# Patient Record
Sex: FEMALE | Race: WHITE | NOT HISPANIC OR LATINO | Employment: OTHER | URBAN - METROPOLITAN AREA
[De-identification: names, ages, dates, MRNs, and addresses within clinical notes are randomized per-mention and may not be internally consistent; named-entity substitution may affect disease eponyms.]

---

## 2017-01-03 ENCOUNTER — OFFICE VISIT (OUTPATIENT)
Dept: MEDICAL GROUP | Facility: PHYSICIAN GROUP | Age: 72
End: 2017-01-03
Payer: MEDICARE

## 2017-01-03 VITALS
BODY MASS INDEX: 27.64 KG/M2 | HEART RATE: 76 BPM | OXYGEN SATURATION: 91 % | WEIGHT: 156 LBS | DIASTOLIC BLOOD PRESSURE: 80 MMHG | HEIGHT: 63 IN | RESPIRATION RATE: 16 BRPM | SYSTOLIC BLOOD PRESSURE: 132 MMHG | TEMPERATURE: 97.7 F

## 2017-01-03 DIAGNOSIS — J20.9 ACUTE BRONCHITIS DUE TO INFECTION: ICD-10-CM

## 2017-01-03 PROCEDURE — 99214 OFFICE O/P EST MOD 30 MIN: CPT | Performed by: NURSE PRACTITIONER

## 2017-01-03 NOTE — MR AVS SNAPSHOT
"Rossy Lopez   1/3/2017 9:40 AM   Office Visit   MRN: [id number]    Department:  [facility]   Dept Phone:  [phone]    Description:  Female : 1945   Provider:  TASNEEM Mariscal           Reason for Visit     Cough     Shoulder Pain           Allergies as of 1/3/2017     Allergen Noted Reactions    Other Misc 2011   Hives    Patient is allergic to bandaides  RXN=ongoing    Phisohex [Hexachlorophene] 2016   Rash    Rash at contact site  RXN=48 years ago    [facility] 2010   Rash, Itching    RXN=44-48  Years ago     Tape 2016   Hives, Itching    Paper tape okay      You were diagnosed with     Acute bronchitis due to infection   [[id number]]         Vital Signs     Blood Pressure Pulse Temperature Respirations Height Weight    132/80 mmHg 76 36.5 °C (97.7 °F) 16 1.6 m (5' 3\") 70.761 kg (156 lb)    Body Mass Index Oxygen Saturation Smoking Status             27.64 kg/m2 91% Former Smoker         Basic Information     Date Of Birth Sex Race Ethnicity Preferred Language    1945 Female White Non- English      Problem List              ICD-10-CM Priority Class Noted - Resolved    Hypertension I10   2012 - Present    Diabetes mellitus type 2, controlled (HCC) E11.9   2012 - Present    Hyperlipidemia E78.5   2012 - Present    Family history of colon cancer Z80.0   2012 - Present    Spinal stenosis, lumbar M48.06   2014 - Present    Acute carpal tunnel syndrome of right wrist G56.01   2016 - Present      Health Maintenance        Date Due Completion Dates    DIABETES MONOFILAMTENT / LE EXAM 2017, 2014, 7/10/2013 (Done)    Override on 7/10/2013: Done    URINE ACR / MICROALBUMIN 2017, 7/15/2015, 2014, 2013, 2012    A1C SCREENING 2017, 2016, 7/15/2015, 2014, 2013, 2012, 2011, 8/3/2010    COLONOSCOPY 3/12/2017 3/12/2012, 7/10/2010 (Done)   " Override on 7/10/2010: Done ([facility] q 5 yrs Family history of colon cancer)    FASTING LIPID PROFILE 8/30/2017 8/30/2016, 2/22/2016, 7/15/2015, 8/20/2014, 7/11/2013, 6/20/2012, 7/29/2011    SERUM CREATININE 8/30/2017 8/30/2016, 2/22/2016, 7/15/2015, 8/20/2014, 7/11/2013, 6/20/2012, 7/29/2011    MAMMOGRAM 9/1/2017 9/1/2016, 7/22/2015, 7/21/2014, 7/16/2013, 3/26/2012, 3/14/2011, 2/25/2010, 2/25/2010, 2/23/2009, 2/23/2009, 2/11/2008, 2/11/2008, 1/25/2007, 1/25/2007    RETINAL SCREENING 11/3/2017 11/3/2016, 11/3/2016, 11/3/2016, 11/24/2014    BONE DENSITY 3/2/2021 3/2/2016, 7/11/2012    IMM DTaP/Tdap/Td Vaccine (2 - Td) 6/19/2022 6/19/2012            Current Immunizations     13-VALENT PCV PREVNAR 8/19/2015    Influenza TIV (IM) 10/28/2014    Influenza Vaccine Adult HD 11/22/2016 11:11 AM, 10/10/2015    Pneumococcal polysaccharide vaccine (PPSV-23) 6/19/2012    SHINGLES VACCINE 6/19/2012    Tdap Vaccine 6/19/2012    Varicella Vaccine Live 6/19/2012      Below and/or attached are the medications your provider expects you to take. Review all of your home medications and newly ordered medications with your provider and/or pharmacist. Follow medication instructions as directed by your provider and/or pharmacist. Please keep your medication list with you and share with your provider. Update the information when medications are discontinued, doses are changed, or new medications (including over-the-counter products) are added; and carry medication information at all times in the event of emergency situations     Allergies:  OTHER MISC - Hives     PHISOHEX - Rash     SULFA DRUGS - Rash,Itching     TAPE - Hives,Itching               Medications  Valid as of: January 03, 2017 -  1:20 PM    Generic Name Brand Name Tablet Size Instructions for use    Aspirin (Tab) aspirin 81 MG Take 81 mg by mouth every day.        Calcium Citrate-Vitamin D   Take 1 Tab by mouth every day.        Cholecalciferol   Take  by mouth.        Enalapril  Maleate (Tab) VASOTEC 10 MG Take 1 Tab by mouth every day.        Glucose Blood (Strip) glucose blood  TEST TWICE DAILY AND AS NEEDED FOR SYMPTOMS OF HIGH OR LOW SUGAR        HydroCHLOROthiazide (Tab) HYDRODIURIL 25 MG Take 1 Tab by mouth every day.        Hydrocodone-Acetaminophen (Tab) NORCO 5-325 MG Take 1 Tab by mouth every 6 hours as needed.        Ibuprofen   Take  by mouth.        MetFORMIN HCl (Tab) GLUCOPHAGE 500 MG Take 1 Tab by mouth every day.        MethylPREDNISolone (Tablet Therapy Pack) MEDROL DOSEPAK 4 MG U.D.        MethylPREDNISolone (Tablet Therapy Pack) MEDROL DOSEPAK 4 MG Take as directed        Multiple Vitamins-Minerals   Take 1 Tab by mouth every day.        Omeprazole (CAPSULE DELAYED RELEASE) PRILOSEC 20 MG Take 1 Cap by mouth every day.        Simvastatin (Tab) ZOCOR 10 MG Take 1 Tab by mouth every evening.        .                 Medicines prescribed today were sent to:     [facility] [address] [place] [address] [address] [facility]    [address] [facility] [address]    Phone: [phone] Fax: [phone]    Open 24 Hours?: No      Medication refill instructions:       If your prescription bottle indicates you have medication refills left, it is not necessary to call your provider’s office. Please contact your pharmacy and they will refill your medication.    If your prescription bottle indicates you do not have any refills left, you may request refills at any time through one of the following ways: The online [employer] system (except Urgent Care), by calling your provider’s office, or by asking your pharmacy to contact your provider’s office with a refill request. Medication refills are processed only during regular business hours and may not be available until the next business day. Your provider may request additional information or to have a follow-up visit with you prior to refilling your medication.   *Please Note: Medication refills are  assigned a new Rx number when refilled electronically. Your pharmacy may indicate that no refills were authorized even though a new prescription for the same medication is available at the pharmacy. Please request the medicine by name with the pharmacy before contacting your provider for a refill.           MyChart Access Code: Activation code not generated  Current [employer] Status: Active

## 2017-01-03 NOTE — PROGRESS NOTES
Chief Complaint   Patient presents with   • Cough   • Shoulder Pain       HISTORY OF PRESENT ILLNESS: Patient is a 71 y.o. female established patient who presents today to discuss the followin. Acute bronchitis due to infection  Patient states that she has been suffering from a productive cough for one week now.  She has tried multiple over-the-counter remedies including decongestants, cough suppressant and expectorant without any improvement.  She continues to have a productive, congested cough with fatigue and subjective fever and chills.  Her son had similar symptoms when he was visiting for the holidays.    Allergies:Other misc; Phisohex; Sulfa drugs; and Tape    Current Outpatient Prescriptions Ordered in [facility]   Medication Sig Dispense Refill   • Ibuprofen (ADVIL PO) Take  by mouth.     • MethylPREDNISolone (MEDROL DOSEPAK) 4 MG Tablet Therapy Pack Take as directed 21 Tab 0   • glucose blood (ACCU-CHEK SMARTVIEW) strip TEST TWICE DAILY AND AS NEEDED FOR SYMPTOMS OF HIGH OR LOW SUGAR 100 Strip 3   • Cholecalciferol (VITAMIN D-3 PO) Take  by mouth.     • hydrocodone-acetaminophen (NORCO) 5-325 MG Tab per tablet Take 1 Tab by mouth every 6 hours as needed. 12 Tab 0   • MethylPREDNISolone (MEDROL DOSEPAK) 4 MG Tablet Therapy Pack U.D. 1 Tab 0   • simvastatin (ZOCOR) 10 MG Tab Take 1 Tab by mouth every evening. 90 Tab 3   • omeprazole (PRILOSEC) 20 MG delayed-release capsule Take 1 Cap by mouth every day. 90 Cap 3   • metformin (GLUCOPHAGE) 500 MG Tab Take 1 Tab by mouth every day. 90 Tab 1   • hydrochlorothiazide (HYDRODIURIL) 25 MG Tab Take 1 Tab by mouth every day. 90 Tab 1   • enalapril (VASOTEC) 10 MG Tab Take 1 Tab by mouth every day. 90 Tab 1   • Multiple Vitamins-Minerals (MULTIVITAMIN ADULT PO) Take 1 Tab by mouth every day.     • aspirin 81 MG tablet Take 81 mg by mouth every day.     • Calcium-Vitamin D (CALCIUM + D PO) Take 1 Tab by mouth every day.       No current Epic-ordered  facility-administered medications on file.       Past Medical History   Diagnosis Date   • Hyperlipidemia    • Hypertension    • Family history of colon cancer 2012   • Diabetes    • Diabetes mellitus type 2, controlled ([facility]) 2012     oral medication   • High cholesterol    • Heart burn    • Cataract 2016     early stage   • Anesthesia      difficult to wake and extended sleep (couple of days)       Social History   Substance Use Topics   • Smoking status: Former Smoker -- 1.00 packs/day for 5 years     Types: Cigarettes     Quit date: 1970   • Smokeless tobacco: Never Used      Comment: quit    • Alcohol Use: Yes      Comment: occasionally       Family Status   Relation Status Death Age   • Father     • Maternal Aunt     • Mother       Family History   Problem Relation Age of Onset   • Heart Disease Father    • Cancer Father    • Diabetes Father    • Cancer Maternal Aunt        ROS: see above    Review of Systems   Constitutional: Negative for fever, chills, weight loss and malaise/fatigue.   HENT: Negative for ear pain, nosebleeds, congestion, sore throat and neck pain.    Eyes: Negative for blurred vision.   Respiratory: Negative for cough, sputum production, shortness of breath and wheezing.    Cardiovascular: Negative for chest pain, palpitations, orthopnea and leg swelling.   Gastrointestinal: Negative for heartburn, nausea, vomiting and abdominal pain.   Genitourinary: Negative for dysuria, urgency and frequency.   Musculoskeletal: Negative for myalgias, back pain and joint pain.   Skin: Negative for rash and itching.   Neurological: Negative for dizziness, tingling, tremors, sensory change, focal weakness and headaches.   Endo/Heme/Allergies: Does not bruise/bleed easily.   Psychiatric/Behavioral: Negative for depression, suicidal ideas and memory loss.  The patient is not nervous/anxious and does not have insomnia.        Exam:  Blood pressure 132/80, pulse 76,  "temperature 36.5 °C (97.7 °F), resp. rate 16, height 1.6 m (5' 3\"), weight 70.761 kg (156 lb), SpO2 91 %.  General:  Well nourished, well developed female in NAD  Head is grossly normal.  Neck: Thyroid is not enlarged.  Pulmonary: Increased effort with rhonci throughout lower lobes.    Cardiovascular: Regular rate and rhythm without murmur.   Extremities: no clubbing, cyanosis, or edema.  Psych:  Mood and affect are normal.  Answering questions appropriately with good eye contact.      Please note that this dictation was created using voice recognition software. I have made every reasonable attempt to correct obvious errors, but I expect that there are errors of grammar and possibly content that I did not discover before finalizing the note.    Assessment/Plan:    1. Acute bronchitis due to infection          1.  Treat empirically for acute bronchitis, written prescription given due to EPIC failure at time of appointment.  2.  Continue OTC remedies for symptom relief  3.  RTC if symptoms or worsen.      "

## 2017-01-19 ENCOUNTER — TELEPHONE (OUTPATIENT)
Dept: MEDICAL GROUP | Facility: PHYSICIAN GROUP | Age: 72
End: 2017-01-19

## 2017-01-19 ENCOUNTER — HOSPITAL ENCOUNTER (OUTPATIENT)
Dept: RADIOLOGY | Facility: MEDICAL CENTER | Age: 72
End: 2017-01-19
Attending: NURSE PRACTITIONER
Payer: MEDICARE

## 2017-01-19 ENCOUNTER — OFFICE VISIT (OUTPATIENT)
Dept: MEDICAL GROUP | Facility: PHYSICIAN GROUP | Age: 72
End: 2017-01-19
Payer: MEDICARE

## 2017-01-19 VITALS
DIASTOLIC BLOOD PRESSURE: 80 MMHG | RESPIRATION RATE: 16 BRPM | SYSTOLIC BLOOD PRESSURE: 142 MMHG | WEIGHT: 156 LBS | HEIGHT: 63 IN | HEART RATE: 88 BPM | TEMPERATURE: 98.1 F | BODY MASS INDEX: 27.64 KG/M2 | OXYGEN SATURATION: 99 %

## 2017-01-19 DIAGNOSIS — S32.050A COMPRESSION FRACTURE OF L5 LUMBAR VERTEBRA, CLOSED, INITIAL ENCOUNTER (HCC): ICD-10-CM

## 2017-01-19 DIAGNOSIS — M54.50 ACUTE RIGHT-SIDED LOW BACK PAIN WITHOUT SCIATICA: ICD-10-CM

## 2017-01-19 DIAGNOSIS — M48.061 SPINAL STENOSIS, LUMBAR: ICD-10-CM

## 2017-01-19 DIAGNOSIS — M25.551 ACUTE RIGHT HIP PAIN: ICD-10-CM

## 2017-01-19 DIAGNOSIS — M25.551 ACUTE RIGHT HIP PAIN: Primary | ICD-10-CM

## 2017-01-19 DIAGNOSIS — S32.000A COMPRESSION FRACTURE OF LUMBAR VERTEBRA, CLOSED, INITIAL ENCOUNTER (HCC): ICD-10-CM

## 2017-01-19 PROCEDURE — 72100 X-RAY EXAM L-S SPINE 2/3 VWS: CPT

## 2017-01-19 PROCEDURE — 73502 X-RAY EXAM HIP UNI 2-3 VIEWS: CPT | Mod: RT

## 2017-01-19 PROCEDURE — 99214 OFFICE O/P EST MOD 30 MIN: CPT | Performed by: NURSE PRACTITIONER

## 2017-01-19 RX ORDER — KETOROLAC TROMETHAMINE 30 MG/ML
30 INJECTION, SOLUTION INTRAMUSCULAR; INTRAVENOUS ONCE
Status: COMPLETED | OUTPATIENT
Start: 2017-01-19 | End: 2017-01-19

## 2017-01-19 RX ADMIN — KETOROLAC TROMETHAMINE 30 MG: 30 INJECTION, SOLUTION INTRAMUSCULAR; INTRAVENOUS at 09:43

## 2017-01-19 ASSESSMENT — PATIENT HEALTH QUESTIONNAIRE - PHQ9: CLINICAL INTERPRETATION OF PHQ2 SCORE: 0

## 2017-01-19 NOTE — PROGRESS NOTES
Chief Complaint   Patient presents with   • Hip Pain       HISTORY OF PRESENT ILLNESS: Patient is a 71 y.o. female established patient who presents today with her  to discuss the followin. Acute right hip pain  Patient states that she developed acute right groin pain at approximately 9:30 last night.  She denies any aggravating activities or movements.  She states that the pain can be intense and is radiating to the lateral hip and into the buttock.  She does have long-standing history of lumbar stenosis, was under the care of Dr. Alvares at one point.  Last lumbar spine x-rays were reviewed from .  She denies any radiation beyond the knee.  Denies any family weakness.  She states that she took 2 Advil last night and the codeine with moderate, temporary relief.    - Ketorolac Tromethamine    2. Spinal stenosis, lumbar  - Ketorolac Tromethamine    Allergies:Other misc; Phisohex; Sulfa drugs; and Tape    Current Outpatient Prescriptions Ordered in [facility]   Medication Sig Dispense Refill   • Ibuprofen (ADVIL PO) Take  by mouth.     • MethylPREDNISolone (MEDROL DOSEPAK) 4 MG Tablet Therapy Pack Take as directed 21 Tab 0   • glucose blood (ACCU-CHEK SMARTVIEW) strip TEST TWICE DAILY AND AS NEEDED FOR SYMPTOMS OF HIGH OR LOW SUGAR 100 Strip 3   • Cholecalciferol (VITAMIN D-3 PO) Take  by mouth.     • hydrocodone-acetaminophen (NORCO) 5-325 MG Tab per tablet Take 1 Tab by mouth every 6 hours as needed. 12 Tab 0   • MethylPREDNISolone (MEDROL DOSEPAK) 4 MG Tablet Therapy Pack U.D. 1 Tab 0   • simvastatin (ZOCOR) 10 MG Tab Take 1 Tab by mouth every evening. 90 Tab 3   • omeprazole (PRILOSEC) 20 MG delayed-release capsule Take 1 Cap by mouth every day. 90 Cap 3   • metformin (GLUCOPHAGE) 500 MG Tab Take 1 Tab by mouth every day. 90 Tab 1   • hydrochlorothiazide (HYDRODIURIL) 25 MG Tab Take 1 Tab by mouth every day. 90 Tab 1   • enalapril (VASOTEC) 10 MG Tab Take 1 Tab by mouth every day. 90 Tab 1   • Multiple  Vitamins-Minerals (MULTIVITAMIN ADULT PO) Take 1 Tab by mouth every day.     • aspirin 81 MG tablet Take 81 mg by mouth every day.     • Calcium-Vitamin D (CALCIUM + D PO) Take 1 Tab by mouth every day.       Current Facility-Administered Medications Ordered in Epic   Medication Dose Route Frequency Provider Last Rate Last Dose   • ketorolac (TORADOL) injection 30 mg  30 mg Intramuscular Once Chelsi Snyder A.P.N.           Past Medical History   Diagnosis Date   • Hyperlipidemia    • Hypertension    • Family history of colon cancer 2012   • Diabetes    • Diabetes mellitus type 2, controlled ([facility]) 2012     oral medication   • High cholesterol    • Heart burn    • Cataract 2016     early stage   • Anesthesia      difficult to wake and extended sleep (couple of days)       Social History   Substance Use Topics   • Smoking status: Former Smoker -- 1.00 packs/day for 5 years     Types: Cigarettes     Quit date: 1970   • Smokeless tobacco: Never Used      Comment: quit    • Alcohol Use: 0.0 oz/week     0 Standard drinks or equivalent per week      Comment: occasionally       Family Status   Relation Status Death Age   • Father     • Maternal Aunt     • Mother       Family History   Problem Relation Age of Onset   • Heart Disease Father    • Cancer Father    • Diabetes Father    • Cancer Maternal Aunt        ROS: see above    Review of Systems   Constitutional: Negative for fever, chills, weight loss and malaise/fatigue.   HENT: Negative for ear pain, nosebleeds, congestion, sore throat and neck pain.    Eyes: Negative for blurred vision.   Respiratory: Negative for cough, sputum production, shortness of breath and wheezing.    Cardiovascular: Negative for chest pain, palpitations, orthopnea and leg swelling.   Gastrointestinal: Negative for heartburn, nausea, vomiting and abdominal pain.   Genitourinary: Negative for dysuria, urgency and frequency.   Musculoskeletal:  "Negative for myalgias, back pain and joint pain.   Skin: Negative for rash and itching.   Neurological: Negative for dizziness, tingling, tremors, sensory change, focal weakness and headaches.   Endo/Heme/Allergies: Does not bruise/bleed easily.   Psychiatric/Behavioral: Negative for depression, suicidal ideas and memory loss.  The patient is not nervous/anxious and does not have insomnia.        Exam:  Blood pressure 142/80, pulse 88, temperature 36.7 °C (98.1 °F), resp. rate 16, height 1.6 m (5' 3\"), weight 70.761 kg (156 lb), SpO2 99 %.  General:  Well nourished, well developed female in moderate distress.  Head is grossly normal.  Neck: Thyroid is not enlarged.  Pulmonary: Normal effort.   Cardiovascular: Regular rate.   Extremities: no clubbing, cyanosis, or edema.  Limited ROM with forward flexion of the lumbar spine.  Very little, painful flexion of the right hip.  Psych:  Mood and affect are normal.  Answering questions appropriately with good eye contact.      Please note that this dictation was created using voice recognition software. I have made every reasonable attempt to correct obvious errors, but I expect that there are errors of grammar and possibly content that I did not discover before finalizing the note.    I have placed the below orders and discussed them with an approved delegating provider. The MA is performing the below orders under the direction of Dr. Barillas, who have provided verbal consent for supervision.    Assessment/Plan:    1. Acute right hip pain  ketorolac (TORADOL) injection 30 mg    REFERRAL TO PHYSICAL THERAPY Reason for Therapy: Eval/Treat/Report    REFERRAL TO PHYSIATRY (PMR)    DX-HIP-COMPLETE - UNILATERAL 2+ RIGHT   2. Spinal stenosis, lumbar  ketorolac (TORADOL) injection 30 mg    REFERRAL TO PHYSICAL THERAPY Reason for Therapy: Eval/Treat/Report    REFERRAL TO PHYSIATRY (PMR)    DX-LUMBAR SPINE-2 OR 3 VIEWS        1.  Xrays today  2.  Toradol today, no additional NSAIDS " for the remainder of the day.  3.  Consider PT vs PMR if pain continues or xray results warrant further evaluation.  4.  Will contact patient with the above results. F/u pending.

## 2017-01-19 NOTE — MR AVS SNAPSHOT
"Rossy Lopez   2017 9:20 AM   Office Visit   MRN: [id number]    Department:  [facility]   Dept Phone:  [phone]    Description:  Female : 1945   Provider:  TASNEEM Mariscal           Reason for Visit     Hip Pain           Allergies as of 2017     Allergen Noted Reactions    Other Misc 2011   Hives    Patient is allergic to bandaides  RXN=ongoing    Phisohex [Hexachlorophene] 2016   Rash    Rash at contact site  RXN=48 years ago    [facility] 2010   Rash, Itching    RXN=44-48  Years ago     Tape 2016   Hives, Itching    Paper tape okay      You were diagnosed with     Acute right hip pain   [[id number]]  -  Primary     Spinal stenosis, lumbar   [[id number]]         Vital Signs     Blood Pressure Pulse Temperature Respirations Height Weight    142/80 mmHg 88 36.7 °C (98.1 °F) 16 1.6 m (5' 3\") 70.761 kg (156 lb)    Body Mass Index Oxygen Saturation Smoking Status             27.64 kg/m2 99% Former Smoker         Basic Information     Date Of Birth Sex Race Ethnicity Preferred Language    1945 Female White Non- English      Problem List              ICD-10-CM Priority Class Noted - Resolved    Hypertension I10   2012 - Present    Diabetes mellitus type 2, controlled (CMS-HCC) E11.9   2012 - Present    Hyperlipidemia E78.5   2012 - Present    Family history of colon cancer Z80.0   2012 - Present    Spinal stenosis, lumbar M48.06   2014 - Present    Acute carpal tunnel syndrome of right wrist G56.01   2016 - Present      Health Maintenance        Date Due Completion Dates    DIABETES MONOFILAMENT / LE EXAM 2017, 2014, 7/10/2013 (Done)    Override on 7/10/2013: Done    URINE ACR / MICROALBUMIN 2017, 7/15/2015, 2014, 2013, 2012    A1C SCREENING 2017, 2016, 7/15/2015, 2014, 2013, 2012, 2011, 8/3/2010    COLONOSCOPY 3/12/2017 " 3/12/2012, 7/10/2010 (Done)    Override on 7/10/2010: Done (GIC q 5 yrs Family history of colon cancer)    FASTING LIPID PROFILE 8/30/2017 8/30/2016, 2/22/2016, 7/15/2015, 8/20/2014, 7/11/2013, 6/20/2012, 7/29/2011    SERUM CREATININE 8/30/2017 8/30/2016, 2/22/2016, 7/15/2015, 8/20/2014, 7/11/2013, 6/20/2012, 7/29/2011    MAMMOGRAM 9/1/2017 9/1/2016, 7/22/2015, 7/21/2014, 7/16/2013, 3/26/2012, 3/14/2011, 2/25/2010, 2/25/2010, 2/23/2009, 2/23/2009, 2/11/2008, 2/11/2008, 1/25/2007, 1/25/2007    RETINAL SCREENING 11/3/2017 11/3/2016, 11/3/2016, 11/3/2016, 11/24/2014    BONE DENSITY 3/2/2021 3/2/2016, 7/11/2012    IMM DTaP/Tdap/Td Vaccine (2 - Td) 6/19/2022 6/19/2012            Current Immunizations     13-VALENT PCV PREVNAR 8/19/2015    Influenza TIV (IM) 10/28/2014    Influenza Vaccine Adult HD 11/22/2016 11:11 AM, 10/10/2015    Pneumococcal polysaccharide vaccine (PPSV-23) 6/19/2012    SHINGLES VACCINE 6/19/2012    Tdap Vaccine 6/19/2012    Varicella Vaccine Live 6/19/2012      Below and/or attached are the medications your provider expects you to take. Review all of your home medications and newly ordered medications with your provider and/or pharmacist. Follow medication instructions as directed by your provider and/or pharmacist. Please keep your medication list with you and share with your provider. Update the information when medications are discontinued, doses are changed, or new medications (including over-the-counter products) are added; and carry medication information at all times in the event of emergency situations     Allergies:  OTHER MISC - Hives     PHISOHEX - Rash     SULFA DRUGS - Rash,Itching     TAPE - Hives,Itching               Medications  Valid as of: January 19, 2017 - 10:16 AM    Generic Name Brand Name Tablet Size Instructions for use    Aspirin (Tab) aspirin 81 MG Take 81 mg by mouth every day.        Calcium Citrate-Vitamin D   Take 1 Tab by mouth every day.        Cholecalciferol   Take   by mouth.        Enalapril Maleate (Tab) VASOTEC 10 MG Take 1 Tab by mouth every day.        Glucose Blood (Strip) glucose blood  TEST TWICE DAILY AND AS NEEDED FOR SYMPTOMS OF HIGH OR LOW SUGAR        HydroCHLOROthiazide (Tab) HYDRODIURIL 25 MG Take 1 Tab by mouth every day.        Hydrocodone-Acetaminophen (Tab) NORCO 5-325 MG Take 1 Tab by mouth every 6 hours as needed.        Ibuprofen   Take  by mouth.        MetFORMIN HCl (Tab) GLUCOPHAGE 500 MG Take 1 Tab by mouth every day.        MethylPREDNISolone (Tablet Therapy Pack) MEDROL DOSEPAK 4 MG U.D.        MethylPREDNISolone (Tablet Therapy Pack) MEDROL DOSEPAK 4 MG Take as directed        Multiple Vitamins-Minerals   Take 1 Tab by mouth every day.        Omeprazole (CAPSULE DELAYED RELEASE) PRILOSEC 20 MG Take 1 Cap by mouth every day.        Simvastatin (Tab) ZOCOR 10 MG Take 1 Tab by mouth every evening.        .                 Medicines prescribed today were sent to:     [facility] [address] [place] [address] [address] [facility] [place] [address] [facility] [address]    Phone: [phone] Fax: [phone]    Open 24 Hours?: No      Medication refill instructions:       If your prescription bottle indicates you have medication refills left, it is not necessary to call your provider’s office. Please contact your pharmacy and they will refill your medication.    If your prescription bottle indicates you do not have any refills left, you may request refills at any time through one of the following ways: The online [employer] system (except Urgent Care), by calling your provider’s office, or by asking your pharmacy to contact your provider’s office with a refill request. Medication refills are processed only during regular business hours and may not be available until the next business day. Your provider may request additional information or to have a follow-up visit with you prior to refilling your medication.   *Please Note:  Medication refills are assigned a new Rx number when refilled electronically. Your pharmacy may indicate that no refills were authorized even though a new prescription for the same medication is available at the pharmacy. Please request the medicine by name with the pharmacy before contacting your provider for a refill.        Your To Do List     Future Labs/Procedures Complete By Expires    DX-HIP-COMPLETE - UNILATERAL 2+ RIGHT  As directed 1/19/2018    DX-LUMBAR SPINE-2 OR 3 VIEWS  As directed 1/19/2018      Referral     A referral request has been sent to our patient care coordination department. Please allow 3-5 business days for us to process this request and contact you either by phone or mail. If you do not hear from us by the 5th business day, please call us at [phone].           [employer] Access Code: Activation code not generated  Current [employer] Status: Active

## 2017-01-20 ENCOUNTER — HOSPITAL ENCOUNTER (OUTPATIENT)
Dept: RADIOLOGY | Facility: MEDICAL CENTER | Age: 72
End: 2017-01-20
Attending: NURSE PRACTITIONER
Payer: MEDICARE

## 2017-01-20 DIAGNOSIS — M54.50 ACUTE RIGHT-SIDED LOW BACK PAIN WITHOUT SCIATICA: ICD-10-CM

## 2017-01-20 DIAGNOSIS — S32.050A COMPRESSION FRACTURE OF L5 LUMBAR VERTEBRA, CLOSED, INITIAL ENCOUNTER (HCC): ICD-10-CM

## 2017-01-20 PROCEDURE — 72148 MRI LUMBAR SPINE W/O DYE: CPT

## 2017-01-20 RX ORDER — TRAMADOL HYDROCHLORIDE 50 MG/1
50 TABLET ORAL EVERY 4 HOURS PRN
Qty: 60 TAB | Refills: 0 | Status: SHIPPED
Start: 2017-01-20 | End: 2017-07-12

## 2017-01-20 NOTE — TELEPHONE ENCOUNTER
----- Message from TASNEEM Mariscal sent at 1/19/2017  4:40 PM PST -----  Lumbar spine xray reveals the possibility of a compression fracture of the L5 vertebral body.  I would like to get an MRI scan to further evaluation is chronicity.  This may be the source of the pain.  Please encourage patient to call [phone] to schedule.  Hold off on PT until MRI is done.  Thank you

## 2017-01-20 NOTE — TELEPHONE ENCOUNTER
Patient notified to get the MRI done.  She stated that she was still in pain and took some codeine.  She has the MRI today.

## 2017-01-20 NOTE — TELEPHONE ENCOUNTER
1. Caller Name: Rossy Lopez                      Call Back Number: [phone] (home)     2. Message: Patient called asking if she should schedule MRI. Per notes, I advised she should call and schedule. Patient also in a lot of pain, wondering if there is anything she can take or get to help with the pain? Please advise.    3. Patient approves office to leave a detailed voicemail/MyChart message: yes

## 2017-01-20 NOTE — TELEPHONE ENCOUNTER
Yes, patient needs to schedule MRI as soon as possible.  This will determine if the fracture is new and is amenable to surgical repair.  Please ask Dr. Barillas if she is comfortable prescribing Tramadol for the patient while we wait for the imaging.  It appears she has an acute L5 compression fracture.  Thank you

## 2017-01-23 DIAGNOSIS — M54.50 ACUTE EXACERBATION OF CHRONIC LOW BACK PAIN: ICD-10-CM

## 2017-01-23 DIAGNOSIS — G89.29 ACUTE EXACERBATION OF CHRONIC LOW BACK PAIN: ICD-10-CM

## 2017-01-23 DIAGNOSIS — M48.061 SPINAL STENOSIS, LUMBAR: ICD-10-CM

## 2017-01-25 ENCOUNTER — OFFICE VISIT (OUTPATIENT)
Dept: MEDICAL GROUP | Facility: PHYSICIAN GROUP | Age: 72
End: 2017-01-25
Payer: MEDICARE

## 2017-01-25 VITALS
HEIGHT: 63 IN | BODY MASS INDEX: 27.29 KG/M2 | RESPIRATION RATE: 16 BRPM | TEMPERATURE: 98.4 F | WEIGHT: 154 LBS | DIASTOLIC BLOOD PRESSURE: 78 MMHG | HEART RATE: 75 BPM | SYSTOLIC BLOOD PRESSURE: 130 MMHG | OXYGEN SATURATION: 98 %

## 2017-01-25 DIAGNOSIS — M25.532 LEFT WRIST PAIN: ICD-10-CM

## 2017-01-25 DIAGNOSIS — M25.512 ACUTE PAIN OF LEFT SHOULDER: Primary | ICD-10-CM

## 2017-01-25 PROCEDURE — 3288F FALL RISK ASSESSMENT DOCD: CPT | Performed by: NURSE PRACTITIONER

## 2017-01-25 PROCEDURE — 3017F COLORECTAL CA SCREEN DOC REV: CPT | Performed by: NURSE PRACTITIONER

## 2017-01-25 PROCEDURE — G8482 FLU IMMUNIZE ORDER/ADMIN: HCPCS | Performed by: NURSE PRACTITIONER

## 2017-01-25 PROCEDURE — G8432 DEP SCR NOT DOC, RNG: HCPCS | Performed by: NURSE PRACTITIONER

## 2017-01-25 PROCEDURE — 1100F PTFALLS ASSESS-DOCD GE2>/YR: CPT | Performed by: NURSE PRACTITIONER

## 2017-01-25 PROCEDURE — G8420 CALC BMI NORM PARAMETERS: HCPCS | Performed by: NURSE PRACTITIONER

## 2017-01-25 PROCEDURE — 4040F PNEUMOC VAC/ADMIN/RCVD: CPT | Performed by: NURSE PRACTITIONER

## 2017-01-25 PROCEDURE — 1036F TOBACCO NON-USER: CPT | Performed by: NURSE PRACTITIONER

## 2017-01-25 PROCEDURE — 99213 OFFICE O/P EST LOW 20 MIN: CPT | Performed by: NURSE PRACTITIONER

## 2017-01-25 PROCEDURE — 3014F SCREEN MAMMO DOC REV: CPT | Performed by: NURSE PRACTITIONER

## 2017-01-25 PROCEDURE — 0518F FALL PLAN OF CARE DOCD: CPT | Mod: 8P | Performed by: NURSE PRACTITIONER

## 2017-01-25 NOTE — MR AVS SNAPSHOT
"        Rossy Cartagena Jessica   2017 10:20 AM   Office Visit   MRN: [id number]    Department:  [facility]   Dept Phone:  [phone]    Description:  Female : 1945   Provider:  TASNEEM Mariscal           Reason for Visit     Shoulder Pain with swelling down to wrist     Arm Pain     Hand Pain           Allergies as of 2017     Allergen Noted Reactions    Other Misc 2011   Hives    Patient is allergic to bandaides  RXN=ongoing    Phisohex [Hexachlorophene] 2016   Rash    Rash at contact site  RXN=48 years ago    Sulfa Drugs 2010   Rash, Itching    RXN=[id number]  Years ago     Tape 2016   Hives, Itching    Paper tape okay      You were diagnosed with     Acute pain of left shoulder   [6413534]  -  Primary     Left wrist pain   [[id number]]         Vital Signs     Blood Pressure Pulse Temperature Respirations Height Weight    130/78 mmHg 75 36.9 °C (98.4 °F) 16 1.6 m (5' 2.99\") 69.854 kg (154 lb)    Body Mass Index Oxygen Saturation Smoking Status             27.29 kg/m2 98% Former Smoker         Basic Information     Date Of Birth Sex Race Ethnicity Preferred Language    1945 Female White Non- English      Problem List              ICD-10-CM Priority Class Noted - Resolved    Hypertension I10   2012 - Present    Diabetes mellitus type 2, controlled (CMS-HCC) E11.9   2012 - Present    Hyperlipidemia E78.5   2012 - Present    Family history of colon cancer Z80.0   2012 - Present    Spinal stenosis, lumbar M48.06   2014 - Present    Acute carpal tunnel syndrome of right wrist G56.01   2016 - Present      Health Maintenance        Date Due Completion Dates    DIABETES MONOFILAMENT / LE EXAM 2017, 2014, 7/10/2013 (Done)    Override on 7/10/2013: Done    URINE ACR / MICROALBUMIN 2017, 7/15/2015, 2014, 2013, 2012    A1C SCREENING 2017, 2016, 7/15/2015, 2014, " 7/11/2013, 6/20/2012, 7/29/2011, 8/3/2010    COLONOSCOPY 3/12/2017 3/12/2012, 7/10/2010 (Done)    Override on 7/10/2010: Done ([facility] q 5 yrs Family history of colon cancer)    FASTING LIPID PROFILE 8/30/2017 8/30/2016, 2/22/2016, 7/15/2015, 8/20/2014, 7/11/2013, 6/20/2012, 7/29/2011    SERUM CREATININE 8/30/2017 8/30/2016, 2/22/2016, 7/15/2015, 8/20/2014, 7/11/2013, 6/20/2012, 7/29/2011    MAMMOGRAM 9/1/2017 9/1/2016, 7/22/2015, 7/21/2014, 7/16/2013, 3/26/2012, 3/14/2011, 2/25/2010, 2/25/2010, 2/23/2009, 2/23/2009, 2/11/2008, 2/11/2008, 1/25/2007, 1/25/2007    RETINAL SCREENING 11/3/2017 11/3/2016, 11/3/2016, 11/3/2016, 11/24/2014    BONE DENSITY 3/2/2021 3/2/2016, 7/11/2012    IMM DTaP/Tdap/Td Vaccine (2 - Td) 6/19/2022 6/19/2012            Current Immunizations     13-VALENT PCV PREVNAR 8/19/2015    Influenza TIV (IM) 10/28/2014    Influenza Vaccine Adult HD 11/22/2016 11:11 AM, 10/10/2015    Pneumococcal polysaccharide vaccine (PPSV-23) 6/19/2012    SHINGLES VACCINE 6/19/2012    Tdap Vaccine 6/19/2012    Varicella Vaccine Live 6/19/2012      Below and/or attached are the medications your provider expects you to take. Review all of your home medications and newly ordered medications with your provider and/or pharmacist. Follow medication instructions as directed by your provider and/or pharmacist. Please keep your medication list with you and share with your provider. Update the information when medications are discontinued, doses are changed, or new medications (including over-the-counter products) are added; and carry medication information at all times in the event of emergency situations     Allergies:  OTHER MISC - Hives     PHISOHEX - Rash     SULFA DRUGS - Rash,Itching     TAPE - Hives,Itching               Medications  Valid as of: January 25, 2017 -  3:20 PM    Generic Name Brand Name Tablet Size Instructions for use    Aspirin (Tab) aspirin 81 MG Take 81 mg by mouth every day.        Calcium  Citrate-Vitamin D   Take 1 Tab by mouth every day.        Cholecalciferol   Take  by mouth.        Enalapril Maleate (Tab) VASOTEC 10 MG Take 1 Tab by mouth every day.        Glucose Blood (Strip) glucose blood  TEST TWICE DAILY AND AS NEEDED FOR SYMPTOMS OF HIGH OR LOW SUGAR        HydroCHLOROthiazide (Tab) HYDRODIURIL 25 MG Take 1 Tab by mouth every day.        Hydrocodone-Acetaminophen (Tab) NORCO 5-325 MG Take 1 Tab by mouth every 6 hours as needed.        Ibuprofen   Take  by mouth.        MetFORMIN HCl (Tab) GLUCOPHAGE 500 MG Take 1 Tab by mouth every day.        MethylPREDNISolone (Tablet Therapy Pack) MEDROL DOSEPAK 4 MG U.D.        MethylPREDNISolone (Tablet Therapy Pack) MEDROL DOSEPAK 4 MG Take as directed        Multiple Vitamins-Minerals   Take 1 Tab by mouth every day.        Omeprazole (CAPSULE DELAYED RELEASE) PRILOSEC 20 MG Take 1 Cap by mouth every day.        Simvastatin (Tab) ZOCOR 10 MG Take 1 Tab by mouth every evening.        TraMADol HCl (Tab) ULTRAM 50 MG Take 1 Tab by mouth every four hours as needed.        .                 Medicines prescribed today were sent to:     [facility] [id number] [place] [address] [address] [facility]    [address] [facility] [address]    Phone: [phone] Fax: [phone]    Open 24 Hours?: No      Medication refill instructions:       If your prescription bottle indicates you have medication refills left, it is not necessary to call your provider’s office. Please contact your pharmacy and they will refill your medication.    If your prescription bottle indicates you do not have any refills left, you may request refills at any time through one of the following ways: The online [employer] system (except Urgent Care), by calling your provider’s office, or by asking your pharmacy to contact your provider’s office with a refill request. Medication refills are processed only during regular business hours and may not be  available until the next business day. Your provider may request additional information or to have a follow-up visit with you prior to refilling your medication.   *Please Note: Medication refills are assigned a new Rx number when refilled electronically. Your pharmacy may indicate that no refills were authorized even though a new prescription for the same medication is available at the pharmacy. Please request the medicine by name with the pharmacy before contacting your provider for a refill.        Referral     A referral request has been sent to our patient care coordination department. Please allow 3-5 business days for us to process this request and contact you either by phone or mail. If you do not hear from us by the 5th business day, please call us at [phone].           appbackr Access Code: Activation code not generated  Current appbackr Status: Active

## 2017-01-25 NOTE — PROGRESS NOTES
"Subjective:      Rossy Lopez is a 71 y.o. female who presents with Shoulder Pain; Arm Pain; and Hand Pain            Shoulder Pain    Arm Pain     Rossy is here today to discuss the following new problem:    1. Acute pain of left shoulder  Patient states that left shoulder pain started on Friday.  Patient was optimistic that the symptoms would improve however she continues to have both pain and now limited range of motion.  She has been taking over-the-counter anti-inflammatories without any significant improvement.  She denies any injury or accident.  It is possible that she slept wrong but denies any specific accident, injury or movements.    2. Left wrist pain  Patient was recently seen for right low back/hip pain.  She was using a walker.  This is essentially only change to her daily routine.  She reports swelling of the left wrist and denies any known injury.  She states that there is a sensation as though she feels that her hand is asleep.    Active Ambulatory Problems     Diagnosis Date Noted   • Hypertension 06/19/2012   • Diabetes mellitus type 2, controlled (CMS-HCC) 06/19/2012   • Hyperlipidemia 06/19/2012   • Family history of colon cancer 06/19/2012   • Spinal stenosis, lumbar 05/14/2014   • Acute carpal tunnel syndrome of right wrist 09/12/2016     Resolved Ambulatory Problems     Diagnosis Date Noted   • Routine health maintenance 06/19/2012   • Pain 09/05/2014     Past Medical History   Diagnosis Date   • Diabetes    • High cholesterol    • Heart burn    • Cataract 8/2016   • Anesthesia      Review of Systems   Musculoskeletal:        See HPI          Objective:     /78 mmHg  Pulse 75  Temp(Src) 36.9 °C (98.4 °F)  Resp 16  Ht 1.6 m (5' 2.99\")  Wt 69.854 kg (154 lb)  BMI 27.29 kg/m2  SpO2 98%     Physical Exam   Constitutional: She is oriented to person, place, and time. She appears well-developed and well-nourished.   Cardiovascular: Normal rate.    Pulmonary/Chest: Effort normal. "   Musculoskeletal:        Left shoulder: She exhibits decreased range of motion, tenderness (biceps tendon) and pain. She exhibits normal pulse and normal strength.        Left wrist: She exhibits decreased range of motion, tenderness and swelling.   Neurological: She is alert and oriented to person, place, and time.   Skin: Skin is warm and dry.   Nursing note and vitals reviewed.              Assessment/Plan:     1. Acute pain of left shoulder  REFERRAL TO PHYSICAL THERAPY Reason for Therapy: Eval/Treat/Report    CANCELED: DX-SHOULDER 2+ LEFT   2. Left wrist pain  REFERRAL TO PHYSICAL THERAPY Reason for Therapy: Eval/Treat/Report       1.  Reviewed x-rays from October, there does not appear to be any arthritis in the left shoulder.  2.  Symptoms are consistent with bursitis, likely flared up with her use of the walker  3.  Encouraged five-day course of over-the-counter anti-inflammatory, consider scheduling PT if pain persists despite the medication.

## 2017-03-13 ENCOUNTER — HOSPITAL ENCOUNTER (OUTPATIENT)
Dept: LAB | Facility: MEDICAL CENTER | Age: 72
End: 2017-03-13
Attending: OPHTHALMOLOGY
Payer: MEDICARE

## 2017-03-13 LAB — CREAT SERPL-MCNC: 0.63 MG/DL (ref 0.5–1.4)

## 2017-03-13 PROCEDURE — 36415 COLL VENOUS BLD VENIPUNCTURE: CPT

## 2017-03-13 PROCEDURE — 82565 ASSAY OF CREATININE: CPT

## 2017-03-16 ENCOUNTER — HOSPITAL ENCOUNTER (OUTPATIENT)
Dept: RADIOLOGY | Facility: MEDICAL CENTER | Age: 72
End: 2017-03-16
Attending: OPHTHALMOLOGY
Payer: MEDICARE

## 2017-03-16 DIAGNOSIS — H04.163: ICD-10-CM

## 2017-03-16 PROCEDURE — 700117 HCHG RX CONTRAST REV CODE 255: Performed by: OPHTHALMOLOGY

## 2017-03-16 PROCEDURE — 70482 CT ORBIT/EAR/FOSSA W/O&W/DYE: CPT

## 2017-03-16 RX ADMIN — IOHEXOL 100 ML: 350 INJECTION, SOLUTION INTRAVENOUS at 11:31

## 2017-03-23 RX ORDER — HYDROCHLOROTHIAZIDE 25 MG/1
TABLET ORAL
Qty: 90 TAB | Refills: 0 | Status: SHIPPED | OUTPATIENT
Start: 2017-03-23 | End: 2017-04-10

## 2017-03-23 NOTE — TELEPHONE ENCOUNTER
Was the patient seen in the last year in this department? Yes     Does patient have an active prescription for medications requested? No     Received Request Via: Pharmacy      Pt met protocol?: Yes pt last ov 1/2017   BP Readings from Last 1 Encounters:   01/25/17 130/78     Lab Results   Component Value Date/Time    GLYCOHEMOGLOBIN 6.6* 08/30/2016 08:11 AM

## 2017-04-10 RX ORDER — ENALAPRIL MALEATE 10 MG/1
TABLET ORAL
Qty: 90 TAB | Refills: 1 | Status: SHIPPED | OUTPATIENT
Start: 2017-04-10 | End: 2017-07-12 | Stop reason: SDUPTHER

## 2017-04-10 RX ORDER — HYDROCHLOROTHIAZIDE 25 MG/1
TABLET ORAL
Qty: 90 TAB | Refills: 0 | Status: SHIPPED | OUTPATIENT
Start: 2017-04-10 | End: 2017-06-25 | Stop reason: SDUPTHER

## 2017-04-10 NOTE — TELEPHONE ENCOUNTER
Was the patient seen in the last year in this department? Yes     Does patient have an active prescription for medications requested? No     Received Request Via: Pharmacy      Pt met protocol?: Yes    LAST OV 01/25/2017    BP Readings from Last 1 Encounters:   01/25/17 130/78

## 2017-04-10 NOTE — TELEPHONE ENCOUNTER
Refill X 6 months, sent to pharmacy.Pt. Seen in the last 6 months per protocol.   Lab Results   Component Value Date/Time    SODIUM 134* 08/30/2016 08:11 AM    POTASSIUM 3.5* 08/30/2016 08:11 AM    CHLORIDE 97 08/30/2016 08:11 AM    CO2 30 08/30/2016 08:11 AM    GLUCOSE 141* 08/30/2016 08:11 AM    BUN 11 08/30/2016 08:11 AM    CREATININE 0.63 03/13/2017 01:30 PM

## 2017-04-17 NOTE — TELEPHONE ENCOUNTER
Last seen by PCP 1/17. Will send 3 months to pharmacy. Change of pharmacy. Patient is due for a follow up appointment. Please schedule.

## 2017-04-17 NOTE — TELEPHONE ENCOUNTER
Was the patient seen in the last year in this department? Yes     Does patient have an active prescription for medications requested? No     Received Request Via: Pharmacy      Pt met protocol?: Yes    A1C 8/16

## 2017-05-16 RX ORDER — HYDROCHLOROTHIAZIDE 25 MG/1
TABLET ORAL
Refills: 0 | OUTPATIENT
Start: 2017-05-16

## 2017-06-26 RX ORDER — HYDROCHLOROTHIAZIDE 25 MG/1
TABLET ORAL
Qty: 90 TAB | Refills: 0 | Status: SHIPPED | OUTPATIENT
Start: 2017-06-26 | End: 2017-09-23 | Stop reason: SDUPTHER

## 2017-06-26 NOTE — TELEPHONE ENCOUNTER
Was the patient seen in the last year in this department? Yes     Does patient have an active prescription for medications requested? No     Received Request Via: Pharmacy      Pt met protocol?: Yes    LAST OV 01/25/2017    BP Readings from Last 1 Encounters:   01/25/17 130/78     Lab Results   Component Value Date/Time    GLYCOHEMOGLOBIN 6.6* 08/30/2016 08:11 AM        Lab Results   Component Value Date/Time    HDL 60 08/30/2016 08:11 AM

## 2017-07-12 ENCOUNTER — HOSPITAL ENCOUNTER (OUTPATIENT)
Dept: LAB | Facility: MEDICAL CENTER | Age: 72
End: 2017-07-12
Attending: NURSE PRACTITIONER
Payer: MEDICARE

## 2017-07-12 ENCOUNTER — OFFICE VISIT (OUTPATIENT)
Dept: MEDICAL GROUP | Facility: PHYSICIAN GROUP | Age: 72
End: 2017-07-12
Payer: MEDICARE

## 2017-07-12 VITALS
SYSTOLIC BLOOD PRESSURE: 122 MMHG | TEMPERATURE: 97.9 F | HEART RATE: 70 BPM | RESPIRATION RATE: 16 BRPM | HEIGHT: 62 IN | OXYGEN SATURATION: 95 % | DIASTOLIC BLOOD PRESSURE: 88 MMHG | BODY MASS INDEX: 28.34 KG/M2 | WEIGHT: 154 LBS

## 2017-07-12 DIAGNOSIS — K21.9 GERD WITHOUT ESOPHAGITIS: ICD-10-CM

## 2017-07-12 DIAGNOSIS — I10 ESSENTIAL HYPERTENSION: ICD-10-CM

## 2017-07-12 DIAGNOSIS — E11.22 CONTROLLED TYPE 2 DIABETES MELLITUS WITH STAGE 1 CHRONIC KIDNEY DISEASE, WITHOUT LONG-TERM CURRENT USE OF INSULIN (HCC): ICD-10-CM

## 2017-07-12 DIAGNOSIS — Z78.0 POSTMENOPAUSAL: ICD-10-CM

## 2017-07-12 DIAGNOSIS — Z12.39 SCREENING FOR BREAST CANCER: ICD-10-CM

## 2017-07-12 DIAGNOSIS — N18.1 CONTROLLED TYPE 2 DIABETES MELLITUS WITH STAGE 1 CHRONIC KIDNEY DISEASE, WITHOUT LONG-TERM CURRENT USE OF INSULIN (HCC): ICD-10-CM

## 2017-07-12 DIAGNOSIS — Z00.00 MEDICARE ANNUAL WELLNESS VISIT, SUBSEQUENT: Primary | ICD-10-CM

## 2017-07-12 DIAGNOSIS — E78.2 MIXED HYPERLIPIDEMIA: ICD-10-CM

## 2017-07-12 DIAGNOSIS — Z00.00 MEDICARE ANNUAL WELLNESS VISIT, SUBSEQUENT: ICD-10-CM

## 2017-07-12 DIAGNOSIS — Z80.0 FAMILY HISTORY OF COLON CANCER: ICD-10-CM

## 2017-07-12 LAB
25(OH)D3 SERPL-MCNC: 78 NG/ML (ref 30–100)
ALBUMIN SERPL BCP-MCNC: 3.9 G/DL (ref 3.2–4.9)
ALBUMIN/GLOB SERPL: 1.3 G/DL
ALP SERPL-CCNC: 96 U/L (ref 30–99)
ALT SERPL-CCNC: 17 U/L (ref 2–50)
ANION GAP SERPL CALC-SCNC: 7 MMOL/L (ref 0–11.9)
AST SERPL-CCNC: 17 U/L (ref 12–45)
BILIRUB SERPL-MCNC: 0.5 MG/DL (ref 0.1–1.5)
BUN SERPL-MCNC: 11 MG/DL (ref 8–22)
CALCIUM SERPL-MCNC: 9.4 MG/DL (ref 8.5–10.5)
CHLORIDE SERPL-SCNC: 98 MMOL/L (ref 96–112)
CHOLEST SERPL-MCNC: 143 MG/DL (ref 100–199)
CO2 SERPL-SCNC: 28 MMOL/L (ref 20–33)
CREAT SERPL-MCNC: 0.62 MG/DL (ref 0.5–1.4)
EST. AVERAGE GLUCOSE BLD GHB EST-MCNC: 154 MG/DL
GFR SERPL CREATININE-BSD FRML MDRD: >60 ML/MIN/1.73 M 2
GLOBULIN SER CALC-MCNC: 2.9 G/DL (ref 1.9–3.5)
GLUCOSE SERPL-MCNC: 117 MG/DL (ref 65–99)
HBA1C MFR BLD: 7 % (ref 0–5.6)
HDLC SERPL-MCNC: 55 MG/DL
LDLC SERPL CALC-MCNC: 70 MG/DL
POTASSIUM SERPL-SCNC: 4.2 MMOL/L (ref 3.6–5.5)
PROT SERPL-MCNC: 6.8 G/DL (ref 6–8.2)
SODIUM SERPL-SCNC: 133 MMOL/L (ref 135–145)
TRIGL SERPL-MCNC: 90 MG/DL (ref 0–149)

## 2017-07-12 PROCEDURE — 36415 COLL VENOUS BLD VENIPUNCTURE: CPT | Mod: GA

## 2017-07-12 PROCEDURE — 83036 HEMOGLOBIN GLYCOSYLATED A1C: CPT | Mod: GA

## 2017-07-12 PROCEDURE — 80053 COMPREHEN METABOLIC PANEL: CPT

## 2017-07-12 PROCEDURE — G0439 PPPS, SUBSEQ VISIT: HCPCS | Performed by: NURSE PRACTITIONER

## 2017-07-12 PROCEDURE — 82043 UR ALBUMIN QUANTITATIVE: CPT

## 2017-07-12 PROCEDURE — 80061 LIPID PANEL: CPT

## 2017-07-12 PROCEDURE — 82570 ASSAY OF URINE CREATININE: CPT

## 2017-07-12 PROCEDURE — 82306 VITAMIN D 25 HYDROXY: CPT | Mod: GA

## 2017-07-12 RX ORDER — ENALAPRIL MALEATE 10 MG/1
10 TABLET ORAL
Qty: 90 TAB | Refills: 1 | Status: SHIPPED | OUTPATIENT
Start: 2017-07-12 | End: 2018-04-09 | Stop reason: SDUPTHER

## 2017-07-12 RX ORDER — OMEPRAZOLE 20 MG/1
20 CAPSULE, DELAYED RELEASE ORAL DAILY
Qty: 90 CAP | Refills: 3 | Status: SHIPPED | OUTPATIENT
Start: 2017-07-12 | End: 2018-04-09 | Stop reason: SDUPTHER

## 2017-07-12 RX ORDER — SIMVASTATIN 10 MG
10 TABLET ORAL EVERY EVENING
Qty: 90 TAB | Refills: 3 | Status: SHIPPED | OUTPATIENT
Start: 2017-07-12 | End: 2018-04-09 | Stop reason: SDUPTHER

## 2017-07-12 NOTE — PROGRESS NOTES
Chief Complaint   Patient presents with   • Annual Exam   • Medication Refill       HISTORY OF PRESENT ILLNESS: Patient is a 72 y.o. female established patient who presents today for annual exam and medication refills.    Depression Screening    Little interest or pleasure in doing things?   No  Feeling down, depressed , or hopeless?  No  Patient Health Questionnaire Score:   0  If depressive symptoms.    Interpretation of PHQ-9 Total Score   Score Severity   1-4 Minimal Depression   5-9 Mild Depression   10-14 Moderate Depression   15-19 Moderately Severe Depression   20-27 Severe Depression    Screening for Cognitive Impairment    Three Minute Recall (banana, sunrise, fence)   3/3    Draw clock face with all 12 numbers set to the hand to show 10 minures past 11 o'clock    5/5  No cognitive concerns identified.    Fall Risk Assessment    Has the patient had two or more falls in the last year or any fall with injury in the last year?  No  If Fall Risk identified deferred to follow up visit unless specifically addressed in assessment and plan.    Safety Assessment    Throw rugs on floor.   Yes  Handrails on all stairs.   Yes  Good lighting in all hallways.  Yes     Difficulty hearing.   No  Patient counseled about all safety risks that were identified.    Functional Assessment ADLs    Are there any barriers preventing you from cooking for yourself or meeting nutritional needs?   no.    Are there any barriers preventing you from driving safely or obtaining transportation?   .  no  Are there any barriers preventing you from using a telephone or calling for help?   .  no  Are there any barriers preventing you from shopping?   .  no  Are there any barriers preventing you from taking care of your own finances?   .  no  Are there any barriers preventing you from managing your medications?   .  no  Are currently engaing any exercise or physical activity?   .   Regular exercise, walking, camping    Health Maintenance Summary                 Annual Wellness Visit Overdue 2/16/2017      Done 2/16/2016 Visit Dx: Medicare annual wellness visit, subsequent     Patient has more history with this topic...    DIABETES MONOFILAMENT / LE EXAM Overdue 2/16/2017      Done 2/16/2016 AMB DIABETIC MONOFILAMENT LOWER EXTREMITY EXAM     Patient has more history with this topic...    URINE ACR / MICROALBUMIN Overdue 2/22/2017      Done 2/22/2016 MICROALBUMIN CREAT RATIO URINE     Patient has more history with this topic...    A1C SCREENING Overdue 2/28/2017      Done 8/30/2016 HEMOGLOBIN A1C (A)     Patient has more history with this topic...    COLONOSCOPY Overdue 3/12/2017      Done 3/12/2012 REFERRAL TO GI FOR COLONOSCOPY     Patient has more history with this topic...    FASTING LIPID PROFILE Next Due 8/30/2017      Done 8/30/2016 LIPID PROFILE (A)     Patient has more history with this topic...    MAMMOGRAM Next Due 9/1/2017      Done 9/1/2016 MA-SCREEN MAMMO W/CAD-BILAT     Patient has more history with this topic...    IMM INFLUENZA Next Due 9/1/2017      Done 11/22/2016 Imm Admin: Influenza Vaccine Adult HD     Patient has more history with this topic...    RETINAL SCREENING Next Due 11/3/2017      Done 11/3/2016 REFERRAL FOR RETINAL SCREENING EXAM     Patient has more history with this topic...    SERUM CREATININE Next Due 3/13/2018      Done 3/13/2017 CREATININE     Patient has more history with this topic...    BONE DENSITY Next Due 3/2/2021      Done 3/2/2016 DS-BONE DENSITY STUDY (DEXA)     Patient has more history with this topic...    IMM DTaP/Tdap/Td Vaccine Next Due 6/19/2022      Done 6/19/2012 Imm Admin: Tdap Vaccine          Patient Care Team:  TASNEEM Mariscal as PCP - General (Family Medicine)  Emiliano Daily M.D. as Consulting Physician (Ophthalmology)  Cale Watkins M.D. as Consulting Physician (Dermatology)  Jovani Alvares M.D. as Consulting Physician (Neurosurgery)    Allergies:Other misc; Phisohex; Sulfa drugs; and  Tape    Current Outpatient Prescriptions Ordered in [facility]   Medication Sig Dispense Refill   • enalapril (VASOTEC) 10 MG Tab Take 1 Tab by mouth every day. 90 Tab 1   • glucose blood (ACCU-CHEK SMARTVIEW) strip TEST TWICE DAILY AND AS NEEDED FOR SYMPTOMS OF HIGH OR LOW SUGAR 100 Strip 3   • simvastatin (ZOCOR) 10 MG Tab Take 1 Tab by mouth every evening. 90 Tab 3   • omeprazole (PRILOSEC) 20 MG delayed-release capsule Take 1 Cap by mouth every day. 90 Cap 3   • metformin (GLUCOPHAGE) 500 MG Tab TAKE 1 TABLET BY MOUTH EVERY DAY 90 Tab 0   • hydrochlorothiazide (HYDRODIURIL) 25 MG Tab TAKE 1 TABLET BY MOUTH EVERY DAY 90 Tab 0   • Ibuprofen (ADVIL PO) Take  by mouth.     • Cholecalciferol (VITAMIN D-3 PO) Take  by mouth.     • Multiple Vitamins-Minerals (MULTIVITAMIN ADULT PO) Take 1 Tab by mouth every day.     • aspirin 81 MG tablet Take 81 mg by mouth every day.     • Calcium-Vitamin D (CALCIUM + D PO) Take 1 Tab by mouth every day.       No current [facility]-ordered facility-administered medications on file.       Past Medical History   Diagnosis Date   • Hyperlipidemia    • Hypertension    • Family history of colon cancer 2012   • Diabetes    • Diabetes mellitus type 2, controlled (CMS-[facility]) 2012     oral medication   • High cholesterol    • Heart burn    • Cataract 2016     early stage   • Anesthesia      difficult to wake and extended sleep (couple of days)       Social History   Substance Use Topics   • Smoking status: Former Smoker -- 1.00 packs/day for 5 years     Types: Cigarettes     Quit date: 1970   • Smokeless tobacco: Never Used      Comment: quit    • Alcohol Use: 0.0 oz/week     0 Standard drinks or equivalent per week      Comment: occasionally       Family Status   Relation Status Death Age   • Father     • Maternal Aunt     • Mother       Family History   Problem Relation Age of Onset   • Heart Disease Father    • Cancer Father    • Diabetes Father    • Cancer  "Maternal Aunt        ROS: see above    Review of Systems   Constitutional: Negative for fever, chills, weight loss and malaise/fatigue.   HENT: Negative for ear pain, nosebleeds, congestion, sore throat and neck pain.    Eyes: Negative for blurred vision.   Respiratory: Negative for cough, sputum production, shortness of breath and wheezing.    Cardiovascular: Negative for chest pain, palpitations, orthopnea and leg swelling.   Gastrointestinal: Negative for heartburn, nausea, vomiting and abdominal pain.   Genitourinary: Negative for dysuria, urgency and frequency.   Musculoskeletal: Negative for myalgias, back pain and joint pain.   Skin: Negative for rash and itching.   Neurological: Negative for dizziness, tingling, tremors, sensory change, focal weakness and headaches.   Endo/Heme/Allergies: Does not bruise/bleed easily.   Psychiatric/Behavioral: Negative for depression, suicidal ideas and memory loss.  The patient is not nervous/anxious and does not have insomnia.        Exam:  Blood pressure 122/88, pulse 70, temperature 36.6 °C (97.9 °F), resp. rate 16, height 1.575 m (5' 2\"), weight 69.854 kg (154 lb), SpO2 95 %.  General:  Well nourished, well developed female in NAD  Head is grossly normal.  Neck: Thyroid is not enlarged.  Pulmonary: Normal effort. No rales, ronchi, or wheezing.  Cardiovascular: Regular rate and rhythm without murmur.   Extremities: no clubbing, cyanosis, or edema.  Psych:  Mood and affect are normal.  Answering questions appropriately with good eye contact.      Please note that this dictation was created using voice recognition software. I have made every reasonable attempt to correct obvious errors, but I expect that there are errors of grammar and possibly content that I did not discover before finalizing the note.    Assessment/Plan:    1. Medicare annual wellness visit, subsequent  Comorbidities are adequately controlled with her current medication regimen.  Patient is due for labs, " she has fasted this morning in preparation for blood work.  Due for m medication refills.  - HEMOGLOBIN A1C; Future  - LIPID PROFILE; Future  - COMP METABOLIC PANEL; Future  - MICROALBUMIN CREAT RATIO URINE; Future  - VITAMIN D,25 HYDROXY; Future  - enalapril (VASOTEC) 10 MG Tab; Take 1 Tab by mouth every day.  Dispense: 90 Tab; Refill: 1  - glucose blood (ACCU-CHEK SMARTVIEW) strip; TEST TWICE DAILY AND AS NEEDED FOR SYMPTOMS OF HIGH OR LOW SUGAR  Dispense: 100 Strip; Refill: 3  - simvastatin (ZOCOR) 10 MG Tab; Take 1 Tab by mouth every evening.  Dispense: 90 Tab; Refill: 3  - omeprazole (PRILOSEC) 20 MG delayed-release capsule; Take 1 Cap by mouth every day.  Dispense: 90 Cap; Refill: 3  - MA-SCREEN MAMMO W/CAD-BILAT; Future    2. Controlled type 2 diabetes mellitus with stage 1 chronic kidney disease, without long-term current use of insulin (CMS-HCC)  Patient monitors blood sugar 1-2 times per week.  She is due for fasting labs, prepared to have them drawn today.  She continues to take Metformin daily.  Labs are monitored by PCP.    - HEMOGLOBIN A1C; Future  - LIPID PROFILE; Future  - COMP METABOLIC PANEL; Future  - MICROALBUMIN CREAT RATIO URINE; Future  - glucose blood (ACCU-CHEK SMARTVIEW) strip; TEST TWICE DAILY AND AS NEEDED FOR SYMPTOMS OF HIGH OR LOW SUGAR  Dispense: 100 Strip; Refill: 3  - simvastatin (ZOCOR) 10 MG Tab; Take 1 Tab by mouth every evening.  Dispense: 90 Tab; Refill: 3    3. Essential hypertension  Blood pressure remains adequately controlled with enalapril, hydrochlorothiazide.  Patient monitors blood pressure intermittently outside of the clinic.  Labs and blood pressure are managed and monitored by PCP.  - enalapril (VASOTEC) 10 MG Tab; Take 1 Tab by mouth every day.  Dispense: 90 Tab; Refill: 1    4. GERD without esophagitis  Symptoms are adequately controlled with Prilosec.  Labs are up-to-date and monitored by PCP.  - omeprazole (PRILOSEC) 20 MG delayed-release capsule; Take 1 Cap by  mouth every day.  Dispense: 90 Cap; Refill: 3    5. Mixed hyperlipidemia  Cholesterol remains adequately controlled with Zocor.  Updated labs today, reviewed and monitored by PCP.  - simvastatin (ZOCOR) 10 MG Tab; Take 1 Tab by mouth every evening.  Dispense: 90 Tab; Refill: 3    6. Postmenopausal  Patient is due for vitamin D testing.  She is up-to-date with mammography and bone density screens.  Screening results are monitored by PCP.  - VITAMIN D,25 HYDROXY; Future    7. Family history of colon cancer  Patient is up-to-date with colon cancer screening.  Denies any history of abnormal findings.  Monitored by PCP.    8. Screening for breast cancer  Due for mammography in September, order given.  - MA-SCREEN MAMMO W/CAD-BILAT; Future      Fasting labs today  Due for Mammogram in Sept  Obtain records from [facility]  Due for retinal eye in November.  RTC yearly, sooner if results warrant further discussion.

## 2017-07-12 NOTE — MR AVS SNAPSHOT
"        Rossy Cartagena Lopez   2017 10:00 AM   Office Visit   MRN: [id number]    Department:  [facility]   Dept Phone:  [phone]    Description:  Female : 1945   Provider:  TASNEEM Mariscal           Reason for Visit     Annual Exam     Medication Refill           Allergies as of 2017     Allergen Noted Reactions    Other Misc 2011   Hives    Patient is allergic to bandaides  RXN=ongoing    Phisohex [Hexachlorophene] 2016   Rash    Rash at contact site  RXN=48 years ago    [facility] 2010   Rash, Itching    RXN=44-48  Years ago     Tape 2016   Hives, Itching    Paper tape okay      You were diagnosed with     Controlled type 2 diabetes mellitus with stage 1 chronic kidney disease, without long-term current use of insulin (CMS-Formerly [facility])   [2115290]  -  Primary     Essential hypertension   [5745671]       GERD without esophagitis   [250137]       Mixed hyperlipidemia   [272.2.ICD-9-CM]       Postmenopausal   [825058]       Family history of colon cancer   [859331]       Screening for breast cancer   [667471]         Vital Signs     Blood Pressure Pulse Temperature Respirations Height Weight    122/88 mmHg 70 36.6 °C (97.9 °F) 16 1.575 m (5' 2\") 69.854 kg (154 lb)    Body Mass Index Oxygen Saturation Smoking Status             28.16 kg/m2 95% Former Smoker         Basic Information     Date Of Birth Sex Race Ethnicity Preferred Language    1945 Female White Non- English      Problem List              ICD-10-CM Priority Class Noted - Resolved    Hypertension I10   2012 - Present    Diabetes mellitus type 2, controlled (CMS-Formerly [facility]) E11.9   2012 - Present    Hyperlipidemia E78.5   2012 - Present    Family history of colon cancer Z80.0   2012 - Present    Spinal stenosis, lumbar M48.06   2014 - Present    Acute carpal tunnel syndrome of right wrist G56.01   2016 - Present      Health Maintenance        Date Due Completion Dates   " DIABETES MONOFILAMENT / LE EXAM 2/16/2017 2/16/2016, 8/20/2014, 7/10/2013 (Done)    Override on 7/10/2013: Done    URINE ACR / MICROALBUMIN 2/22/2017 2/22/2016, 7/15/2015, 8/20/2014, 7/11/2013, 6/20/2012    A1C SCREENING 2/28/2017 8/30/2016, 2/22/2016, 7/15/2015, 8/20/2014, 7/11/2013, 6/20/2012, 7/29/2011, 8/3/2010    COLONOSCOPY 3/12/2017 3/12/2012, 7/10/2010 (Done)    Override on 7/10/2010: Done (GIC q 5 yrs Family history of colon cancer)    FASTING LIPID PROFILE 8/30/2017 8/30/2016, 2/22/2016, 7/15/2015, 8/20/2014, 7/11/2013, 6/20/2012, 7/29/2011    MAMMOGRAM 9/1/2017 9/1/2016, 7/22/2015, 7/21/2014, 7/16/2013, 3/26/2012, 3/14/2011, 2/25/2010, 2/25/2010, 2/23/2009, 2/23/2009, 2/11/2008, 2/11/2008, 1/25/2007, 1/25/2007    IMM INFLUENZA (1) 9/1/2017 11/22/2016, 10/10/2015, 10/28/2014    RETINAL SCREENING 11/3/2017 11/3/2016, 11/3/2016, 11/3/2016, 11/24/2014    SERUM CREATININE 3/13/2018 3/13/2017, 8/30/2016, 2/22/2016, 7/15/2015, 8/20/2014, 7/11/2013, 6/20/2012, 7/29/2011    BONE DENSITY 3/2/2021 3/2/2016, 7/11/2012    IMM DTaP/Tdap/Td Vaccine (2 - Td) 6/19/2022 6/19/2012            Current Immunizations     13-VALENT PCV PREVNAR 8/19/2015    Influenza TIV (IM) 10/28/2014    Influenza Vaccine Adult HD 11/22/2016 11:11 AM, 10/10/2015    Pneumococcal polysaccharide vaccine (PPSV-23) 6/19/2012    SHINGLES VACCINE 6/19/2012    Tdap Vaccine 6/19/2012    Varicella Vaccine Live 6/19/2012      Below and/or attached are the medications your provider expects you to take. Review all of your home medications and newly ordered medications with your provider and/or pharmacist. Follow medication instructions as directed by your provider and/or pharmacist. Please keep your medication list with you and share with your provider. Update the information when medications are discontinued, doses are changed, or new medications (including over-the-counter products) are added; and carry medication information at all times in the event of  emergency situations     Allergies:  OTHER MISC - Hives     PHISOHEX - Rash     SULFA DRUGS - Rash,Itching     TAPE - Hives,Itching               Medications  Valid as of: July 12, 2017 - 10:29 AM    Generic Name Brand Name Tablet Size Instructions for use    Aspirin (Tab) aspirin 81 MG Take 81 mg by mouth every day.        Calcium Citrate-Vitamin D   Take 1 Tab by mouth every day.        Cholecalciferol   Take  by mouth.        Enalapril Maleate (Tab) VASOTEC 10 MG Take 1 Tab by mouth every day.        Glucose Blood (Strip) glucose blood  TEST TWICE DAILY AND AS NEEDED FOR SYMPTOMS OF HIGH OR LOW SUGAR        HydroCHLOROthiazide (Tab) HYDRODIURIL 25 MG TAKE 1 TABLET BY MOUTH EVERY DAY        Ibuprofen   Take  by mouth.        MetFORMIN HCl (Tab) GLUCOPHAGE 500 MG TAKE 1 TABLET BY MOUTH EVERY DAY        Multiple Vitamins-Minerals   Take 1 Tab by mouth every day.        Omeprazole (CAPSULE DELAYED RELEASE) PRILOSEC 20 MG Take 1 Cap by mouth every day.        Simvastatin (Tab) ZOCOR 10 MG Take 1 Tab by mouth every evening.        .                 Medicines prescribed today were sent to:     [id number] - [place] - [address] [facility] [address]    [address] [place] [address]    Phone: [phone] Fax: [phone]    Open 24 Hours?: No    [id number] - [place] - [address] [place] [address]    Phone: [phone] Fax: [phone]    Open 24 Hours?: No      Medication refill instructions:       If your prescription bottle indicates you have medication refills left, it is not necessary to call your provider’s office. Please contact your pharmacy and they will refill your medication.    If your prescription bottle indicates you do not have any refills left, you may request refills at any time through one of the following ways: The online [employer] system (except Urgent Care), by calling your provider’s office, or by asking your pharmacy to contact  your provider’s office with a refill request. Medication refills are processed only during regular business hours and may not be available until the next business day. Your provider may request additional information or to have a follow-up visit with you prior to refilling your medication.   *Please Note: Medication refills are assigned a new Rx number when refilled electronically. Your pharmacy may indicate that no refills were authorized even though a new prescription for the same medication is available at the pharmacy. Please request the medicine by name with the pharmacy before contacting your provider for a refill.        Your To Do List     Future Labs/Procedures Complete By Expires    COMP METABOLIC PANEL  As directed 7/12/2018    HEMOGLOBIN A1C  As directed 7/12/2018    LIPID PROFILE  As directed 7/12/2018    MA-SCREEN MAMMO W/CAD-BILAT  As directed 8/12/2018    MICROALBUMIN CREAT RATIO URINE  As directed 7/12/2018    VITAMIN D,25 HYDROXY  As directed 7/12/2018         [employer] Access Code: Activation code not generated  Current [employer] [facility] Status: Active

## 2017-07-12 NOTE — Clinical Note
[facility]  TASNEEM Mariscal  [address]  [place] [address]  Fax: [phone]   Authorization for Release/Disclosure of   Protected Health Information   Name: ROSSY LOPEZ : 1945 SSN: [id number]   Address: [address] [place] [address] [place] [address] Phone:    [phone] (home)    I authorize the entity listed below to release/disclose the PHI below to:   [facility]/TASNEEM Mariscal and TASNEEM Mariscal   Provider or Entity Name:  [facility]   Address   City, State, Zip   Phone:      Fax:     Reason for request: continuity of care   Information to be released:    [X] LAST COLONOSCOPY,  including any PATH REPORT and follow-up  [  ] LAST FIT/COLOGUARD RESULT [  ] LAST DEXA  [  ] LAST MAMMOGRAM  [  ] LAST PAP  [  ] LAST LABS [  ] RETINA EXAM REPORT  [  ] IMMUNIZATION RECORDS  [  ] Release all info      [  ] Check here and initial the line next to each item to release ALL health information INCLUDING  _____ Care and treatment for drug and / or alcohol abuse  _____ HIV testing, infection status, or AIDS  _____ Genetic Testing    DATES OF SERVICE OR TIME PERIOD TO BE DISCLOSED: _____________  I understand and acknowledge that:  * This Authorization may be revoked at any time by you in writing, except if your health information has already been used or disclosed.  * Your health information that will be used or disclosed as a result of you signing this authorization could be re-disclosed by the recipient. If this occurs, your re-disclosed health information may no longer be protected by State or Federal laws.  * You may refuse to sign this Authorization. Your refusal will not affect your ability to obtain treatment.  * This Authorization becomes effective upon signing and will  on (date) __________.      If no date is indicated, this Authorization will  one (1) year from the signature date.    Name: Rossy Lopez    Signature:   Date:     2017       PLEASE FAX  REQUESTED RECORDS BACK TO: [phone]

## 2017-07-13 LAB
CREAT UR-MCNC: 40.4 MG/DL
MICROALBUMIN UR-MCNC: <0.7 MG/DL
MICROALBUMIN/CREAT UR: NORMAL MG/G (ref 0–30)

## 2017-07-14 ENCOUNTER — TELEPHONE (OUTPATIENT)
Dept: MEDICAL GROUP | Facility: PHYSICIAN GROUP | Age: 72
End: 2017-07-14

## 2017-07-14 DIAGNOSIS — E11.9 CONTROLLED TYPE 2 DIABETES MELLITUS WITHOUT COMPLICATION, WITHOUT LONG-TERM CURRENT USE OF INSULIN (HCC): ICD-10-CM

## 2017-07-17 NOTE — TELEPHONE ENCOUNTER
Patient's test strips were denied I called pharmacy and they stated they will need a new script for a new glucometer & strips

## 2017-08-25 ENCOUNTER — OFFICE VISIT (OUTPATIENT)
Dept: MEDICAL GROUP | Facility: PHYSICIAN GROUP | Age: 72
End: 2017-08-25
Payer: MEDICARE

## 2017-08-25 VITALS
DIASTOLIC BLOOD PRESSURE: 70 MMHG | OXYGEN SATURATION: 95 % | SYSTOLIC BLOOD PRESSURE: 150 MMHG | BODY MASS INDEX: 27.19 KG/M2 | TEMPERATURE: 98.8 F | HEART RATE: 91 BPM | RESPIRATION RATE: 16 BRPM | WEIGHT: 153.44 LBS | HEIGHT: 63 IN

## 2017-08-25 DIAGNOSIS — E78.2 MIXED HYPERLIPIDEMIA: ICD-10-CM

## 2017-08-25 DIAGNOSIS — J45.909 REACTIVE AIRWAY DISEASE WITHOUT COMPLICATION: ICD-10-CM

## 2017-08-25 DIAGNOSIS — E55.9 VITAMIN D DEFICIENCY: ICD-10-CM

## 2017-08-25 DIAGNOSIS — E11.22 CONTROLLED TYPE 2 DIABETES MELLITUS WITH STAGE 1 CHRONIC KIDNEY DISEASE, WITHOUT LONG-TERM CURRENT USE OF INSULIN (HCC): ICD-10-CM

## 2017-08-25 DIAGNOSIS — R19.5 LOOSE STOOLS: ICD-10-CM

## 2017-08-25 DIAGNOSIS — I10 ESSENTIAL HYPERTENSION: ICD-10-CM

## 2017-08-25 DIAGNOSIS — N18.1 CONTROLLED TYPE 2 DIABETES MELLITUS WITH STAGE 1 CHRONIC KIDNEY DISEASE, WITHOUT LONG-TERM CURRENT USE OF INSULIN (HCC): ICD-10-CM

## 2017-08-25 DIAGNOSIS — Z80.0 FAMILY HISTORY OF COLON CANCER: ICD-10-CM

## 2017-08-25 DIAGNOSIS — K21.9 GASTROESOPHAGEAL REFLUX DISEASE WITHOUT ESOPHAGITIS: ICD-10-CM

## 2017-08-25 PROCEDURE — 99214 OFFICE O/P EST MOD 30 MIN: CPT | Performed by: INTERNAL MEDICINE

## 2017-08-25 RX ORDER — ALBUTEROL SULFATE 90 UG/1
2 AEROSOL, METERED RESPIRATORY (INHALATION) EVERY 6 HOURS PRN
Qty: 8.5 G | Refills: 1 | Status: SHIPPED | OUTPATIENT
Start: 2017-08-25 | End: 2018-04-09 | Stop reason: SDUPTHER

## 2017-08-25 NOTE — ASSESSMENT & PLAN NOTE
A1c 7 (07/2017) she is only on metformin (glucophage 500 mg daily). She is on asa 81 mg daily, ace. microalbumin negative. Retinal screening up to date. Lipid panel well managed (07/2017)   She does not check blood glucose at home. Monofilament today

## 2017-08-25 NOTE — Clinical Note
[facility]  Zeinab Jeronimo M.D.  [address]  [place] [address]  Fax: [phone]   Authorization for Release/Disclosure of   Protected Health Information   Name: XI LICONA : 1945 SSN: [id number]   Address: [address]   [place] [address] Phone:    [phone] (home)    I authorize the entity listed below to release/disclose the PHI below to:   [facility]/Zeinab Jeronimo M.D. and Zeinab Jeronimo M.D.   Provider or Entity Name:  [facility]   Address   City, [facility]                 [address],  [place] [address]   Phone:  [phone]      Fax:      [phone]        Reason for request: continuity of care   Information to be released:    [ X ] LAST COLONOSCOPY,  including any PATH REPORT and follow-up  [ X ] LAST FIT/COLOGUARD RESULT [  ] LAST DEXA  [  ] LAST MAMMOGRAM  [  ] LAST PAP  [  ] LAST LABS [  ] RETINA EXAM REPORT  [  ] IMMUNIZATION RECORDS  [  ] Release all info      [  ] Check here and initial the line next to each item to release ALL health information INCLUDING  _____ Care and treatment for drug and / or alcohol abuse  _____ HIV testing, infection status, or AIDS  _____ Genetic Testing    DATES OF SERVICE OR TIME PERIOD TO BE DISCLOSED: _____________  I understand and acknowledge that:  * This Authorization may be revoked at any time by you in writing, except if your health information has already been used or disclosed.  * Your health information that will be used or disclosed as a result of you signing this authorization could be re-disclosed by the recipient. If this occurs, your re-disclosed health information may no longer be protected by State or Federal laws.  * You may refuse to sign this Authorization. Your refusal will not affect your ability to obtain treatment.  * This Authorization becomes effective upon signing and will  on (date) __________.      If no date is indicated, this Authorization will  one (1) year from the signature date.       Name: Rossy Lopez    Signature:   Date:     8/25/2017       PLEASE FAX REQUESTED RECORDS BACK TO: [phone]

## 2017-08-25 NOTE — ASSESSMENT & PLAN NOTE
Her mother and children with asthma. She tells me that she gets sob, not able to get full breath, especially when there is smoking, slight wheezing today on examination. Former smoker, stopped long time ago, not significant smoking. She is not using any inhaler. She denies chest pain, leg swelling, orthopnea, palpitations. This is occasional, have happened in the past. She denies cough or fever

## 2017-08-25 NOTE — ASSESSMENT & PLAN NOTE
Chronic. Well managed. She is on enalapril 10 mg daily and hydrochlorothiazide 25 mg daily. Blood pressure slightly elevated today in the clinic, but very well controled in the past and at home. She denies symptoms of headache, visual changes, palpitation, cough, leg swelling, chest pain

## 2017-08-25 NOTE — MR AVS SNAPSHOT
"        Rossy Cartagena Jessica   2017 1:00 PM   Office Visit   MRN: [id number]    Department:  [facility]   Dept Phone:  [phone]    Description:  Female : 1945   Provider:  Zeinab Jeronimo M.D.           Reason for Visit     Establish Care     Diabetes monofilament test      Allergies as of 2017     Allergen Noted Reactions    Other Misc 2011   Hives    Patient is allergic to bandaides  RXN=ongoing    Phisohex [Hexachlorophene] 2016   Rash    Rash at contact site  RXN=48 years ago    [facility] 2010   Rash, Itching    RXN=44-48  Years ago     Tape 2016   Hives, Itching    Paper tape okay      You were diagnosed with     Loose stools   [563661]       Essential hypertension   [5606510]       Controlled type 2 diabetes mellitus with stage 1 chronic kidney disease, without long-term current use of insulin (CMS-[facility])   [8962462]       Mixed hyperlipidemia   [272.2.ICD-9-CM]       Family history of colon cancer   [326390]       Vitamin D deficiency   [1051329]       Reactive airway disease without complication   [0270102]         Vital Signs     Blood Pressure Pulse Temperature Respirations Height Weight    150/70 mmHg 91 37.1 °C (98.8 °F) 16 1.6 m (5' 3\") 69.6 kg (153 lb 7 oz)    Body Mass Index Oxygen Saturation Smoking Status             27.19 kg/m2 95% Former Smoker         Basic Information     Date Of Birth Sex Race Ethnicity Preferred Language    1945 Female White Non- English      Your appointments     Sep 05, 2017  9:30 AM   MA SCRN10 with [facility] MG 2   [facility] ([address])    [address]  [place] [address]   [phone]           No deodorant, powder, perfume or lotion under the arm or breast area.              Problem List              ICD-10-CM Priority Class Noted - Resolved    Hypertension I10   2012 - Present    Diabetes mellitus type 2, controlled (CMS-HCC) E11.9   2012 - Present    Hyperlipidemia E78.5   " 6/19/2012 - Present    Family history of colon cancer Z80.0   6/19/2012 - Present    Loose stools R19.5   8/25/2017 - Present    Vitamin D deficiency E55.9   8/25/2017 - Present    Reactive airway disease without complication J45.909   8/25/2017 - Present      Health Maintenance        Date Due Completion Dates    DIABETES MONOFILAMENT / LE EXAM 2/16/2017 2/16/2016, 8/20/2014, 7/10/2013 (Done)    Override on 7/10/2013: Done    COLONOSCOPY 3/12/2017 3/12/2012, 7/10/2010 (Done)    Override on 7/10/2010: Done (GIC q 5 yrs Family history of colon cancer)    IMM INFLUENZA (1) 9/1/2017 11/22/2016, 10/10/2015, 10/28/2014    MAMMOGRAM 9/1/2017 9/1/2016, 7/22/2015, 7/21/2014, 7/16/2013, 3/26/2012, 3/14/2011, 2/25/2010, 2/25/2010, 2/23/2009, 2/23/2009, 2/11/2008, 2/11/2008, 1/25/2007, 1/25/2007    RETINAL SCREENING 11/3/2017 11/3/2016, 11/3/2016, 11/3/2016, 11/24/2014    A1C SCREENING 1/12/2018 7/12/2017, 8/30/2016, 2/22/2016, 7/15/2015, 8/20/2014, 7/11/2013, 6/20/2012, 7/29/2011, 8/3/2010    FASTING LIPID PROFILE 7/12/2018 7/12/2017, 8/30/2016, 2/22/2016, 7/15/2015, 8/20/2014, 7/11/2013, 6/20/2012, 7/29/2011    URINE ACR / MICROALBUMIN 7/12/2018 7/12/2017, 2/22/2016, 7/15/2015, 8/20/2014, 7/11/2013, 6/20/2012    SERUM CREATININE 7/12/2018 7/12/2017, 3/13/2017, 8/30/2016, 2/22/2016, 7/15/2015, 8/20/2014, 7/11/2013, 6/20/2012, 7/29/2011    BONE DENSITY 3/2/2021 3/2/2016, 7/11/2012    IMM DTaP/Tdap/Td Vaccine (2 - Td) 6/19/2022 6/19/2012            Current Immunizations     13-VALENT PCV PREVNAR 8/19/2015    Influenza TIV (IM) 10/28/2014    Influenza Vaccine Adult HD 11/22/2016 11:11 AM, 10/10/2015    Pneumococcal polysaccharide vaccine (PPSV-23) 6/19/2012    SHINGLES VACCINE 6/19/2012    Tdap Vaccine 6/19/2012    Varicella Vaccine Live 6/19/2012      Below and/or attached are the medications your provider expects you to take. Review all of your home medications and newly ordered medications with your provider and/or  pharmacist. Follow medication instructions as directed by your provider and/or pharmacist. Please keep your medication list with you and share with your provider. Update the information when medications are discontinued, doses are changed, or new medications (including over-the-counter products) are added; and carry medication information at all times in the event of emergency situations     Allergies:  OTHER MISC - Hives     PHISOHEX - Rash     SULFA DRUGS - Rash,Itching     TAPE - Hives,Itching               Medications  Valid as of: August 25, 2017 -  1:38 PM    Generic Name Brand Name Tablet Size Instructions for use    Albuterol Sulfate (Aero Soln) albuterol 108 (90 Base) MCG/ACT Inhale 2 Puffs by mouth every 6 hours as needed for Shortness of Breath.        Aspirin (Tab) aspirin 81 MG Take 81 mg by mouth every day.        Cholecalciferol   Take  by mouth.        Enalapril Maleate (Tab) VASOTEC 10 MG Take 1 Tab by mouth every day.        Glucose Blood (Strip) glucose blood  TEST TWICE DAILY AND AS NEEDED FOR SYMPTOMS OF HIGH OR LOW SUGAR        HydroCHLOROthiazide (Tab) HYDRODIURIL 25 MG TAKE 1 TABLET BY MOUTH EVERY DAY        Ibuprofen   Take  by mouth.        MetFORMIN HCl (Tab) GLUCOPHAGE 500 MG Take 1 Tab by mouth 2 times a day, with meals.        Multiple Vitamins-Minerals   Take 1 Tab by mouth every day.        Omeprazole (CAPSULE DELAYED RELEASE) PRILOSEC 20 MG Take 1 Cap by mouth every day.        Simvastatin (Tab) ZOCOR 10 MG Take 1 Tab by mouth every evening.        .                 Medicines prescribed today were sent to:     [id number] - [place] - [address] AT [facility] [address]    [address] [place] [address]    Phone: [phone] Fax: [phone]    Open 24 Hours?: No    [id number] - [place] - [address]    [address] [place] [address]    Phone: [phone] Fax: [phone]    Open 24 Hours?: No      Medication refill instructions:        If your prescription bottle indicates you have medication refills left, it is not necessary to call your provider’s office. Please contact your pharmacy and they will refill your medication.    If your prescription bottle indicates you do not have any refills left, you may request refills at any time through one of the following ways: The online [employer] system (except Urgent Care), by calling your provider’s office, or by asking your pharmacy to contact your provider’s office with a refill request. Medication refills are processed only during regular business hours and may not be available until the next business day. Your provider may request additional information or to have a follow-up visit with you prior to refilling your medication.   *Please Note: Medication refills are assigned a new Rx number when refilled electronically. Your pharmacy may indicate that no refills were authorized even though a new prescription for the same medication is available at the pharmacy. Please request the medicine by name with the pharmacy before contacting your provider for a refill.        Your To Do List     Future Labs/Procedures Complete By Expires    CBC WITH DIFFERENTIAL  As directed 8/26/2018    COMP METABOLIC PANEL  As directed 8/26/2018    HEMOGLOBIN A1C  As directed 8/26/2018    LIPID PROFILE  As directed 8/26/2018    MICROALBUMIN CREAT RATIO URINE  As directed 8/26/2018    VITAMIN D,25 HYDROXY  As directed 8/26/2018         [employer] Access Code: Activation code not generated  Current [employer] Status: Active

## 2017-08-25 NOTE — ASSESSMENT & PLAN NOTE
Vitamin D 78, but she is on supplement, advise to cut down on vitamin d and will continue to monitor

## 2017-08-25 NOTE — ASSESSMENT & PLAN NOTE
She gets symptoms acid reflux, and burning sensation whenever she will have pasta, or eat tomatoes. Never had EGD. Used to take tums a lot. She is stable on omeprazole which she is taking for few years. She denies unintentional weight loss, dysphagia, melanotic stool, abdominal pain, hematochezia

## 2017-08-25 NOTE — ASSESSMENT & PLAN NOTE
She reports that last two weeks she is experiencing loose, watery stool, intermittent, sometimes associated with abdominal cramping pain. She denies hematochezia, melanotic stool, weight loss, fever. It goes on its own. Sometimes it is only once a day, sometimes 3-4 times. No recent antibiotic use. Normal, recent normal colonoscopy

## 2017-08-26 NOTE — PROGRESS NOTES
Subjective:   Rossy Lopez is a 72 y.o. female here today for hypertension, DMII     Hypertension  Chronic. Well managed. She is on enalapril 10 mg daily and hydrochlorothiazide 25 mg daily. Blood pressure slightly elevated today in the clinic, but very well controled in the past and at home. She denies symptoms of headache, visual changes, palpitation, cough, leg swelling, chest pain    Diabetes mellitus type 2, controlled  A1c 7 (07/2017) she is only on metformin (glucophage 500 mg daily). She is on asa 81 mg daily, ace. microalbumin negative. Retinal screening up to date. Lipid panel well managed (07/2017)   She does not check blood glucose at home. Monofilament today    Hyperlipidemia  Chronic. She is on simvastatin 10 mg daily, lipid panel 7/2017. Well managed     Family history of colon cancer  Father with colon cancer, she is up to date with colonoscopy. Will request the report    Loose stools  She reports that last two weeks she is experiencing loose, watery stool, intermittent, sometimes associated with abdominal cramping pain. She denies hematochezia, melanotic stool, weight loss, fever. It goes on its own. Sometimes it is only once a day, sometimes 3-4 times. No recent antibiotic use. Normal, recent normal colonoscopy     Vitamin D deficiency  Vitamin D 78, but she is on supplement, advise to cut down on vitamin d and will continue to monitor     Reactive airway disease without complication  Her mother and children with asthma. She tells me that she gets sob, not able to get full breath, especially when there is smoking, slight wheezing today on examination. Former smoker, stopped long time ago, not significant smoking. She is not using any inhaler. She denies chest pain, leg swelling, orthopnea, palpitations. This is occasional, have happened in the past. She denies cough or fever     Gastroesophageal reflux disease without esophagitis  She gets symptoms acid reflux, and burning sensation whenever  she will have pasta, or eat tomatoes. Never had EGD. Used to take tums a lot. She is stable on omeprazole which she is taking for few years. She denies unintentional weight loss, dysphagia, melanotic stool, abdominal pain, hematochezia          Current medicines (including changes today)  Current Outpatient Prescriptions   Medication Sig Dispense Refill   • metformin (GLUCOPHAGE) 500 MG Tab Take 1 Tab by mouth 2 times a day, with meals. 180 Tab 3   • albuterol 108 (90 Base) MCG/ACT Aero Soln inhalation aerosol Inhale 2 Puffs by mouth every 6 hours as needed for Shortness of Breath. 8.5 g 1   • enalapril (VASOTEC) 10 MG Tab Take 1 Tab by mouth every day. 90 Tab 1   • simvastatin (ZOCOR) 10 MG Tab Take 1 Tab by mouth every evening. 90 Tab 3   • omeprazole (PRILOSEC) 20 MG delayed-release capsule Take 1 Cap by mouth every day. 90 Cap 3   • hydrochlorothiazide (HYDRODIURIL) 25 MG Tab TAKE 1 TABLET BY MOUTH EVERY DAY 90 Tab 0   • Ibuprofen (ADVIL PO) Take  by mouth.     • Cholecalciferol (VITAMIN D-3 PO) Take  by mouth.     • Multiple Vitamins-Minerals (MULTIVITAMIN ADULT PO) Take 1 Tab by mouth every day.     • aspirin 81 MG tablet Take 81 mg by mouth every day.     • glucose blood (ACCU-CHEK SMARTVIEW) strip TEST TWICE DAILY AND AS NEEDED FOR SYMPTOMS OF HIGH OR LOW SUGAR 100 Strip 3     No current facility-administered medications for this visit.     She  has a past medical history of Hyperlipidemia; Hypertension; Family history of colon cancer (6/19/2012); Diabetes; Diabetes mellitus type 2, controlled (CMS-HCC) (6/19/2012); High cholesterol; Heart burn; Cataract (8/2016); and Anesthesia.    Current Outpatient Prescriptions   Medication Sig Dispense Refill   • metformin (GLUCOPHAGE) 500 MG Tab Take 1 Tab by mouth 2 times a day, with meals. 180 Tab 3   • albuterol 108 (90 Base) MCG/ACT Aero Soln inhalation aerosol Inhale 2 Puffs by mouth every 6 hours as needed for Shortness of Breath. 8.5 g 1   • enalapril (VASOTEC)  10 MG Tab Take 1 Tab by mouth every day. 90 Tab 1   • simvastatin (ZOCOR) 10 MG Tab Take 1 Tab by mouth every evening. 90 Tab 3   • omeprazole (PRILOSEC) 20 MG delayed-release capsule Take 1 Cap by mouth every day. 90 Cap 3   • hydrochlorothiazide (HYDRODIURIL) 25 MG Tab TAKE 1 TABLET BY MOUTH EVERY DAY 90 Tab 0   • Ibuprofen (ADVIL PO) Take  by mouth.     • Cholecalciferol (VITAMIN D-3 PO) Take  by mouth.     • Multiple Vitamins-Minerals (MULTIVITAMIN ADULT PO) Take 1 Tab by mouth every day.     • aspirin 81 MG tablet Take 81 mg by mouth every day.     • glucose blood (ACCU-CHEK SMARTVIEW) strip TEST TWICE DAILY AND AS NEEDED FOR SYMPTOMS OF HIGH OR LOW SUGAR 100 Strip 3     No current facility-administered medications for this visit.       Allergies as of 08/25/2017 - Jeremiah as Reviewed 08/25/2017   Allergen Reaction Noted   • Other misc Hives 07/29/2011   • Phisohex [hexachlorophene] Rash 08/31/2016   • Sulfa drugs Rash and Itching 07/16/2010   • Tape Hives and Itching 09/12/2016       Social History     Social History   • Marital Status:      Spouse Name: N/A   • Number of Children: N/A   • Years of Education: N/A     Occupational History   • Not on file.     Social History Main Topics   • Smoking status: Former Smoker -- 1.00 packs/day for 5 years     Types: Cigarettes     Quit date: 01/01/1970   • Smokeless tobacco: Never Used      Comment: quit 1972   • Alcohol Use: 0.0 oz/week     0 Standard drinks or equivalent per week      Comment: occasionally   • Drug Use: No   • Sexual Activity: No      Comment:       Other Topics Concern   • Not on file     Social History Narrative        Family History   Problem Relation Age of Onset   • Heart Disease Father 60     MI   • Cancer Father      colon cancer   • Diabetes Father    • Cancer Maternal Aunt      breast cancer       Past Surgical History   Procedure Laterality Date   • Abdominal hysterectomy total       1995   • Tonsillectomy     •  "Inj,epidural/lumb/sac single  9/5/2014     Performed by Cale Alejandro M.D. at [facility]   • Carpal tunnel release Right 9/12/2016     Procedure: CARPAL TUNNEL RELEASE;  Surgeon: Orlando Hunter M.D.;  Location: SURGERY [facility];  Service:        ROS   All systems reviewed are negative except for HPI       Objective:     Blood pressure 150/70, pulse 91, temperature 37.1 °C (98.8 °F), resp. rate 16, height 1.6 m (5' 3\"), weight 69.6 kg (153 lb 7 oz), SpO2 95 %. Body mass index is 27.19 kg/(m^2).   Physical Exam:  Constitutional: Alert, no distress.  Skin: Warm, dry, good turgor, no rashes in visible areas.  Eye: Equal, round and reactive, conjunctiva clear, lids normal.  ENMT: Lips without lesions, good dentition, oropharynx clear.  Neck: Trachea midline, no masses, no thyromegaly. No cervical or supraclavicular lymphadenopathy  Respiratory: Unlabored respiratory effort, lungs clear to auscultation, no wheezes, no ronchi.  Cardiovascular: Normal S1, S2, no murmur, no edema.  Abdomen: Soft, non-tender, no masses, no hepatosplenomegaly.  Psych: Alert and oriented x3, normal affect and mood.  Monofilament testing with a 10 gram force: sensation intact: intact bilaterally  Visual Inspection: Feet without maceration, ulcers, fissures.  Pedal pulses: intact bilaterally        Assessment and Plan:   The following treatment plan was discussed    1. Loose stools  Irritable bowel syndrome vs some food intolerance, I advise pt to keep a diary of her symptoms. Will continue to monitor      2. Essential hypertension  Continue same treatment, continue to monitor     3. Controlled type 2 diabetes mellitus with stage 1 chronic kidney disease, without long-term current use of insulin (CMS-HCC)  Increase metformin 500 mg BID. Continue to monitor   - Diabetic Monofilament Lower Extremity Exam  - HEMOGLOBIN A1C; Future  - MICROALBUMIN CREAT RATIO URINE; Future  - CBC WITH DIFFERENTIAL; Future  - COMP METABOLIC " PANEL; Future  - LIPID PROFILE; Future    4. Mixed hyperlipidemia  Continue same treatment, continue to monitor     5. Family history of colon cancer  Up to date with colonoscopy     6. Vitamin D deficiency  Continue supplement, continue to monitor   - VITAMIN D,25 HYDROXY; Future    7. Reactive airway disease without complication  I think she has asthma or reactive airways. Wheezing. Albuterol prn. PFTs ordered.   - albuterol 108 (90 Base) MCG/ACT Aero Soln inhalation aerosol; Inhale 2 Puffs by mouth every 6 hours as needed for Shortness of Breath.  Dispense: 8.5 g; Refill: 1  - PULMONARY FUNCTION TESTS Test requested: Complete Pulmonary Function Test    8. Gastroesophageal reflux disease without esophagitis  Advise to stop PPI and see if symptoms will reoccur. If yes, restart medications       Followup: Return in about 6 months (around 2/25/2018) for Short.

## 2017-09-05 ENCOUNTER — HOSPITAL ENCOUNTER (OUTPATIENT)
Dept: RADIOLOGY | Facility: MEDICAL CENTER | Age: 72
End: 2017-09-05
Attending: NURSE PRACTITIONER
Payer: MEDICARE

## 2017-09-05 DIAGNOSIS — Z12.39 SCREENING FOR BREAST CANCER: ICD-10-CM

## 2017-09-05 DIAGNOSIS — Z00.00 MEDICARE ANNUAL WELLNESS VISIT, SUBSEQUENT: ICD-10-CM

## 2017-09-05 PROCEDURE — G0202 SCR MAMMO BI INCL CAD: HCPCS

## 2017-09-19 ENCOUNTER — HOSPITAL ENCOUNTER (OUTPATIENT)
Dept: OTHER | Facility: MEDICAL CENTER | Age: 72
End: 2017-09-19
Attending: INTERNAL MEDICINE
Payer: MEDICARE

## 2017-09-19 PROCEDURE — 94729 DIFFUSING CAPACITY: CPT | Mod: 26 | Performed by: INTERNAL MEDICINE

## 2017-09-19 PROCEDURE — 94729 DIFFUSING CAPACITY: CPT

## 2017-09-19 PROCEDURE — 94726 PLETHYSMOGRAPHY LUNG VOLUMES: CPT

## 2017-09-19 PROCEDURE — 94060 EVALUATION OF WHEEZING: CPT

## 2017-09-19 PROCEDURE — 94060 EVALUATION OF WHEEZING: CPT | Mod: 26 | Performed by: INTERNAL MEDICINE

## 2017-09-19 PROCEDURE — 94726 PLETHYSMOGRAPHY LUNG VOLUMES: CPT | Mod: 26 | Performed by: INTERNAL MEDICINE

## 2017-09-19 ASSESSMENT — PULMONARY FUNCTION TESTS
FEV1_PERCENT_PREDICTED: 56
FEV1_PREDICTED: 2.12
FEV1/FVC: 64
FEV1_PERCENT_CHANGE: 3
FEV1_PERCENT_CHANGE: 8
FEV1: 1.29
FEV1/FVC_PERCENT_CHANGE: 267
FEV1/FVC_PERCENT_PREDICTED: 86
FVC_PREDICTED: 2.73
FEV1/FVC_PERCENT_PREDICTED: 82
FVC_PERCENT_PREDICTED: 68
FEV1_PERCENT_PREDICTED: 60
FVC: 1.93
FEV1/FVC: 66.84
FVC: 1.87
FEV1/FVC_PERCENT_PREDICTED: 78
FEV1: 1.19
FVC_PERCENT_PREDICTED: 70

## 2017-09-21 NOTE — PROCEDURES
DATE OF STUDY:  09/19/2017    FINDINGS:  The patient's mid flows are markedly reduced at 32% predicted, FEV1   is very low at 1.19 liters, 56% predicted.  Mild hyperinflation was noted.    Oxygen transfer was normal.  Flow volume loop confirms the obstructive pattern   with good effort noted.       ____________________________________     MD ROSEMARIE PAREKH / JEFERSON    DD:  09/20/2017 14:38:36  DT:  09/20/2017 15:19:22    D#:  [id number]  Job#:  [id number]

## 2017-09-25 RX ORDER — HYDROCHLOROTHIAZIDE 25 MG/1
TABLET ORAL
Qty: 90 TAB | Refills: 1 | Status: SHIPPED | OUTPATIENT
Start: 2017-09-25 | End: 2018-04-03 | Stop reason: SDUPTHER

## 2017-09-26 NOTE — TELEPHONE ENCOUNTER
Refill X 6 months, sent to pharmacy.Pt. Seen in the last 6 months per protocol.   Lab Results   Component Value Date/Time    SODIUM 133 (L) 07/12/2017 10:48 AM    POTASSIUM 4.2 07/12/2017 10:48 AM    CHLORIDE 98 07/12/2017 10:48 AM    CO2 28 07/12/2017 10:48 AM    GLUCOSE 117 (H) 07/12/2017 10:48 AM    BUN 11 07/12/2017 10:48 AM    CREATININE 0.62 07/12/2017 10:48 AM

## 2017-10-19 ENCOUNTER — OFFICE VISIT (OUTPATIENT)
Dept: MEDICAL GROUP | Facility: PHYSICIAN GROUP | Age: 72
End: 2017-10-19
Payer: MEDICARE

## 2017-10-19 VITALS
SYSTOLIC BLOOD PRESSURE: 116 MMHG | RESPIRATION RATE: 20 BRPM | TEMPERATURE: 97.7 F | HEIGHT: 63 IN | HEART RATE: 91 BPM | OXYGEN SATURATION: 90 % | DIASTOLIC BLOOD PRESSURE: 70 MMHG | BODY MASS INDEX: 26.05 KG/M2 | WEIGHT: 147 LBS

## 2017-10-19 DIAGNOSIS — J40 BRONCHITIS: Primary | ICD-10-CM

## 2017-10-19 DIAGNOSIS — J45.20 MILD INTERMITTENT REACTIVE AIRWAY DISEASE WITHOUT COMPLICATION: ICD-10-CM

## 2017-10-19 PROCEDURE — 99214 OFFICE O/P EST MOD 30 MIN: CPT | Performed by: INTERNAL MEDICINE

## 2017-10-19 RX ORDER — PREDNISONE 20 MG/1
60 TABLET ORAL DAILY
Qty: 15 TAB | Refills: 0 | Status: SHIPPED | OUTPATIENT
Start: 2017-10-19 | End: 2017-11-28

## 2017-10-19 RX ORDER — CODEINE PHOSPHATE/GUAIFENESIN 10-100MG/5
5 LIQUID (ML) ORAL 3 TIMES DAILY PRN
Qty: 120 ML | Refills: 0 | Status: SHIPPED | OUTPATIENT
Start: 2017-10-19 | End: 2018-02-28

## 2017-10-19 RX ORDER — ENALAPRIL MALEATE 10 MG/1
TABLET ORAL
Qty: 90 TAB | Refills: 3 | Status: SHIPPED | OUTPATIENT
Start: 2017-10-19 | End: 2017-10-19

## 2017-10-19 RX ORDER — AZITHROMYCIN 250 MG/1
TABLET, FILM COATED ORAL
Qty: 6 TAB | Refills: 0 | Status: SHIPPED | OUTPATIENT
Start: 2017-10-19 | End: 2017-10-24

## 2017-10-19 NOTE — PROGRESS NOTES
Subjective:   Rossy Lopez is a 72 y.o. female here today for Cough, wheezing    72-year-old with past medical history of hypertension, diabetes, reactive airway presents today complaining of productive cough with greenish sputum and wheezing. Started to have cold symptoms 4 days ago. Her  is also sick at home. She denies fever. She has loose stool, which are chronic. She has not tried anything. She denies fever, abdominal pain, distention, or shortness of breath, leg swelling, sore throat. She has some sinus tenderness. She denies myalgia. She has significant wheezing's on examination. It is getting worse       Current medicines (including changes today)  Current Outpatient Prescriptions   Medication Sig Dispense Refill   • predniSONE (DELTASONE) 20 MG Tab Take 3 Tabs by mouth every day. 15 Tab 0   • azithromycin (ZITHROMAX) 250 MG Tab 2 tabs by mouth day 1, 1 tab by mouth days 2-5 6 Tab 0   • guaifenesin-codeine (TUSSI-ORGANIDIN NR) 100-10 MG/5ML syrup Take 5 mL by mouth 3 times a day as needed for Cough. 120 mL 0   • hydrochlorothiazide (HYDRODIURIL) 25 MG Tab TAKE 1 TABLET BY MOUTH EVERY DAY 90 Tab 1   • metformin (GLUCOPHAGE) 500 MG Tab Take 1 Tab by mouth 2 times a day, with meals. 180 Tab 3   • albuterol 108 (90 Base) MCG/ACT Aero Soln inhalation aerosol Inhale 2 Puffs by mouth every 6 hours as needed for Shortness of Breath. 8.5 g 1   • enalapril (VASOTEC) 10 MG Tab Take 1 Tab by mouth every day. 90 Tab 1   • glucose blood (ACCU-CHEK SMARTVIEW) strip TEST TWICE DAILY AND AS NEEDED FOR SYMPTOMS OF HIGH OR LOW SUGAR 100 Strip 3   • simvastatin (ZOCOR) 10 MG Tab Take 1 Tab by mouth every evening. 90 Tab 3   • omeprazole (PRILOSEC) 20 MG delayed-release capsule Take 1 Cap by mouth every day. 90 Cap 3   • Ibuprofen (ADVIL PO) Take  by mouth.     • Cholecalciferol (VITAMIN D-3 PO) Take  by mouth.     • Multiple Vitamins-Minerals (MULTIVITAMIN ADULT PO) Take 1 Tab by mouth every day.     • aspirin 81 MG  tablet Take 81 mg by mouth every day.       No current facility-administered medications for this visit.      She  has a past medical history of Anesthesia; Cataract (8/2016); Diabetes; Diabetes mellitus type 2, controlled (CMS-[facility]) (6/19/2012); Family history of colon cancer (6/19/2012); Heart burn; High cholesterol; Hyperlipidemia; and Hypertension.    Current Outpatient Prescriptions   Medication Sig Dispense Refill   • predniSONE (DELTASONE) 20 MG Tab Take 3 Tabs by mouth every day. 15 Tab 0   • azithromycin (ZITHROMAX) 250 MG Tab 2 tabs by mouth day 1, 1 tab by mouth days 2-5 6 Tab 0   • guaifenesin-codeine (TUSSI-ORGANIDIN NR) 100-10 MG/5ML syrup Take 5 mL by mouth 3 times a day as needed for Cough. 120 mL 0   • hydrochlorothiazide (HYDRODIURIL) 25 MG Tab TAKE 1 TABLET BY MOUTH EVERY DAY 90 Tab 1   • metformin (GLUCOPHAGE) 500 MG Tab Take 1 Tab by mouth 2 times a day, with meals. 180 Tab 3   • albuterol 108 (90 Base) MCG/ACT Aero Soln inhalation aerosol Inhale 2 Puffs by mouth every 6 hours as needed for Shortness of Breath. 8.5 g 1   • enalapril (VASOTEC) 10 MG Tab Take 1 Tab by mouth every day. 90 Tab 1   • glucose blood (ACCU-CHEK SMARTVIEW) strip TEST TWICE DAILY AND AS NEEDED FOR SYMPTOMS OF HIGH OR LOW SUGAR 100 Strip 3   • simvastatin (ZOCOR) 10 MG Tab Take 1 Tab by mouth every evening. 90 Tab 3   • omeprazole (PRILOSEC) 20 MG delayed-release capsule Take 1 Cap by mouth every day. 90 Cap 3   • Ibuprofen (ADVIL PO) Take  by mouth.     • Cholecalciferol (VITAMIN D-3 PO) Take  by mouth.     • Multiple Vitamins-Minerals (MULTIVITAMIN ADULT PO) Take 1 Tab by mouth every day.     • aspirin 81 MG tablet Take 81 mg by mouth every day.       No current facility-administered medications for this visit.        Allergies as of 10/19/2017 - Reviewed 10/19/2017   Allergen Reaction Noted   • Other [facility] Hives 07/29/2011   • Phisohex [hexachlorophene] Rash 08/31/2016   • Sulfa drugs Rash and Itching 07/16/2010   • Tape  "Hives and Itching 09/12/2016       Social History     Social History   • Marital status:      Spouse name: N/A   • Number of children: N/A   • Years of education: N/A     Occupational History   • Not on file.     Social History Main Topics   • Smoking status: Former Smoker     Packs/day: 1.00     Years: 5.00     Types: Cigarettes     Quit date: 1/1/1970   • Smokeless tobacco: Never Used      Comment: quit 1972   • Alcohol use 0.0 oz/week      Comment: occasionally   • Drug use: No   • Sexual activity: No      Comment:       Other Topics Concern   • Not on file     Social History Narrative   • No narrative on file        Family History   Problem Relation Age of Onset   • Heart Disease Father 60     MI   • Cancer Father      colon cancer   • Diabetes Father    • Cancer Maternal Aunt      breast cancer       Past Surgical History:   Procedure Laterality Date   • CARPAL TUNNEL RELEASE Right 9/12/2016    Procedure: CARPAL TUNNEL RELEASE;  Surgeon: Orlando Hunter M.D.;  Location: [facility];  Service:    • INJ,EPIDURAL/LUMB/SAC SINGLE  9/5/2014    Performed by Cale Alejandro M.D. at [facility]   • ABDOMINAL HYSTERECTOMY TOTAL      1995   • TONSILLECTOMY         ROS  All systems reviewed are negative except for HPI         Objective:     Blood pressure 116/70, pulse 91, temperature 36.5 °C (97.7 °F), resp. rate 20, height 1.6 m (5' 3\"), weight 66.7 kg (147 lb), SpO2 90 %, not currently breastfeeding. Body mass index is 26.04 kg/m².  Physical Exam:  Constitutional: Alert, no distress, sick looking appearance .  Skin: Warm, dry, good turgor, no rashes in visible areas.  Eye: Equal, round and reactive, conjunctiva clear, lids normal.  ENMT: Lips without lesions, good dentition, oropharynx clear.  Neck: Trachea midline, no masses, no thyromegaly. No cervical or supraclavicular lymphadenopathy, tympanic membraine clear  Respiratory: Unlabored respiratory effort, + wheezes, no " maday.  Cardiovascular: Normal S1, S2, no murmur, no edema.  Abdomen: Soft, non-tender, no masses, no hepatosplenomegaly.  Psych: Alert and oriented x3, normal affect and mood.        Assessment and Plan:   The following treatment plan was discussed    1. Bronchitis  2. Mild intermittent reactive airway disease without complication  She has wheezing significant  No pneumonia at this time. I will order z-pack. Significant wheezing, I order prednisone pack. She had recently PFTs, showed some mild obstruction, but not changed with inhaler. She is on albuterol but it is not helping. She used to smoke only for 5 years. She does not have significant second exposure   I advised to go to emergency room started having fever, feeling sob   - predniSONE (DELTASONE) 20 MG Tab; Take 3 Tabs by mouth every day.  Dispense: 15 Tab; Refill: 0  - azithromycin (ZITHROMAX) 250 MG Tab; 2 tabs by mouth day 1, 1 tab by mouth days 2-5  Dispense: 6 Tab; Refill: 0  - guaifenesin-codeine (TUSSI-ORGANIDIN NR) 100-10 MG/5ML syrup; Take 5 mL by mouth 3 times a day as needed for Cough.  Dispense: 120 mL; Refill: 0    Followup: Return if symptoms worsen or fail to improve, for Short.

## 2017-10-19 NOTE — LETTER
[facility]  Zeinab Jeronimo M.D.  [address]  [place] [address]  Fax: [phone]   Authorization for Release/Disclosure of   Protected Health Information   Name: XI LOPEZ : 1945 SSN: [id number]   Address: [address] [place] [address] Phone:    [phone] (home)    I authorize the entity listed below to release/disclose the PHI below to:   [facility]/Zeinab Jeronimo M.D. and Zeinab Jeronimo M.D.   Provider or Entity Name:  [facility]   Address   City, State, Zip   Phone:      Fax:     Reason for request: continuity of care   Information to be released:    [X  ] LAST COLONOSCOPY,  including any PATH REPORT and follow-up  [  ] LAST FIT/COLOGUARD RESULT [  ] LAST DEXA  [  ] LAST MAMMOGRAM  [  ] LAST PAP  [  ] LAST LABS [  ] RETINA EXAM REPORT  [  ] IMMUNIZATION RECORDS  [  ] Release all info      [  ] Check here and initial the line next to each item to release ALL health information INCLUDING  _____ Care and treatment for drug and / or alcohol abuse  _____ HIV testing, infection status, or AIDS  _____ Genetic Testing    DATES OF SERVICE OR TIME PERIOD TO BE DISCLOSED: _____________  I understand and acknowledge that:  * This Authorization may be revoked at any time by you in writing, except if your health information has already been used or disclosed.  * Your health information that will be used or disclosed as a result of you signing this authorization could be re-disclosed by the recipient. If this occurs, your re-disclosed health information may no longer be protected by State or Federal laws.  * You may refuse to sign this Authorization. Your refusal will not affect your ability to obtain treatment.  * This Authorization becomes effective upon signing and will  on (date) __________.      If no date is indicated, this Authorization will  one (1) year from the signature date.    Name: Xi Lopez    Signature:   Date:     10/19/2017       PLEASE FAX REQUESTED RECORDS BACK  TO: [phone]

## 2017-11-02 ENCOUNTER — APPOINTMENT (RX ONLY)
Dept: URBAN - METROPOLITAN AREA CLINIC 20 | Facility: CLINIC | Age: 72
Setting detail: DERMATOLOGY
End: 2017-11-02

## 2017-11-02 DIAGNOSIS — L81.4 OTHER MELANIN HYPERPIGMENTATION: ICD-10-CM

## 2017-11-02 DIAGNOSIS — D18.0 HEMANGIOMA: ICD-10-CM

## 2017-11-02 DIAGNOSIS — L57.8 OTHER SKIN CHANGES DUE TO CHRONIC EXPOSURE TO NONIONIZING RADIATION: ICD-10-CM

## 2017-11-02 DIAGNOSIS — D22 MELANOCYTIC NEVI: ICD-10-CM

## 2017-11-02 DIAGNOSIS — L82.1 OTHER SEBORRHEIC KERATOSIS: ICD-10-CM

## 2017-11-02 DIAGNOSIS — L72.0 EPIDERMAL CYST: ICD-10-CM

## 2017-11-02 DIAGNOSIS — L57.0 ACTINIC KERATOSIS: ICD-10-CM

## 2017-11-02 PROBLEM — D48.5 NEOPLASM OF UNCERTAIN BEHAVIOR OF SKIN: Status: ACTIVE | Noted: 2017-11-02

## 2017-11-02 PROBLEM — D22.5 MELANOCYTIC NEVI OF TRUNK: Status: ACTIVE | Noted: 2017-11-02

## 2017-11-02 PROBLEM — D18.01 HEMANGIOMA OF SKIN AND SUBCUTANEOUS TISSUE: Status: ACTIVE | Noted: 2017-11-02

## 2017-11-02 PROBLEM — I10 ESSENTIAL (PRIMARY) HYPERTENSION: Status: ACTIVE | Noted: 2017-11-02

## 2017-11-02 PROBLEM — E13.9 OTHER SPECIFIED DIABETES MELLITUS WITHOUT COMPLICATIONS: Status: ACTIVE | Noted: 2017-11-02

## 2017-11-02 PROCEDURE — ? LIQUID NITROGEN

## 2017-11-02 PROCEDURE — 17000 DESTRUCT PREMALG LESION: CPT

## 2017-11-02 PROCEDURE — ? COUNSELING

## 2017-11-02 PROCEDURE — 11101: CPT

## 2017-11-02 PROCEDURE — 17003 DESTRUCT PREMALG LES 2-14: CPT

## 2017-11-02 PROCEDURE — ? BIOPSY BY SHAVE METHOD

## 2017-11-02 PROCEDURE — 11100: CPT | Mod: 59

## 2017-11-02 PROCEDURE — 99214 OFFICE O/P EST MOD 30 MIN: CPT | Mod: 25

## 2017-11-02 ASSESSMENT — LOCATION SIMPLE DESCRIPTION DERM
LOCATION SIMPLE: LEFT FOREHEAD
LOCATION SIMPLE: RIGHT POSTERIOR UPPER ARM
LOCATION SIMPLE: LEFT FOREARM
LOCATION SIMPLE: RIGHT FOREARM
LOCATION SIMPLE: LEFT CHEEK
LOCATION SIMPLE: RIGHT THIGH
LOCATION SIMPLE: RIGHT UPPER ARM
LOCATION SIMPLE: CHEST
LOCATION SIMPLE: SCALP
LOCATION SIMPLE: RIGHT UPPER BACK
LOCATION SIMPLE: RIGHT CHEEK
LOCATION SIMPLE: LEFT THIGH
LOCATION SIMPLE: LEFT UPPER ARM

## 2017-11-02 ASSESSMENT — LOCATION ZONE DERM
LOCATION ZONE: SCALP
LOCATION ZONE: FACE
LOCATION ZONE: TRUNK
LOCATION ZONE: ARM
LOCATION ZONE: LEG

## 2017-11-02 ASSESSMENT — LOCATION DETAILED DESCRIPTION DERM
LOCATION DETAILED: RIGHT PROXIMAL LATERAL POSTERIOR UPPER ARM
LOCATION DETAILED: LEFT MEDIAL SUPERIOR CHEST
LOCATION DETAILED: RIGHT CENTRAL MALAR CHEEK
LOCATION DETAILED: RIGHT ANTERIOR DISTAL THIGH
LOCATION DETAILED: LEFT LATERAL MALAR CHEEK
LOCATION DETAILED: LEFT INFERIOR CENTRAL MALAR CHEEK
LOCATION DETAILED: RIGHT SUPERIOR MEDIAL UPPER BACK
LOCATION DETAILED: LEFT PROXIMAL DORSAL FOREARM
LOCATION DETAILED: LEFT SUPERIOR PARIETAL SCALP
LOCATION DETAILED: LEFT MEDIAL FOREHEAD
LOCATION DETAILED: RIGHT ANTERIOR PROXIMAL UPPER ARM
LOCATION DETAILED: LEFT ANTERIOR PROXIMAL UPPER ARM
LOCATION DETAILED: RIGHT INFERIOR UPPER BACK
LOCATION DETAILED: RIGHT MID-UPPER BACK
LOCATION DETAILED: RIGHT SUPERIOR PARIETAL SCALP
LOCATION DETAILED: LEFT LATERAL FOREHEAD
LOCATION DETAILED: LEFT ANTERIOR DISTAL THIGH
LOCATION DETAILED: RIGHT PROXIMAL DORSAL FOREARM

## 2017-11-02 NOTE — PROCEDURE: BIOPSY BY SHAVE METHOD
Notification Instructions: Patient will be notified of biopsy results. However, patient instructed to call the office if not contacted within 2 weeks.
Electrodesiccation Text: The wound bed was treated with electrodesiccation after the biopsy was performed.
Anesthesia Type: 1% lidocaine with 1:100,000 epinephrine and 408mcg clindamycin/ml and a 1:10 solution of 8.4% sodium bicarbonate
Anesthesia Volume In Cc: 0.5
Destruction After The Procedure: No
Size Of Lesion In Cm: 0
Biopsy Method: Personna blade
Post-Care Instructions: I reviewed with the patient in detail post-care instructions. Patient is to keep the biopsy site dry overnight, and then apply bacitracin twice daily until healed. Patient may apply hydrogen peroxide soaks to remove any crusting.
Cryotherapy Text: The wound bed was treated with cryotherapy after the biopsy was performed.
Detail Level: Detailed
Lab: [id number]
Wound Care: Vaseline
Consent: Written consent was obtained and risks were reviewed including but not limited to scarring, infection, bleeding, scabbing, incomplete removal, nerve damage and allergy to anesthesia.
Lab Facility: 
Type Of Destruction Used: Curettage
Dressing: Band-Aid
Hemostasis: Drysol and Electrocautery
Billing Type: Third-Party Bill
Curettage Text: The wound bed was treated with curettage after the biopsy was performed.
Silver Nitrate Text: The wound bed was treated with silver nitrate after the biopsy was performed.
Biopsy Type: H and E
Electrodesiccation And Curettage Text: The wound bed was treated with electrodesiccation and curettage after the biopsy was performed.

## 2017-11-28 ENCOUNTER — OFFICE VISIT (OUTPATIENT)
Dept: MEDICAL GROUP | Facility: PHYSICIAN GROUP | Age: 72
End: 2017-11-28
Payer: MEDICARE

## 2017-11-28 VITALS
HEIGHT: 63 IN | WEIGHT: 148.6 LBS | HEART RATE: 102 BPM | TEMPERATURE: 97.6 F | DIASTOLIC BLOOD PRESSURE: 70 MMHG | BODY MASS INDEX: 26.33 KG/M2 | SYSTOLIC BLOOD PRESSURE: 134 MMHG | OXYGEN SATURATION: 90 % | RESPIRATION RATE: 20 BRPM

## 2017-11-28 DIAGNOSIS — J40 BRONCHITIS: ICD-10-CM

## 2017-11-28 DIAGNOSIS — J45.20 MILD INTERMITTENT REACTIVE AIRWAY DISEASE WITHOUT COMPLICATION: ICD-10-CM

## 2017-11-28 DIAGNOSIS — I10 ESSENTIAL HYPERTENSION: ICD-10-CM

## 2017-11-28 PROCEDURE — 99214 OFFICE O/P EST MOD 30 MIN: CPT | Performed by: INTERNAL MEDICINE

## 2017-11-28 RX ORDER — AZITHROMYCIN 250 MG/1
TABLET, FILM COATED ORAL
Qty: 6 TAB | Refills: 0 | Status: SHIPPED | OUTPATIENT
Start: 2017-11-28 | End: 2017-12-03

## 2017-11-30 ENCOUNTER — APPOINTMENT (RX ONLY)
Dept: URBAN - METROPOLITAN AREA CLINIC 20 | Facility: CLINIC | Age: 72
Setting detail: DERMATOLOGY
End: 2017-11-30

## 2017-11-30 PROBLEM — C44.612 BASAL CELL CARCINOMA OF SKIN OF RIGHT UPPER LIMB, INCLUDING SHOULDER: Status: ACTIVE | Noted: 2017-11-30

## 2017-11-30 PROBLEM — L57.0 ACTINIC KERATOSIS: Status: ACTIVE | Noted: 2017-11-30

## 2017-11-30 PROCEDURE — 17261 DSTRJ MAL LES T/A/L .6-1.0CM: CPT

## 2017-11-30 PROCEDURE — ? CURETTAGE AND DESTRUCTION

## 2017-11-30 NOTE — PROCEDURE: CURETTAGE AND DESTRUCTION
Number Of Curettages: 3
Anesthesia Type: 1% lidocaine with epinephrine
Post-Care Instructions: I reviewed with the patient in detail post-care instructions. Patient is to keep the area dry for 48 hours, and not to engage in any swimming until the area is healed. Should the patient develop any fevers, chills, bleeding, severe pain patient will contact the office immediately.
Bill As A Line Item Or As Units: Line Item
What Was Performed First?: Curettage
Additional Information: (Optional): The wound was cleaned, and a pressure dressing was applied.  The patient received detailed post-op instructions.
Size Of Lesion After Curettage: 0.6
Anesthesia Volume In Cc: -
Render Post-Care Instructions In Note?: no
Detail Level: Detailed
Cautery Type: electrodesiccation
Size Of Lesion In Cm: 0.4
Consent was obtained from the patient. The risks, benefits and alternatives to therapy were discussed in detail. Specifically, the risks of infection, scarring, bleeding, prolonged wound healing, nerve injury, incomplete removal, allergy to anesthesia and recurrence were addressed. Alternatives to ED&C, such as: surgical removal and XRT were also discussed.  Prior to the procedure, the treatment site was clearly identified and confirmed by the patient. All components of Universal Protocol/PAUSE Rule completed.

## 2017-11-30 NOTE — HPI: SKIN LESION (BASAL CELL CARCINOMA)
How Severe Is Your Skin Cancer?: mild
Is This A New Presentation, Or A Follow-Up?: Follow Up Basal Cell Carcinoma
When Was Basal Cell Biopsied? (Optional): 11/2/17

## 2017-12-04 NOTE — PROGRESS NOTES
Subjective:   Rossy Lopez is a 72 y.o. female here today for feeling sick     She also reports ongoing history of hypertension, diabetes hyperactive airway presented today complaining of feeling congested, productive cough with greenish sputum sometimes feeling shortness of breath. She is feeling this way for one week. She hasn't used the inhaler or any other medications. She denies  recent traveling, sick contact exposure, fever, chest pain, leg swelling, orthopnea, abdominal pain, diarrhea.  BP well control. She denies lightheadedness, blurry vision, headaches.    Current medicines (including changes today)  Current Outpatient Prescriptions   Medication Sig Dispense Refill   • azithromycin (ZITHROMAX) 250 MG Tab 2 tabs by mouth day 1, 1 tab by mouth days 2-5 6 Tab 0   • guaifenesin-codeine (TUSSI-ORGANIDIN NR) 100-10 MG/5ML syrup Take 5 mL by mouth 3 times a day as needed for Cough. 120 mL 0   • hydrochlorothiazide (HYDRODIURIL) 25 MG Tab TAKE 1 TABLET BY MOUTH EVERY DAY 90 Tab 1   • metformin (GLUCOPHAGE) 500 MG Tab Take 1 Tab by mouth 2 times a day, with meals. 180 Tab 3   • albuterol 108 (90 Base) MCG/ACT Aero Soln inhalation aerosol Inhale 2 Puffs by mouth every 6 hours as needed for Shortness of Breath. 8.5 g 1   • enalapril (VASOTEC) 10 MG Tab Take 1 Tab by mouth every day. 90 Tab 1   • glucose blood (ACCU-CHEK SMARTVIEW) strip TEST TWICE DAILY AND AS NEEDED FOR SYMPTOMS OF HIGH OR LOW SUGAR 100 Strip 3   • simvastatin (ZOCOR) 10 MG Tab Take 1 Tab by mouth every evening. 90 Tab 3   • omeprazole (PRILOSEC) 20 MG delayed-release capsule Take 1 Cap by mouth every day. 90 Cap 3   • Ibuprofen (ADVIL PO) Take  by mouth.     • Cholecalciferol (VITAMIN D-3 PO) Take  by mouth.     • Multiple Vitamins-Minerals (MULTIVITAMIN ADULT PO) Take 1 Tab by mouth every day.     • aspirin 81 MG tablet Take 81 mg by mouth every day.       No current facility-administered medications for this visit.      She  has a past  medical history of Anesthesia; Cataract (8/2016); Diabetes; Diabetes mellitus type 2, controlled (CMS-HCC) (6/19/2012); Family history of colon cancer (6/19/2012); Heart burn; High cholesterol; Hyperlipidemia; and Hypertension.    Current Outpatient Prescriptions   Medication Sig Dispense Refill   • azithromycin (ZITHROMAX) 250 MG Tab 2 tabs by mouth day 1, 1 tab by mouth days 2-5 6 Tab 0   • guaifenesin-codeine (TUSSI-ORGANIDIN NR) 100-10 MG/5ML syrup Take 5 mL by mouth 3 times a day as needed for Cough. 120 mL 0   • hydrochlorothiazide (HYDRODIURIL) 25 MG Tab TAKE 1 TABLET BY MOUTH EVERY DAY 90 Tab 1   • metformin (GLUCOPHAGE) 500 MG Tab Take 1 Tab by mouth 2 times a day, with meals. 180 Tab 3   • albuterol 108 (90 Base) MCG/ACT Aero Soln inhalation aerosol Inhale 2 Puffs by mouth every 6 hours as needed for Shortness of Breath. 8.5 g 1   • enalapril (VASOTEC) 10 MG Tab Take 1 Tab by mouth every day. 90 Tab 1   • glucose blood (ACCU-CHEK SMARTVIEW) strip TEST TWICE DAILY AND AS NEEDED FOR SYMPTOMS OF HIGH OR LOW SUGAR 100 Strip 3   • simvastatin (ZOCOR) 10 MG Tab Take 1 Tab by mouth every evening. 90 Tab 3   • omeprazole (PRILOSEC) 20 MG delayed-release capsule Take 1 Cap by mouth every day. 90 Cap 3   • Ibuprofen (ADVIL PO) Take  by mouth.     • Cholecalciferol (VITAMIN D-3 PO) Take  by mouth.     • Multiple Vitamins-Minerals (MULTIVITAMIN ADULT PO) Take 1 Tab by mouth every day.     • aspirin 81 MG tablet Take 81 mg by mouth every day.       No current facility-administered medications for this visit.        Allergies as of 11/28/2017 - Reviewed 11/28/2017   Allergen Reaction Noted   • Other misc Hives 07/29/2011   • Phisohex [hexachlorophene] Rash 08/31/2016   • Sulfa drugs Rash and Itching 07/16/2010   • Tape Hives and Itching 09/12/2016       Social History     Social History   • Marital status:      Spouse name: N/A   • Number of children: N/A   • Years of education: N/A     Occupational History   •  "Not on file.     Social History Main Topics   • Smoking status: Former Smoker     Packs/day: 1.00     Years: 5.00     Types: Cigarettes     Quit date: 1/1/1970   • Smokeless tobacco: Never Used      Comment: quit 1972   • Alcohol use 0.0 oz/week      Comment: occasionally   • Drug use: No   • Sexual activity: No      Comment:       Other Topics Concern   • Not on file     Social History Narrative   • No narrative on file        Family History   Problem Relation Age of Onset   • Heart Disease Father 60     MI   • Cancer Father      colon cancer   • Diabetes Father    • Cancer Maternal Aunt      breast cancer       Past Surgical History:   Procedure Laterality Date   • CARPAL TUNNEL RELEASE Right 9/12/2016    Procedure: CARPAL TUNNEL RELEASE;  Surgeon: Orlando Hunter M.D.;  Location: SURGERY [facility];  Service:    • INJ,EPIDURAL/LUMB/SAC SINGLE  9/5/2014    Performed by Cale Alejandro M.D. at SURGERY SURGICAL [facility] ORS   • ABDOMINAL HYSTERECTOMY TOTAL      1995   • TONSILLECTOMY         ROS   All systems reviewed are negative except for HPI       Objective:     Blood pressure 134/70, pulse (!) 102, temperature 36.4 °C (97.6 °F), resp. rate 20, height 1.6 m (5' 3\"), weight 67.4 kg (148 lb 9.6 oz), SpO2 90 %, not currently breastfeeding. Body mass index is 26.32 kg/m².   Physical Exam:  Constitutional: Alert, no distress, congested   Skin: Warm, dry, good turgor, no rashes in visible areas.  Eye: Equal, round and reactive, conjunctiva clear, lids normal.  ENMT: Lips without lesions, good dentition, oropharynx clear.  Neck: Trachea midline, no masses, no thyromegaly. No cervical or supraclavicular lymphadenopathy, tympanic membrane clear  Respiratory: Unlabored respiratory effort, lungs clear to auscultation, + wheezes, no ronchi.  Cardiovascular: Normal S1, S2, no murmur, no edema.  Abdomen: Soft, non-tender, no masses, no hepatosplenomegaly.  Psych: Alert and oriented x3, normal affect and " mood.        Assessment and Plan:   The following treatment plan was discussed    1. Essential hypertension  Continue same treatment, continue to monitor     2. Mild intermittent reactive airway disease without complication  3. Bronchitis  I think pt is developing bacterial bronchitis. I advised her to use her inhaler regularly. I will prescribe Z-Francisco. Advised patient with hydration, probiotic. Advised patient to follow up if she is not feeling better, if she starts developing fever.    - azithromycin (ZITHROMAX) 250 MG Tab; 2 tabs by mouth day 1, 1 tab by mouth days 2-5  Dispense: 6 Tab; Refill: 0    Total 22 minutes face-to-face time spent with patient, with greater than 50% of the total time discussing patient's issues and symptoms as listed above in assessment and plan, as well as managing coordination of care for future evaluation and treatment.    Followup: Return if symptoms worsen or fail to improve, for Short.

## 2018-01-31 ENCOUNTER — OFFICE VISIT (OUTPATIENT)
Dept: MEDICAL GROUP | Facility: PHYSICIAN GROUP | Age: 73
End: 2018-01-31
Payer: MEDICARE

## 2018-01-31 VITALS
TEMPERATURE: 98.6 F | WEIGHT: 146 LBS | HEART RATE: 91 BPM | HEIGHT: 63 IN | SYSTOLIC BLOOD PRESSURE: 112 MMHG | OXYGEN SATURATION: 94 % | RESPIRATION RATE: 16 BRPM | BODY MASS INDEX: 25.87 KG/M2 | DIASTOLIC BLOOD PRESSURE: 80 MMHG

## 2018-01-31 DIAGNOSIS — N18.1 CONTROLLED TYPE 2 DIABETES MELLITUS WITH STAGE 1 CHRONIC KIDNEY DISEASE, WITHOUT LONG-TERM CURRENT USE OF INSULIN (HCC): ICD-10-CM

## 2018-01-31 DIAGNOSIS — J45.20 MILD INTERMITTENT REACTIVE AIRWAY DISEASE WITHOUT COMPLICATION: ICD-10-CM

## 2018-01-31 DIAGNOSIS — R53.83 FATIGUE, UNSPECIFIED TYPE: ICD-10-CM

## 2018-01-31 DIAGNOSIS — R05.9 COUGH: ICD-10-CM

## 2018-01-31 DIAGNOSIS — R63.4 WEIGHT LOSS: ICD-10-CM

## 2018-01-31 DIAGNOSIS — E87.1 HYPONATREMIA: ICD-10-CM

## 2018-01-31 DIAGNOSIS — E11.22 CONTROLLED TYPE 2 DIABETES MELLITUS WITH STAGE 1 CHRONIC KIDNEY DISEASE, WITHOUT LONG-TERM CURRENT USE OF INSULIN (HCC): ICD-10-CM

## 2018-01-31 PROCEDURE — 99214 OFFICE O/P EST MOD 30 MIN: CPT | Performed by: INTERNAL MEDICINE

## 2018-01-31 RX ORDER — TIOTROPIUM BROMIDE 18 UG/1
18 CAPSULE ORAL; RESPIRATORY (INHALATION) DAILY
Qty: 30 CAP | Refills: 3 | Status: SHIPPED | OUTPATIENT
Start: 2018-01-31 | End: 2018-02-01

## 2018-01-31 ASSESSMENT — PATIENT HEALTH QUESTIONNAIRE - PHQ9: CLINICAL INTERPRETATION OF PHQ2 SCORE: 0

## 2018-02-01 ENCOUNTER — TELEPHONE (OUTPATIENT)
Dept: MEDICAL GROUP | Facility: PHYSICIAN GROUP | Age: 73
End: 2018-02-01

## 2018-02-01 DIAGNOSIS — J40 BRONCHITIS: ICD-10-CM

## 2018-02-01 DIAGNOSIS — R63.4 WEIGHT LOSS: ICD-10-CM

## 2018-02-01 DIAGNOSIS — E87.1 HYPONATREMIA: ICD-10-CM

## 2018-02-01 DIAGNOSIS — J45.20 MILD INTERMITTENT REACTIVE AIRWAY DISEASE WITHOUT COMPLICATION: ICD-10-CM

## 2018-02-01 DIAGNOSIS — R53.83 FATIGUE, UNSPECIFIED TYPE: ICD-10-CM

## 2018-02-01 NOTE — PROGRESS NOTES
Subjective:   Rossy Lopez is a 72 y.o. female here today for cough, tired, weight loss     71 y/o F with pmh of DMII , HTN, hyperlipidemia presented feeling sick. . I have ordered PFTs, because she was Her mother had asthma and she  Was geting sob, not able to get full breath, especially when there is smoking. Former smoker, stopped long time ago, not significant smoking. PFts 09/2017 FEV/FVC 81 %,, FEV 56%. Loop obstructive pattern. . I treated for bronchitis 10/29/2018, because she was sick. She got slight better, but not complete. She presented again 12/28/2017 feeling sick. Treated again with z-pack. She again after a month, feeling tired, persistent cough, not productive. No wheezing. Albuterol does not help. She denies chest pain, leg swelling, palpitations. She denies night sweats. She reports 10 lbs weight loss in 4 months unintentional. She has low energy. Review of lab work noted hyponatremia     Current medicines (including changes today)  Current Outpatient Prescriptions   Medication Sig Dispense Refill   • tiotropium (SPIRIVA) 18 MCG Cap Inhale 1 Cap by mouth every day. 30 Cap 3   • guaifenesin-codeine (TUSSI-ORGANIDIN NR) 100-10 MG/5ML syrup Take 5 mL by mouth 3 times a day as needed for Cough. 120 mL 0   • hydrochlorothiazide (HYDRODIURIL) 25 MG Tab TAKE 1 TABLET BY MOUTH EVERY DAY 90 Tab 1   • metformin (GLUCOPHAGE) 500 MG Tab Take 1 Tab by mouth 2 times a day, with meals. 180 Tab 3   • albuterol 108 (90 Base) MCG/ACT Aero Soln inhalation aerosol Inhale 2 Puffs by mouth every 6 hours as needed for Shortness of Breath. 8.5 g 1   • enalapril (VASOTEC) 10 MG Tab Take 1 Tab by mouth every day. 90 Tab 1   • glucose blood (ACCU-CHEK SMARTVIEW) strip TEST TWICE DAILY AND AS NEEDED FOR SYMPTOMS OF HIGH OR LOW SUGAR 100 Strip 3   • simvastatin (ZOCOR) 10 MG Tab Take 1 Tab by mouth every evening. 90 Tab 3   • omeprazole (PRILOSEC) 20 MG delayed-release capsule Take 1 Cap by mouth every day. 90 Cap 3    • Ibuprofen (ADVIL PO) Take  by mouth.     • Cholecalciferol (VITAMIN D-3 PO) Take  by mouth.     • Multiple Vitamins-Minerals (MULTIVITAMIN ADULT PO) Take 1 Tab by mouth every day.     • aspirin 81 MG tablet Take 81 mg by mouth every day.       No current facility-administered medications for this visit.      She  has a past medical history of Anesthesia; Cataract (8/2016); Diabetes; Diabetes mellitus type 2, controlled (CMS-[facility]) (6/19/2012); Family history of colon cancer (6/19/2012); Heart burn; High cholesterol; Hyperlipidemia; and Hypertension.    Current Outpatient Prescriptions   Medication Sig Dispense Refill   • tiotropium (SPIRIVA) 18 MCG Cap Inhale 1 Cap by mouth every day. 30 Cap 3   • guaifenesin-codeine (TUSSI-ORGANIDIN NR) 100-10 MG/5ML syrup Take 5 mL by mouth 3 times a day as needed for Cough. 120 mL 0   • hydrochlorothiazide (HYDRODIURIL) 25 MG Tab TAKE 1 TABLET BY MOUTH EVERY DAY 90 Tab 1   • metformin (GLUCOPHAGE) 500 MG Tab Take 1 Tab by mouth 2 times a day, with meals. 180 Tab 3   • albuterol 108 (90 Base) MCG/ACT Aero Soln inhalation aerosol Inhale 2 Puffs by mouth every 6 hours as needed for Shortness of Breath. 8.5 g 1   • enalapril (VASOTEC) 10 MG Tab Take 1 Tab by mouth every day. 90 Tab 1   • glucose blood (ACCU-CHEK SMARTVIEW) strip TEST TWICE DAILY AND AS NEEDED FOR SYMPTOMS OF HIGH OR LOW SUGAR 100 Strip 3   • simvastatin (ZOCOR) 10 MG Tab Take 1 Tab by mouth every evening. 90 Tab 3   • omeprazole (PRILOSEC) 20 MG delayed-release capsule Take 1 Cap by mouth every day. 90 Cap 3   • Ibuprofen (ADVIL PO) Take  by mouth.     • Cholecalciferol (VITAMIN D-3 PO) Take  by mouth.     • Multiple Vitamins-Minerals (MULTIVITAMIN ADULT PO) Take 1 Tab by mouth every day.     • aspirin 81 MG tablet Take 81 mg by mouth every day.       No current facility-administered medications for this visit.        Allergies as of 01/31/2018 - Reviewed 01/31/2018   Allergen Reaction Noted   • Other misc Hives  "07/29/2011   • Phisohex [hexachlorophene] Rash 08/31/2016   • Sulfa drugs Rash and Itching 07/16/2010   • Tape Hives and Itching 09/12/2016       Social History     Social History   • Marital status:      Spouse name: N/A   • Number of children: N/A   • Years of education: N/A     Occupational History   • Not on file.     Social History Main Topics   • Smoking status: Former Smoker     Packs/day: 1.00     Years: 5.00     Types: Cigarettes     Quit date: 1/1/1970   • Smokeless tobacco: Never Used      Comment: quit 1972   • Alcohol use 0.0 oz/week      Comment: occasionally   • Drug use: No   • Sexual activity: No      Comment:       Other Topics Concern   • Not on file     Social History Narrative   • No narrative on file        Family History   Problem Relation Age of Onset   • Heart Disease Father 60     MI   • Cancer Father      colon cancer   • Diabetes Father    • Cancer Maternal Aunt      breast cancer       Past Surgical History:   Procedure Laterality Date   • CARPAL TUNNEL RELEASE Right 9/12/2016    Procedure: CARPAL TUNNEL RELEASE;  Surgeon: Orlando Hunter M.D.;  Location: [facility];  Service:    • INJ,EPIDURAL/LUMB/SAC SINGLE  9/5/2014    Performed by Cale Alejandro M.D. at SURGERY SURGICAL [facility] ORS   • ABDOMINAL HYSTERECTOMY TOTAL      1995   • TONSILLECTOMY         ROS   All systems reviewed are negative except for HPI       Objective:     Blood pressure 112/80, pulse 91, temperature 37 °C (98.6 °F), resp. rate 16, height 1.6 m (5' 3\"), weight 66.2 kg (146 lb), SpO2 94 %, not currently breastfeeding. Body mass index is 25.86 kg/m².   Physical Exam:  Constitutional: Alert, no distress.  Skin: Warm, dry, good turgor, no rashes in visible areas.  Eye: Equal, round and reactive, conjunctiva clear, lids normal.  ENMT: Lips without lesions, good dentition, oropharynx clear.  Neck: Trachea midline, no masses, no thyromegaly. No cervical or supraclavicular " lymphadenopathy  Respiratory: Unlabored respiratory effort, lungs clear to auscultation, no wheezes, no ronchi.  Cardiovascular: Normal S1, S2, no murmur, no edema.  Abdomen: Soft, non-tender, no masses, no hepatosplenomegaly.  Psych: Alert and oriented x3, normal affect and mood.        Assessment and Plan:   The following treatment plan was discussed    1. Cough  2. Mild intermittent reactive airway disease without complication  3. Hyponatremia  4. Weight loss  5. Fatigue, unspecified type  I will order Chest CT to rule out malignancy, vs interstitial lung disease. pFT are mixed obstructive, mild restrictive. She has not been a significant smoker. Does not make, why she has persistent cough. Not reactive to inhaler. Weight loss and hyponatremia is concerning. I will order chest CT for further eval. Cough medication and spiriva trial in the mean time. TSH to rule out thyroid condition, TSH nl in the past   - CT-CHEST (THORAX) WITH; Future  - REFERRAL TO PULMONOLOGY  - TSH WITH REFLEX TO FT4; Future    6. Controlled type 2 diabetes mellitus with stage 1 chronic kidney disease, without long-term current use of insulin (CMS-HCC)  Continue to monitor. Lab work to follow     Followup: Return in about 4 weeks (around 2/28/2018), or if symptoms worsen or fail to improve, for Short.

## 2018-02-01 NOTE — TELEPHONE ENCOUNTER
Phone Number Called: [phone] (home)     Message: Pt notified of message below.     Left Message for patient to call back: N\A

## 2018-02-01 NOTE — TELEPHONE ENCOUNTER
Phone Number Called: [phone] (home)     Message: Pt called states spiriva inhaler is almost 300 dollars she said the pharmacy said to get anoro ellipta inhaler insurance covers it with only 30 dollars out of pocket. Also pt states ct order on the questions it states she isnt diabetic but she is so would like you to update her order. Please advise, thank you.     Left Message for patient to call back: N\A

## 2018-02-03 ENCOUNTER — HOSPITAL ENCOUNTER (OUTPATIENT)
Dept: LAB | Facility: MEDICAL CENTER | Age: 73
End: 2018-02-03
Attending: INTERNAL MEDICINE
Payer: MEDICARE

## 2018-02-03 DIAGNOSIS — N18.1 CONTROLLED TYPE 2 DIABETES MELLITUS WITH STAGE 1 CHRONIC KIDNEY DISEASE, WITHOUT LONG-TERM CURRENT USE OF INSULIN (HCC): ICD-10-CM

## 2018-02-03 DIAGNOSIS — R53.83 FATIGUE, UNSPECIFIED TYPE: ICD-10-CM

## 2018-02-03 DIAGNOSIS — E55.9 VITAMIN D DEFICIENCY: ICD-10-CM

## 2018-02-03 DIAGNOSIS — E11.22 CONTROLLED TYPE 2 DIABETES MELLITUS WITH STAGE 1 CHRONIC KIDNEY DISEASE, WITHOUT LONG-TERM CURRENT USE OF INSULIN (HCC): ICD-10-CM

## 2018-02-03 DIAGNOSIS — R63.4 WEIGHT LOSS: ICD-10-CM

## 2018-02-03 LAB
25(OH)D3 SERPL-MCNC: 42 NG/ML (ref 30–100)
ALBUMIN SERPL BCP-MCNC: 4.2 G/DL (ref 3.2–4.9)
ALBUMIN/GLOB SERPL: 1.4 G/DL
ALP SERPL-CCNC: 86 U/L (ref 30–99)
ALT SERPL-CCNC: 18 U/L (ref 2–50)
ANION GAP SERPL CALC-SCNC: 12 MMOL/L (ref 0–11.9)
AST SERPL-CCNC: 19 U/L (ref 12–45)
BASOPHILS # BLD AUTO: 0.5 % (ref 0–1.8)
BASOPHILS # BLD: 0.03 K/UL (ref 0–0.12)
BILIRUB SERPL-MCNC: 0.5 MG/DL (ref 0.1–1.5)
BUN SERPL-MCNC: 8 MG/DL (ref 8–22)
CALCIUM SERPL-MCNC: 8.8 MG/DL (ref 8.5–10.5)
CHLORIDE SERPL-SCNC: 93 MMOL/L (ref 96–112)
CHOLEST SERPL-MCNC: 132 MG/DL (ref 100–199)
CO2 SERPL-SCNC: 25 MMOL/L (ref 20–33)
CREAT SERPL-MCNC: 0.62 MG/DL (ref 0.5–1.4)
CREAT UR-MCNC: 182.4 MG/DL
EOSINOPHIL # BLD AUTO: 0.1 K/UL (ref 0–0.51)
EOSINOPHIL NFR BLD: 1.6 % (ref 0–6.9)
ERYTHROCYTE [DISTWIDTH] IN BLOOD BY AUTOMATED COUNT: 38.5 FL (ref 35.9–50)
EST. AVERAGE GLUCOSE BLD GHB EST-MCNC: 151 MG/DL
GLOBULIN SER CALC-MCNC: 2.9 G/DL (ref 1.9–3.5)
GLUCOSE SERPL-MCNC: 158 MG/DL (ref 65–99)
HBA1C MFR BLD: 6.9 % (ref 0–5.6)
HCT VFR BLD AUTO: 43.3 % (ref 37–47)
HDLC SERPL-MCNC: 55 MG/DL
HGB BLD-MCNC: 15.5 G/DL (ref 12–16)
IMM GRANULOCYTES # BLD AUTO: 0.02 K/UL (ref 0–0.11)
IMM GRANULOCYTES NFR BLD AUTO: 0.3 % (ref 0–0.9)
LDLC SERPL CALC-MCNC: 60 MG/DL
LYMPHOCYTES # BLD AUTO: 1.04 K/UL (ref 1–4.8)
LYMPHOCYTES NFR BLD: 16.7 % (ref 22–41)
MCH RBC QN AUTO: 31 PG (ref 27–33)
MCHC RBC AUTO-ENTMCNC: 35.8 G/DL (ref 33.6–35)
MCV RBC AUTO: 86.6 FL (ref 81.4–97.8)
MICROALBUMIN UR-MCNC: 2.5 MG/DL
MICROALBUMIN/CREAT UR: 14 MG/G (ref 0–30)
MONOCYTES # BLD AUTO: 0.82 K/UL (ref 0–0.85)
MONOCYTES NFR BLD AUTO: 13.2 % (ref 0–13.4)
NEUTROPHILS # BLD AUTO: 4.21 K/UL (ref 2–7.15)
NEUTROPHILS NFR BLD: 67.7 % (ref 44–72)
NRBC # BLD AUTO: 0 K/UL
NRBC BLD-RTO: 0 /100 WBC
PLATELET # BLD AUTO: 258 K/UL (ref 164–446)
PMV BLD AUTO: 9.9 FL (ref 9–12.9)
POTASSIUM SERPL-SCNC: 3.3 MMOL/L (ref 3.6–5.5)
PROT SERPL-MCNC: 7.1 G/DL (ref 6–8.2)
RBC # BLD AUTO: 5 M/UL (ref 4.2–5.4)
SODIUM SERPL-SCNC: 130 MMOL/L (ref 135–145)
TRIGL SERPL-MCNC: 85 MG/DL (ref 0–149)
TSH SERPL DL<=0.005 MIU/L-ACNC: 1.26 UIU/ML (ref 0.38–5.33)
WBC # BLD AUTO: 6.2 K/UL (ref 4.8–10.8)

## 2018-02-03 PROCEDURE — 80053 COMPREHEN METABOLIC PANEL: CPT

## 2018-02-03 PROCEDURE — 80061 LIPID PANEL: CPT

## 2018-02-03 PROCEDURE — 84443 ASSAY THYROID STIM HORMONE: CPT

## 2018-02-03 PROCEDURE — 82043 UR ALBUMIN QUANTITATIVE: CPT

## 2018-02-03 PROCEDURE — 83036 HEMOGLOBIN GLYCOSYLATED A1C: CPT | Mod: GA

## 2018-02-03 PROCEDURE — 85025 COMPLETE CBC W/AUTO DIFF WBC: CPT

## 2018-02-03 PROCEDURE — 82306 VITAMIN D 25 HYDROXY: CPT

## 2018-02-03 PROCEDURE — 82570 ASSAY OF URINE CREATININE: CPT

## 2018-02-03 PROCEDURE — 36415 COLL VENOUS BLD VENIPUNCTURE: CPT

## 2018-02-05 ENCOUNTER — TELEPHONE (OUTPATIENT)
Dept: MEDICAL GROUP | Facility: PHYSICIAN GROUP | Age: 73
End: 2018-02-05

## 2018-02-05 NOTE — TELEPHONE ENCOUNTER
----- Message from Zeinab Jeronimo M.D. sent at 2/4/2018 11:23 PM PST -----  Please let the patient know that the labs look good. Sodium still on the low side. Thyroid test is normal. Sugar slight lower. We can discuss at her next appointment. Thank you  Zeinab Jeronimo M.D.

## 2018-02-05 NOTE — PROGRESS NOTES
Please let the patient know that the labs look good. Sodium still on the low side. Thyroid test is normal. Sugar slight lower. We can discuss at her next appointment. Thank you  Zeinab Jeronimo M.D.

## 2018-02-06 ENCOUNTER — TELEPHONE (OUTPATIENT)
Dept: MEDICAL GROUP | Facility: PHYSICIAN GROUP | Age: 73
End: 2018-02-06

## 2018-02-06 ENCOUNTER — HOSPITAL ENCOUNTER (OUTPATIENT)
Dept: RADIOLOGY | Facility: MEDICAL CENTER | Age: 73
End: 2018-02-06
Attending: INTERNAL MEDICINE
Payer: MEDICARE

## 2018-02-06 DIAGNOSIS — J45.20 MILD INTERMITTENT REACTIVE AIRWAY DISEASE WITHOUT COMPLICATION: ICD-10-CM

## 2018-02-06 DIAGNOSIS — R63.4 WEIGHT LOSS: ICD-10-CM

## 2018-02-06 DIAGNOSIS — R53.83 FATIGUE, UNSPECIFIED TYPE: ICD-10-CM

## 2018-02-06 DIAGNOSIS — J40 BRONCHITIS: ICD-10-CM

## 2018-02-06 DIAGNOSIS — E87.1 HYPONATREMIA: ICD-10-CM

## 2018-02-06 PROCEDURE — 71260 CT THORAX DX C+: CPT

## 2018-02-06 RX ORDER — DOXYCYCLINE HYCLATE 100 MG
100 TABLET ORAL 2 TIMES DAILY
Qty: 20 TAB | Refills: 0 | Status: SHIPPED | OUTPATIENT
Start: 2018-02-06 | End: 2018-02-28

## 2018-02-06 NOTE — TELEPHONE ENCOUNTER
Please let pt knows that I will send her a new prescription for doxycycline 100 mg BID for 10 days. Please advise pt to call us or make and apt if she is not getting better  Thank you  Zeinab Jeronimo M.D.

## 2018-02-06 NOTE — TELEPHONE ENCOUNTER
----- Message from [facility] sent at 2/5/2018  1:29 PM PST -----  Regarding: FW: Procedure Question  Contact: [phone]      ----- Message -----  From: Rossy Lopez  Sent: 2/5/2018   1:24 PM  To: Emmanuel Young Ma  Subject: Procedure Question                               Dr. Jeronimo- I saw you Wednesday about what I thought was a sinus infection that was starting before it went to my lungs.  It went to my lungs and I am coughing  up phlegm/mucas.  What now?   I have an appt tomorrow at 3:15 for lung CT.   I need help getting rid of whatever this.  I called pulmonologist but they did not have a referral yet

## 2018-02-06 NOTE — TELEPHONE ENCOUNTER
Phone Number Called: [phone] (home)       Message: Pt called back and stated it is a 5 hour wait for urgent care, and would like to know if she would be able to get an antibiotic as her cough has gotten worse.    Left Message for patient to call back: N\A

## 2018-02-06 NOTE — TELEPHONE ENCOUNTER
Phone Number Called: [phone] (home)     Message: Pt notified of results below.     Left Message for patient to call back: N\A

## 2018-02-08 ENCOUNTER — TELEPHONE (OUTPATIENT)
Dept: MEDICAL GROUP | Facility: PHYSICIAN GROUP | Age: 73
End: 2018-02-08

## 2018-02-08 NOTE — TELEPHONE ENCOUNTER
Please let pt knows that she has possible pneumonia. Please check with pt how she is doing. Doxy is a good antibiotic for pneumonia. I will repeat CT scan after a month to see if chest is cleared. please advise her to keep apt as scheduled.   Thank you,  Zeinab Jeronimo M.D.

## 2018-02-08 NOTE — TELEPHONE ENCOUNTER
Phone Number Called: [phone] (home)     Message: pt notified of message below. No questions at this time.     Left Message for patient to call back: N\A

## 2018-02-28 ENCOUNTER — APPOINTMENT (RX ONLY)
Dept: URBAN - METROPOLITAN AREA CLINIC 20 | Facility: CLINIC | Age: 73
Setting detail: DERMATOLOGY
End: 2018-02-28

## 2018-02-28 ENCOUNTER — OFFICE VISIT (OUTPATIENT)
Dept: MEDICAL GROUP | Facility: PHYSICIAN GROUP | Age: 73
End: 2018-02-28
Payer: MEDICARE

## 2018-02-28 VITALS
SYSTOLIC BLOOD PRESSURE: 144 MMHG | TEMPERATURE: 97.3 F | DIASTOLIC BLOOD PRESSURE: 66 MMHG | WEIGHT: 144 LBS | RESPIRATION RATE: 14 BRPM | HEIGHT: 63 IN | HEART RATE: 54 BPM | BODY MASS INDEX: 25.52 KG/M2 | OXYGEN SATURATION: 93 %

## 2018-02-28 DIAGNOSIS — R93.89 ABNORMAL CT SCAN, CHEST: ICD-10-CM

## 2018-02-28 DIAGNOSIS — N18.1 CONTROLLED TYPE 2 DIABETES MELLITUS WITH STAGE 1 CHRONIC KIDNEY DISEASE, WITHOUT LONG-TERM CURRENT USE OF INSULIN (HCC): ICD-10-CM

## 2018-02-28 DIAGNOSIS — I10 ESSENTIAL HYPERTENSION: ICD-10-CM

## 2018-02-28 DIAGNOSIS — Z85.828 PERSONAL HISTORY OF OTHER MALIGNANT NEOPLASM OF SKIN: ICD-10-CM

## 2018-02-28 DIAGNOSIS — L57.0 ACTINIC KERATOSIS: ICD-10-CM

## 2018-02-28 DIAGNOSIS — J45.20 MILD INTERMITTENT REACTIVE AIRWAY DISEASE WITHOUT COMPLICATION: ICD-10-CM

## 2018-02-28 DIAGNOSIS — R19.5 LOOSE STOOLS: ICD-10-CM

## 2018-02-28 DIAGNOSIS — L82.0 INFLAMED SEBORRHEIC KERATOSIS: ICD-10-CM

## 2018-02-28 DIAGNOSIS — E87.1 HYPONATREMIA: ICD-10-CM

## 2018-02-28 DIAGNOSIS — E11.22 CONTROLLED TYPE 2 DIABETES MELLITUS WITH STAGE 1 CHRONIC KIDNEY DISEASE, WITHOUT LONG-TERM CURRENT USE OF INSULIN (HCC): ICD-10-CM

## 2018-02-28 DIAGNOSIS — L98.8 OTHER SPECIFIED DISORDERS OF THE SKIN AND SUBCUTANEOUS TISSUE: ICD-10-CM

## 2018-02-28 PROBLEM — J40 BRONCHITIS: Status: RESOLVED | Noted: 2017-10-19 | Resolved: 2018-02-28

## 2018-02-28 PROBLEM — R05.9 COUGH: Status: RESOLVED | Noted: 2018-01-31 | Resolved: 2018-02-28

## 2018-02-28 PROCEDURE — 99213 OFFICE O/P EST LOW 20 MIN: CPT | Mod: 25

## 2018-02-28 PROCEDURE — ? LIQUID NITROGEN

## 2018-02-28 PROCEDURE — ? COUNSELING

## 2018-02-28 PROCEDURE — ? OBSERVATION

## 2018-02-28 PROCEDURE — 17000 DESTRUCT PREMALG LESION: CPT | Mod: 59

## 2018-02-28 PROCEDURE — 99214 OFFICE O/P EST MOD 30 MIN: CPT | Performed by: INTERNAL MEDICINE

## 2018-02-28 PROCEDURE — ? ADDITIONAL NOTES

## 2018-02-28 ASSESSMENT — LOCATION ZONE DERM
LOCATION ZONE: FACE
LOCATION ZONE: LIP
LOCATION ZONE: ARM

## 2018-02-28 ASSESSMENT — LOCATION DETAILED DESCRIPTION DERM
LOCATION DETAILED: RIGHT ANTERIOR PROXIMAL UPPER ARM
LOCATION DETAILED: RIGHT POSTERIOR SHOULDER
LOCATION DETAILED: GLABELLA
LOCATION DETAILED: RIGHT SUPERIOR VERMILION LIP

## 2018-02-28 ASSESSMENT — LOCATION SIMPLE DESCRIPTION DERM
LOCATION SIMPLE: RIGHT LIP
LOCATION SIMPLE: GLABELLA
LOCATION SIMPLE: RIGHT UPPER ARM
LOCATION SIMPLE: RIGHT SHOULDER

## 2018-02-28 NOTE — PROCEDURE: LIQUID NITROGEN
Post-Care Instructions: I reviewed with the patient in detail post-care instructions. Patient is to wear sunprotection, and avoid picking at any of the treated lesions. Pt may apply Vaseline to crusted or scabbing areas.
Duration Of Freeze Thaw-Cycle (Seconds): 4
Consent: The patient's consent was obtained including but not limited to risks of crusting, scabbing, blistering, scarring, darker or lighter pigmentary change, recurrence, incomplete removal and infection.
Render Post-Care Instructions In Note?: no
Detail Level: Detailed
Medical Necessity Information: It is in your best interest to select a reason for this procedure from the list below. All of these items fulfill various CMS LCD requirements except the new and changing color options.
Medical Necessity Clause: This procedure was medically necessary because the lesions that were treated were:

## 2018-03-01 NOTE — PROGRESS NOTES
Subjective:   Rossy Lopez is a 73 y.o. female here today for follow up on chest CT scan, bronchitis     73-year-old female past medical history hypertension, hyperlipidemia, GERD, diabetes 2 well-controlled, presented as follow up on bronchitis. Please refer to previous note. I have seen pt couple of times last few months for reoccurrence of cough, being sick. She was treated twice with z-pack since 10/2017, never got better. I did order chest CT because of her being sick often and also from lab work noted chronic hyponatremia. CT showed opacification. I started pt on doxy. She is feeling significant better. She had PFTs 09/2017, significant reduce of FEV1 56%. I started her on anorrol and does not seems to help. Not sure if pt is using technic properly. She had smoked long time ago only for short period. Albuterol does not help either.   In regards to diarrhea resolved once she started to lower dose of metformin to 500 mg daily. No complications     Current medicines (including changes today)  Current Outpatient Prescriptions   Medication Sig Dispense Refill   • hydrochlorothiazide (HYDRODIURIL) 25 MG Tab TAKE 1 TABLET BY MOUTH EVERY DAY 90 Tab 1   • metformin (GLUCOPHAGE) 500 MG Tab Take 1 Tab by mouth 2 times a day, with meals. 180 Tab 3   • albuterol 108 (90 Base) MCG/ACT Aero Soln inhalation aerosol Inhale 2 Puffs by mouth every 6 hours as needed for Shortness of Breath. 8.5 g 1   • enalapril (VASOTEC) 10 MG Tab Take 1 Tab by mouth every day. 90 Tab 1   • glucose blood (ACCU-CHEK SMARTVIEW) strip TEST TWICE DAILY AND AS NEEDED FOR SYMPTOMS OF HIGH OR LOW SUGAR 100 Strip 3   • simvastatin (ZOCOR) 10 MG Tab Take 1 Tab by mouth every evening. 90 Tab 3   • omeprazole (PRILOSEC) 20 MG delayed-release capsule Take 1 Cap by mouth every day. 90 Cap 3   • Ibuprofen (ADVIL PO) Take  by mouth.     • Cholecalciferol (VITAMIN D-3 PO) Take  by mouth.     • Multiple Vitamins-Minerals (MULTIVITAMIN ADULT PO) Take 1 Tab by  mouth every day.     • aspirin 81 MG tablet Take 81 mg by mouth every day.       No current facility-administered medications for this visit.      She  has a past medical history of Anesthesia; Cataract (8/2016); Diabetes; Diabetes mellitus type 2, controlled (CMS-[facility]) (6/19/2012); Family history of colon cancer (6/19/2012); Heart burn; High cholesterol; Hyperlipidemia; and Hypertension.    Current Outpatient Prescriptions   Medication Sig Dispense Refill   • hydrochlorothiazide (HYDRODIURIL) 25 MG Tab TAKE 1 TABLET BY MOUTH EVERY DAY 90 Tab 1   • metformin (GLUCOPHAGE) 500 MG Tab Take 1 Tab by mouth 2 times a day, with meals. 180 Tab 3   • albuterol 108 (90 Base) MCG/ACT Aero Soln inhalation aerosol Inhale 2 Puffs by mouth every 6 hours as needed for Shortness of Breath. 8.5 g 1   • enalapril (VASOTEC) 10 MG Tab Take 1 Tab by mouth every day. 90 Tab 1   • glucose blood (ACCU-CHEK SMARTVIEW) strip TEST TWICE DAILY AND AS NEEDED FOR SYMPTOMS OF HIGH OR LOW SUGAR 100 Strip 3   • simvastatin (ZOCOR) 10 MG Tab Take 1 Tab by mouth every evening. 90 Tab 3   • omeprazole (PRILOSEC) 20 MG delayed-release capsule Take 1 Cap by mouth every day. 90 Cap 3   • Ibuprofen (ADVIL PO) Take  by mouth.     • Cholecalciferol (VITAMIN D-3 PO) Take  by mouth.     • Multiple Vitamins-Minerals (MULTIVITAMIN ADULT PO) Take 1 Tab by mouth every day.     • aspirin 81 MG tablet Take 81 mg by mouth every day.       No current facility-administered medications for this visit.        Allergies as of 02/28/2018 - Reviewed 01/31/2018   Allergen Reaction Noted   • Other misc Hives 07/29/2011   • Phisohex [hexachlorophene] Rash 08/31/2016   • Sulfa drugs Rash and Itching 07/16/2010   • Tape Hives and Itching 09/12/2016       Social History     Social History   • Marital status:      Spouse name: N/A   • Number of children: N/A   • Years of education: N/A     Occupational History   • Not on file.     Social History Main Topics   • Smoking  "status: Former Smoker     Packs/day: 1.00     Years: 5.00     Types: Cigarettes     Quit date: 1/1/1970   • Smokeless tobacco: Never Used      Comment: quit 1972   • Alcohol use 0.0 oz/week      Comment: occasionally   • Drug use: No   • Sexual activity: No      Comment:       Other Topics Concern   • Not on file     Social History Narrative   • No narrative on file        Family History   Problem Relation Age of Onset   • Heart Disease Father 60     MI   • Cancer Father      colon cancer   • Diabetes Father    • Cancer Maternal Aunt      breast cancer       Past Surgical History:   Procedure Laterality Date   • CARPAL TUNNEL RELEASE Right 9/12/2016    Procedure: CARPAL TUNNEL RELEASE;  Surgeon: Orlando Hunter M.D.;  Location: SURGERY [facility];  Service:    • INJ,EPIDURAL/LUMB/SAC SINGLE  9/5/2014    Performed by Cale Alejandro M.D. at SURGERY SURGICAL [facility] ORS   • ABDOMINAL HYSTERECTOMY TOTAL      1995   • TONSILLECTOMY         ROS   All systems reviewed are negative except for HPI       Objective:     Blood pressure 144/66, pulse (!) 54, temperature 36.3 °C (97.3 °F), resp. rate 14, height 1.6 m (5' 3\"), weight 65.3 kg (144 lb), SpO2 93 %. Body mass index is 25.51 kg/m².   Physical Exam:  Constitutional: Alert, no distress.  Skin: Warm, dry, good turgor, no rashes in visible areas.  Eye: Equal, round and reactive, conjunctiva clear, lids normal.  ENMT: Lips without lesions, good dentition, oropharynx clear.  Neck: Trachea midline, no masses, no thyromegaly. No cervical or supraclavicular lymphadenopathy  Respiratory: Unlabored respiratory effort, lungs clear to auscultation, no wheezes, no ronchi.  Cardiovascular: Normal S1, S2, no murmur, no edema.  Abdomen: Soft, non-tender, no masses, no hepatosplenomegaly.  Psych: Alert and oriented x3, normal affect and mood.        Assessment and Plan:   The following treatment plan was discussed    1. Controlled type 2 diabetes mellitus with stage 1 " chronic kidney disease, without long-term current use of insulin (CMS-HCC)  Continue metformin 500 mg daily.     2. Loose stools  Resolved.     3. Abnormal CT scan, chest  4. Mild intermittent reactive airway disease without complication  It is concerning ground glass opacification,. pfts significantly lower. She does not responds to inhalers. I will repeat chest CT again. If still persistent she might needs higher resolution CT scan. Possible bronchoscopy and biopsy. Not a smoker. Feeling better today   - CT-CHEST (THORAX) WITH; Future    5. Essential hypertension  Continue to monitor     6. Hyponatremia  Continue to monitor. Chronic, asymptomatic       Followup: Return in about 4 weeks (around 3/28/2018), or if symptoms worsen or fail to improve, for Short.

## 2018-03-14 ENCOUNTER — HOSPITAL ENCOUNTER (OUTPATIENT)
Dept: RADIOLOGY | Facility: MEDICAL CENTER | Age: 73
End: 2018-03-14
Attending: INTERNAL MEDICINE
Payer: MEDICARE

## 2018-03-14 DIAGNOSIS — R93.89 ABNORMAL CT SCAN, CHEST: ICD-10-CM

## 2018-03-14 PROCEDURE — 700117 HCHG RX CONTRAST REV CODE 255: Performed by: INTERNAL MEDICINE

## 2018-03-14 PROCEDURE — 71260 CT THORAX DX C+: CPT

## 2018-03-14 RX ADMIN — IOHEXOL 75 ML: 350 INJECTION, SOLUTION INTRAVENOUS at 15:45

## 2018-04-02 DIAGNOSIS — Z00.00 MEDICARE ANNUAL WELLNESS VISIT, SUBSEQUENT: ICD-10-CM

## 2018-04-02 DIAGNOSIS — N18.1 CONTROLLED TYPE 2 DIABETES MELLITUS WITH STAGE 1 CHRONIC KIDNEY DISEASE, WITHOUT LONG-TERM CURRENT USE OF INSULIN (HCC): ICD-10-CM

## 2018-04-02 DIAGNOSIS — E11.22 CONTROLLED TYPE 2 DIABETES MELLITUS WITH STAGE 1 CHRONIC KIDNEY DISEASE, WITHOUT LONG-TERM CURRENT USE OF INSULIN (HCC): ICD-10-CM

## 2018-04-03 RX ORDER — HYDROCHLOROTHIAZIDE 25 MG/1
TABLET ORAL
Qty: 90 TAB | Refills: 3 | Status: SHIPPED | OUTPATIENT
Start: 2018-04-03 | End: 2018-04-09 | Stop reason: SDUPTHER

## 2018-04-09 DIAGNOSIS — I10 ESSENTIAL HYPERTENSION: ICD-10-CM

## 2018-04-09 DIAGNOSIS — Z00.00 MEDICARE ANNUAL WELLNESS VISIT, SUBSEQUENT: ICD-10-CM

## 2018-04-09 DIAGNOSIS — E78.2 MIXED HYPERLIPIDEMIA: ICD-10-CM

## 2018-04-09 DIAGNOSIS — K21.9 GERD WITHOUT ESOPHAGITIS: ICD-10-CM

## 2018-04-09 DIAGNOSIS — N18.1 CONTROLLED TYPE 2 DIABETES MELLITUS WITH STAGE 1 CHRONIC KIDNEY DISEASE, WITHOUT LONG-TERM CURRENT USE OF INSULIN (HCC): ICD-10-CM

## 2018-04-09 DIAGNOSIS — E11.22 CONTROLLED TYPE 2 DIABETES MELLITUS WITH STAGE 1 CHRONIC KIDNEY DISEASE, WITHOUT LONG-TERM CURRENT USE OF INSULIN (HCC): ICD-10-CM

## 2018-04-09 DIAGNOSIS — J45.909 REACTIVE AIRWAY DISEASE WITHOUT COMPLICATION, UNSPECIFIED ASTHMA SEVERITY, UNSPECIFIED WHETHER PERSISTENT: ICD-10-CM

## 2018-04-09 NOTE — TELEPHONE ENCOUNTER
----- Message from Rossy Lopez sent at 4/9/2018  2:32 PM PDT -----  Regarding: Prescription Question  Contact: [phone]  Dr. Jeronimo:  I am having some problems at [facility] with my prescriptions.  They are using the prescriptions from [place] office instead of your office.  With the exceptions of Metformin and hydrochlorizide, all of my prescriptions are not being filled by prescriptions  from your office.  I am pretty sure that in August when I first saw you, my prescriptions were sent in by you to [facility].   I am now out of test strips and the pharmacy said that Medicare needs a CMA (certificate of medical necessity) before they can be filled.  The pharmacist said she was sending you a message also.  Will you please send [facility] all of  my prescriptions so that they will all be from you.  Thank you for your help

## 2018-04-10 ENCOUNTER — TELEPHONE (OUTPATIENT)
Dept: MEDICAL GROUP | Facility: PHYSICIAN GROUP | Age: 73
End: 2018-04-10

## 2018-04-10 RX ORDER — ALBUTEROL SULFATE 90 UG/1
2 AEROSOL, METERED RESPIRATORY (INHALATION) EVERY 6 HOURS PRN
Qty: 8.5 G | Refills: 1 | Status: SHIPPED | OUTPATIENT
Start: 2018-04-10 | End: 2018-05-09 | Stop reason: SDUPTHER

## 2018-04-10 RX ORDER — OMEPRAZOLE 20 MG/1
20 CAPSULE, DELAYED RELEASE ORAL DAILY
Qty: 90 CAP | Refills: 3 | Status: SHIPPED | OUTPATIENT
Start: 2018-04-10 | End: 2019-04-12 | Stop reason: SDUPTHER

## 2018-04-10 RX ORDER — SIMVASTATIN 10 MG
10 TABLET ORAL EVERY EVENING
Qty: 90 TAB | Refills: 3 | Status: SHIPPED | OUTPATIENT
Start: 2018-04-10 | End: 2019-04-12 | Stop reason: SDUPTHER

## 2018-04-10 RX ORDER — ENALAPRIL MALEATE 10 MG/1
10 TABLET ORAL
Qty: 90 TAB | Refills: 3 | Status: SHIPPED | OUTPATIENT
Start: 2018-04-10 | End: 2018-04-19 | Stop reason: SINTOL

## 2018-04-10 RX ORDER — HYDROCHLOROTHIAZIDE 25 MG/1
25 TABLET ORAL
Qty: 90 TAB | Refills: 3 | Status: SHIPPED | OUTPATIENT
Start: 2018-04-10 | End: 2019-04-12 | Stop reason: SDUPTHER

## 2018-04-11 ENCOUNTER — TELEPHONE (OUTPATIENT)
Dept: MEDICAL GROUP | Facility: PHYSICIAN GROUP | Age: 73
End: 2018-04-11

## 2018-04-11 NOTE — TELEPHONE ENCOUNTER
Pt insurance wont cover enalapril- pt states she would be ok with you sending benazapril or lisinopril.    Pt insurance wont cover proair is covered     anoro isnt covered by insurance please change it to     spiriva  advair  breo     Please advise, thank you

## 2018-04-11 NOTE — TELEPHONE ENCOUNTER
DOCUMENTATION OF PAR STATUS:    1. Name of Medication & Dose: Enalapril     2. Name of Prescription Coverage Company & phone #: [employer]    3. Date Prior Auth Submitted: 4/10/18    4. What information was given to obtain insurance decision? Office notes     5. Prior Auth Status? Pending    6. Patient Notified: yes

## 2018-04-12 ENCOUNTER — TELEPHONE (OUTPATIENT)
Dept: MEDICAL GROUP | Facility: PHYSICIAN GROUP | Age: 73
End: 2018-04-12

## 2018-04-12 RX ORDER — LISINOPRIL 10 MG/1
10 TABLET ORAL DAILY
Qty: 90 TAB | Refills: 3 | Status: SHIPPED | OUTPATIENT
Start: 2018-04-12 | End: 2018-04-19 | Stop reason: SINTOL

## 2018-04-12 NOTE — TELEPHONE ENCOUNTER
enalapril not covered by insurance needs benasepril or lisnopril or losartan please advise thank you

## 2018-04-12 NOTE — TELEPHONE ENCOUNTER
"----- Message from Rossy Lopez sent at 4/11/2018  7:18 PM PDT -----  Regarding: Prescription Question  Contact: [phone]  Hi, I am sorry to be such a pest!  The pharmacist said that Medicare needs a \"certificate of medical necessity\"   for me to receive my test strips.  Will you please send something to the pharmacy that will fulfill their wishes.  Thank you very much  Rossy"

## 2018-04-12 NOTE — TELEPHONE ENCOUNTER
DOCUMENTATION OF PAR STATUS:    1. Name of Medication & Dose: omeprazole     2. Name of Prescription Coverage Company & phone #: [employer]    3. Date Prior Auth Submitted: 4/12/18    4. What information was given to obtain insurance decision? Office notes    5. Prior Auth Status? Pending    6. Patient Notified: yes    DOCUMENTATION OF PAR STATUS:    1. Name of Medication & Dose: breo     2. Name of Prescription Coverage Company & phone #: [employer]    3. Date Prior Auth Submitted: 4/12/18    4. What information was given to obtain insurance decision? Office notes    5. Prior Auth Status? Pending    6. Patient Notified: yes    FINAL PRIOR AUTHORIZATION STATUS:    1.  Name of Medication & Dose: enalapril     2. Prior Auth Status: Denied.  Reason: Change medication to benazapril    3. Action Taken: Pharmacy Notified: yes Patient Notified: yes

## 2018-04-12 NOTE — TELEPHONE ENCOUNTER
Called [facility] and they are faxing the form. Messaged patient to notify her of what's going on.

## 2018-04-13 ENCOUNTER — APPOINTMENT (OUTPATIENT)
Dept: RADIOLOGY | Facility: IMAGING CENTER | Age: 73
End: 2018-04-13
Attending: NURSE PRACTITIONER
Payer: MEDICARE

## 2018-04-13 ENCOUNTER — OFFICE VISIT (OUTPATIENT)
Dept: URGENT CARE | Facility: CLINIC | Age: 73
End: 2018-04-13
Payer: MEDICARE

## 2018-04-13 VITALS
BODY MASS INDEX: 26.13 KG/M2 | SYSTOLIC BLOOD PRESSURE: 122 MMHG | HEIGHT: 63 IN | RESPIRATION RATE: 16 BRPM | DIASTOLIC BLOOD PRESSURE: 82 MMHG | WEIGHT: 147.49 LBS | HEART RATE: 94 BPM | TEMPERATURE: 97.1 F | OXYGEN SATURATION: 92 %

## 2018-04-13 DIAGNOSIS — J22 LOWER RESPIRATORY INFECTION: ICD-10-CM

## 2018-04-13 DIAGNOSIS — R05.9 COUGH: ICD-10-CM

## 2018-04-13 DIAGNOSIS — R06.2 WHEEZING: ICD-10-CM

## 2018-04-13 PROCEDURE — 71046 X-RAY EXAM CHEST 2 VIEWS: CPT | Mod: TC | Performed by: NURSE PRACTITIONER

## 2018-04-13 PROCEDURE — 94640 AIRWAY INHALATION TREATMENT: CPT | Performed by: NURSE PRACTITIONER

## 2018-04-13 PROCEDURE — 99213 OFFICE O/P EST LOW 20 MIN: CPT | Mod: 25 | Performed by: NURSE PRACTITIONER

## 2018-04-13 RX ORDER — LEVOFLOXACIN 750 MG/1
750 TABLET, FILM COATED ORAL DAILY
Qty: 5 TAB | Refills: 0 | Status: SHIPPED | OUTPATIENT
Start: 2018-04-13 | End: 2018-04-18

## 2018-04-13 RX ORDER — PREDNISONE 20 MG/1
40 TABLET ORAL DAILY
Qty: 10 TAB | Refills: 0 | Status: SHIPPED | OUTPATIENT
Start: 2018-04-13 | End: 2018-04-18

## 2018-04-13 RX ORDER — IPRATROPIUM BROMIDE AND ALBUTEROL SULFATE 2.5; .5 MG/3ML; MG/3ML
3 SOLUTION RESPIRATORY (INHALATION) ONCE
Status: COMPLETED | OUTPATIENT
Start: 2018-04-13 | End: 2018-04-13

## 2018-04-13 RX ADMIN — IPRATROPIUM BROMIDE AND ALBUTEROL SULFATE 3 ML: 2.5; .5 SOLUTION RESPIRATORY (INHALATION) at 08:55

## 2018-04-13 NOTE — PROGRESS NOTES
"Subjective:      Rossy Lopez is a 73 y.o. female who presents with Cough (x 4 months off / on, productive cough, wheezing)            HPI this is a 73-year-old female complaining of chronic cough on and off since October. She has been seen multiple times by her primary care provider and by urgent care for this problem. Over the last week she feels the cough is getting worse she has increased sputum production, chills, fatigue, wheezing, and generally not feeling well. Treatments tried have been over-the-counter cough medications. She has already taken azithromycin and doxycycline from her providers over the past few months. She has had 2 CT scans of her chest ordered by her primary care provider to \"rule out cancer\". No other aggravating or relieving factors.    ROS  Denies fever, orthopnea, chest pain, shortness of breath, leg swelling or pain..     Objective:     /82   Pulse 94   Temp 36.2 °C (97.1 °F)   Resp 16   Ht 1.6 m (5' 3\")   Wt 66.9 kg (147 lb 7.8 oz)   SpO2 92%   BMI 26.13 kg/m²      Physical Exam   Constitutional: She is oriented to person, place, and time. Vital signs are normal. She appears well-developed and well-nourished.  Non-toxic appearance. She does not have a sickly appearance. She does not appear ill. No distress.   HENT:   Head: Normocephalic.   Right Ear: Hearing, external ear and ear canal normal. Tympanic membrane is injected. Tympanic membrane is not erythematous. No middle ear effusion.   Left Ear: Hearing, external ear and ear canal normal. Tympanic membrane is injected. Tympanic membrane is not erythematous.  No middle ear effusion.   Nose: Rhinorrhea present. No mucosal edema. Right sinus exhibits no maxillary sinus tenderness and no frontal sinus tenderness. Left sinus exhibits no maxillary sinus tenderness and no frontal sinus tenderness.   Mouth/Throat: Uvula is midline. Posterior oropharyngeal erythema present. No oropharyngeal exudate, posterior oropharyngeal " edema or tonsillar abscesses.   Eyes: Pupils are equal, round, and reactive to light.   Neck: Trachea normal, normal range of motion and full passive range of motion without pain. Neck supple.   Cardiovascular: Normal rate and regular rhythm.  PMI is not displaced.    Pulmonary/Chest: Effort normal. No respiratory distress. She has no decreased breath sounds. She has wheezes. She has rhonchi. She has no rales.   Abdominal: Soft. Normal appearance. There is no tenderness.   Musculoskeletal: Normal range of motion.   Lymphadenopathy:     She has no cervical adenopathy.        Right: No supraclavicular adenopathy present.        Left: No supraclavicular adenopathy present.   Neurological: She is alert and oriented to person, place, and time. She has normal strength. Gait normal.   Skin: Skin is warm and dry.   Psychiatric: She has a normal mood and affect. Her speech is normal and behavior is normal.   Nursing note and vitals reviewed.        Nebulized DuoNeb given in urgent care today. Patient tolerated it well with no adverse effects. Her work of breathing was improved with moderate improvement in her story wheezing. She reports improved work of breathing. SPO2 maintained.        Chest x-ray impression:  4/13/2018 9:11 AM    HISTORY/REASON FOR EXAM:  Chronic cough since October. Prior history of smoking.      TECHNIQUE/EXAM DESCRIPTION AND NUMBER OF VIEWS:  Two views of the chest.    COMPARISON:  CT thorax 3/14/2018    FINDINGS:  The heart is normal in size.  No pulmonary infiltrates or consolidations are noted.  No pleural effusions are appreciated.  Sigmoid-shaped thoracic scoliosis is present convex right inferiorly.  There is focal eventration of the posterior aspect of the left hemidiaphragm.   Impression       No acute cardiopulmonary disease.   Reading Provider Reading Date   Gaurav Mann M.D. Apr 13, 2018          Assessment/Plan:     1. Cough  DX-CHEST-2 VIEWS    ipratropium-albuterol (DUONEB) nebulizer  solution 3 mL    predniSONE (DELTASONE) 20 MG Tab    levoFLOXacin (LEVAQUIN) 750 MG tablet   2. Wheezing  ipratropium-albuterol (DUONEB) nebulizer solution 3 mL    predniSONE (DELTASONE) 20 MG Tab    levoFLOXacin (LEVAQUIN) 750 MG tablet   3. Lower respiratory infection  predniSONE (DELTASONE) 20 MG Tab    levoFLOXacin (LEVAQUIN) 750 MG tablet     .  She has an appointment scheduled with pulmonology in 2 weeks she is encouraged to keep this appointment. Consideration of inhaled corticosteroids. It was discussed adding this at this visit today although patient is reluctant to start on inhaled corticosteroids due to the cost. I will give her some oral steroids today.    Educated in proper administration of medication(s) ordered today including safety, possible SE, risks, benefits, rationale and alternatives to therapy.     Return to clinic or PCP  2-3  days if current symptoms are not resolving in a satisfactory manner or sooner if new or worsening symptoms occur.   Patient (and/or family) advised differential diagnoses, signs and symptoms which would warrant further evaluation and /or emergent evaluation.     Patient was in agreement with this treatment plan and seemed to understand without barriers.   Questions were encouraged and answered to patients satisfaction.     Pt education done. Aftercare instructions given to pt/ caregiver. Questions answered. Verbalizes good understanding.

## 2018-04-19 ENCOUNTER — PATIENT MESSAGE (OUTPATIENT)
Dept: MEDICAL GROUP | Facility: PHYSICIAN GROUP | Age: 73
End: 2018-04-19

## 2018-04-19 DIAGNOSIS — I10 ESSENTIAL HYPERTENSION: ICD-10-CM

## 2018-04-19 RX ORDER — LOSARTAN POTASSIUM 25 MG/1
25 TABLET ORAL DAILY
Qty: 90 TAB | Refills: 3 | Status: SHIPPED | OUTPATIENT
Start: 2018-04-19 | End: 2019-04-12 | Stop reason: SDUPTHER

## 2018-04-23 ENCOUNTER — TELEPHONE (OUTPATIENT)
Dept: MEDICAL GROUP | Facility: PHYSICIAN GROUP | Age: 73
End: 2018-04-23

## 2018-04-24 NOTE — TELEPHONE ENCOUNTER
VOICEMAIL  1. Caller Name: [facility]                      Call Back Number: [phone]    2. Message: Pt's pharm called stating the standard dose for Fluticasone Furoate-Vilanterol (BREO ELLIPTA) 200-25 MCG/INH AEROSOL POWDER, BREATH ACTIVATED is to take 1 puff per day. Please review...    3. Patient approves office to leave a detailed voicemail/MyChart message: N\A

## 2018-05-09 ENCOUNTER — TELEPHONE (OUTPATIENT)
Dept: MEDICAL GROUP | Facility: PHYSICIAN GROUP | Age: 73
End: 2018-05-09

## 2018-05-09 DIAGNOSIS — J45.909 REACTIVE AIRWAY DISEASE WITHOUT COMPLICATION, UNSPECIFIED ASTHMA SEVERITY, UNSPECIFIED WHETHER PERSISTENT: ICD-10-CM

## 2018-05-09 RX ORDER — ALBUTEROL SULFATE 90 UG/1
1 AEROSOL, METERED RESPIRATORY (INHALATION) EVERY 6 HOURS PRN
Qty: 8.5 G | Refills: 1 | Status: SHIPPED | OUTPATIENT
Start: 2018-05-09 | End: 2019-05-14

## 2018-05-09 NOTE — TELEPHONE ENCOUNTER
Was the patient seen in the last year in this department? Yes     Does patient have an active prescription for medications requested? No     Received Request Via: Pharmacy     Per the pharmacy pt needs a new Rx for albuterol 108 (90 Base) MCG/ACT Aero Soln inhalation due to pt taking 2 puffs is not covered by her insurance.  I changed the the Rx to 1 puff...

## 2018-05-30 ENCOUNTER — APPOINTMENT (RX ONLY)
Dept: URBAN - METROPOLITAN AREA CLINIC 20 | Facility: CLINIC | Age: 73
Setting detail: DERMATOLOGY
End: 2018-05-30

## 2018-05-30 DIAGNOSIS — L82.1 OTHER SEBORRHEIC KERATOSIS: ICD-10-CM

## 2018-05-30 DIAGNOSIS — L57.8 OTHER SKIN CHANGES DUE TO CHRONIC EXPOSURE TO NONIONIZING RADIATION: ICD-10-CM

## 2018-05-30 DIAGNOSIS — D18.0 HEMANGIOMA: ICD-10-CM

## 2018-05-30 DIAGNOSIS — D22 MELANOCYTIC NEVI: ICD-10-CM

## 2018-05-30 DIAGNOSIS — L30.9 DERMATITIS, UNSPECIFIED: ICD-10-CM

## 2018-05-30 DIAGNOSIS — Z85.828 PERSONAL HISTORY OF OTHER MALIGNANT NEOPLASM OF SKIN: ICD-10-CM

## 2018-05-30 DIAGNOSIS — L57.0 ACTINIC KERATOSIS: ICD-10-CM

## 2018-05-30 DIAGNOSIS — L81.4 OTHER MELANIN HYPERPIGMENTATION: ICD-10-CM

## 2018-05-30 PROBLEM — D22.5 MELANOCYTIC NEVI OF TRUNK: Status: ACTIVE | Noted: 2018-05-30

## 2018-05-30 PROBLEM — D18.01 HEMANGIOMA OF SKIN AND SUBCUTANEOUS TISSUE: Status: ACTIVE | Noted: 2018-05-30

## 2018-05-30 PROBLEM — D48.5 NEOPLASM OF UNCERTAIN BEHAVIOR OF SKIN: Status: ACTIVE | Noted: 2018-05-30

## 2018-05-30 PROCEDURE — ? BIOPSY BY SHAVE METHOD

## 2018-05-30 PROCEDURE — ? PRESCRIPTION

## 2018-05-30 PROCEDURE — 99214 OFFICE O/P EST MOD 30 MIN: CPT | Mod: 25

## 2018-05-30 PROCEDURE — ? COUNSELING

## 2018-05-30 PROCEDURE — 17003 DESTRUCT PREMALG LES 2-14: CPT

## 2018-05-30 PROCEDURE — 17000 DESTRUCT PREMALG LESION: CPT

## 2018-05-30 PROCEDURE — ? LIQUID NITROGEN

## 2018-05-30 PROCEDURE — 11100: CPT | Mod: 59

## 2018-05-30 RX ORDER — TRIAMCINOLONE ACETONIDE 1 MG/G
CREAM TOPICAL BID
Qty: 1 | Refills: 2 | Status: ERX | COMMUNITY
Start: 2018-05-30

## 2018-05-30 RX ADMIN — TRIAMCINOLONE ACETONIDE: 1 CREAM TOPICAL at 00:00

## 2018-05-30 ASSESSMENT — LOCATION SIMPLE DESCRIPTION DERM
LOCATION SIMPLE: RIGHT HAND
LOCATION SIMPLE: RIGHT CHEEK
LOCATION SIMPLE: RIGHT PRETIBIAL REGION
LOCATION SIMPLE: LEFT UPPER BACK
LOCATION SIMPLE: RIGHT UPPER BACK
LOCATION SIMPLE: RIGHT SHOULDER
LOCATION SIMPLE: LEFT HAND
LOCATION SIMPLE: NOSE
LOCATION SIMPLE: NECK
LOCATION SIMPLE: RIGHT ANTERIOR NECK
LOCATION SIMPLE: LEFT CHEEK

## 2018-05-30 ASSESSMENT — LOCATION ZONE DERM
LOCATION ZONE: NECK
LOCATION ZONE: LEG
LOCATION ZONE: NOSE
LOCATION ZONE: FACE
LOCATION ZONE: HAND
LOCATION ZONE: ARM
LOCATION ZONE: TRUNK

## 2018-05-30 ASSESSMENT — LOCATION DETAILED DESCRIPTION DERM
LOCATION DETAILED: RIGHT INFERIOR MEDIAL UPPER BACK
LOCATION DETAILED: RIGHT ANTERIOR SHOULDER
LOCATION DETAILED: LEFT LATERAL MALAR CHEEK
LOCATION DETAILED: RIGHT SUPERIOR UPPER BACK
LOCATION DETAILED: LEFT RADIAL DORSAL HAND
LOCATION DETAILED: RIGHT INFERIOR CENTRAL MALAR CHEEK
LOCATION DETAILED: NASAL DORSUM
LOCATION DETAILED: RIGHT DISTAL PRETIBIAL REGION
LOCATION DETAILED: RIGHT RADIAL DORSAL HAND
LOCATION DETAILED: LEFT MEDIAL MALAR CHEEK
LOCATION DETAILED: LEFT SUPERIOR UPPER BACK
LOCATION DETAILED: NASAL SUPRATIP
LOCATION DETAILED: RIGHT CENTRAL LATERAL NECK
LOCATION DETAILED: RIGHT SUPERIOR LATERAL BUCCAL CHEEK
LOCATION DETAILED: RIGHT INFERIOR ANTERIOR NECK

## 2018-05-30 NOTE — PROCEDURE: LIQUID NITROGEN
Post-Care Instructions: I reviewed with the patient in detail post-care instructions. Patient is to wear sunprotection, and avoid picking at any of the treated lesions. Pt may apply Vaseline to crusted or scabbing areas.
Consent: The patient's consent was obtained including but not limited to risks of crusting, scabbing, blistering, scarring, darker or lighter pigmentary change, recurrence, incomplete removal and infection.
Render Post-Care Instructions In Note?: no
Duration Of Freeze Thaw-Cycle (Seconds): 4
Detail Level: Detailed

## 2018-05-30 NOTE — PROCEDURE: BIOPSY BY SHAVE METHOD
Type Of Destruction Used: Curettage
Detail Level: Detailed
Anesthesia Volume In Cc: 0.5
Consent: Written consent was obtained and risks were reviewed including but not limited to scarring, infection, bleeding, scabbing, incomplete removal, nerve damage and allergy to anesthesia.
Notification Instructions: Patient will be notified of biopsy results. However, patient instructed to call the office if not contacted within 2 weeks.
Bill [id number] For Specimen Handling/Conveyance To Laboratory?: no
Lab Facility: 
Silver Nitrate Text: The wound bed was treated with silver nitrate after the biopsy was performed.
Hemostasis: Drysol and Electrocautery
Biopsy Type: H and E
Additional Anesthesia Volume In Cc (Will Not Render If 0): 0
Billing Type: Third-Party Bill
Post-Care Instructions: I reviewed with the patient in detail post-care instructions. Patient is to keep the biopsy site dry overnight, and then apply bacitracin twice daily until healed. Patient may apply hydrogen peroxide soaks to remove any crusting.
Electrodesiccation Text: The wound bed was treated with electrodesiccation after the biopsy was performed.
Anesthesia Type: 1% lidocaine with 1:100,000 epinephrine and 408mcg clindamycin/ml and a 1:10 solution of 8.4% sodium bicarbonate
Lab: [id number]
Biopsy Method: Personna blade
Dressing: Band-Aid
Electrodesiccation And Curettage Text: The wound bed was treated with electrodesiccation and curettage after the biopsy was performed.
Was A Bandage Applied: Yes
Wound Care: Vaseline
REVIEW OF SYSTEMS/HISTORY OF PRESENT ILLNESS/HIV/VITAL SIGNS( Pullset)/DISPOSITION/PHYSICAL EXAM/PAST MEDICAL/SURGICAL/SOCIAL HISTORY

## 2018-07-08 DIAGNOSIS — Z00.00 MEDICARE ANNUAL WELLNESS VISIT, SUBSEQUENT: ICD-10-CM

## 2018-07-08 DIAGNOSIS — E11.22 CONTROLLED TYPE 2 DIABETES MELLITUS WITH STAGE 1 CHRONIC KIDNEY DISEASE, WITHOUT LONG-TERM CURRENT USE OF INSULIN (HCC): ICD-10-CM

## 2018-07-08 DIAGNOSIS — E78.2 MIXED HYPERLIPIDEMIA: ICD-10-CM

## 2018-07-08 DIAGNOSIS — N18.1 CONTROLLED TYPE 2 DIABETES MELLITUS WITH STAGE 1 CHRONIC KIDNEY DISEASE, WITHOUT LONG-TERM CURRENT USE OF INSULIN (HCC): ICD-10-CM

## 2018-07-10 RX ORDER — SIMVASTATIN 10 MG
TABLET ORAL
Qty: 90 TAB | Refills: 3 | Status: SHIPPED | OUTPATIENT
Start: 2018-07-10 | End: 2021-12-13

## 2018-07-23 ENCOUNTER — TELEPHONE (OUTPATIENT)
Dept: MEDICAL GROUP | Facility: PHYSICIAN GROUP | Age: 73
End: 2018-07-23

## 2018-07-23 NOTE — TELEPHONE ENCOUNTER
1. Caller Name: Rossy Lopez                      Call Back Number: [phone] (home)     2. Message: Pt called and requested to get medical records expedited from Dr. Jeronimo. I notified pt the records request was sent to medical records due to them requesting all the records. Pt notified to contact records dept through [phone] and ask for medical records. Pt had no problem and will let us know if she needs any help.     3. Patient approves office to leave a detailed voicemail/MyChart message: N\A

## 2018-09-03 DIAGNOSIS — E11.22 CONTROLLED TYPE 2 DIABETES MELLITUS WITH STAGE 1 CHRONIC KIDNEY DISEASE, WITHOUT LONG-TERM CURRENT USE OF INSULIN (HCC): ICD-10-CM

## 2018-09-03 DIAGNOSIS — Z00.00 MEDICARE ANNUAL WELLNESS VISIT, SUBSEQUENT: ICD-10-CM

## 2018-09-03 DIAGNOSIS — N18.1 CONTROLLED TYPE 2 DIABETES MELLITUS WITH STAGE 1 CHRONIC KIDNEY DISEASE, WITHOUT LONG-TERM CURRENT USE OF INSULIN (HCC): ICD-10-CM

## 2018-09-04 RX ORDER — BLOOD SUGAR DIAGNOSTIC
STRIP MISCELLANEOUS
Qty: 100 STRIP | Refills: 3 | Status: SHIPPED | OUTPATIENT
Start: 2018-09-04 | End: 2019-04-12 | Stop reason: SDUPTHER

## 2018-09-07 ENCOUNTER — HOSPITAL ENCOUNTER (OUTPATIENT)
Dept: RADIOLOGY | Facility: MEDICAL CENTER | Age: 73
End: 2018-09-07
Attending: INTERNAL MEDICINE
Payer: MEDICARE

## 2018-09-07 DIAGNOSIS — Z12.39 SCREENING BREAST EXAMINATION: ICD-10-CM

## 2018-09-07 PROCEDURE — 77067 SCR MAMMO BI INCL CAD: CPT

## 2018-11-27 ENCOUNTER — APPOINTMENT (RX ONLY)
Dept: URBAN - METROPOLITAN AREA CLINIC 20 | Facility: CLINIC | Age: 73
Setting detail: DERMATOLOGY
End: 2018-11-27

## 2018-11-27 DIAGNOSIS — D22 MELANOCYTIC NEVI: ICD-10-CM

## 2018-11-27 DIAGNOSIS — L73.9 FOLLICULAR DISORDER, UNSPECIFIED: ICD-10-CM

## 2018-11-27 DIAGNOSIS — D18.0 HEMANGIOMA: ICD-10-CM

## 2018-11-27 DIAGNOSIS — Z85.828 PERSONAL HISTORY OF OTHER MALIGNANT NEOPLASM OF SKIN: ICD-10-CM

## 2018-11-27 DIAGNOSIS — L57.8 OTHER SKIN CHANGES DUE TO CHRONIC EXPOSURE TO NONIONIZING RADIATION: ICD-10-CM

## 2018-11-27 DIAGNOSIS — L81.4 OTHER MELANIN HYPERPIGMENTATION: ICD-10-CM

## 2018-11-27 DIAGNOSIS — L738 OTHER SPECIFIED DISEASES OF HAIR AND HAIR FOLLICLES: ICD-10-CM

## 2018-11-27 DIAGNOSIS — L57.0 ACTINIC KERATOSIS: ICD-10-CM

## 2018-11-27 DIAGNOSIS — L82.1 OTHER SEBORRHEIC KERATOSIS: ICD-10-CM

## 2018-11-27 DIAGNOSIS — L663 OTHER SPECIFIED DISEASES OF HAIR AND HAIR FOLLICLES: ICD-10-CM

## 2018-11-27 PROBLEM — J45.909 UNSPECIFIED ASTHMA, UNCOMPLICATED: Status: ACTIVE | Noted: 2018-11-27

## 2018-11-27 PROBLEM — D18.01 HEMANGIOMA OF SKIN AND SUBCUTANEOUS TISSUE: Status: ACTIVE | Noted: 2018-11-27

## 2018-11-27 PROBLEM — D22.5 MELANOCYTIC NEVI OF TRUNK: Status: ACTIVE | Noted: 2018-11-27

## 2018-11-27 PROBLEM — D48.5 NEOPLASM OF UNCERTAIN BEHAVIOR OF SKIN: Status: ACTIVE | Noted: 2018-11-27

## 2018-11-27 PROBLEM — L02.222 FURUNCLE OF BACK [ANY PART, EXCEPT BUTTOCK]: Status: ACTIVE | Noted: 2018-11-27

## 2018-11-27 PROCEDURE — ? ADDITIONAL NOTES

## 2018-11-27 PROCEDURE — 99214 OFFICE O/P EST MOD 30 MIN: CPT | Mod: 25

## 2018-11-27 PROCEDURE — 17000 DESTRUCT PREMALG LESION: CPT

## 2018-11-27 PROCEDURE — ? COUNSELING

## 2018-11-27 PROCEDURE — ? LIQUID NITROGEN

## 2018-11-27 ASSESSMENT — LOCATION SIMPLE DESCRIPTION DERM
LOCATION SIMPLE: RIGHT HAND
LOCATION SIMPLE: RIGHT UPPER BACK
LOCATION SIMPLE: LEFT UPPER BACK
LOCATION SIMPLE: LEFT HAND
LOCATION SIMPLE: RIGHT FOREHEAD
LOCATION SIMPLE: RIGHT BACK
LOCATION SIMPLE: RIGHT ANTERIOR NECK
LOCATION SIMPLE: RIGHT CHEEK

## 2018-11-27 ASSESSMENT — LOCATION DETAILED DESCRIPTION DERM
LOCATION DETAILED: RIGHT SUPERIOR MEDIAL FOREHEAD
LOCATION DETAILED: RIGHT RADIAL DORSAL HAND
LOCATION DETAILED: RIGHT INFERIOR ANTERIOR NECK
LOCATION DETAILED: RIGHT INFERIOR MEDIAL UPPER BACK
LOCATION DETAILED: RIGHT SUPERIOR LATERAL UPPER BACK
LOCATION DETAILED: LEFT SUPERIOR UPPER BACK
LOCATION DETAILED: RIGHT INFERIOR CENTRAL MALAR CHEEK
LOCATION DETAILED: RIGHT SUPERIOR UPPER BACK
LOCATION DETAILED: LEFT RADIAL DORSAL HAND

## 2018-11-27 ASSESSMENT — LOCATION ZONE DERM
LOCATION ZONE: HAND
LOCATION ZONE: FACE
LOCATION ZONE: TRUNK
LOCATION ZONE: NECK

## 2018-11-27 NOTE — PROCEDURE: ADDITIONAL NOTES
Detail Level: Simple
Additional Notes: No drainage today.  Appears to be healing.  If still there in 1-2 weeks, RTC.

## 2018-11-27 NOTE — PROCEDURE: LIQUID NITROGEN
Duration Of Freeze Thaw-Cycle (Seconds): 4
Render Post-Care Instructions In Note?: no
Post-Care Instructions: I reviewed with the patient in detail post-care instructions. Patient is to wear sunprotection, and avoid picking at any of the treated lesions. Pt may apply Vaseline to crusted or scabbing areas.
Consent: The patient's consent was obtained including but not limited to risks of crusting, scabbing, blistering, scarring, darker or lighter pigmentary change, recurrence, incomplete removal and infection.
Detail Level: Detailed

## 2019-01-21 ENCOUNTER — HOSPITAL ENCOUNTER (OUTPATIENT)
Facility: MEDICAL CENTER | Age: 74
End: 2019-01-21
Attending: PHYSICIAN ASSISTANT
Payer: MEDICARE

## 2019-01-21 ENCOUNTER — OFFICE VISIT (OUTPATIENT)
Dept: URGENT CARE | Facility: CLINIC | Age: 74
End: 2019-01-21
Payer: MEDICARE

## 2019-01-21 VITALS
RESPIRATION RATE: 16 BRPM | OXYGEN SATURATION: 96 % | BODY MASS INDEX: 26.58 KG/M2 | HEIGHT: 63 IN | HEART RATE: 85 BPM | TEMPERATURE: 97.8 F | DIASTOLIC BLOOD PRESSURE: 84 MMHG | SYSTOLIC BLOOD PRESSURE: 130 MMHG | WEIGHT: 150 LBS

## 2019-01-21 DIAGNOSIS — N30.01 ACUTE CYSTITIS WITH HEMATURIA: ICD-10-CM

## 2019-01-21 LAB
APPEARANCE UR: NORMAL
BILIRUB UR STRIP-MCNC: NORMAL MG/DL
COLOR UR AUTO: NORMAL
GLUCOSE UR STRIP.AUTO-MCNC: NORMAL MG/DL
KETONES UR STRIP.AUTO-MCNC: NORMAL MG/DL
LEUKOCYTE ESTERASE UR QL STRIP.AUTO: NORMAL
NITRITE UR QL STRIP.AUTO: NORMAL
PH UR STRIP.AUTO: NORMAL [PH] (ref 5–8)
PROT UR QL STRIP: NORMAL MG/DL
RBC UR QL AUTO: NORMAL
SP GR UR STRIP.AUTO: NORMAL
UROBILINOGEN UR STRIP-MCNC: NORMAL MG/DL

## 2019-01-21 PROCEDURE — 87186 SC STD MICRODIL/AGAR DIL: CPT

## 2019-01-21 PROCEDURE — 87077 CULTURE AEROBIC IDENTIFY: CPT

## 2019-01-21 PROCEDURE — 99214 OFFICE O/P EST MOD 30 MIN: CPT | Performed by: PHYSICIAN ASSISTANT

## 2019-01-21 PROCEDURE — 87086 URINE CULTURE/COLONY COUNT: CPT

## 2019-01-21 PROCEDURE — 81002 URINALYSIS NONAUTO W/O SCOPE: CPT | Performed by: PHYSICIAN ASSISTANT

## 2019-01-21 RX ORDER — CEFDINIR 300 MG/1
300 CAPSULE ORAL 2 TIMES DAILY
Qty: 10 CAP | Refills: 0 | Status: SHIPPED | OUTPATIENT
Start: 2019-01-21 | End: 2019-01-26

## 2019-01-21 ASSESSMENT — ENCOUNTER SYMPTOMS
NAUSEA: 0
COUGH: 0
VOMITING: 0
FLANK PAIN: 0
SHORTNESS OF BREATH: 0
CONSTIPATION: 0
FEVER: 0
ABDOMINAL PAIN: 1
DIARRHEA: 0
CHILLS: 0
DIZZINESS: 0
MUSCULOSKELETAL NEGATIVE: 1

## 2019-01-21 NOTE — PROGRESS NOTES
"Subjective:      Rossy Lopez is a 73 y.o. female who presents with UTI (pain urination, blood in urine, started yesterday)            UTI   This is a new problem. The current episode started yesterday. The problem occurs constantly. The problem has been unchanged. Associated symptoms include abdominal pain. Pertinent negatives include no chest pain, chills, congestion, coughing, fever, nausea, rash or vomiting. Nothing aggravates the symptoms. She has tried nothing for the symptoms.     Patient denies history of pyelonephritis or kidney stones. No fevers, chills, body aches, nausea, or vomiting.       Review of Systems   Constitutional: Negative for chills and fever.   HENT: Negative for congestion.    Respiratory: Negative for cough and shortness of breath.    Cardiovascular: Negative for chest pain.   Gastrointestinal: Positive for abdominal pain. Negative for constipation, diarrhea, nausea and vomiting.   Genitourinary: Positive for frequency, hematuria and urgency. Negative for dysuria and flank pain.   Musculoskeletal: Negative.    Skin: Negative for rash.   Neurological: Negative for dizziness.        Objective:     /84 (BP Location: Left arm, Patient Position: Sitting, BP Cuff Size: Adult)   Pulse 85   Temp 36.6 °C (97.8 °F) (Temporal)   Resp 16   Ht 1.6 m (5' 3\")   Wt 68 kg (150 lb)   SpO2 96%   BMI 26.57 kg/m²      Physical Exam   Constitutional: She is oriented to person, place, and time. She appears well-developed and well-nourished. No distress.   HENT:   Head: Normocephalic and atraumatic.   Eyes: Pupils are equal, round, and reactive to light.   Neck: Normal range of motion.   Cardiovascular: Normal rate.    Pulmonary/Chest: Effort normal.   Abdominal: Soft. Normal appearance and bowel sounds are normal. She exhibits no distension. There is tenderness in the suprapubic area. There is no rebound, no guarding and no CVA tenderness.       No CVAT bilaterally   Musculoskeletal: Normal " range of motion.   Neurological: She is alert and oriented to person, place, and time.   Skin: Skin is warm and dry. She is not diaphoretic.   Psychiatric: She has a normal mood and affect. Her behavior is normal.   Nursing note and vitals reviewed.       PMH:  has a past medical history of Anesthesia; Cataract (8/2016); Diabetes; Diabetes mellitus type 2, controlled ([facility]) (6/19/2012); Family history of colon cancer (6/19/2012); Heart burn; High cholesterol; Hyperlipidemia; and Hypertension.  MEDS:   Current Outpatient Prescriptions:   •  Calcium Carbonate-Vit D-Min (CALCIUM 1200 PO), Take  by mouth., Disp: , Rfl:   •  GuaiFENesin (MUCINEX PO), Take  by mouth., Disp: , Rfl:   •  cefdinir (OMNICEF) 300 MG Cap, Take 1 Cap by mouth 2 times a day for 5 days., Disp: 10 Cap, Rfl: 0  •  losartan (COZAAR) 25 MG Tab, Take 1 Tab by mouth every day., Disp: 90 Tab, Rfl: 3  •  simvastatin (ZOCOR) 10 MG Tab, Take 1 Tab by mouth every evening., Disp: 90 Tab, Rfl: 3  •  hydroCHLOROthiazide (HYDRODIURIL) 25 MG Tab, Take 1 Tab by mouth every day., Disp: 90 Tab, Rfl: 3  •  omeprazole (PRILOSEC) 20 MG delayed-release capsule, Take 1 Cap by mouth every day., Disp: 90 Cap, Rfl: 3  •  metFORMIN (GLUCOPHAGE) 500 MG Tab, Take 1 Tab by mouth 2 times a day, with meals., Disp: 180 Tab, Rfl: 3  •  Multiple Vitamins-Minerals (MULTIVITAMIN ADULT PO), Take 1 Tab by mouth every day., Disp: , Rfl:   •  aspirin 81 MG tablet, Take 81 mg by mouth every day., Disp: , Rfl:   •  ACCU-CHEK SMARTVIEW strip, TEST TWICE DAILY AS NEEDED FOR SYMPTOMS OF HIGH OR LOW SUGAR, Disp: 100 Strip, Rfl: 3  •  metFORMIN (GLUCOPHAGE) 500 MG Tab, TAKE 1 TABLET BY MOUTH TWICE DAILY WITH MEALS, Disp: 180 Tab, Rfl: 3  •  simvastatin (ZOCOR) 10 MG Tab, TAKE 1 TABLET BY MOUTH EVERY EVENING, Disp: 90 Tab, Rfl: 3  •  albuterol 108 (90 Base) MCG/ACT Aero Soln inhalation aerosol, Inhale 1 Puff by mouth every 6 hours as needed for Shortness of Breath. (Patient not taking: Reported on  1/21/2019), Disp: 8.5 g, Rfl: 1  •  Fluticasone Furoate-Vilanterol (BREO ELLIPTA) 200-25 MCG/INH AEROSOL POWDER, BREATH ACTIVATED, Inhale 1 Puff by mouth 2 Times a Day. (Patient not taking: Reported on 1/21/2019), Disp: 1 Each, Rfl: 2  •  glucose blood (ACCU-CHEK SMARTVIEW) strip, TEST TWICE DAILY AND AS NEEDED FOR SYMPTOMS OF HIGH OR LOW SUGAR, Disp: 100 Strip, Rfl: 3  •  Ibuprofen (ADVIL PO), Take  by mouth., Disp: , Rfl:   •  Cholecalciferol (VITAMIN D-3 PO), Take  by mouth., Disp: , Rfl:   ALLERGIES:   Allergies   Allergen Reactions   • Other Misc Hives     Patient is allergic to bandaides  RXN=ongoing   • Phisohex [Hexachlorophene] Rash     Rash at contact site  RXN=48 years ago   • Sulfa Drugs Rash and Itching     RXN=44-48  Years ago    • Tape Hives and Itching     Paper tape okay     SURGHX:   Past Surgical History:   Procedure Laterality Date   • CARPAL TUNNEL RELEASE Right 9/12/2016    Procedure: CARPAL TUNNEL RELEASE;  Surgeon: Orlando Hunter M.D.;  Location: SURGERY [facility];  Service:    • INJ,EPIDURAL/LUMB/SAC SINGLE  9/5/2014    Performed by Cale Alejandro M.D. at SURGERY SURGICAL [facility] ORS   • ABDOMINAL HYSTERECTOMY TOTAL      1995   • TONSILLECTOMY       SOCHX:  reports that she quit smoking about 49 years ago. Her smoking use included Cigarettes. She has a 5.00 pack-year smoking history. She has never used smokeless tobacco. She reports that she drinks alcohol. She reports that she does not use drugs.  FH: family history includes Breast Cancer in her maternal aunt; Cancer in her father and maternal aunt; Diabetes in her father; Heart Disease (age of onset: 60) in her father.       Assessment/Plan:     1. Acute cystitis with hematuria  - POCT Urinalysis --> invalid, urine very turbid and bloody  - URINE CULTURE(NEW); Future  - cefdinir (OMNICEF) 300 MG Cap; Take 1 Cap by mouth 2 times a day for 5 days.  Dispense: 10 Cap; Refill: 0   - Complete full course of antibiotics as prescribed     -  Pt educated on preventative measures for avoiding future UTIs  - Advised to increase fluid intake  - OTC Pyridium (Azo) for symptomatic relief, advised that it will turn urine orange in color  - Pending urine culture  - ER precautions given regarding pyelonephritis including fevers greater than 101 and, vomiting and dehydration, increased back pain.

## 2019-01-24 LAB
BACTERIA UR CULT: ABNORMAL
BACTERIA UR CULT: ABNORMAL
SIGNIFICANT IND 70042: ABNORMAL
SITE SITE: ABNORMAL
SOURCE SOURCE: ABNORMAL

## 2019-01-25 ENCOUNTER — TELEPHONE (OUTPATIENT)
Dept: URGENT CARE | Facility: MEDICAL CENTER | Age: 74
End: 2019-01-25

## 2019-01-25 NOTE — TELEPHONE ENCOUNTER
Spoke to patient and informed her of positive urine culture results for E. Coli, susceptible to current course of antibiotics. She states she is feeling much better and appreciates the phone call.

## 2019-02-13 ENCOUNTER — HOSPITAL ENCOUNTER (OUTPATIENT)
Dept: LAB | Facility: MEDICAL CENTER | Age: 74
End: 2019-02-13
Attending: INTERNAL MEDICINE
Payer: MEDICARE

## 2019-02-13 LAB
ALBUMIN SERPL BCP-MCNC: 4.4 G/DL (ref 3.2–4.9)
ALBUMIN/GLOB SERPL: 1.4 G/DL
ALP SERPL-CCNC: 97 U/L (ref 30–99)
ALT SERPL-CCNC: 17 U/L (ref 2–50)
ANION GAP SERPL CALC-SCNC: 7 MMOL/L (ref 0–11.9)
AST SERPL-CCNC: 15 U/L (ref 12–45)
BASOPHILS # BLD AUTO: 0.7 % (ref 0–1.8)
BASOPHILS # BLD: 0.05 K/UL (ref 0–0.12)
BILIRUB SERPL-MCNC: 0.6 MG/DL (ref 0.1–1.5)
BUN SERPL-MCNC: 12 MG/DL (ref 8–22)
CALCIUM SERPL-MCNC: 10.2 MG/DL (ref 8.5–10.5)
CHLORIDE SERPL-SCNC: 97 MMOL/L (ref 96–112)
CHOLEST SERPL-MCNC: 178 MG/DL (ref 100–199)
CO2 SERPL-SCNC: 32 MMOL/L (ref 20–33)
CREAT SERPL-MCNC: 0.64 MG/DL (ref 0.5–1.4)
CREAT UR-MCNC: 249.1 MG/DL
EOSINOPHIL # BLD AUTO: 0.62 K/UL (ref 0–0.51)
EOSINOPHIL NFR BLD: 8.8 % (ref 0–6.9)
ERYTHROCYTE [DISTWIDTH] IN BLOOD BY AUTOMATED COUNT: 40.3 FL (ref 35.9–50)
EST. AVERAGE GLUCOSE BLD GHB EST-MCNC: 160 MG/DL
FASTING STATUS PATIENT QL REPORTED: NORMAL
GLOBULIN SER CALC-MCNC: 3.1 G/DL (ref 1.9–3.5)
GLUCOSE SERPL-MCNC: 168 MG/DL (ref 65–99)
HBA1C MFR BLD: 7.2 % (ref 0–5.6)
HCT VFR BLD AUTO: 45.2 % (ref 37–47)
HDLC SERPL-MCNC: 59 MG/DL
HGB BLD-MCNC: 15.3 G/DL (ref 12–16)
IMM GRANULOCYTES # BLD AUTO: 0.01 K/UL (ref 0–0.11)
IMM GRANULOCYTES NFR BLD AUTO: 0.1 % (ref 0–0.9)
LDLC SERPL CALC-MCNC: 92 MG/DL
LYMPHOCYTES # BLD AUTO: 2.19 K/UL (ref 1–4.8)
LYMPHOCYTES NFR BLD: 31 % (ref 22–41)
MCH RBC QN AUTO: 30.7 PG (ref 27–33)
MCHC RBC AUTO-ENTMCNC: 33.8 G/DL (ref 33.6–35)
MCV RBC AUTO: 90.8 FL (ref 81.4–97.8)
MICROALBUMIN UR-MCNC: 1 MG/DL
MICROALBUMIN/CREAT UR: 4 MG/G (ref 0–30)
MONOCYTES # BLD AUTO: 0.68 K/UL (ref 0–0.85)
MONOCYTES NFR BLD AUTO: 9.6 % (ref 0–13.4)
NEUTROPHILS # BLD AUTO: 3.52 K/UL (ref 2–7.15)
NEUTROPHILS NFR BLD: 49.8 % (ref 44–72)
NRBC # BLD AUTO: 0 K/UL
NRBC BLD-RTO: 0 /100 WBC
PLATELET # BLD AUTO: 294 K/UL (ref 164–446)
PMV BLD AUTO: 9.8 FL (ref 9–12.9)
POTASSIUM SERPL-SCNC: 3.6 MMOL/L (ref 3.6–5.5)
PROT SERPL-MCNC: 7.5 G/DL (ref 6–8.2)
RBC # BLD AUTO: 4.98 M/UL (ref 4.2–5.4)
SODIUM SERPL-SCNC: 136 MMOL/L (ref 135–145)
TRIGL SERPL-MCNC: 133 MG/DL (ref 0–149)
WBC # BLD AUTO: 7.1 K/UL (ref 4.8–10.8)

## 2019-02-13 PROCEDURE — 80061 LIPID PANEL: CPT

## 2019-02-13 PROCEDURE — 85025 COMPLETE CBC W/AUTO DIFF WBC: CPT

## 2019-02-13 PROCEDURE — 82043 UR ALBUMIN QUANTITATIVE: CPT

## 2019-02-13 PROCEDURE — 36415 COLL VENOUS BLD VENIPUNCTURE: CPT | Mod: GA

## 2019-02-13 PROCEDURE — 82570 ASSAY OF URINE CREATININE: CPT

## 2019-02-13 PROCEDURE — 80053 COMPREHEN METABOLIC PANEL: CPT

## 2019-02-13 PROCEDURE — 83036 HEMOGLOBIN GLYCOSYLATED A1C: CPT | Mod: GA

## 2019-03-09 ENCOUNTER — OFFICE VISIT (OUTPATIENT)
Dept: URGENT CARE | Facility: CLINIC | Age: 74
End: 2019-03-09
Payer: MEDICARE

## 2019-03-09 ENCOUNTER — APPOINTMENT (OUTPATIENT)
Dept: RADIOLOGY | Facility: IMAGING CENTER | Age: 74
End: 2019-03-09
Attending: PHYSICIAN ASSISTANT
Payer: MEDICARE

## 2019-03-09 VITALS
WEIGHT: 150 LBS | BODY MASS INDEX: 26.58 KG/M2 | OXYGEN SATURATION: 87 % | DIASTOLIC BLOOD PRESSURE: 88 MMHG | TEMPERATURE: 98.5 F | SYSTOLIC BLOOD PRESSURE: 130 MMHG | RESPIRATION RATE: 15 BRPM | HEIGHT: 63 IN | HEART RATE: 68 BPM

## 2019-03-09 DIAGNOSIS — J22 LOWER RESPIRATORY INFECTION: ICD-10-CM

## 2019-03-09 DIAGNOSIS — R09.02 HYPOXIA: ICD-10-CM

## 2019-03-09 PROCEDURE — 94640 AIRWAY INHALATION TREATMENT: CPT | Performed by: PHYSICIAN ASSISTANT

## 2019-03-09 PROCEDURE — 71046 X-RAY EXAM CHEST 2 VIEWS: CPT | Mod: TC | Performed by: PHYSICIAN ASSISTANT

## 2019-03-09 PROCEDURE — 94760 N-INVAS EAR/PLS OXIMETRY 1: CPT | Performed by: PHYSICIAN ASSISTANT

## 2019-03-09 PROCEDURE — 99214 OFFICE O/P EST MOD 30 MIN: CPT | Mod: 25 | Performed by: PHYSICIAN ASSISTANT

## 2019-03-09 RX ORDER — AMOXICILLIN AND CLAVULANATE POTASSIUM 875; 125 MG/1; MG/1
TABLET, FILM COATED ORAL
Refills: 0 | COMMUNITY
Start: 2019-02-21 | End: 2019-05-14

## 2019-03-09 RX ORDER — MONTELUKAST SODIUM 10 MG/1
10 TABLET ORAL EVERY EVENING
COMMUNITY
Start: 2019-03-07 | End: 2021-02-05

## 2019-03-09 RX ORDER — LEVOFLOXACIN 250 MG/1
750 TABLET, FILM COATED ORAL DAILY
Qty: 21 TAB | Refills: 0 | Status: SHIPPED | OUTPATIENT
Start: 2019-03-09 | End: 2019-03-16

## 2019-03-09 RX ORDER — METFORMIN HYDROCHLORIDE 500 MG/1
500 TABLET, EXTENDED RELEASE ORAL DAILY
Refills: 2 | COMMUNITY
Start: 2019-02-20 | End: 2021-10-05 | Stop reason: DRUGHIGH

## 2019-03-09 RX ORDER — ALBUTEROL SULFATE 90 UG/1
2 AEROSOL, METERED RESPIRATORY (INHALATION) EVERY 6 HOURS PRN
Qty: 8.5 G | Refills: 0 | Status: ON HOLD | OUTPATIENT
Start: 2019-03-09 | End: 2019-10-15

## 2019-03-09 RX ORDER — IPRATROPIUM BROMIDE AND ALBUTEROL SULFATE 2.5; .5 MG/3ML; MG/3ML
3 SOLUTION RESPIRATORY (INHALATION) ONCE
Status: COMPLETED | OUTPATIENT
Start: 2019-03-09 | End: 2019-03-09

## 2019-03-09 RX ORDER — BENZONATATE 100 MG/1
100-200 CAPSULE ORAL 3 TIMES DAILY PRN
Qty: 60 CAP | Refills: 0 | Status: SHIPPED | OUTPATIENT
Start: 2019-03-09 | End: 2019-05-14

## 2019-03-09 RX ORDER — METHYLPREDNISOLONE 4 MG/1
TABLET ORAL
Qty: 1 KIT | Refills: 0 | Status: SHIPPED | OUTPATIENT
Start: 2019-03-09 | End: 2019-05-14

## 2019-03-09 RX ADMIN — IPRATROPIUM BROMIDE AND ALBUTEROL SULFATE 3 ML: 2.5; .5 SOLUTION RESPIRATORY (INHALATION) at 18:03

## 2019-03-12 ASSESSMENT — ENCOUNTER SYMPTOMS
SPUTUM PRODUCTION: 0
MUSCULOSKELETAL NEGATIVE: 1
VOMITING: 0
HEMOPTYSIS: 0
NAUSEA: 0
ABDOMINAL PAIN: 0
DIARRHEA: 0
SORE THROAT: 0
CHILLS: 0
COUGH: 1
RHINORRHEA: 0
FEVER: 0
DIZZINESS: 0
HEADACHES: 0
SHORTNESS OF BREATH: 0
MYALGIAS: 0
WHEEZING: 1

## 2019-03-12 ASSESSMENT — COPD QUESTIONNAIRES: COPD: 0

## 2019-03-12 NOTE — PROGRESS NOTES
"Subjective:      Rossy Lopez is a 74 y.o. female who presents with Cough and Headache            Cough   This is a new problem. The current episode started in the past 7 days (3 days). The problem has been unchanged. The problem occurs every few minutes. Associated symptoms include nasal congestion, postnasal drip and wheezing. Pertinent negatives include no chest pain, chills, ear pain, fever, headaches, hemoptysis, myalgias, rhinorrhea, sore throat or shortness of breath. The symptoms are aggravated by lying down. She has tried OTC cough suppressant for the symptoms. The treatment provided mild relief. There is no history of asthma, COPD or pneumonia.     Patient presents to urgent care reporting a 3 day history of cough, congestion, wheezing, headaches, and SOB. She is currently seeing pulmonology for a chronic cough > 1 year, but hasn't been diagnosed with any specific lung disease. She is taking singulair and has an albuterol inhaler, although she is almost our of her inhaler. No recent fevers, chills, body aches, or chest pain. No lower extremity edema or history of blood clots.     Review of Systems   Constitutional: Negative for chills and fever.   HENT: Positive for postnasal drip. Negative for congestion, ear pain, rhinorrhea and sore throat.    Respiratory: Positive for cough and wheezing. Negative for hemoptysis, sputum production and shortness of breath.    Cardiovascular: Negative for chest pain.   Gastrointestinal: Negative for abdominal pain, diarrhea, nausea and vomiting.   Genitourinary: Negative.    Musculoskeletal: Negative.  Negative for myalgias.   Neurological: Negative for dizziness and headaches.        Objective:     /88   Pulse 68   Temp 36.9 °C (98.5 °F) (Temporal)   Resp 15   Ht 1.6 m (5' 3\")   Wt 68 kg (150 lb)   SpO2 (!) 87%   BMI 26.57 kg/m²      Physical Exam   Constitutional: She is oriented to person, place, and time. She appears well-developed and well-nourished. " No distress.   HENT:   Head: Normocephalic and atraumatic.   Right Ear: Hearing, tympanic membrane, external ear and ear canal normal.   Left Ear: Hearing, tympanic membrane, external ear and ear canal normal.   Mouth/Throat: Posterior oropharyngeal erythema present. No oropharyngeal exudate or posterior oropharyngeal edema.   Eyes: Pupils are equal, round, and reactive to light. Conjunctivae are normal. Right eye exhibits no discharge. Left eye exhibits no discharge.   Neck: Normal range of motion.   Cardiovascular: Normal rate, regular rhythm and normal heart sounds.    No murmur heard.  Pulmonary/Chest: Effort normal. No respiratory distress. She has wheezes. She has no rales.   Expiratory wheezes heard throughout   Musculoskeletal: Normal range of motion.   No lower extremity edema bilaterally    Lymphadenopathy:     She has no cervical adenopathy.   Neurological: She is alert and oriented to person, place, and time.   Skin: Skin is warm and dry. She is not diaphoretic.   Psychiatric: She has a normal mood and affect. Her behavior is normal.   Nursing note and vitals reviewed.       PMH:  has a past medical history of Anesthesia; Cataract (8/2016); Diabetes; Diabetes mellitus type 2, controlled ([facility]) (6/19/2012); Family history of colon cancer (6/19/2012); Heart burn; High cholesterol; Hyperlipidemia; and Hypertension.  MEDS:   Current Outpatient Prescriptions:   •  levoFLOXacin (LEVAQUIN) 250 MG Tab, Take 3 Tabs by mouth every day for 7 days., Disp: 21 Tab, Rfl: 0  •  benzonatate (TESSALON) 100 MG Cap, Take 1-2 Caps by mouth 3 times a day as needed., Disp: 60 Cap, Rfl: 0  •  MethylPREDNISolone (MEDROL DOSEPAK) 4 MG Tablet Therapy Pack, Take as directed, Disp: 1 Kit, Rfl: 0  •  albuterol 108 (90 Base) MCG/ACT Aero Soln inhalation aerosol, Inhale 2 Puffs by mouth every 6 hours as needed for Shortness of Breath., Disp: 8.5 g, Rfl: 0  •  ACCU-CHEK SMARTVIEW strip, TEST TWICE DAILY AS NEEDED FOR SYMPTOMS OF HIGH OR  LOW SUGAR, Disp: 100 Strip, Rfl: 3  •  metFORMIN (GLUCOPHAGE) 500 MG Tab, TAKE 1 TABLET BY MOUTH TWICE DAILY WITH MEALS, Disp: 180 Tab, Rfl: 3  •  simvastatin (ZOCOR) 10 MG Tab, TAKE 1 TABLET BY MOUTH EVERY EVENING, Disp: 90 Tab, Rfl: 3  •  albuterol 108 (90 Base) MCG/ACT Aero Soln inhalation aerosol, Inhale 1 Puff by mouth every 6 hours as needed for Shortness of Breath., Disp: 8.5 g, Rfl: 1  •  losartan (COZAAR) 25 MG Tab, Take 1 Tab by mouth every day., Disp: 90 Tab, Rfl: 3  •  Fluticasone Furoate-Vilanterol (BREO ELLIPTA) 200-25 MCG/INH AEROSOL POWDER, BREATH ACTIVATED, Inhale 1 Puff by mouth 2 Times a Day., Disp: 1 Each, Rfl: 2  •  simvastatin (ZOCOR) 10 MG Tab, Take 1 Tab by mouth every evening., Disp: 90 Tab, Rfl: 3  •  hydroCHLOROthiazide (HYDRODIURIL) 25 MG Tab, Take 1 Tab by mouth every day., Disp: 90 Tab, Rfl: 3  •  omeprazole (PRILOSEC) 20 MG delayed-release capsule, Take 1 Cap by mouth every day., Disp: 90 Cap, Rfl: 3  •  metFORMIN (GLUCOPHAGE) 500 MG Tab, Take 1 Tab by mouth 2 times a day, with meals., Disp: 180 Tab, Rfl: 3  •  glucose blood (ACCU-CHEK SMARTVIEW) strip, TEST TWICE DAILY AND AS NEEDED FOR SYMPTOMS OF HIGH OR LOW SUGAR, Disp: 100 Strip, Rfl: 3  •  metFORMIN ER (GLUCOPHAGE XR) 500 MG TABLET SR 24 HR, TK 1 T PO BID, Disp: , Rfl: 2  •  montelukast (SINGULAIR) 10 MG Tab, , Disp: , Rfl:   •  nystatin (MYCOSTATIN) 201452 UNIT/ML Suspension, SHAKE LQ AND TK 4 ML PO QID FOR 14 DAYS, Disp: , Rfl: 0  •  amoxicillin-clavulanate (AUGMENTIN) 875-125 MG Tab, TK 1 T PO Q 12 H FOR 14 DAYS, Disp: , Rfl: 0  •  Calcium Carbonate-Vit D-Min (CALCIUM 1200 PO), Take  by mouth., Disp: , Rfl:   •  GuaiFENesin (MUCINEX PO), Take  by mouth., Disp: , Rfl:   •  Ibuprofen (ADVIL PO), Take  by mouth., Disp: , Rfl:   •  Cholecalciferol (VITAMIN D-3 PO), Take  by mouth., Disp: , Rfl:   •  Multiple Vitamins-Minerals (MULTIVITAMIN ADULT PO), Take 1 Tab by mouth every day., Disp: , Rfl:   •  aspirin 81 MG tablet, Take 81  mg by mouth every day., Disp: , Rfl:   ALLERGIES:   Allergies   Allergen Reactions   • Other Misc Hives     Patient is allergic to bandaides  RXN=ongoing   • Phisohex [Hexachlorophene] Rash     Rash at contact site  RXN=48 years ago   • Sulfa Drugs Rash and Itching     RXN=44-48  Years ago    • Tape Hives and Itching     Paper tape okay     SURGHX:   Past Surgical History:   Procedure Laterality Date   • CARPAL TUNNEL RELEASE Right 9/12/2016    Procedure: CARPAL TUNNEL RELEASE;  Surgeon: Orlando Hunter M.D.;  Location: SURGERY [facility];  Service:    • INJ,EPIDURAL/LUMB/SAC SINGLE  9/5/2014    Performed by Cale Alejandro M.D. at SURGERY SURGICAL [facility] ORS   • ABDOMINAL HYSTERECTOMY TOTAL      1995   • TONSILLECTOMY       SOCHX:  reports that she quit smoking about 49 years ago. Her smoking use included Cigarettes. She has a 5.00 pack-year smoking history. She has never used smokeless tobacco. She reports that she drinks alcohol. She reports that she does not use drugs.  FH: family history includes Breast Cancer in her maternal aunt; Cancer in her father and maternal aunt; Diabetes in her father; Heart Disease (age of onset: 60) in her father.       Assessment/Plan:     1. Lower respiratory infection    - DX-CHEST-2 VIEWS; Future  Impression       1.  No evidence of acute cardiopulmonary process.     report per radiology.  I have reviewed the films and agree with the reading    - levoFLOXacin (LEVAQUIN) 250 MG Tab; Take 3 Tabs by mouth every day for 7 days.  Dispense: 21 Tab; Refill: 0  - benzonatate (TESSALON) 100 MG Cap; Take 1-2 Caps by mouth 3 times a day as needed.  Dispense: 60 Cap; Refill: 0  - MethylPREDNISolone (MEDROL DOSEPAK) 4 MG Tablet Therapy Pack; Take as directed  Dispense: 1 Kit; Refill: 0  - albuterol 108 (90 Base) MCG/ACT Aero Soln inhalation aerosol; Inhale 2 Puffs by mouth every 6 hours as needed for Shortness of Breath.  Dispense: 8.5 g; Refill: 0    2. Hypoxia  - UC AMA/Refusal of  Treatment  - ipratropium-albuterol (DUONEB) nebulizer solution; 3 mL by Nebulization route Once.   - Given in urgent care without significant improvement of hypoxia. Pulse ox 87-89% on RA post breathing treatment.     Recommend patient go directly to the ED for further evaluation and management of hypoxia. She declines at this time after extensive conversation of risks of doing so. AMA signed at today's visit. Will treat with antibiotics and oral steroids given patient's refusal to go to the hospital. She states she has a pulse ox machine at home and will monitor her symptoms closely, she will go to the ED if symptoms persist/worsen over the next few days.

## 2019-04-10 ENCOUNTER — HOSPITAL ENCOUNTER (OUTPATIENT)
Dept: HOSPITAL 8 - CFH | Age: 74
Discharge: HOME | End: 2019-04-10
Attending: INTERNAL MEDICINE
Payer: MEDICARE

## 2019-04-10 DIAGNOSIS — M41.85: ICD-10-CM

## 2019-04-10 DIAGNOSIS — M47.812: Primary | ICD-10-CM

## 2019-04-10 DIAGNOSIS — M51.35: ICD-10-CM

## 2019-04-10 PROCEDURE — 71250 CT THORAX DX C-: CPT

## 2019-04-10 PROCEDURE — 70490 CT SOFT TISSUE NECK W/O DYE: CPT

## 2019-04-12 DIAGNOSIS — E11.22 CONTROLLED TYPE 2 DIABETES MELLITUS WITH STAGE 1 CHRONIC KIDNEY DISEASE, WITHOUT LONG-TERM CURRENT USE OF INSULIN (HCC): ICD-10-CM

## 2019-04-12 DIAGNOSIS — Z00.00 MEDICARE ANNUAL WELLNESS VISIT, SUBSEQUENT: ICD-10-CM

## 2019-04-12 DIAGNOSIS — I10 ESSENTIAL HYPERTENSION: ICD-10-CM

## 2019-04-12 DIAGNOSIS — K21.9 GERD WITHOUT ESOPHAGITIS: ICD-10-CM

## 2019-04-12 DIAGNOSIS — N18.1 CONTROLLED TYPE 2 DIABETES MELLITUS WITH STAGE 1 CHRONIC KIDNEY DISEASE, WITHOUT LONG-TERM CURRENT USE OF INSULIN (HCC): ICD-10-CM

## 2019-04-12 DIAGNOSIS — E78.2 MIXED HYPERLIPIDEMIA: ICD-10-CM

## 2019-04-12 RX ORDER — SIMVASTATIN 10 MG
TABLET ORAL
Qty: 90 TAB | Refills: 0 | Status: SHIPPED | OUTPATIENT
Start: 2019-04-12 | End: 2019-05-14

## 2019-04-12 RX ORDER — LOSARTAN POTASSIUM 25 MG/1
TABLET ORAL
Qty: 90 TAB | Refills: 0 | Status: SHIPPED | OUTPATIENT
Start: 2019-04-12 | End: 2019-07-08 | Stop reason: SDUPTHER

## 2019-04-12 RX ORDER — BLOOD SUGAR DIAGNOSTIC
STRIP MISCELLANEOUS
Qty: 100 STRIP | Refills: 0 | Status: SHIPPED | OUTPATIENT
Start: 2019-04-12 | End: 2019-04-12 | Stop reason: SDUPTHER

## 2019-04-12 RX ORDER — OMEPRAZOLE 20 MG/1
CAPSULE, DELAYED RELEASE ORAL
Qty: 90 CAP | Refills: 0 | Status: SHIPPED | OUTPATIENT
Start: 2019-04-12 | End: 2022-01-03 | Stop reason: SDUPTHER

## 2019-04-22 ENCOUNTER — HOSPITAL ENCOUNTER (OUTPATIENT)
Dept: LAB | Facility: MEDICAL CENTER | Age: 74
End: 2019-04-22
Attending: INTERNAL MEDICINE
Payer: MEDICARE

## 2019-04-22 LAB — EOSINOPHIL # BLD AUTO: 0.91 K/UL (ref 0–0.51)

## 2019-04-22 PROCEDURE — 36415 COLL VENOUS BLD VENIPUNCTURE: CPT

## 2019-04-22 PROCEDURE — 82785 ASSAY OF IGE: CPT

## 2019-04-22 PROCEDURE — 85004 AUTOMATED DIFF WBC COUNT: CPT

## 2019-04-24 LAB — IGE SERPL-ACNC: 96 KU/L

## 2019-04-25 ENCOUNTER — TELEPHONE (OUTPATIENT)
Dept: INTERNAL MEDICINE | Facility: MEDICAL CENTER | Age: 74
End: 2019-04-25

## 2019-04-26 NOTE — TELEPHONE ENCOUNTER
VOICEMAIL  1. Caller Name: Rossy Lopez                        Call Back Number: [phone] (home)       2. Message: Requested for medical records to be faxed to us so Dr. Alvarado can review them all before she comes into her appointment with her.  She would like for Dr. Alvarado to know her whole history before she is seen.  Also if you are going to need her to do blood work she can do it now for you before she is seen.      3. Patient approves office to leave a detailed voicemail/MyChart message: N\A    Please advise.

## 2019-04-29 NOTE — TELEPHONE ENCOUNTER
I would like to see the patient first before ordering blood work but I will review her records. Thanks.

## 2019-05-07 ENCOUNTER — HOSPITAL ENCOUNTER (OUTPATIENT)
Facility: MEDICAL CENTER | Age: 74
End: 2019-05-07
Attending: INTERNAL MEDICINE
Payer: MEDICARE

## 2019-05-07 LAB
G LAMBLIA+C PARVUM AG STL QL RAPID: NORMAL
SIGNIFICANT IND 70042: NORMAL
SITE SITE: NORMAL
SOURCE SOURCE: NORMAL

## 2019-05-07 PROCEDURE — 87329 GIARDIA AG IA: CPT

## 2019-05-07 PROCEDURE — 87328 CRYPTOSPORIDIUM AG IA: CPT

## 2019-05-08 ENCOUNTER — HOSPITAL ENCOUNTER (OUTPATIENT)
Facility: MEDICAL CENTER | Age: 74
End: 2019-05-08
Attending: INTERNAL MEDICINE
Payer: MEDICARE

## 2019-05-08 PROCEDURE — 87329 GIARDIA AG IA: CPT

## 2019-05-08 PROCEDURE — 87328 CRYPTOSPORIDIUM AG IA: CPT

## 2019-05-09 ENCOUNTER — HOSPITAL ENCOUNTER (OUTPATIENT)
Facility: MEDICAL CENTER | Age: 74
End: 2019-05-09
Attending: INTERNAL MEDICINE
Payer: MEDICARE

## 2019-05-09 PROCEDURE — 87328 CRYPTOSPORIDIUM AG IA: CPT

## 2019-05-09 PROCEDURE — 87329 GIARDIA AG IA: CPT

## 2019-05-14 ENCOUNTER — OFFICE VISIT (OUTPATIENT)
Dept: INTERNAL MEDICINE | Facility: MEDICAL CENTER | Age: 74
End: 2019-05-14
Payer: MEDICARE

## 2019-05-14 VITALS
BODY MASS INDEX: 26.05 KG/M2 | DIASTOLIC BLOOD PRESSURE: 88 MMHG | SYSTOLIC BLOOD PRESSURE: 145 MMHG | WEIGHT: 147 LBS | OXYGEN SATURATION: 92 % | HEART RATE: 75 BPM | TEMPERATURE: 98.1 F | HEIGHT: 63 IN

## 2019-05-14 DIAGNOSIS — E11.22 CONTROLLED TYPE 2 DIABETES MELLITUS WITH STAGE 1 CHRONIC KIDNEY DISEASE, WITHOUT LONG-TERM CURRENT USE OF INSULIN (HCC): ICD-10-CM

## 2019-05-14 DIAGNOSIS — E07.9 THYROID LESION: ICD-10-CM

## 2019-05-14 DIAGNOSIS — I10 ESSENTIAL HYPERTENSION: ICD-10-CM

## 2019-05-14 DIAGNOSIS — E78.2 MIXED HYPERLIPIDEMIA: ICD-10-CM

## 2019-05-14 DIAGNOSIS — N18.1 CONTROLLED TYPE 2 DIABETES MELLITUS WITH STAGE 1 CHRONIC KIDNEY DISEASE, WITHOUT LONG-TERM CURRENT USE OF INSULIN (HCC): ICD-10-CM

## 2019-05-14 DIAGNOSIS — Z00.00 HEALTHCARE MAINTENANCE: ICD-10-CM

## 2019-05-14 DIAGNOSIS — K21.9 GASTROESOPHAGEAL REFLUX DISEASE WITHOUT ESOPHAGITIS: ICD-10-CM

## 2019-05-14 PROCEDURE — 99204 OFFICE O/P NEW MOD 45 MIN: CPT | Performed by: INTERNAL MEDICINE

## 2019-05-14 RX ORDER — PREDNISONE 10 MG/1
TABLET ORAL
Refills: 1 | COMMUNITY
Start: 2019-04-19 | End: 2019-07-12

## 2019-05-14 RX ORDER — KETOROLAC TROMETHAMINE 5 MG/ML
SOLUTION OPHTHALMIC
COMMUNITY
Start: 2019-05-09 | End: 2019-07-12

## 2019-05-14 RX ORDER — PREDNISOLONE ACETATE 10 MG/ML
SUSPENSION/ DROPS OPHTHALMIC
COMMUNITY
Start: 2019-05-09 | End: 2019-07-12

## 2019-05-14 RX ORDER — MOXIFLOXACIN 5 MG/ML
SOLUTION/ DROPS OPHTHALMIC
COMMUNITY
Start: 2019-05-09 | End: 2019-07-12

## 2019-05-14 ASSESSMENT — PATIENT HEALTH QUESTIONNAIRE - PHQ9: CLINICAL INTERPRETATION OF PHQ2 SCORE: 0

## 2019-05-14 NOTE — PATIENT INSTRUCTIONS
Please get the Ultrasound thyroid done.  You should also get the shingles vaccine from the pharmacy.

## 2019-05-14 NOTE — PROGRESS NOTES
Rossy Lopez is a 74 y.o. female who is here to Establish care and is new to the clinic.     CC: Patient here to establish care, history of hypertension, hyperlipidemia    HPI:    Patient is a 74-year-old female with a past medical history of type 2 diabetes, hypertension, hyperlipidemia, gastroesophageal reflux disease who is presenting to the clinic today to establish care.  She does not have any acute complaints today.  She says she recently had a history of a cough that had been ongoing for a very long time and she saw a pulmonologist for it as well as her primary care physician and had been put on many different inhalers without any improvement.  Last time she saw her pulmonologist, she was placed on Asmanex and she said her cough went away after that so she is doing great in regards to that.  She sees Dr. Abrams at [facility].  He ordered a CT scan of her chest and this was in April.  It showed mildly elevated left hemidiaphragm with adjacent minimal subsegmental atelectasis or linear fibrosis and then he also ordered a CT scan of her neck which showed moderate to severe degenerative changes of C4-C7 disc levels as well as a small 6 mm hypodense area in the left thyroid and to consider thyroid ultrasound for further work-up.  She does not have any complaints of skin changes, hair changes, weight changes, or any mood changes recently.    Patient did have labs done in February, April, and May this year.  Her eosinophil count was high and that is why it was being followed.  She was also placed on steroids during various different times throughout the past year and that is why her hemoglobin A1c level is at 7.2 but she says is always been below 7.  Patient also has a history of hypertension and she is on losartan and hydrochlorothiazide.  For her diabetes, she takes metformin 500 mg twice a day and she said it was increased at some point and she had a lot of side effects such as diarrhea and abdominal  bloating because of it.  Patient also has a history of hyperlipidemia and she is on simvastatin.  Her cholesterol levels have been well controlled with that.    She also does have a history of cataracts and she is going to be having surgery for the removal of this coming Thursday.  That is the reason why 3 different eyedrops because of the surgery that is coming up.    In terms of her smoking, she quit in 1972.  She denied any drug use, occasionally drinks alcohol.  She is up-to-date on all her health care maintenance as well as vaccinations.    No problem-specific Assessment & Plan notes found for this encounter.      She  has a past medical history of Anesthesia; Cataract (8/2016); Diabetes; Diabetes mellitus type 2, controlled ([facility]) (6/19/2012); Family history of colon cancer (6/19/2012); Heart burn; High cholesterol; Hyperlipidemia; and Hypertension.    Patient Active Problem List    Diagnosis Date Noted   • Abnormal CT scan, chest 02/28/2018   • Hyponatremia 01/31/2018   • Weight loss 01/31/2018   • Fatigue 01/31/2018   • Vitamin D deficiency 08/25/2017   • Reactive airway disease without complication 08/25/2017   • Gastroesophageal reflux disease without esophagitis 08/25/2017   • Hypertension 06/19/2012   • Controlled type 2 diabetes mellitus with stage 1 chronic kidney disease, without long-term current use of insulin ([facility]) 06/19/2012   • Hyperlipidemia 06/19/2012   • Family history of colon cancer 06/19/2012       Allergies:Other misc; Phisohex [hexachlorophene]; Sulfa drugs; and Tape    Current Outpatient Prescriptions   Medication Sig Dispense Refill   • predniSONE (DELTASONE) 10 MG Tab   1   • ketorolac (ACULAR) 0.5 % Solution      • moxifloxacin (VIGAMOX) 0.5 % Solution      • prednisoLONE acetate (PRED FORTE) 1 % Suspension      • omeprazole (PRILOSEC) 20 MG delayed-release capsule TAKE 1 CAPSULE BY MOUTH EVERY DAY 90 Cap 0   • losartan (COZAAR) 25 MG Tab TAKE 1 TABLET BY MOUTH EVERY DAY 90 Tab 0   •  "hydroCHLOROthiazide (HYDRODIURIL) 25 MG Tab TAKE 1 TABLET BY MOUTH EVERY DAY 90 Tab 0   • metFORMIN ER (GLUCOPHAGE XR) 500 MG TABLET SR 24 HR TK 1 T PO BID  2   • montelukast (SINGULAIR) 10 MG Tab      • Calcium Carbonate-Vit D-Min (CALCIUM 1200 PO) Take  by mouth.     • simvastatin (ZOCOR) 10 MG Tab TAKE 1 TABLET BY MOUTH EVERY EVENING 90 Tab 3   • glucose blood (ACCU-CHEK SMARTVIEW) strip TEST TWICE DAILY AND AS NEEDED FOR SYMPTOMS OF HIGH OR LOW SUGAR 100 Strip 3   • Multiple Vitamins-Minerals (MULTIVITAMIN ADULT PO) Take 1 Tab by mouth every day.     • aspirin 81 MG tablet Take 81 mg by mouth every day.     • albuterol 108 (90 Base) MCG/ACT Aero Soln inhalation aerosol Inhale 2 Puffs by mouth every 6 hours as needed for Shortness of Breath. 8.5 g 0   • Cholecalciferol (VITAMIN D-3 PO) Take  by mouth.       No current facility-administered medications for this visit.        Social History   Substance Use Topics   • Smoking status: Former Smoker     Packs/day: 1.00     Years: 5.00     Types: Cigarettes     Quit date: 1/1/1970   • Smokeless tobacco: Never Used      Comment: quit 1972   • Alcohol use 0.0 oz/week      Comment: occasionally       Family History   Problem Relation Age of Onset   • Heart Disease Father 60        MI   • Cancer Father         colon cancer   • Diabetes Father    • Cancer Maternal Aunt         breast cancer   • Breast Cancer Maternal Aunt    • Lung Disease Mother    • Diabetes Sister    • Heart Disease Sister      Review of Systems:   Pertinent positives as stated in HPI, all others reviewed as negative.    Physical Exam:  /88 (BP Location: Right arm, Patient Position: Sitting, BP Cuff Size: Adult)   Pulse 75   Temp 36.7 °C (98.1 °F) (Temporal)   Ht 1.6 m (5' 3\")   Wt 66.7 kg (147 lb)   SpO2 92%  Body mass index is 26.04 kg/m².    General Appearance: healthy, alert, no distress, cooperative  Skin: No rashes or lesions.  Head: Normocephalic. No masses appreciated.   Eyes: " PERRLA.  Ears: External ears normal.  Nose/Sinuses: Nares normal. Mucosa normal.   Oropharynx: Oropharynx moist and without lesion.  Neck: Neck supple. No adenopathy. Thyroid symmetric, normal size, and without nodularity.  Lungs: Lungs clear to auscultation bilaterally.  Heart: RRR without murmur, gallop, or rubs.  Abdomen: Soft, non-tender. BS normal. No masses,  No organomegaly  Musculoskeletal: Extremities: Upper and lower extremities appear normal. No deformities, edema.   Peripheral Pulses: Pulses: radial=4/4, dorsalis pedis=4/4  Neurologic: Cranial nerves intact.   Psychiatric: Mood appears normal.     Assessment/Plan:     1. Controlled type 2 diabetes mellitus with stage 1 chronic kidney disease, without long-term current use of insulin (HCC)  Patient's last hemoglobin A1c was noted to be 7.2.  It has always been below 7.  She is on metformin 500 mg twice a day.  We will continue on the current dose for now.  She did receive a short bursts of steroid therapy in the past year because of the cough.  Her last microalbumin creatinine ratio was done in February this year and it was normal.  She is up-to-date on her eye exam.  Does not have any complaints in regards to numbness or tingling or any other vision at this time.    2. Essential hypertension  Blood pressure is 145/88 today.  She says she is getting cataract surgery this Thursday so she is on 3 different eyedrops.  She says her blood pressure readings are usually lower at home.  She is taking losartan 25 mg and hydrochlorothiazide 25 mg.  We will continue on the current dose and will not make any changes at this time.    3. Mixed hyperlipidemia  Reviewed her recent lipid profile with her.  She is on simvastatin 10 mg.  We will continue on the current dose for now and no acute complaints.    4. Gastroesophageal reflux disease without esophagitis  Patient does take Prilosec and she says there is no acute complaints with that either.    5. Thyroid  lesion  She had a CT of her neck done through her pulmonologist office in April which showed a small 6 mm hypodense area in the left thyroid and to consider a thyroid ultrasound.  She does not have any acute complaints related to her thyroid but will do an ultrasound of the thyroid to make sure it is not a concerning lesion.  She did have a TSH done in February 2018 which was within normal range.  - US-SOFT TISSUES OF HEAD - NECK; Future    6. Healthcare maintenance  She had a mammogram done in September 2018 which was normal.  Due for one again this September.  She is no longer needing Pap smears.  Up-to-date on colonoscopy, up-to-date on all vaccinations, reviewed recent labs with her.  Follows up with a dentist and eye doctor regular basis.  No other complaints or needs at this time.      Followup: Return in about 6 months (around 11/14/2019), or if symptoms worsen or fail to improve.    This note was created using voice recognition software. There may be unintended errors spelling, and grammar or content.

## 2019-05-15 ENCOUNTER — PATIENT MESSAGE (OUTPATIENT)
Dept: INTERNAL MEDICINE | Facility: MEDICAL CENTER | Age: 74
End: 2019-05-15

## 2019-05-16 NOTE — TELEPHONE ENCOUNTER
From: Rossy Lopez  To: Louise Alvarado M.D.  Sent: 5/15/2019 6:53 PM PDT  Subject: Prescription Question    Dr. Alvarado - I forgot to ask about refills for my medicines. Should I just ask the pharmacy to call you when it is time for those refills? The Dr I saw in February is no longer practicing in [place] as far as I know.  thanks  Rossy Lopez

## 2019-05-21 ENCOUNTER — HOSPITAL ENCOUNTER (OUTPATIENT)
Dept: RADIOLOGY | Facility: MEDICAL CENTER | Age: 74
End: 2019-05-21
Attending: INTERNAL MEDICINE
Payer: MEDICARE

## 2019-05-21 DIAGNOSIS — E07.9 THYROID LESION: ICD-10-CM

## 2019-05-21 PROCEDURE — 76536 US EXAM OF HEAD AND NECK: CPT

## 2019-06-14 ENCOUNTER — OUTPATIENT INFUSION SERVICES (OUTPATIENT)
Dept: ONCOLOGY | Facility: MEDICAL CENTER | Age: 74
End: 2019-06-14
Attending: INTERNAL MEDICINE
Payer: MEDICARE

## 2019-06-14 VITALS
DIASTOLIC BLOOD PRESSURE: 95 MMHG | BODY MASS INDEX: 27.55 KG/M2 | HEART RATE: 98 BPM | OXYGEN SATURATION: 94 % | TEMPERATURE: 98.2 F | RESPIRATION RATE: 18 BRPM | SYSTOLIC BLOOD PRESSURE: 138 MMHG | WEIGHT: 149.69 LBS | HEIGHT: 62 IN

## 2019-06-14 PROCEDURE — 700111 HCHG RX REV CODE 636 W/ 250 OVERRIDE (IP): Mod: TB | Performed by: INTERNAL MEDICINE

## 2019-06-14 PROCEDURE — 96372 THER/PROPH/DIAG INJ SC/IM: CPT | Performed by: CLINICAL NURSE SPECIALIST

## 2019-06-14 RX ADMIN — MEPOLIZUMAB 100 MG: 100 INJECTION, POWDER, FOR SOLUTION SUBCUTANEOUS at 14:05

## 2019-06-14 NOTE — PROGRESS NOTES
Patient to infusion for first time Nucala.  Educated patient and provided new patient handout.  No concerns or complaints.  Nucala given in left back arm.  Tolerated well.  Patient aware of when to call MD/emergent s/s.  Discharged in stable condition with supportive . Will return in 4 weeks.

## 2019-06-18 ENCOUNTER — APPOINTMENT (RX ONLY)
Dept: URBAN - METROPOLITAN AREA CLINIC 20 | Facility: CLINIC | Age: 74
Setting detail: DERMATOLOGY
End: 2019-06-18

## 2019-06-18 DIAGNOSIS — D22 MELANOCYTIC NEVI: ICD-10-CM

## 2019-06-18 DIAGNOSIS — Z85.828 PERSONAL HISTORY OF OTHER MALIGNANT NEOPLASM OF SKIN: ICD-10-CM

## 2019-06-18 DIAGNOSIS — Z71.89 OTHER SPECIFIED COUNSELING: ICD-10-CM

## 2019-06-18 DIAGNOSIS — L57.0 ACTINIC KERATOSIS: ICD-10-CM

## 2019-06-18 DIAGNOSIS — L81.4 OTHER MELANIN HYPERPIGMENTATION: ICD-10-CM

## 2019-06-18 DIAGNOSIS — L72.8 OTHER FOLLICULAR CYSTS OF THE SKIN AND SUBCUTANEOUS TISSUE: ICD-10-CM

## 2019-06-18 DIAGNOSIS — D18.0 HEMANGIOMA: ICD-10-CM

## 2019-06-18 DIAGNOSIS — L82.1 OTHER SEBORRHEIC KERATOSIS: ICD-10-CM

## 2019-06-18 PROBLEM — D22.39 MELANOCYTIC NEVI OF OTHER PARTS OF FACE: Status: ACTIVE | Noted: 2019-06-18

## 2019-06-18 PROBLEM — D18.01 HEMANGIOMA OF SKIN AND SUBCUTANEOUS TISSUE: Status: ACTIVE | Noted: 2019-06-18

## 2019-06-18 PROBLEM — D22.72 MELANOCYTIC NEVI OF LEFT LOWER LIMB, INCLUDING HIP: Status: ACTIVE | Noted: 2019-06-18

## 2019-06-18 PROBLEM — D22.5 MELANOCYTIC NEVI OF TRUNK: Status: ACTIVE | Noted: 2019-06-18

## 2019-06-18 PROBLEM — D22.61 MELANOCYTIC NEVI OF RIGHT UPPER LIMB, INCLUDING SHOULDER: Status: ACTIVE | Noted: 2019-06-18

## 2019-06-18 PROBLEM — D22.71 MELANOCYTIC NEVI OF RIGHT LOWER LIMB, INCLUDING HIP: Status: ACTIVE | Noted: 2019-06-18

## 2019-06-18 PROBLEM — D22.62 MELANOCYTIC NEVI OF LEFT UPPER LIMB, INCLUDING SHOULDER: Status: ACTIVE | Noted: 2019-06-18

## 2019-06-18 PROCEDURE — 99214 OFFICE O/P EST MOD 30 MIN: CPT | Mod: 25

## 2019-06-18 PROCEDURE — ? DEFER

## 2019-06-18 PROCEDURE — ? LIQUID NITROGEN

## 2019-06-18 PROCEDURE — ? COUNSELING

## 2019-06-18 PROCEDURE — 17000 DESTRUCT PREMALG LESION: CPT

## 2019-06-18 PROCEDURE — ? SUNSCREEN RECOMMENDATIONS

## 2019-06-18 PROCEDURE — 17003 DESTRUCT PREMALG LES 2-14: CPT

## 2019-06-18 ASSESSMENT — LOCATION DETAILED DESCRIPTION DERM
LOCATION DETAILED: INFERIOR THORACIC SPINE
LOCATION DETAILED: RIGHT INFERIOR CENTRAL MALAR CHEEK
LOCATION DETAILED: LEFT VENTRAL PROXIMAL FOREARM
LOCATION DETAILED: LEFT LATERAL PROXIMAL PRETIBIAL REGION
LOCATION DETAILED: RIGHT VENTRAL PROXIMAL FOREARM
LOCATION DETAILED: POSTERIOR MID-PARIETAL SCALP
LOCATION DETAILED: LEFT PROXIMAL POSTERIOR UPPER ARM
LOCATION DETAILED: RIGHT INFERIOR FOREHEAD
LOCATION DETAILED: RIGHT SUPERIOR UPPER BACK
LOCATION DETAILED: LEFT DISTAL POSTERIOR THIGH
LOCATION DETAILED: MONS PUBIS
LOCATION DETAILED: LEFT DISTAL PRETIBIAL REGION
LOCATION DETAILED: RIGHT INFERIOR MEDIAL UPPER BACK
LOCATION DETAILED: RIGHT DISTAL POSTERIOR THIGH
LOCATION DETAILED: RIGHT INFERIOR MEDIAL MIDBACK
LOCATION DETAILED: RIGHT PROXIMAL PRETIBIAL REGION
LOCATION DETAILED: RIGHT DISTAL POSTERIOR UPPER ARM

## 2019-06-18 ASSESSMENT — LOCATION SIMPLE DESCRIPTION DERM
LOCATION SIMPLE: LEFT POSTERIOR THIGH
LOCATION SIMPLE: LEFT FOREARM
LOCATION SIMPLE: RIGHT POSTERIOR UPPER ARM
LOCATION SIMPLE: RIGHT CHEEK
LOCATION SIMPLE: RIGHT UPPER BACK
LOCATION SIMPLE: GROIN
LOCATION SIMPLE: RIGHT FOREHEAD
LOCATION SIMPLE: POSTERIOR SCALP
LOCATION SIMPLE: LEFT POSTERIOR UPPER ARM
LOCATION SIMPLE: RIGHT FOREARM
LOCATION SIMPLE: RIGHT POSTERIOR THIGH
LOCATION SIMPLE: RIGHT LOWER BACK
LOCATION SIMPLE: RIGHT PRETIBIAL REGION
LOCATION SIMPLE: LEFT PRETIBIAL REGION
LOCATION SIMPLE: UPPER BACK

## 2019-06-18 ASSESSMENT — LOCATION ZONE DERM
LOCATION ZONE: LEG
LOCATION ZONE: VULVA
LOCATION ZONE: SCALP
LOCATION ZONE: TRUNK
LOCATION ZONE: FACE
LOCATION ZONE: ARM

## 2019-06-18 NOTE — PROCEDURE: LIQUID NITROGEN
Post-Care Instructions: I reviewed with the patient in detail post-care instructions. Patient is to wear sunprotection, and avoid picking at any of the treated lesions. Pt may apply Vaseline to crusted or scabbing areas.
Render Note In Bullet Format When Appropriate: No
Number Of Freeze-Thaw Cycles: 2 freeze-thaw cycles
Duration Of Freeze Thaw-Cycle (Seconds): 10
Consent: The patient's consent was obtained including but not limited to risks of crusting, scabbing, blistering, scarring, darker or lighter pigmentary change, recurrence, incomplete removal and infection. RTC in 2 months if lesion(s) persistent.
Render Post-Care Instructions In Note?: yes
Detail Level: Detailed

## 2019-07-08 DIAGNOSIS — I10 ESSENTIAL HYPERTENSION: ICD-10-CM

## 2019-07-08 RX ORDER — LOSARTAN POTASSIUM 25 MG/1
TABLET ORAL
Qty: 90 TAB | Refills: 0 | Status: SHIPPED | OUTPATIENT
Start: 2019-07-08 | End: 2022-01-03 | Stop reason: SDUPTHER

## 2019-07-12 ENCOUNTER — OUTPATIENT INFUSION SERVICES (OUTPATIENT)
Dept: ONCOLOGY | Facility: MEDICAL CENTER | Age: 74
End: 2019-07-12
Attending: INTERNAL MEDICINE
Payer: MEDICARE

## 2019-07-12 VITALS
SYSTOLIC BLOOD PRESSURE: 142 MMHG | OXYGEN SATURATION: 93 % | WEIGHT: 148.37 LBS | TEMPERATURE: 98 F | HEART RATE: 84 BPM | RESPIRATION RATE: 18 BRPM | DIASTOLIC BLOOD PRESSURE: 73 MMHG | BODY MASS INDEX: 28.01 KG/M2 | HEIGHT: 61 IN

## 2019-07-12 PROCEDURE — 96372 THER/PROPH/DIAG INJ SC/IM: CPT

## 2019-07-12 PROCEDURE — 700111 HCHG RX REV CODE 636 W/ 250 OVERRIDE (IP): Mod: TB | Performed by: INTERNAL MEDICINE

## 2019-07-12 RX ADMIN — MEPOLIZUMAB 100 MG: 100 INJECTION, POWDER, FOR SOLUTION SUBCUTANEOUS at 11:22

## 2019-07-12 NOTE — PROGRESS NOTES
Pt arrived ambulatory to [facility] for Nucala injection. POC discussed with pt and she agrees with POC. Pt medicated per MAR. Pt tolerated injection without s/s adverse reaction. Pt reminded of next appt 8/9/19. Pt verbalized understanding. Pt discharged to self care.

## 2019-08-09 ENCOUNTER — OUTPATIENT INFUSION SERVICES (OUTPATIENT)
Dept: ONCOLOGY | Facility: MEDICAL CENTER | Age: 74
End: 2019-08-09
Attending: INTERNAL MEDICINE
Payer: MEDICARE

## 2019-08-09 VITALS
TEMPERATURE: 97.6 F | BODY MASS INDEX: 28.05 KG/M2 | RESPIRATION RATE: 18 BRPM | SYSTOLIC BLOOD PRESSURE: 134 MMHG | OXYGEN SATURATION: 94 % | DIASTOLIC BLOOD PRESSURE: 62 MMHG | WEIGHT: 148.59 LBS | HEIGHT: 61 IN | HEART RATE: 77 BPM

## 2019-08-09 PROCEDURE — 700111 HCHG RX REV CODE 636 W/ 250 OVERRIDE (IP): Mod: JG | Performed by: INTERNAL MEDICINE

## 2019-08-09 PROCEDURE — 96372 THER/PROPH/DIAG INJ SC/IM: CPT

## 2019-08-09 RX ADMIN — MEPOLIZUMAB 100 MG: 100 INJECTION, POWDER, FOR SOLUTION SUBCUTANEOUS at 11:25

## 2019-08-09 NOTE — PROGRESS NOTES
Pt arrived ambulatory to [facility] for Nucala injection. POC discussed with pt and she agrees with POC. Pt medicated per MAR. Pt tolerated injection without s/s adverse reaction. Pt discharged to self care. Pt reminded on next appt 9/6/19. Pt verbalized understanding.

## 2019-09-06 ENCOUNTER — OUTPATIENT INFUSION SERVICES (OUTPATIENT)
Dept: ONCOLOGY | Facility: MEDICAL CENTER | Age: 74
End: 2019-09-06
Attending: INTERNAL MEDICINE
Payer: MEDICARE

## 2019-09-06 VITALS
SYSTOLIC BLOOD PRESSURE: 121 MMHG | BODY MASS INDEX: 29.38 KG/M2 | DIASTOLIC BLOOD PRESSURE: 92 MMHG | WEIGHT: 145.72 LBS | RESPIRATION RATE: 18 BRPM | OXYGEN SATURATION: 90 % | HEIGHT: 59 IN | TEMPERATURE: 97.8 F | HEART RATE: 119 BPM

## 2019-09-06 PROCEDURE — 700111 HCHG RX REV CODE 636 W/ 250 OVERRIDE (IP): Mod: JG | Performed by: INTERNAL MEDICINE

## 2019-09-06 PROCEDURE — 96372 THER/PROPH/DIAG INJ SC/IM: CPT

## 2019-09-06 RX ORDER — GABAPENTIN 100 MG/1
100 CAPSULE ORAL
COMMUNITY
End: 2020-01-10

## 2019-09-06 RX ORDER — ACETAMINOPHEN AND CODEINE PHOSPHATE 300; 30 MG/1; MG/1
1-2 TABLET ORAL EVERY 4 HOURS PRN
COMMUNITY
End: 2019-10-02

## 2019-09-06 RX ORDER — CYCLOBENZAPRINE HCL 10 MG
10 TABLET ORAL 3 TIMES DAILY PRN
COMMUNITY
End: 2019-10-02

## 2019-09-06 RX ADMIN — MEPOLIZUMAB 100 MG: 100 INJECTION, POWDER, FOR SOLUTION SUBCUTANEOUS at 14:06

## 2019-09-06 NOTE — PROGRESS NOTES
Pt arrived ambulatory to [facility] for Nucala. Pt medicated per MAR. Pt tolerated injection without s/s adverse reaction. Pt discharged to self care. Pt reminded of next appt 10/4/19. Pt verbalized understanding.

## 2019-10-02 ENCOUNTER — HOSPITAL ENCOUNTER (OUTPATIENT)
Dept: RADIOLOGY | Facility: MEDICAL CENTER | Age: 74
DRG: 460 | End: 2019-10-02
Attending: NEUROLOGICAL SURGERY | Admitting: NEUROLOGICAL SURGERY
Payer: MEDICARE

## 2019-10-02 DIAGNOSIS — Z01.811 PRE-OPERATIVE RESPIRATORY EXAMINATION: ICD-10-CM

## 2019-10-02 DIAGNOSIS — Z01.812 PRE-OPERATIVE LABORATORY EXAMINATION: ICD-10-CM

## 2019-10-02 DIAGNOSIS — Z01.810 PRE-OPERATIVE CARDIOVASCULAR EXAMINATION: ICD-10-CM

## 2019-10-02 LAB
AMORPH CRY #/AREA URNS HPF: PRESENT /HPF
ANION GAP SERPL CALC-SCNC: 12 MMOL/L (ref 0–11.9)
APPEARANCE UR: ABNORMAL
APTT PPP: 23.1 SEC (ref 24.7–36)
BACTERIA #/AREA URNS HPF: NEGATIVE /HPF
BASOPHILS # BLD AUTO: 0.4 % (ref 0–1.8)
BASOPHILS # BLD: 0.05 K/UL (ref 0–0.12)
BILIRUB UR QL STRIP.AUTO: NEGATIVE
BUN SERPL-MCNC: 14 MG/DL (ref 8–22)
CALCIUM SERPL-MCNC: 9.7 MG/DL (ref 8.5–10.5)
CHLORIDE SERPL-SCNC: 93 MMOL/L (ref 96–112)
CO2 SERPL-SCNC: 30 MMOL/L (ref 20–33)
COLOR UR: YELLOW
CREAT SERPL-MCNC: 0.48 MG/DL (ref 0.5–1.4)
EKG IMPRESSION: NORMAL
EOSINOPHIL # BLD AUTO: 0.04 K/UL (ref 0–0.51)
EOSINOPHIL NFR BLD: 0.3 % (ref 0–6.9)
EPI CELLS #/AREA URNS HPF: NEGATIVE /HPF
ERYTHROCYTE [DISTWIDTH] IN BLOOD BY AUTOMATED COUNT: 42.7 FL (ref 35.9–50)
EST. AVERAGE GLUCOSE BLD GHB EST-MCNC: 157 MG/DL
GLUCOSE SERPL-MCNC: 134 MG/DL (ref 65–99)
GLUCOSE UR STRIP.AUTO-MCNC: NEGATIVE MG/DL
HBA1C MFR BLD: 7.1 % (ref 0–5.6)
HCT VFR BLD AUTO: 44.8 % (ref 37–47)
HGB BLD-MCNC: 15.5 G/DL (ref 12–16)
HYALINE CASTS #/AREA URNS LPF: ABNORMAL /LPF
IMM GRANULOCYTES # BLD AUTO: 0.11 K/UL (ref 0–0.11)
IMM GRANULOCYTES NFR BLD AUTO: 0.8 % (ref 0–0.9)
INR PPP: 0.87 (ref 0.87–1.13)
KETONES UR STRIP.AUTO-MCNC: NEGATIVE MG/DL
LEUKOCYTE ESTERASE UR QL STRIP.AUTO: NEGATIVE
LYMPHOCYTES # BLD AUTO: 2.71 K/UL (ref 1–4.8)
LYMPHOCYTES NFR BLD: 19.6 % (ref 22–41)
MCH RBC QN AUTO: 31.6 PG (ref 27–33)
MCHC RBC AUTO-ENTMCNC: 34.6 G/DL (ref 33.6–35)
MCV RBC AUTO: 91.4 FL (ref 81.4–97.8)
MICRO URNS: ABNORMAL
MONOCYTES # BLD AUTO: 1.11 K/UL (ref 0–0.85)
MONOCYTES NFR BLD AUTO: 8 % (ref 0–13.4)
NEUTROPHILS # BLD AUTO: 9.84 K/UL (ref 2–7.15)
NEUTROPHILS NFR BLD: 70.9 % (ref 44–72)
NITRITE UR QL STRIP.AUTO: NEGATIVE
NRBC # BLD AUTO: 0 K/UL
NRBC BLD-RTO: 0 /100 WBC
PH UR STRIP.AUTO: 8 [PH] (ref 5–8)
PLATELET # BLD AUTO: 328 K/UL (ref 164–446)
PMV BLD AUTO: 9.2 FL (ref 9–12.9)
POTASSIUM SERPL-SCNC: 3.3 MMOL/L (ref 3.6–5.5)
PROT UR QL STRIP: NEGATIVE MG/DL
PROTHROMBIN TIME: 12 SEC (ref 12–14.6)
RBC # BLD AUTO: 4.9 M/UL (ref 4.2–5.4)
RBC # URNS HPF: ABNORMAL /HPF
RBC UR QL AUTO: NEGATIVE
SODIUM SERPL-SCNC: 135 MMOL/L (ref 135–145)
SP GR UR STRIP.AUTO: 1.02
UROBILINOGEN UR STRIP.AUTO-MCNC: 0.2 MG/DL
WBC # BLD AUTO: 13.9 K/UL (ref 4.8–10.8)
WBC #/AREA URNS HPF: ABNORMAL /HPF

## 2019-10-02 PROCEDURE — 81001 URINALYSIS AUTO W/SCOPE: CPT

## 2019-10-02 PROCEDURE — 85610 PROTHROMBIN TIME: CPT

## 2019-10-02 PROCEDURE — 80048 BASIC METABOLIC PNL TOTAL CA: CPT

## 2019-10-02 PROCEDURE — 93005 ELECTROCARDIOGRAM TRACING: CPT | Performed by: NEUROLOGICAL SURGERY

## 2019-10-02 PROCEDURE — 83036 HEMOGLOBIN GLYCOSYLATED A1C: CPT

## 2019-10-02 PROCEDURE — 71046 X-RAY EXAM CHEST 2 VIEWS: CPT

## 2019-10-02 PROCEDURE — 85730 THROMBOPLASTIN TIME PARTIAL: CPT

## 2019-10-02 PROCEDURE — 36415 COLL VENOUS BLD VENIPUNCTURE: CPT

## 2019-10-02 PROCEDURE — 85025 COMPLETE CBC W/AUTO DIFF WBC: CPT

## 2019-10-02 RX ORDER — TRAMADOL HYDROCHLORIDE 50 MG/1
25 TABLET ORAL
Status: ON HOLD | COMMUNITY
End: 2019-10-18

## 2019-10-04 ENCOUNTER — OUTPATIENT INFUSION SERVICES (OUTPATIENT)
Dept: ONCOLOGY | Facility: MEDICAL CENTER | Age: 74
End: 2019-10-04
Attending: INTERNAL MEDICINE
Payer: MEDICARE

## 2019-10-04 VITALS
OXYGEN SATURATION: 90 % | SYSTOLIC BLOOD PRESSURE: 115 MMHG | TEMPERATURE: 98.1 F | HEART RATE: 99 BPM | HEIGHT: 62 IN | WEIGHT: 143.3 LBS | DIASTOLIC BLOOD PRESSURE: 65 MMHG | BODY MASS INDEX: 26.37 KG/M2 | RESPIRATION RATE: 18 BRPM

## 2019-10-04 PROCEDURE — 700111 HCHG RX REV CODE 636 W/ 250 OVERRIDE (IP): Mod: JG | Performed by: INTERNAL MEDICINE

## 2019-10-04 PROCEDURE — 96372 THER/PROPH/DIAG INJ SC/IM: CPT

## 2019-10-04 RX ADMIN — MEPOLIZUMAB 100 MG: 100 INJECTION, POWDER, FOR SOLUTION SUBCUTANEOUS at 11:09

## 2019-10-04 NOTE — PROGRESS NOTES
Pt arrived ambulatory to [facility] for Nucala injection. Pt states feeling well with the exception of left hip pain. Pt states she is scheduled for left total hip replacement next month. Pt ambulates independently. Pt medicated per MAR. Pt tolerated injection without s/s adverse reaction.pt discharged to self care. Pt aware of November and December appts.

## 2019-10-15 ENCOUNTER — APPOINTMENT (OUTPATIENT)
Dept: RADIOLOGY | Facility: MEDICAL CENTER | Age: 74
DRG: 460 | End: 2019-10-15
Attending: NEUROLOGICAL SURGERY
Payer: MEDICARE

## 2019-10-15 ENCOUNTER — HOSPITAL ENCOUNTER (INPATIENT)
Facility: MEDICAL CENTER | Age: 74
LOS: 4 days | DRG: 460 | End: 2019-10-19
Attending: NEUROLOGICAL SURGERY | Admitting: NEUROLOGICAL SURGERY
Payer: MEDICARE

## 2019-10-15 ENCOUNTER — ANESTHESIA (OUTPATIENT)
Dept: SURGERY | Facility: MEDICAL CENTER | Age: 74
DRG: 460 | End: 2019-10-15
Payer: MEDICARE

## 2019-10-15 ENCOUNTER — ANESTHESIA EVENT (OUTPATIENT)
Dept: SURGERY | Facility: MEDICAL CENTER | Age: 74
DRG: 460 | End: 2019-10-15
Payer: MEDICARE

## 2019-10-15 DIAGNOSIS — E11.22 CONTROLLED TYPE 2 DIABETES MELLITUS WITH STAGE 1 CHRONIC KIDNEY DISEASE, WITHOUT LONG-TERM CURRENT USE OF INSULIN (HCC): ICD-10-CM

## 2019-10-15 DIAGNOSIS — M48.062 LUMBAR STENOSIS WITH NEUROGENIC CLAUDICATION: ICD-10-CM

## 2019-10-15 DIAGNOSIS — G89.18 ACUTE POSTOPERATIVE PAIN: ICD-10-CM

## 2019-10-15 DIAGNOSIS — I10 ESSENTIAL HYPERTENSION: ICD-10-CM

## 2019-10-15 DIAGNOSIS — R09.02 HYPOXIA: ICD-10-CM

## 2019-10-15 DIAGNOSIS — N18.1 CONTROLLED TYPE 2 DIABETES MELLITUS WITH STAGE 1 CHRONIC KIDNEY DISEASE, WITHOUT LONG-TERM CURRENT USE OF INSULIN (HCC): ICD-10-CM

## 2019-10-15 PROBLEM — R11.2 PONV (POSTOPERATIVE NAUSEA AND VOMITING): Status: ACTIVE | Noted: 2019-10-15

## 2019-10-15 PROBLEM — Z98.890 PONV (POSTOPERATIVE NAUSEA AND VOMITING): Status: ACTIVE | Noted: 2019-10-15

## 2019-10-15 LAB — GLUCOSE BLD-MCNC: 145 MG/DL (ref 65–99)

## 2019-10-15 PROCEDURE — 95861 NEEDLE EMG 2 EXTREMITIES: CPT | Performed by: NEUROLOGICAL SURGERY

## 2019-10-15 PROCEDURE — A9270 NON-COVERED ITEM OR SERVICE: HCPCS | Performed by: NEUROLOGICAL SURGERY

## 2019-10-15 PROCEDURE — 160035 HCHG PACU - 1ST 60 MINS PHASE I: Performed by: NEUROLOGICAL SURGERY

## 2019-10-15 PROCEDURE — 160036 HCHG PACU - EA ADDL 30 MINS PHASE I: Performed by: NEUROLOGICAL SURGERY

## 2019-10-15 PROCEDURE — 92585 HCHG BER: CPT | Performed by: NEUROLOGICAL SURGERY

## 2019-10-15 PROCEDURE — 700102 HCHG RX REV CODE 250 W/ 637 OVERRIDE(OP): Performed by: NEUROLOGICAL SURGERY

## 2019-10-15 PROCEDURE — 502240 HCHG MISC OR SUPPLY RC 0272: Performed by: NEUROLOGICAL SURGERY

## 2019-10-15 PROCEDURE — 501838 HCHG SUTURE GENERAL: Performed by: NEUROLOGICAL SURGERY

## 2019-10-15 PROCEDURE — 95938 SOMATOSENSORY TESTING: CPT | Performed by: NEUROLOGICAL SURGERY

## 2019-10-15 PROCEDURE — 700111 HCHG RX REV CODE 636 W/ 250 OVERRIDE (IP): Performed by: ANESTHESIOLOGY

## 2019-10-15 PROCEDURE — 700111 HCHG RX REV CODE 636 W/ 250 OVERRIDE (IP): Performed by: NURSE PRACTITIONER

## 2019-10-15 PROCEDURE — 95940 IONM IN OPERATNG ROOM 15 MIN: CPT | Performed by: NEUROLOGICAL SURGERY

## 2019-10-15 PROCEDURE — 01NB0ZZ RELEASE LUMBAR NERVE, OPEN APPROACH: ICD-10-PCS | Performed by: NEUROLOGICAL SURGERY

## 2019-10-15 PROCEDURE — 500864 HCHG NEEDLE, SPINAL 18G: Performed by: NEUROLOGICAL SURGERY

## 2019-10-15 PROCEDURE — C1713 ANCHOR/SCREW BN/BN,TIS/BN: HCPCS | Performed by: NEUROLOGICAL SURGERY

## 2019-10-15 PROCEDURE — A9270 NON-COVERED ITEM OR SERVICE: HCPCS | Performed by: NURSE PRACTITIONER

## 2019-10-15 PROCEDURE — 700102 HCHG RX REV CODE 250 W/ 637 OVERRIDE(OP): Performed by: NURSE PRACTITIONER

## 2019-10-15 PROCEDURE — 3E0U0GB INTRODUCTION OF RECOMBINANT BONE MORPHOGENETIC PROTEIN INTO JOINTS, OPEN APPROACH: ICD-10-PCS | Performed by: NEUROLOGICAL SURGERY

## 2019-10-15 PROCEDURE — 160002 HCHG RECOVERY MINUTES (STAT): Performed by: NEUROLOGICAL SURGERY

## 2019-10-15 PROCEDURE — 500885 HCHG PACK, JACKSON TABLE: Performed by: NEUROLOGICAL SURGERY

## 2019-10-15 PROCEDURE — 72100 X-RAY EXAM L-S SPINE 2/3 VWS: CPT

## 2019-10-15 PROCEDURE — 0SG00AJ FUSION OF LUMBAR VERTEBRAL JOINT WITH INTERBODY FUSION DEVICE, POSTERIOR APPROACH, ANTERIOR COLUMN, OPEN APPROACH: ICD-10-PCS | Performed by: NEUROLOGICAL SURGERY

## 2019-10-15 PROCEDURE — 700101 HCHG RX REV CODE 250: Performed by: ANESTHESIOLOGY

## 2019-10-15 PROCEDURE — 160048 HCHG OR STATISTICAL LEVEL 1-5: Performed by: NEUROLOGICAL SURGERY

## 2019-10-15 PROCEDURE — 110371 HCHG SHELL REV 272: Performed by: NEUROLOGICAL SURGERY

## 2019-10-15 PROCEDURE — 700111 HCHG RX REV CODE 636 W/ 250 OVERRIDE (IP)

## 2019-10-15 PROCEDURE — 160009 HCHG ANES TIME/MIN: Performed by: NEUROLOGICAL SURGERY

## 2019-10-15 PROCEDURE — 82962 GLUCOSE BLOOD TEST: CPT

## 2019-10-15 PROCEDURE — 500367 HCHG DRAIN KIT, HEMOVAC: Performed by: NEUROLOGICAL SURGERY

## 2019-10-15 PROCEDURE — L8699 PROSTHETIC IMPLANT NOS: HCPCS | Performed by: NEUROLOGICAL SURGERY

## 2019-10-15 PROCEDURE — 00NY0ZZ RELEASE LUMBAR SPINAL CORD, OPEN APPROACH: ICD-10-PCS | Performed by: NEUROLOGICAL SURGERY

## 2019-10-15 PROCEDURE — 700101 HCHG RX REV CODE 250: Performed by: NURSE PRACTITIONER

## 2019-10-15 PROCEDURE — 700102 HCHG RX REV CODE 250 W/ 637 OVERRIDE(OP): Performed by: ANESTHESIOLOGY

## 2019-10-15 PROCEDURE — 0SG30AJ FUSION OF LUMBOSACRAL JOINT WITH INTERBODY FUSION DEVICE, POSTERIOR APPROACH, ANTERIOR COLUMN, OPEN APPROACH: ICD-10-PCS | Performed by: NEUROLOGICAL SURGERY

## 2019-10-15 PROCEDURE — 700111 HCHG RX REV CODE 636 W/ 250 OVERRIDE (IP): Performed by: NEUROLOGICAL SURGERY

## 2019-10-15 PROCEDURE — 160042 HCHG SURGERY MINUTES - EA ADDL 1 MIN LEVEL 5: Performed by: NEUROLOGICAL SURGERY

## 2019-10-15 PROCEDURE — 4A11X4G MONITORING OF PERIPHERAL NERVOUS ELECTRICAL ACTIVITY, INTRAOPERATIVE, EXTERNAL APPROACH: ICD-10-PCS | Performed by: NEUROLOGICAL SURGERY

## 2019-10-15 PROCEDURE — C1821 INTERSPINOUS IMPLANT: HCPCS | Performed by: NEUROLOGICAL SURGERY

## 2019-10-15 PROCEDURE — A4314 CATH W/DRAINAGE 2-WAY LATEX: HCPCS | Performed by: NEUROLOGICAL SURGERY

## 2019-10-15 PROCEDURE — 700112 HCHG RX REV CODE 229: Performed by: NURSE PRACTITIONER

## 2019-10-15 PROCEDURE — 770001 HCHG ROOM/CARE - MED/SURG/GYN PRIV*

## 2019-10-15 PROCEDURE — A9270 NON-COVERED ITEM OR SERVICE: HCPCS | Performed by: ANESTHESIOLOGY

## 2019-10-15 PROCEDURE — 110454 HCHG SHELL REV 250: Performed by: NEUROLOGICAL SURGERY

## 2019-10-15 PROCEDURE — 700105 HCHG RX REV CODE 258: Performed by: NEUROLOGICAL SURGERY

## 2019-10-15 PROCEDURE — 700101 HCHG RX REV CODE 250: Performed by: NEUROLOGICAL SURGERY

## 2019-10-15 PROCEDURE — 160031 HCHG SURGERY MINUTES - 1ST 30 MINS LEVEL 5: Performed by: NEUROLOGICAL SURGERY

## 2019-10-15 DEVICE — GRAPH BONE KIT INFUSE SMALL: Type: IMPLANTABLE DEVICE | Site: SPINE LUMBAR | Status: FUNCTIONAL

## 2019-10-15 DEVICE — SCREW SOLERA SET SCREW (1TCX40+3TCX21+2TCX10=123): Type: IMPLANTABLE DEVICE | Site: SPINE LUMBAR | Status: FUNCTIONAL

## 2019-10-15 DEVICE — ROD PREBENT TITANIUM 5.5 X 50MM (2TCONX2=4): Type: IMPLANTABLE DEVICE | Site: SPINE LUMBAR | Status: FUNCTIONAL

## 2019-10-15 DEVICE — SCREW MAS  SOLERA 6.5 X 45MM (1TCX16+3TCX8=40): Type: IMPLANTABLE DEVICE | Site: SPINE LUMBAR | Status: FUNCTIONAL

## 2019-10-15 DEVICE — SPACER RISE 10X26MM 8-15MM 10 DEGREES (3TCONX2=6): Type: IMPLANTABLE DEVICE | Site: SPINE LUMBAR | Status: FUNCTIONAL

## 2019-10-15 DEVICE — SCREW MAS  SOLERA 6.5 X 40MM (1TCX16+3TCX8=40): Type: IMPLANTABLE DEVICE | Site: SPINE LUMBAR | Status: FUNCTIONAL

## 2019-10-15 RX ORDER — SODIUM CHLORIDE AND POTASSIUM CHLORIDE 150; 900 MG/100ML; MG/100ML
INJECTION, SOLUTION INTRAVENOUS CONTINUOUS
Status: DISCONTINUED | OUTPATIENT
Start: 2019-10-15 | End: 2019-10-19 | Stop reason: HOSPADM

## 2019-10-15 RX ORDER — ACETAMINOPHEN 500 MG
1000 TABLET ORAL ONCE
Status: COMPLETED | OUTPATIENT
Start: 2019-10-15 | End: 2019-10-15

## 2019-10-15 RX ORDER — ONDANSETRON 2 MG/ML
4 INJECTION INTRAMUSCULAR; INTRAVENOUS EVERY 4 HOURS PRN
Status: DISCONTINUED | OUTPATIENT
Start: 2019-10-15 | End: 2019-10-19 | Stop reason: HOSPADM

## 2019-10-15 RX ORDER — HYDROMORPHONE HYDROCHLORIDE 1 MG/ML
0.4 INJECTION, SOLUTION INTRAMUSCULAR; INTRAVENOUS; SUBCUTANEOUS
Status: DISCONTINUED | OUTPATIENT
Start: 2019-10-15 | End: 2019-10-15 | Stop reason: HOSPADM

## 2019-10-15 RX ORDER — HYDROMORPHONE HYDROCHLORIDE 1 MG/ML
0.1 INJECTION, SOLUTION INTRAMUSCULAR; INTRAVENOUS; SUBCUTANEOUS
Status: DISCONTINUED | OUTPATIENT
Start: 2019-10-15 | End: 2019-10-15 | Stop reason: HOSPADM

## 2019-10-15 RX ORDER — HYDROMORPHONE HYDROCHLORIDE 1 MG/ML
0.2 INJECTION, SOLUTION INTRAMUSCULAR; INTRAVENOUS; SUBCUTANEOUS
Status: DISCONTINUED | OUTPATIENT
Start: 2019-10-15 | End: 2019-10-15 | Stop reason: HOSPADM

## 2019-10-15 RX ORDER — CEFAZOLIN SODIUM 1 G/3ML
INJECTION, POWDER, FOR SOLUTION INTRAMUSCULAR; INTRAVENOUS
Status: DISCONTINUED | OUTPATIENT
Start: 2019-10-15 | End: 2019-10-15 | Stop reason: HOSPADM

## 2019-10-15 RX ORDER — HYDROCHLOROTHIAZIDE 25 MG/1
25 TABLET ORAL
Status: DISCONTINUED | OUTPATIENT
Start: 2019-10-16 | End: 2019-10-19 | Stop reason: HOSPADM

## 2019-10-15 RX ORDER — BISACODYL 10 MG
10 SUPPOSITORY, RECTAL RECTAL
Status: DISCONTINUED | OUTPATIENT
Start: 2019-10-15 | End: 2019-10-19 | Stop reason: HOSPADM

## 2019-10-15 RX ORDER — ROCURONIUM BROMIDE 10 MG/ML
INJECTION, SOLUTION INTRAVENOUS PRN
Status: DISCONTINUED | OUTPATIENT
Start: 2019-10-15 | End: 2019-10-15 | Stop reason: SURG

## 2019-10-15 RX ORDER — HALOPERIDOL 5 MG/ML
1 INJECTION INTRAMUSCULAR
Status: DISCONTINUED | OUTPATIENT
Start: 2019-10-15 | End: 2019-10-15 | Stop reason: HOSPADM

## 2019-10-15 RX ORDER — GABAPENTIN 100 MG/1
200 CAPSULE ORAL 3 TIMES DAILY
Status: DISCONTINUED | OUTPATIENT
Start: 2019-10-15 | End: 2019-10-19 | Stop reason: HOSPADM

## 2019-10-15 RX ORDER — FLUTICASONE PROPIONATE 110 UG/1
2 AEROSOL, METERED RESPIRATORY (INHALATION)
Status: DISCONTINUED | OUTPATIENT
Start: 2019-10-15 | End: 2019-10-16

## 2019-10-15 RX ORDER — ONDANSETRON 4 MG/1
4 TABLET, ORALLY DISINTEGRATING ORAL EVERY 4 HOURS PRN
Status: DISCONTINUED | OUTPATIENT
Start: 2019-10-15 | End: 2019-10-19 | Stop reason: HOSPADM

## 2019-10-15 RX ORDER — AMOXICILLIN 250 MG
1 CAPSULE ORAL
Status: DISCONTINUED | OUTPATIENT
Start: 2019-10-15 | End: 2019-10-19 | Stop reason: HOSPADM

## 2019-10-15 RX ORDER — CEFAZOLIN SODIUM 2 G/100ML
2 INJECTION, SOLUTION INTRAVENOUS EVERY 8 HOURS
Status: COMPLETED | OUTPATIENT
Start: 2019-10-15 | End: 2019-10-16

## 2019-10-15 RX ORDER — OXYCODONE HCL 5 MG/5 ML
5 SOLUTION, ORAL ORAL
Status: COMPLETED | OUTPATIENT
Start: 2019-10-15 | End: 2019-10-15

## 2019-10-15 RX ORDER — POLYETHYLENE GLYCOL 3350 17 G/17G
1 POWDER, FOR SOLUTION ORAL 2 TIMES DAILY PRN
Status: DISCONTINUED | OUTPATIENT
Start: 2019-10-15 | End: 2019-10-19 | Stop reason: HOSPADM

## 2019-10-15 RX ORDER — SODIUM CHLORIDE, SODIUM LACTATE, POTASSIUM CHLORIDE, CALCIUM CHLORIDE 600; 310; 30; 20 MG/100ML; MG/100ML; MG/100ML; MG/100ML
INJECTION, SOLUTION INTRAVENOUS CONTINUOUS
Status: ACTIVE | OUTPATIENT
Start: 2019-10-15 | End: 2019-10-16

## 2019-10-15 RX ORDER — MIDAZOLAM HYDROCHLORIDE 1 MG/ML
INJECTION INTRAMUSCULAR; INTRAVENOUS PRN
Status: DISCONTINUED | OUTPATIENT
Start: 2019-10-15 | End: 2019-10-15 | Stop reason: SURG

## 2019-10-15 RX ORDER — DIPHENHYDRAMINE HYDROCHLORIDE 50 MG/ML
25 INJECTION INTRAMUSCULAR; INTRAVENOUS EVERY 6 HOURS PRN
Status: DISCONTINUED | OUTPATIENT
Start: 2019-10-15 | End: 2019-10-19 | Stop reason: HOSPADM

## 2019-10-15 RX ORDER — OMEPRAZOLE 20 MG/1
20 CAPSULE, DELAYED RELEASE ORAL
Status: DISCONTINUED | OUTPATIENT
Start: 2019-10-16 | End: 2019-10-19 | Stop reason: HOSPADM

## 2019-10-15 RX ORDER — DIPHENHYDRAMINE HYDROCHLORIDE 50 MG/ML
12.5 INJECTION INTRAMUSCULAR; INTRAVENOUS
Status: DISCONTINUED | OUTPATIENT
Start: 2019-10-15 | End: 2019-10-15 | Stop reason: HOSPADM

## 2019-10-15 RX ORDER — PHENYLEPHRINE HYDROCHLORIDE 10 MG/ML
INJECTION, SOLUTION INTRAMUSCULAR; INTRAVENOUS; SUBCUTANEOUS PRN
Status: DISCONTINUED | OUTPATIENT
Start: 2019-10-15 | End: 2019-10-15 | Stop reason: SURG

## 2019-10-15 RX ORDER — CEFAZOLIN SODIUM 1 G/3ML
INJECTION, POWDER, FOR SOLUTION INTRAMUSCULAR; INTRAVENOUS PRN
Status: DISCONTINUED | OUTPATIENT
Start: 2019-10-15 | End: 2019-10-15 | Stop reason: SURG

## 2019-10-15 RX ORDER — TIZANIDINE 4 MG/1
2 TABLET ORAL 3 TIMES DAILY PRN
Status: DISCONTINUED | OUTPATIENT
Start: 2019-10-15 | End: 2019-10-19 | Stop reason: HOSPADM

## 2019-10-15 RX ORDER — HYDROMORPHONE HYDROCHLORIDE 2 MG/ML
INJECTION, SOLUTION INTRAMUSCULAR; INTRAVENOUS; SUBCUTANEOUS PRN
Status: DISCONTINUED | OUTPATIENT
Start: 2019-10-15 | End: 2019-10-15 | Stop reason: SURG

## 2019-10-15 RX ORDER — KETAMINE HYDROCHLORIDE 50 MG/ML
INJECTION, SOLUTION INTRAMUSCULAR; INTRAVENOUS PRN
Status: DISCONTINUED | OUTPATIENT
Start: 2019-10-15 | End: 2019-10-15 | Stop reason: SURG

## 2019-10-15 RX ORDER — LOSARTAN POTASSIUM 50 MG/1
25 TABLET ORAL
Status: DISCONTINUED | OUTPATIENT
Start: 2019-10-16 | End: 2019-10-15

## 2019-10-15 RX ORDER — LIDOCAINE HYDROCHLORIDE 10 MG/ML
INJECTION, SOLUTION EPIDURAL; INFILTRATION; INTRACAUDAL; PERINEURAL
Status: COMPLETED
Start: 2019-10-15 | End: 2019-10-15

## 2019-10-15 RX ORDER — DOCUSATE SODIUM 100 MG/1
100 CAPSULE, LIQUID FILLED ORAL 2 TIMES DAILY
Status: DISCONTINUED | OUTPATIENT
Start: 2019-10-15 | End: 2019-10-19 | Stop reason: HOSPADM

## 2019-10-15 RX ORDER — SIMVASTATIN 20 MG
10 TABLET ORAL EVERY EVENING
Status: DISCONTINUED | OUTPATIENT
Start: 2019-10-15 | End: 2019-10-19 | Stop reason: HOSPADM

## 2019-10-15 RX ORDER — BUPIVACAINE HYDROCHLORIDE AND EPINEPHRINE 2.5; 5 MG/ML; UG/ML
INJECTION, SOLUTION EPIDURAL; INFILTRATION; INTRACAUDAL; PERINEURAL
Status: DISCONTINUED | OUTPATIENT
Start: 2019-10-15 | End: 2019-10-15 | Stop reason: HOSPADM

## 2019-10-15 RX ORDER — OXYCODONE HCL 5 MG/5 ML
10 SOLUTION, ORAL ORAL
Status: COMPLETED | OUTPATIENT
Start: 2019-10-15 | End: 2019-10-15

## 2019-10-15 RX ORDER — LOSARTAN POTASSIUM 50 MG/1
25 TABLET ORAL
Status: DISCONTINUED | OUTPATIENT
Start: 2019-10-16 | End: 2019-10-19 | Stop reason: HOSPADM

## 2019-10-15 RX ORDER — CYCLOBENZAPRINE HCL 5 MG
TABLET ORAL
Refills: 0 | Status: ON HOLD | COMMUNITY
Start: 2019-09-18 | End: 2019-10-15

## 2019-10-15 RX ORDER — DEXAMETHASONE SODIUM PHOSPHATE 4 MG/ML
INJECTION, SOLUTION INTRA-ARTICULAR; INTRALESIONAL; INTRAMUSCULAR; INTRAVENOUS; SOFT TISSUE PRN
Status: DISCONTINUED | OUTPATIENT
Start: 2019-10-15 | End: 2019-10-15 | Stop reason: SURG

## 2019-10-15 RX ORDER — CALCIUM CARBONATE 500 MG/1
500 TABLET, CHEWABLE ORAL 2 TIMES DAILY
Status: DISCONTINUED | OUTPATIENT
Start: 2019-10-15 | End: 2019-10-19 | Stop reason: HOSPADM

## 2019-10-15 RX ORDER — METFORMIN HYDROCHLORIDE 500 MG/1
500 TABLET, EXTENDED RELEASE ORAL 2 TIMES DAILY
Status: DISCONTINUED | OUTPATIENT
Start: 2019-10-16 | End: 2019-10-19 | Stop reason: HOSPADM

## 2019-10-15 RX ORDER — ENEMA 19; 7 G/133ML; G/133ML
1 ENEMA RECTAL
Status: DISCONTINUED | OUTPATIENT
Start: 2019-10-15 | End: 2019-10-19 | Stop reason: HOSPADM

## 2019-10-15 RX ORDER — DIPHENHYDRAMINE HCL 25 MG
25 TABLET ORAL EVERY 6 HOURS PRN
Status: DISCONTINUED | OUTPATIENT
Start: 2019-10-15 | End: 2019-10-19 | Stop reason: HOSPADM

## 2019-10-15 RX ORDER — MONTELUKAST SODIUM 10 MG/1
10 TABLET ORAL EVERY EVENING
Status: DISCONTINUED | OUTPATIENT
Start: 2019-10-15 | End: 2019-10-19 | Stop reason: HOSPADM

## 2019-10-15 RX ORDER — ONDANSETRON 2 MG/ML
INJECTION INTRAMUSCULAR; INTRAVENOUS PRN
Status: DISCONTINUED | OUTPATIENT
Start: 2019-10-15 | End: 2019-10-15 | Stop reason: SURG

## 2019-10-15 RX ORDER — GABAPENTIN 300 MG/1
300 CAPSULE ORAL ONCE
Status: COMPLETED | OUTPATIENT
Start: 2019-10-15 | End: 2019-10-15

## 2019-10-15 RX ORDER — HYDROCHLOROTHIAZIDE 25 MG/1
25 TABLET ORAL
Status: DISCONTINUED | OUTPATIENT
Start: 2019-10-15 | End: 2019-10-15

## 2019-10-15 RX ORDER — ONDANSETRON 2 MG/ML
4 INJECTION INTRAMUSCULAR; INTRAVENOUS
Status: COMPLETED | OUTPATIENT
Start: 2019-10-15 | End: 2019-10-15

## 2019-10-15 RX ADMIN — Medication 0.3 ML: at 13:40

## 2019-10-15 RX ADMIN — PHENYLEPHRINE HYDROCHLORIDE 200 MCG: 10 INJECTION INTRAVENOUS at 17:01

## 2019-10-15 RX ADMIN — ONDANSETRON 4 MG: 2 INJECTION INTRAMUSCULAR; INTRAVENOUS at 20:05

## 2019-10-15 RX ADMIN — SIMVASTATIN 10 MG: 20 TABLET, FILM COATED ORAL at 22:02

## 2019-10-15 RX ADMIN — PROPOFOL 100 MG: 10 INJECTION, EMULSION INTRAVENOUS at 16:34

## 2019-10-15 RX ADMIN — GABAPENTIN 300 MG: 300 CAPSULE ORAL at 14:24

## 2019-10-15 RX ADMIN — PHENYLEPHRINE HYDROCHLORIDE 200 MCG: 10 INJECTION INTRAVENOUS at 17:17

## 2019-10-15 RX ADMIN — PHENYLEPHRINE HYDROCHLORIDE 200 MCG: 10 INJECTION INTRAVENOUS at 17:11

## 2019-10-15 RX ADMIN — FENTANYL CITRATE 150 MCG: 50 INJECTION, SOLUTION INTRAMUSCULAR; INTRAVENOUS at 16:34

## 2019-10-15 RX ADMIN — ONDANSETRON 4 MG: 2 INJECTION INTRAMUSCULAR; INTRAVENOUS at 16:38

## 2019-10-15 RX ADMIN — FENTANYL CITRATE 100 MCG: 50 INJECTION, SOLUTION INTRAMUSCULAR; INTRAVENOUS at 16:47

## 2019-10-15 RX ADMIN — CEFAZOLIN 2 G: 330 INJECTION, POWDER, FOR SOLUTION INTRAMUSCULAR; INTRAVENOUS at 16:37

## 2019-10-15 RX ADMIN — DOCUSATE SODIUM 100 MG: 100 CAPSULE, LIQUID FILLED ORAL at 22:03

## 2019-10-15 RX ADMIN — KETAMINE HYDROCHLORIDE 30 MG: 50 INJECTION, SOLUTION INTRAMUSCULAR; INTRAVENOUS at 16:34

## 2019-10-15 RX ADMIN — PHENYLEPHRINE HYDROCHLORIDE 200 MCG: 10 INJECTION INTRAVENOUS at 18:01

## 2019-10-15 RX ADMIN — FENTANYL CITRATE 50 MCG: 50 INJECTION INTRAMUSCULAR; INTRAVENOUS at 19:25

## 2019-10-15 RX ADMIN — OXYCODONE HYDROCHLORIDE 5 MG: 5 SOLUTION ORAL at 19:34

## 2019-10-15 RX ADMIN — PHENYLEPHRINE HYDROCHLORIDE 100 MCG: 10 INJECTION INTRAVENOUS at 17:29

## 2019-10-15 RX ADMIN — POTASSIUM CHLORIDE AND SODIUM CHLORIDE: 900; 150 INJECTION, SOLUTION INTRAVENOUS at 22:02

## 2019-10-15 RX ADMIN — SUGAMMADEX 200 MG: 100 INJECTION, SOLUTION INTRAVENOUS at 18:22

## 2019-10-15 RX ADMIN — ANTACID TABLETS 500 MG: 500 TABLET, CHEWABLE ORAL at 22:03

## 2019-10-15 RX ADMIN — PHENYLEPHRINE HYDROCHLORIDE 200 MCG: 10 INJECTION INTRAVENOUS at 17:49

## 2019-10-15 RX ADMIN — DEXAMETHASONE SODIUM PHOSPHATE 4 MG: 4 INJECTION, SOLUTION INTRA-ARTICULAR; INTRALESIONAL; INTRAMUSCULAR; INTRAVENOUS; SOFT TISSUE at 16:38

## 2019-10-15 RX ADMIN — PHENYLEPHRINE HYDROCHLORIDE 100 MCG: 10 INJECTION INTRAVENOUS at 18:14

## 2019-10-15 RX ADMIN — PHENYLEPHRINE HYDROCHLORIDE 200 MCG: 10 INJECTION INTRAVENOUS at 16:52

## 2019-10-15 RX ADMIN — PROPOFOL 30 MG: 10 INJECTION, EMULSION INTRAVENOUS at 16:54

## 2019-10-15 RX ADMIN — ACETAMINOPHEN 1000 MG: 500 TABLET ORAL at 14:24

## 2019-10-15 RX ADMIN — ROCURONIUM BROMIDE 25 MG: 10 INJECTION, SOLUTION INTRAVENOUS at 16:34

## 2019-10-15 RX ADMIN — HYDROMORPHONE HYDROCHLORIDE 1 MG: 2 INJECTION, SOLUTION INTRAMUSCULAR; INTRAVENOUS; SUBCUTANEOUS at 16:48

## 2019-10-15 RX ADMIN — CEFAZOLIN SODIUM 2 G: 2 INJECTION, SOLUTION INTRAVENOUS at 22:02

## 2019-10-15 RX ADMIN — LIDOCAINE HYDROCHLORIDE 0.3 ML: 10 INJECTION, SOLUTION EPIDURAL; INFILTRATION; INTRACAUDAL at 13:40

## 2019-10-15 RX ADMIN — Medication: at 22:08

## 2019-10-15 RX ADMIN — MIDAZOLAM 2 MG: 1 INJECTION INTRAMUSCULAR; INTRAVENOUS at 16:29

## 2019-10-15 RX ADMIN — SODIUM CHLORIDE, POTASSIUM CHLORIDE, SODIUM LACTATE AND CALCIUM CHLORIDE: 600; 310; 30; 20 INJECTION, SOLUTION INTRAVENOUS at 13:39

## 2019-10-15 RX ADMIN — GABAPENTIN 200 MG: 100 CAPSULE ORAL at 22:03

## 2019-10-15 ASSESSMENT — PATIENT HEALTH QUESTIONNAIRE - PHQ9
SUM OF ALL RESPONSES TO PHQ9 QUESTIONS 1 AND 2: 0
1. LITTLE INTEREST OR PLEASURE IN DOING THINGS: NOT AT ALL
2. FEELING DOWN, DEPRESSED, IRRITABLE, OR HOPELESS: NOT AT ALL

## 2019-10-15 ASSESSMENT — COGNITIVE AND FUNCTIONAL STATUS - GENERAL
MOBILITY SCORE: 24
SUGGESTED CMS G CODE MODIFIER MOBILITY: CH
SUGGESTED CMS G CODE MODIFIER DAILY ACTIVITY: CH
DAILY ACTIVITIY SCORE: 24

## 2019-10-15 ASSESSMENT — LIFESTYLE VARIABLES
EVER HAD A DRINK FIRST THING IN THE MORNING TO STEADY YOUR NERVES TO GET RID OF A HANGOVER: NO
TOTAL SCORE: 0
DOES PATIENT WANT TO STOP DRINKING: NO
EVER_SMOKED: NEVER
EVER FELT BAD OR GUILTY ABOUT YOUR DRINKING: NO
ON A TYPICAL DAY WHEN YOU DRINK ALCOHOL HOW MANY DRINKS DO YOU HAVE: 0
TOTAL SCORE: 0
AVERAGE NUMBER OF DAYS PER WEEK YOU HAVE A DRINK CONTAINING ALCOHOL: 0
CONSUMPTION TOTAL: NEGATIVE
HOW MANY TIMES IN THE PAST YEAR HAVE YOU HAD 5 OR MORE DRINKS IN A DAY: 0
HAVE YOU EVER FELT YOU SHOULD CUT DOWN ON YOUR DRINKING: NO
TOTAL SCORE: 0
ALCOHOL_USE: NO
HAVE PEOPLE ANNOYED YOU BY CRITICIZING YOUR DRINKING: NO

## 2019-10-15 NOTE — ANESTHESIA PREPROCEDURE EVALUATION
Relevant Problems   ANESTHESIA   (+) PONV (postoperative nausea and vomiting)      CARDIAC   (+) Hypertension      GI   (+) Gastroesophageal reflux disease without esophagitis      ENDO   (+) Controlled type 2 diabetes mellitus with stage 1 chronic kidney disease, without long-term current use of insulin (HCC)       Physical Exam    Airway   Mallampati: II  TM distance: >3 FB  Neck ROM: full       Cardiovascular - normal exam  Rhythm: regular  Rate: normal  (-) murmur     Dental - normal exam         Pulmonary - normal exam  Breath sounds clear to auscultation     Abdominal    Neurological - normal exam                 Anesthesia Plan    ASA 3   ASA physical status 3 criteria: diabetes - poorly controlled and hypertension - poorly controlled    Plan - general       Airway plan will be ETT        Induction: intravenous    Postoperative Plan: Postoperative administration of opioids is intended.    Pertinent diagnostic labs and testing reviewed    Informed Consent:    Anesthetic plan and risks discussed with patient.    Use of blood products discussed with: patient whom consented to blood products.

## 2019-10-16 LAB
ANION GAP SERPL CALC-SCNC: 10 MMOL/L (ref 0–11.9)
BUN SERPL-MCNC: 12 MG/DL (ref 8–22)
CALCIUM SERPL-MCNC: 8.6 MG/DL (ref 8.5–10.5)
CHLORIDE SERPL-SCNC: 96 MMOL/L (ref 96–112)
CO2 SERPL-SCNC: 29 MMOL/L (ref 20–33)
CREAT SERPL-MCNC: 0.58 MG/DL (ref 0.5–1.4)
ERYTHROCYTE [DISTWIDTH] IN BLOOD BY AUTOMATED COUNT: 45.1 FL (ref 35.9–50)
GLUCOSE BLD-MCNC: 192 MG/DL (ref 65–99)
GLUCOSE SERPL-MCNC: 261 MG/DL (ref 65–99)
HCT VFR BLD AUTO: 36.1 % (ref 37–47)
HGB BLD-MCNC: 12 G/DL (ref 12–16)
MCH RBC QN AUTO: 31.1 PG (ref 27–33)
MCHC RBC AUTO-ENTMCNC: 32.8 G/DL (ref 33.6–35)
MCV RBC AUTO: 94.7 FL (ref 81.4–97.8)
PLATELET # BLD AUTO: 271 K/UL (ref 164–446)
PMV BLD AUTO: 9.2 FL (ref 9–12.9)
POTASSIUM SERPL-SCNC: 4.2 MMOL/L (ref 3.6–5.5)
RBC # BLD AUTO: 3.8 M/UL (ref 4.2–5.4)
SODIUM SERPL-SCNC: 135 MMOL/L (ref 135–145)
WBC # BLD AUTO: 13.7 K/UL (ref 4.8–10.8)

## 2019-10-16 PROCEDURE — 97161 PT EVAL LOW COMPLEX 20 MIN: CPT

## 2019-10-16 PROCEDURE — 700111 HCHG RX REV CODE 636 W/ 250 OVERRIDE (IP): Performed by: NURSE PRACTITIONER

## 2019-10-16 PROCEDURE — A9270 NON-COVERED ITEM OR SERVICE: HCPCS | Performed by: NEUROLOGICAL SURGERY

## 2019-10-16 PROCEDURE — A9270 NON-COVERED ITEM OR SERVICE: HCPCS | Performed by: NURSE PRACTITIONER

## 2019-10-16 PROCEDURE — 700112 HCHG RX REV CODE 229: Performed by: NURSE PRACTITIONER

## 2019-10-16 PROCEDURE — 85027 COMPLETE CBC AUTOMATED: CPT

## 2019-10-16 PROCEDURE — L0637 LSO SC R ANT/POS PNL PRE CST: HCPCS

## 2019-10-16 PROCEDURE — 36415 COLL VENOUS BLD VENIPUNCTURE: CPT

## 2019-10-16 PROCEDURE — 97165 OT EVAL LOW COMPLEX 30 MIN: CPT

## 2019-10-16 PROCEDURE — 700102 HCHG RX REV CODE 250 W/ 637 OVERRIDE(OP): Performed by: NEUROLOGICAL SURGERY

## 2019-10-16 PROCEDURE — 82962 GLUCOSE BLOOD TEST: CPT

## 2019-10-16 PROCEDURE — 80048 BASIC METABOLIC PNL TOTAL CA: CPT

## 2019-10-16 PROCEDURE — 770001 HCHG ROOM/CARE - MED/SURG/GYN PRIV*

## 2019-10-16 PROCEDURE — 700102 HCHG RX REV CODE 250 W/ 637 OVERRIDE(OP): Performed by: NURSE PRACTITIONER

## 2019-10-16 RX ORDER — HYDROMORPHONE HYDROCHLORIDE 2 MG/1
2 TABLET ORAL
Status: DISCONTINUED | OUTPATIENT
Start: 2019-10-16 | End: 2019-10-19 | Stop reason: HOSPADM

## 2019-10-16 RX ORDER — OXYCODONE HYDROCHLORIDE AND ACETAMINOPHEN 5; 325 MG/1; MG/1
1-2 TABLET ORAL EVERY 4 HOURS PRN
Status: DISCONTINUED | OUTPATIENT
Start: 2019-10-16 | End: 2019-10-19 | Stop reason: HOSPADM

## 2019-10-16 RX ORDER — HYDROMORPHONE HYDROCHLORIDE 1 MG/ML
0.5 INJECTION, SOLUTION INTRAMUSCULAR; INTRAVENOUS; SUBCUTANEOUS
Status: DISCONTINUED | OUTPATIENT
Start: 2019-10-16 | End: 2019-10-19 | Stop reason: HOSPADM

## 2019-10-16 RX ADMIN — HYDROMORPHONE HYDROCHLORIDE 2 MG: 2 TABLET ORAL at 21:06

## 2019-10-16 RX ADMIN — ONDANSETRON 4 MG: 4 TABLET, ORALLY DISINTEGRATING ORAL at 12:11

## 2019-10-16 RX ADMIN — LOSARTAN POTASSIUM 25 MG: 50 TABLET ORAL at 12:12

## 2019-10-16 RX ADMIN — DOCUSATE SODIUM 100 MG: 100 CAPSULE, LIQUID FILLED ORAL at 16:20

## 2019-10-16 RX ADMIN — GABAPENTIN 200 MG: 100 CAPSULE ORAL at 12:11

## 2019-10-16 RX ADMIN — HYDROMORPHONE HYDROCHLORIDE 2 MG: 2 TABLET ORAL at 16:19

## 2019-10-16 RX ADMIN — TIZANIDINE 2 MG: 4 TABLET ORAL at 16:19

## 2019-10-16 RX ADMIN — OMEPRAZOLE 20 MG: 20 CAPSULE, DELAYED RELEASE ORAL at 12:11

## 2019-10-16 RX ADMIN — ONDANSETRON 4 MG: 2 INJECTION INTRAMUSCULAR; INTRAVENOUS at 03:01

## 2019-10-16 RX ADMIN — METFORMIN HYDROCHLORIDE 500 MG: 500 TABLET, EXTENDED RELEASE ORAL at 16:26

## 2019-10-16 RX ADMIN — MONTELUKAST 10 MG: 10 TABLET, FILM COATED ORAL at 16:19

## 2019-10-16 RX ADMIN — HYDROCHLOROTHIAZIDE 25 MG: 25 TABLET ORAL at 12:12

## 2019-10-16 RX ADMIN — CEFAZOLIN SODIUM 2 G: 2 INJECTION, SOLUTION INTRAVENOUS at 05:25

## 2019-10-16 RX ADMIN — HYDROMORPHONE HYDROCHLORIDE 2 MG: 2 TABLET ORAL at 12:11

## 2019-10-16 RX ADMIN — GABAPENTIN 200 MG: 100 CAPSULE ORAL at 21:06

## 2019-10-16 RX ADMIN — ANTACID TABLETS 500 MG: 500 TABLET, CHEWABLE ORAL at 16:19

## 2019-10-16 RX ADMIN — SIMVASTATIN 10 MG: 20 TABLET, FILM COATED ORAL at 16:19

## 2019-10-16 ASSESSMENT — GAIT ASSESSMENTS
GAIT LEVEL OF ASSIST: MINIMAL ASSIST
DEVIATION: BRADYKINETIC;DECREASED HEEL STRIKE;DECREASED TOE OFF
ASSISTIVE DEVICE: FRONT WHEEL WALKER
DISTANCE (FEET): 2

## 2019-10-16 ASSESSMENT — COGNITIVE AND FUNCTIONAL STATUS - GENERAL
DRESSING REGULAR LOWER BODY CLOTHING: A LITTLE
CLIMB 3 TO 5 STEPS WITH RAILING: A LOT
TURNING FROM BACK TO SIDE WHILE IN FLAT BAD: A LOT
MOVING TO AND FROM BED TO CHAIR: UNABLE
TOILETING: A LITTLE
SUGGESTED CMS G CODE MODIFIER DAILY ACTIVITY: CJ
WALKING IN HOSPITAL ROOM: A LITTLE
MOBILITY SCORE: 12
DAILY ACTIVITIY SCORE: 20
STANDING UP FROM CHAIR USING ARMS: A LITTLE
HELP NEEDED FOR BATHING: A LITTLE
DRESSING REGULAR UPPER BODY CLOTHING: A LITTLE
SUGGESTED CMS G CODE MODIFIER MOBILITY: CL
MOVING FROM LYING ON BACK TO SITTING ON SIDE OF FLAT BED: UNABLE

## 2019-10-16 ASSESSMENT — ACTIVITIES OF DAILY LIVING (ADL): TOILETING: INDEPENDENT

## 2019-10-16 NOTE — PROGRESS NOTES
Neurosurgery Progress Note    Subjective:  Pt states the hospital lost her brace which she brought from our office & therefore was told she can't get oob. Her back is sore, legs w/o symptoms. She has had 3 episodes of emesis    Exam:  AAOX3, fc's, selby's strong. Dressing c&d    BP  Min: 106/47  Max: 133/67  Pulse  Av.6  Min: 51  Max: 99  Resp  Avg: 15.5  Min: 9  Max: 19  Temp  Av.2 °C (97.1 °F)  Min: 35.8 °C (96.5 °F)  Max: 36.8 °C (98.3 °F)  SpO2  Av.9 %  Min: 92 %  Max: 100 %    No data recorded    Recent Labs     10/16/19  0349   WBC 13.7*   RBC 3.80*   HEMOGLOBIN 12.0   HEMATOCRIT 36.1*   MCV 94.7   MCH 31.1   MCHC 32.8*   RDW 45.1   PLATELETCT 271   MPV 9.2     Recent Labs     10/16/19  0349   SODIUM 135   POTASSIUM 4.2   CHLORIDE 96   CO2 29   GLUCOSE 261*   BUN 12   CREATININE 0.58   CALCIUM 8.6               Intake/Output       10/15/19 0700 - 10/16/19 0659 10/16/19 0700 - 10/17/19 0659      7226-1301 0967-3136 Total 5645-9658 0534-4149 Total       Intake    P.O.  --  430 430  --  -- --    P.O. -- 430 430 -- -- --    I.V.  1800  1300 3100  6.4  -- 6.4    PCA End of Shift Total Volume (ml) -- -- -- 6.4 -- 6.4    Volume (mL) (lactated ringers infusion) 8580 004 5217 -- -- --    Volume (mL) (0.9 % NaCl with KCl 20 mEq infusion) -- 1000 1000 -- -- --    Total Intake 1800 1730 3530 6.4 -- 6.4       Output    Urine  50  550 600  --  -- --    Urine 50 -- 50 -- -- --    Output (mL) (Urethral Catheter Latex 16 Fr.) -- 550 550 -- -- --    Drains  --  130 130  --  -- --    Output (mL) (Closed/Suction Drain 1 Midline Back) -- 130 130 -- -- --    Blood  200  -- 200  --  -- --    Est. Blood Loss 200 -- 200 -- -- --    Total Output 250 680 930 -- -- --       Net I/O     1550 1050 2600 6.4 -- 6.4            Intake/Output Summary (Last 24 hours) at 10/16/2019 0832  Last data filed at 10/16/2019 0700  Gross per 24 hour   Intake 3536.35 ml   Output 930 ml   Net 2606.35 ml            • gabapentin  200 mg TID   •  metFORMIN ER  500 mg BID   • montelukast  10 mg Q EVENING   • omeprazole  20 mg QDAY   • simvastatin  10 mg Q EVENING   • Pharmacy Consult Request  1 Each PHARMACY TO DOSE   • MD ALERT...DO NOT ADMINISTER NSAIDS or ASPIRIN unless ORDERED By Neurosurgery  1 Each PRN   • docusate sodium  100 mg BID   • senna-docusate  1 Tab Q24HRS PRN   • polyethylene glycol/lytes  1 Packet BID PRN   • magnesium hydroxide  30 mL QDAY PRN   • bisacodyl  10 mg Q24HRS PRN   • fleet  1 Each Once PRN   • 0.9 % NaCl with KCl 20 mEq 1,000 mL   Continuous   • HYDROmorphone   Continuous   • diphenhydrAMINE  25 mg Q6HRS PRN    Or   • diphenhydrAMINE  25 mg Q6HRS PRN   • ondansetron  4 mg Q4HRS PRN   • ondansetron  4 mg Q4HRS PRN   • tizanidine  2 mg TID PRN   • benzocaine-menthol  1 Lozenge Q2HRS PRN   • calcium carbonate  500 mg BID   • hydroCHLOROthiazide  25 mg QDAY   • losartan  25 mg QDAY   • fluticasone  2 Puff Q12HRS (RT)       Assessment and Plan:  POD # 1 L4 - Sacrum  NM as above - pt to mobilize w/o brace at this time (hospital misplaced the one she brought from the office)  Prophylactic anticoagulation: no         Start date/time: no need  Ok to mobilize today w/o brace  Monitor drain - total 130, last 8 hrs 40cc  Transition onto oral meds after she eats

## 2019-10-16 NOTE — ANESTHESIA TIME REPORT
Anesthesia Start and Stop Event Times     Date Time Event    10/15/2019 1622 Ready for Procedure     1629 Anesthesia Start     1839 Anesthesia Stop        Responsible Staff  10/15/19    Name Role Begin End    Low Smith M.D. Anesth 1629 1839        Preop Diagnosis (Free Text):  Pre-op Diagnosis     LUMBAR STENOSIS, SPONDYLOLYSIS AND SPONDYLOLISTHESIS        Preop Diagnosis (Codes):    Post op Diagnosis  Lumbar spondylosis      Premium Reason  A. 3PM - 7AM    Comments:

## 2019-10-16 NOTE — OP REPORT
DATE OF SERVICE:  10/15/2019    PREOPERATIVE DIAGNOSES:  L4-L5 degenerative spondylolisthesis with severe   lumbar stenosis and neurogenic claudication.    POSTOPERATIVE DIAGNOSES:  1.  L4-L5 spondylolisthesis with severe lumbar stenosis and neurogenic   claudication.  2.  L5-S1 degenerative disk disease with severe bilateral foraminal stenosis   and degenerative disk disease.    PROCEDURES:  1.  L4 laminectomy with medial facetectomy and bilateral foraminotomies and   decompression of the L4 nerve roots.  2.  L5 laminectomy with medial facetectomy and bilateral foraminotomy and   decompression of L5 and S1 nerve roots.  3.  Left partial pediculectomy with decompression of left L5 nerve roots.  4.  Interbody fusion at L4-L5 with the use of Globus Rise cage 8-15 mm in   height and 26 mm in length augmented with locally harvested bone graft and   bone morphogenic protein, small sponge.  5.  L5-S1 interbody fusion with the use of Globus Rise cage 8-15 mm in height,   26 mm in length, augmented with locally harvested bone graft and bone   morphogenic protein, small sponge.  6.  Instrumented stabilization, L4-L5 and L5-S1 with reduction of   spondylolisthesis using [employer] Legacy instrumentation.    SURGEON:  Jovani Alvares MD.    ASSISTANT:  KIRK Perez.    ANESTHESIA:  General anesthetic.    ANESTHESIOLOGIST:  Low Smith MD.    PREPARATION:  Routine with use of somatosensory evoked potentials and EMGs.    The patient also had a Mercer catheter sterilely placed.    DESCRIPTION OF PROCEDURE:  Patient was brought to the operating room, and   following induction, patient was intubated and placed under general   anesthetic, rolled prone onto the OSI table using chest, hip, thigh pads.  All   pressure points were padded.  Sequential stockings were in place.  The back   was prepped and draped in a sterile fashion.  The proposed incision site was   injected with Marcaine 0.5% with epinephrine, approximately 30  mL, and an   incision was made at L4-L5 and carried down through the subcutaneous tissue.    Subperiosteal dissection was carried out over the lamina of L4 and L5 and we   injected another 30 mL with Marcaine 0.5% with epinephrine into the fascia.    Midline decompression was performed with use of a Leksell and Kerrisons,   decompressing around the pedicle of L4 bilaterally after doing a laminectomy   of L4.    We took the superior aspect of L5, decompressed around the pedicles of L5.    The foramina were so tight and the stenosis was so severe on the left hand   side that we had to do a partial pediculectomy, to decompress the nerve root,   and I was not really able to get a pedicular screw into this area.  We thus   drilled the junction of the lamina facet of S1 to a thin shelf and completed   our decompression centrally.    We decompressed over the S1 nerve roots bilaterally and L5 nerve roots   bilaterally.  The disk space at L4-L5 was opened with an 11-bladed scalpel   after coagulating the epidural vessels and drawing the thecal sac medially.    We prepared the endplates with the use of paddle horacio starting with a #5   and moving to a #9.  This released the spondylolisthesis.  Going to L5-S1, we   opened the disk space with use of the 11-bladed scalpel and prepared the   endplates with paddle horacio starting with a #5 and moving to a #9.  We were   angled steeply, into the disk space, to accompany the lordosis.  We were able   to curette the cartilaginous endplates down to bone and used up and   down-biting Scovilles to further get across the midline and laterally.  A   BMP-soaked sponge, small, was placed anteriorly followed by locally harvested   bone graft approximately 15 mL.  We then tamped in an 8-15 mm in height, 26x10   mm graft, and tamped this into a transverse position and opened it to   approximately 14 mm and had solid purchase.  Then on the left hand side, we   were able to instrument L4 and  S1 by going around the pedicle with the Yeh   and drilling down the axis of each pedicle with the Midas Yves followed by the   [employer] navigation system and a 5.5 tap with placement of 6.5x45 mm screw   into L4 and a 6.5x40 mm screw into S1.  Securing the shimon into S1 on the left,   tightening this, we were able to pull the spondylolisthesis of L4 back into   the shimon reducing it and securing it with a set screw.  We also did a small   distraction here.  We were able to further decorticate the cartilaginous   endplates at L4-L5, used the BMP-soaked sponge anteriorly, followed by locally   harvested bone graft 15 mL to 30 mL.  We then placed an 8-15 mm in height,   10x26 mm, cage and tamped this into a transverse position.  It was opened to   approximately 14 mm.  Going to the contralateral side, we were able to   decompress around the pedicle of L4, L5 and S1, identified superior, medial   and inferior pedicle, and drilled down the axis medially with the Midas Yves   followed by the [employer] navigation system and a 5.5 tap at L4, L5 and S1   with placement of 6.5x45 mm screws in L4 and L5 and a 6.5x40 mm screw into S1.    A 50 mm shimon was placed into the multiaxial heads and set screws were secured   at appropriate torque.  We also reduced the remainder of the listhesis of   L4-L5, with the [place] power on the right.  Set screws were broken off at   appropriate torque after intraoperative x-ray confirmed the location.  We   tracked around the pedicle of L4 bilaterally and S1 bilaterally as well as L5   on the right.  We had to do a partial pediculectomy on the left hand side to   decompress the nerve root and was thus unable to get a pedicular screw into   this side.  The nerve root was seen and completely free.    We obtained meticulous hemostasis, irrigated sparingly, and then placed 1 g of   vancomycin over the thecal sac and instrumentation, a medium size Hemovac,   and closed the fascia with #1 Vicryl suture,  subcutaneous tissue with 2-0   Vicryl, subcuticular with 3-0 Vicryl, and the skin with staples.  All sponge   and needle counts were correct.  Estimated blood loss was 200 mL or less.       ____________________________________     MD IVÁN VERMA / JEFERSON    DD:  10/15/2019 19:04:04  DT:  10/15/2019 21:51:32    D#:  [id number]  Job#:  [id number]    cc: Louise Alvarado MD

## 2019-10-16 NOTE — OR NURSING
The pt is sleepy, easy to awaken and oriented. Respirations are regular and easy. Pain is controlled, the pt is comfortable. Dressing dry and intact.Hemovac patent and draining sanguinous..

## 2019-10-16 NOTE — THERAPY
"Occupational Therapy Evaluation completed.   Functional Status: min A supine>sit EOB, max A w/donning LSO max A w/LB dressing, mod A sit>Stand, min A w/steps and pivoting to chair. Poor tolerance for activity d/t nausea and dizziness, VSS. Requested BTB. Initiated education on spinal precautions to pt and to spouse. Rn aware of session   Plan of Care: Will benefit from Occupational Therapy 4 times per week  Discharge Recommendations:  Equipment: Will Continue to Assess for Equipment Needs. Post-acute therapy Recommend home health transitional care for continued occupational therapy services.   - Pt may progress to no needs will further assess tomorrow     See \"Rehab Therapy-Acute\" Patient Summary Report for complete documentation.      74 yr old female admitted for elective spine sx pt demonstrating post op pain, nausea, dizziness and fatigue, all significantly impacting ADL's and txfs pt will benefit from acute OT anticipate as current symptoms resolve pts overall functional status will also improve.   "

## 2019-10-16 NOTE — CARE PLAN
Problem: Safety  Goal: Will remain free from injury  Outcome: PROGRESSING AS EXPECTED  Intervention: Provide assistance with mobility  Flowsheets (Taken 10/15/2019 2240)  Assistance: Assistance of One  Ambulation Tolerance: Tolerates Well  Note:   Encourage to call for any assistance needed, call light within reach.     Problem: Pain Management  Goal: Pain level will decrease to patient's comfort goal  Outcome: PROGRESSING AS EXPECTED  Intervention: Educate and implement non-pharmacologic comfort measures. Examples: relaxation, distration, play therapy, activity therapy, massage, etc.  Flowsheets (Taken 10/15/2019 2240)  Intervention: Medication (see MAR); Bolus button; Relaxation Technique; Repositioned; Rest  Note:   Pain assessment q 2 hours, medicated as needed, started on PCA dilaudid.

## 2019-10-16 NOTE — OR SURGEON
Immediate Post OP Note    PreOp Diagnosis: L45 spondylolisthesis with severe lumbar stenosis.     PostOp Diagnosis: L45 spondylolisthesis with lumbar stenosis. L5-S1 ddd with foraminal stenosis and lumbar degenerative disc disease.    Procedure(s):  POSTERIOR LUMBAR 4 - 5 DECOMPRESSION AND TRANSFORAMINAL INTERBODY FUSION . L5-S1 transforaminal interbody fusion. - Wound Class: Clean with Drain  POSTERIOR LUMBAR 4 - 5, L5-S1  DECOMPRESSION - Wound Class: Clean with Drain    Surgeon(s):  Jovani Alvares M.D.    Anesthesiologist/Type of Anesthesia:  Anesthesiologist: Low Smith M.D./General    Surgical Staff:  Assistant: ALVAREZ Reynolds  Cell Saver : Keli Yang  Circulator: Argelia Lakhani R.N.  Relief Circulator: Olamide Omer R.N.  Relief Scrub: Tianna Hector  Scrub Person: Katlin Franco    Specimens removed if any:  * No specimens in log *    Estimated Blood Loss: 200    Findings: as above, requiring extension of fusion to the L5 S1 level as well    Complications: none        10/15/2019 6:53 PM Jovani Alvares M.D.

## 2019-10-16 NOTE — THERAPY
"Physical Therapy Evaluation completed.   Bed Mobility:  Supine to Sit: Minimal Assist  Transfers: Sit to Stand: Moderate Assist  Gait: Level Of Assist: Minimal Assist with Front-Wheel Walker x2ft      Plan of Care: Pt will benefit from acute skilled PT 5x/wk.   Discharge Recommendations: Equipment: Will Continue to Assess for Equipment Needs. Post-acute therapy: Recommend home health transitional care for continued physical therapy services.    Pt is 75 y/o F s/p L4-L5 decompression and fusion, LSO donned OOB>5 min. Pt is demonstrating poor tolerance to functional mobility at this time with c/o nausea. She required min A for bed mobility with VC to sequence log roll. Pt requiring assistance to don LSO. She demonstrated poor tolerance to standing with c/o nausea /73. She was able to take 2 steps to transfer from bed to chair and requesting to return to bed to rest. Pt encouarged to get up to chair at least 2 more times today to improve tolerance to fxnl mob. Anticipate DC home as patient likely will progress with reduced nausea as pts  is able to assist physically for functional mobility. Recommend home health transitional care for continued physical therapy services.    See \"Rehab Therapy-Acute\" Patient Summary Report for complete documentation.     "

## 2019-10-16 NOTE — PROGRESS NOTES
2 RN skin assessment done; skin not WDL.     Surgical incision lower back, mepilex silver dressing, hemovac.

## 2019-10-16 NOTE — PROGRESS NOTES
Delivered Xlarge LSO brace to patients bedside. Patient lost previous brace  If you have any questions or concerns please call traction at [phone]

## 2019-10-16 NOTE — PROGRESS NOTES
Admitted from PACU, alert and oriented, with family at bedside, oriented to room, transferred from [facility] to bed, vomited 100ml, with dressing lower back and hemovac in place, purdy draining well.

## 2019-10-16 NOTE — OR NURSING
Attempt to updated family by phone.No answer. Belongings taken from locker and placed on gurney with pt. 1-bag

## 2019-10-16 NOTE — ANESTHESIA POSTPROCEDURE EVALUATION
Patient: Rossy Lopez    Procedure Summary     Date:  10/15/19 Room / Location:  [facility] 05 / SURGERY [facility] [place]    Anesthesia Start:  1629 Anesthesia Stop:  1839    Procedures:       POSTERIOR LUMBAR 4 - 5 DECOMPRESSION AND TRANSFORAMINAL INTERBODY FUSION (Spine Lumbar)      POSTERIOR LUMBAR 4 - 5 DECOMPRESSION (Spine Lumbar) Diagnosis:  (LUMBAR STENOSIS, SPONDYLOLYSIS AND SPONDYLOLISTHESIS)    Surgeon:  Jovani Alvares M.D. Responsible Provider:  Low Smith M.D.    Anesthesia Type:  general ASA Status:  3          Final Anesthesia Type: general  Last vitals  BP   Blood Pressure : 127/53, NIBP: 155/84    Temp   36.8 °C (98.3 °F)    Pulse   Pulse: 90   Resp   16    SpO2   95 %      Anesthesia Post Evaluation    Patient location during evaluation: PACU  Patient participation: complete - patient participated  Level of consciousness: awake and alert    Airway patency: patent  Anesthetic complications: no  Cardiovascular status: hemodynamically stable  Respiratory status: acceptable  Hydration status: euvolemic    PONV: none           Nurse Pain Score: 4 (NPRS)

## 2019-10-17 LAB
ANION GAP SERPL CALC-SCNC: 8 MMOL/L (ref 0–11.9)
BUN SERPL-MCNC: 7 MG/DL (ref 8–22)
CALCIUM SERPL-MCNC: 8.6 MG/DL (ref 8.5–10.5)
CHLORIDE SERPL-SCNC: 95 MMOL/L (ref 96–112)
CO2 SERPL-SCNC: 30 MMOL/L (ref 20–33)
CREAT SERPL-MCNC: 0.41 MG/DL (ref 0.5–1.4)
ERYTHROCYTE [DISTWIDTH] IN BLOOD BY AUTOMATED COUNT: 45.7 FL (ref 35.9–50)
GLUCOSE SERPL-MCNC: 166 MG/DL (ref 65–99)
HCT VFR BLD AUTO: 28.9 % (ref 37–47)
HGB BLD-MCNC: 9.9 G/DL (ref 12–16)
MCH RBC QN AUTO: 32.1 PG (ref 27–33)
MCHC RBC AUTO-ENTMCNC: 34.3 G/DL (ref 33.6–35)
MCV RBC AUTO: 93.8 FL (ref 81.4–97.8)
PLATELET # BLD AUTO: 220 K/UL (ref 164–446)
PMV BLD AUTO: 9.5 FL (ref 9–12.9)
POTASSIUM SERPL-SCNC: 3.4 MMOL/L (ref 3.6–5.5)
RBC # BLD AUTO: 3.08 M/UL (ref 4.2–5.4)
SODIUM SERPL-SCNC: 133 MMOL/L (ref 135–145)
WBC # BLD AUTO: 8.5 K/UL (ref 4.8–10.8)

## 2019-10-17 PROCEDURE — A9270 NON-COVERED ITEM OR SERVICE: HCPCS | Performed by: NURSE PRACTITIONER

## 2019-10-17 PROCEDURE — 700102 HCHG RX REV CODE 250 W/ 637 OVERRIDE(OP): Performed by: NURSE PRACTITIONER

## 2019-10-17 PROCEDURE — 700102 HCHG RX REV CODE 250 W/ 637 OVERRIDE(OP): Performed by: NEUROLOGICAL SURGERY

## 2019-10-17 PROCEDURE — 80048 BASIC METABOLIC PNL TOTAL CA: CPT

## 2019-10-17 PROCEDURE — 36415 COLL VENOUS BLD VENIPUNCTURE: CPT

## 2019-10-17 PROCEDURE — 97116 GAIT TRAINING THERAPY: CPT

## 2019-10-17 PROCEDURE — 97530 THERAPEUTIC ACTIVITIES: CPT

## 2019-10-17 PROCEDURE — A9270 NON-COVERED ITEM OR SERVICE: HCPCS | Performed by: NEUROLOGICAL SURGERY

## 2019-10-17 PROCEDURE — 97535 SELF CARE MNGMENT TRAINING: CPT

## 2019-10-17 PROCEDURE — 770001 HCHG ROOM/CARE - MED/SURG/GYN PRIV*

## 2019-10-17 PROCEDURE — 700112 HCHG RX REV CODE 229: Performed by: NURSE PRACTITIONER

## 2019-10-17 PROCEDURE — 85027 COMPLETE CBC AUTOMATED: CPT

## 2019-10-17 RX ADMIN — METFORMIN HYDROCHLORIDE 500 MG: 500 TABLET, EXTENDED RELEASE ORAL at 16:08

## 2019-10-17 RX ADMIN — OMEPRAZOLE 20 MG: 20 CAPSULE, DELAYED RELEASE ORAL at 06:05

## 2019-10-17 RX ADMIN — SIMVASTATIN 10 MG: 20 TABLET, FILM COATED ORAL at 16:09

## 2019-10-17 RX ADMIN — GABAPENTIN 200 MG: 100 CAPSULE ORAL at 16:09

## 2019-10-17 RX ADMIN — ANTACID TABLETS 500 MG: 500 TABLET, CHEWABLE ORAL at 06:05

## 2019-10-17 RX ADMIN — LOSARTAN POTASSIUM 25 MG: 50 TABLET ORAL at 06:03

## 2019-10-17 RX ADMIN — GABAPENTIN 200 MG: 100 CAPSULE ORAL at 20:39

## 2019-10-17 RX ADMIN — TIZANIDINE 2 MG: 4 TABLET ORAL at 20:38

## 2019-10-17 RX ADMIN — HYDROMORPHONE HYDROCHLORIDE 2 MG: 2 TABLET ORAL at 10:12

## 2019-10-17 RX ADMIN — HYDROMORPHONE HYDROCHLORIDE 2 MG: 2 TABLET ORAL at 06:07

## 2019-10-17 RX ADMIN — DOCUSATE SODIUM 100 MG: 100 CAPSULE, LIQUID FILLED ORAL at 06:05

## 2019-10-17 RX ADMIN — TIZANIDINE 2 MG: 4 TABLET ORAL at 10:12

## 2019-10-17 RX ADMIN — MONTELUKAST 10 MG: 10 TABLET, FILM COATED ORAL at 17:08

## 2019-10-17 RX ADMIN — HYDROMORPHONE HYDROCHLORIDE 2 MG: 2 TABLET ORAL at 20:38

## 2019-10-17 RX ADMIN — GABAPENTIN 200 MG: 100 CAPSULE ORAL at 06:04

## 2019-10-17 RX ADMIN — ANTACID TABLETS 500 MG: 500 TABLET, CHEWABLE ORAL at 16:09

## 2019-10-17 RX ADMIN — HYDROMORPHONE HYDROCHLORIDE 2 MG: 2 TABLET ORAL at 02:47

## 2019-10-17 RX ADMIN — DOCUSATE SODIUM 100 MG: 100 CAPSULE, LIQUID FILLED ORAL at 16:09

## 2019-10-17 RX ADMIN — HYDROCHLOROTHIAZIDE 25 MG: 25 TABLET ORAL at 06:04

## 2019-10-17 RX ADMIN — METFORMIN HYDROCHLORIDE 500 MG: 500 TABLET, EXTENDED RELEASE ORAL at 06:05

## 2019-10-17 ASSESSMENT — COGNITIVE AND FUNCTIONAL STATUS - GENERAL
SUGGESTED CMS G CODE MODIFIER MOBILITY: CL
MOVING FROM LYING ON BACK TO SITTING ON SIDE OF FLAT BED: UNABLE
DRESSING REGULAR LOWER BODY CLOTHING: A LITTLE
TURNING FROM BACK TO SIDE WHILE IN FLAT BAD: A LOT
HELP NEEDED FOR BATHING: A LITTLE
WALKING IN HOSPITAL ROOM: A LITTLE
MOBILITY SCORE: 12
CLIMB 3 TO 5 STEPS WITH RAILING: A LOT
DRESSING REGULAR UPPER BODY CLOTHING: A LITTLE
TOILETING: A LITTLE
SUGGESTED CMS G CODE MODIFIER DAILY ACTIVITY: CJ
MOVING TO AND FROM BED TO CHAIR: UNABLE
DAILY ACTIVITIY SCORE: 20
STANDING UP FROM CHAIR USING ARMS: A LITTLE

## 2019-10-17 ASSESSMENT — GAIT ASSESSMENTS
ASSISTIVE DEVICE: FRONT WHEEL WALKER
GAIT LEVEL OF ASSIST: MINIMAL ASSIST
DISTANCE (FEET): 20
DEVIATION: BRADYKINETIC

## 2019-10-17 NOTE — PROGRESS NOTES
Neurosurgery Progress Note    Subjective:  Pt states that her back is sore, legs w/o symptoms. Taking po, she is not a fan of her new brace. Mercer remains    Exam:  AAOX3, fc's, selby's strong. Dressing c&d, drain 60cc (180cc total) last check    BP  Min: 91/56  Max: 120/60  Pulse  Av.6  Min: 85  Max: 99  Resp  Av.3  Min: 16  Max: 18  Temp  Av.7 °C (98 °F)  Min: 36.2 °C (97.2 °F)  Max: 37.2 °C (98.9 °F)  SpO2  Av.9 %  Min: 88 %  Max: 98 %    No data recorded    Recent Labs     10/16/19  0349 10/17/19  0344   WBC 13.7* 8.5   RBC 3.80* 3.08*   HEMOGLOBIN 12.0 9.9*   HEMATOCRIT 36.1* 28.9*   MCV 94.7 93.8   MCH 31.1 32.1   MCHC 32.8* 34.3   RDW 45.1 45.7   PLATELETCT 271 220   MPV 9.2 9.5     Recent Labs     10/16/19  0349 10/17/19  0344   SODIUM 135 133*   POTASSIUM 4.2 3.4*   CHLORIDE 96 95*   CO2 29 30   GLUCOSE 261* 166*   BUN 12 7*   CREATININE 0.58 0.41*   CALCIUM 8.6 8.6               Intake/Output       10/16/19 0700 - 10/17/19 0659 10/17/19 0700 - 10/18/19 0659       Total  Total       Intake    P.O.  150  500 650  --  -- --    P.O. 150 500 650 -- -- --    I.V.  6.4  -- 6.4  --  -- --    PCA End of Shift Total Volume (ml) 6.4 -- 6.4 -- -- --    Total Intake 156.4 500 656.4 -- -- --       Output    Urine  550  600 1150  --  -- --    Output (mL) (Urethral Catheter Latex 16 Fr.)  -- -- --    Emesis  1  -- 1  --  -- --    Emesis 1 -- 1 -- -- --    Drains  --  180 180  --  -- --    Output (mL) (Closed/Suction Drain 1 Midline Back) -- 180 180 -- -- --    Total Output  -- -- --       Net I/O     -394.7 -280 -674.7 -- -- --            Intake/Output Summary (Last 24 hours) at 10/17/2019 0830  Last data filed at 10/17/2019 0600  Gross per 24 hour   Intake 500 ml   Output 1331 ml   Net -831 ml            • HYDROmorphone  2 mg Q3HRS PRN   • oxyCODONE-acetaminophen  1-2 Tab Q4HRS PRN   • HYDROmorphone  0.5 mg Q3HRS PRN   • Mometasone Furoate  2  Puff BID   • gabapentin  200 mg TID   • metFORMIN ER  500 mg BID   • montelukast  10 mg Q EVENING   • omeprazole  20 mg QDAY   • simvastatin  10 mg Q EVENING   • Pharmacy Consult Request  1 Each PHARMACY TO DOSE   • MD ALERT...DO NOT ADMINISTER NSAIDS or ASPIRIN unless ORDERED By Neurosurgery  1 Each PRN   • docusate sodium  100 mg BID   • senna-docusate  1 Tab Q24HRS PRN   • polyethylene glycol/lytes  1 Packet BID PRN   • magnesium hydroxide  30 mL QDAY PRN   • bisacodyl  10 mg Q24HRS PRN   • fleet  1 Each Once PRN   • 0.9 % NaCl with KCl 20 mEq 1,000 mL   Continuous   • diphenhydrAMINE  25 mg Q6HRS PRN    Or   • diphenhydrAMINE  25 mg Q6HRS PRN   • ondansetron  4 mg Q4HRS PRN   • ondansetron  4 mg Q4HRS PRN   • tizanidine  2 mg TID PRN   • benzocaine-menthol  1 Lozenge Q2HRS PRN   • calcium carbonate  500 mg BID   • hydroCHLOROthiazide  25 mg QDAY   • losartan  25 mg QDAY       Assessment and Plan:  POD # 2 L4 - Sacrum  NM as above - pt to continue to mobilize  Prophylactic anticoagulation: no         Start date/time: no need  Continue to monitor drain  DC purdy now  Transition onto oral meds after she eats

## 2019-10-17 NOTE — THERAPY
"Physical Therapy Treatment completed.   Bed Mobility:  Supine to Sit: (at EOB with OT upon arrival )  Transfers: Sit to Stand: Minimal Assist  Gait: Level Of Assist: Minimal Assist with Front-Wheel Walker       Plan of Care: Will benefit from Physical Therapy 5 times per week  Discharge Recommendations: Equipment: Will Continue to Assess for Equipment Needs.   See \"Rehab Therapy-Acute\" Patient Summary Report for complete documentation.     Pt was pleasant and agreeable to therapy session. She was sitting EOB with OT upon arrival, with no symptoms. She is very motivated able to cite spinal precautions and maintain throughout tx. She was able to increase total gait distance with Jackie for stability with fww. BLE knee buckle but pt able to self correct with use of fww. Pt reporting L knee bukled prior to admission. During gait distance pt reporting she started to feel \"hot and woozy\". Assisted pt back to supine position with modA. BP at 107/52. Educated pt and  regardin monitoring symptoms when changing positions, importance of nutrition as well as importance of increasing mobility with nursing staff. Deferred stair training and further gait training today due to BP and pt being symptomatic. At this time pt would not be safe to dc home due to BP, and nausea concerns. Will continue to follow while in house.   "

## 2019-10-17 NOTE — DISCHARGE PLANNING
Received Choice form at 1015  Agency/Facility Name: [facility]  Pending order  Referral sent per Choice form @

## 2019-10-17 NOTE — PROGRESS NOTES
RN MOBILITY NOTE     Surgery patient?: yes  Date of surgery: 10/15  Ambulated 50 ft on day of surgery? (N/A if today is not date of surgery): n/a  Number of times ambulated 50 feet or greater today: 2  Patient has been up to chair, edge of bed or HOB 90 degrees for all meals?: yes  Goal met? (goal is ambulating at least 50 feet 2 times on day shift, one time on night shift): yes  If patient did not meet mobility goal, why?:

## 2019-10-17 NOTE — CARE PLAN
Problem: Safety  Goal: Will remain free from falls  Outcome: PROGRESSING AS EXPECTED  Intervention: Implement fall precautions  Flowsheets (Taken 10/16/2019 2100)  Environmental Precautions: Treaded Slipper Socks on Patient;Personal Belongings, Wastebasket, Call Bell etc. in Easy Reach;Bed in Low Position  Note:   Precautions implemented, risk explained to pt      Problem: Infection  Goal: Will remain free from infection  Outcome: PROGRESSING AS EXPECTED  Intervention: Implement standard precautions and perform hand washing before and after patient contact  Note:   Standard precautions implemented and explained to pt, no s/s of infection at this time

## 2019-10-17 NOTE — DISCHARGE PLANNING
Anticipated Discharge Disposition: Home with HH    Action: Spoke with Rossy with her  at bedside. She anticipates going home with help. Pt has a PCP and Rx coverage. Pt has a walker at home. She lives at home with her spouse and has friends and family for other support.     Barriers to Discharge: Surgical Clearance    Plan: TBD      Care Transition Team Assessment    Information Source  Orientation : Oriented x 4  Information Given By: Patient  Informant's Name: Rossy  Who is responsible for making decisions for patient? : Patient    Readmission Evaluation  Is this a readmission?: No    Elopement Risk  Legal Hold: No  Ambulatory or Self Mobile in Wheelchair: No-Not an Elopement Risk  Elopement Risk: Not at Risk for Elopement    Interdisciplinary Discharge Planning  Primary Care Physician: Louise Alvarado  Lives with - Patient's Self Care Capacity: Spouse  Patient or legal guardian wants to designate a caregiver (see row info): No  Support Systems: Spouse / Significant Other  Housing / Facility: [address]  Able to Return to Previous ADL's: Future Time w/Therapy  Mobility Issues: No  Prior Services: None  Patient Expects to be Discharged to:: Home  Assistance Needed: Yes  Durable Medical Equipment: Walker    Discharge Preparedness  What is your plan after discharge?: Home with help, Home health care  What are your discharge supports?: Spouse  Prior Functional Level: Ambulatory, Drives Self, Independent with Activities of Daily Living, Independent with Medication Management  Difficulity with ADLs: None  Difficulity with IADLs: None    Functional Assesment  Prior Functional Level: Ambulatory, Drives Self, Independent with Activities of Daily Living, Independent with Medication Management    Finances  Financial Barriers to Discharge: No  Prescription Coverage: Yes    Vision / Hearing Impairment  Vision Impairment : No  Hearing Impairment : No         Advance Directive  Advance Directive?: Living Will    Domestic  Abuse  Have you ever been the victim of abuse or violence?: No  Physical Abuse or Sexual Abuse: No  Verbal Abuse or Emotional Abuse: No  Possible Abuse Reported to:: Not Applicable    Psychological Assessment  History of Substance Abuse: None  History of Psychiatric Problems: No  Non-compliant with Treatment: No    Discharge Risks or Barriers  Discharge risks or barriers?: No    Anticipated Discharge Information  Anticipated discharge disposition: [facility], Home  Discharge Address: [facility] [place]  Discharge Contact Phone Number: [phone]

## 2019-10-18 LAB — GLUCOSE BLD-MCNC: 175 MG/DL (ref 65–99)

## 2019-10-18 PROCEDURE — 3E02340 INTRODUCTION OF INFLUENZA VACCINE INTO MUSCLE, PERCUTANEOUS APPROACH: ICD-10-PCS | Performed by: NEUROLOGICAL SURGERY

## 2019-10-18 PROCEDURE — A9270 NON-COVERED ITEM OR SERVICE: HCPCS | Performed by: NURSE PRACTITIONER

## 2019-10-18 PROCEDURE — 700112 HCHG RX REV CODE 229: Performed by: NURSE PRACTITIONER

## 2019-10-18 PROCEDURE — 700111 HCHG RX REV CODE 636 W/ 250 OVERRIDE (IP): Performed by: NEUROLOGICAL SURGERY

## 2019-10-18 PROCEDURE — 97116 GAIT TRAINING THERAPY: CPT

## 2019-10-18 PROCEDURE — A9270 NON-COVERED ITEM OR SERVICE: HCPCS | Performed by: NEUROLOGICAL SURGERY

## 2019-10-18 PROCEDURE — 82962 GLUCOSE BLOOD TEST: CPT

## 2019-10-18 PROCEDURE — 90471 IMMUNIZATION ADMIN: CPT

## 2019-10-18 PROCEDURE — 700102 HCHG RX REV CODE 250 W/ 637 OVERRIDE(OP): Performed by: NURSE PRACTITIONER

## 2019-10-18 PROCEDURE — 90662 IIV NO PRSV INCREASED AG IM: CPT | Performed by: NEUROLOGICAL SURGERY

## 2019-10-18 PROCEDURE — 97530 THERAPEUTIC ACTIVITIES: CPT

## 2019-10-18 PROCEDURE — 700102 HCHG RX REV CODE 250 W/ 637 OVERRIDE(OP): Performed by: NEUROLOGICAL SURGERY

## 2019-10-18 PROCEDURE — 97535 SELF CARE MNGMENT TRAINING: CPT

## 2019-10-18 PROCEDURE — 770001 HCHG ROOM/CARE - MED/SURG/GYN PRIV*

## 2019-10-18 RX ORDER — HYDROMORPHONE HYDROCHLORIDE 2 MG/1
2 TABLET ORAL
Qty: 50 TAB | Refills: 0 | Status: SHIPPED | OUTPATIENT
Start: 2019-10-18 | End: 2019-10-25

## 2019-10-18 RX ORDER — TIZANIDINE 2 MG/1
2 TABLET ORAL 3 TIMES DAILY PRN
Qty: 60 TAB | Refills: 0 | Status: SHIPPED | OUTPATIENT
Start: 2019-10-18 | End: 2021-06-25

## 2019-10-18 RX ADMIN — METFORMIN HYDROCHLORIDE 500 MG: 500 TABLET, EXTENDED RELEASE ORAL at 16:43

## 2019-10-18 RX ADMIN — HYDROMORPHONE HYDROCHLORIDE 2 MG: 2 TABLET ORAL at 23:58

## 2019-10-18 RX ADMIN — HYDROMORPHONE HYDROCHLORIDE 2 MG: 2 TABLET ORAL at 19:30

## 2019-10-18 RX ADMIN — GABAPENTIN 200 MG: 100 CAPSULE ORAL at 04:58

## 2019-10-18 RX ADMIN — HYDROMORPHONE HYDROCHLORIDE 2 MG: 2 TABLET ORAL at 04:59

## 2019-10-18 RX ADMIN — OMEPRAZOLE 20 MG: 20 CAPSULE, DELAYED RELEASE ORAL at 04:59

## 2019-10-18 RX ADMIN — HYDROCHLOROTHIAZIDE 25 MG: 25 TABLET ORAL at 04:59

## 2019-10-18 RX ADMIN — DOCUSATE SODIUM 100 MG: 100 CAPSULE, LIQUID FILLED ORAL at 16:40

## 2019-10-18 RX ADMIN — DOCUSATE SODIUM 100 MG: 100 CAPSULE, LIQUID FILLED ORAL at 04:59

## 2019-10-18 RX ADMIN — INFLUENZA A VIRUS A/MICHIGAN/45/2015 X-275 (H1N1) ANTIGEN (FORMALDEHYDE INACTIVATED), INFLUENZA A VIRUS A/SINGAPORE/INFIMH-16-0019/2016 IVR-186 (H3N2) ANTIGEN (FORMALDEHYDE INACTIVATED), AND INFLUENZA B VIRUS B/MARYLAND/15/2016 BX-69A (A B/COLORADO/6/2017-LIKE VIRUS) ANTIGEN (FORMALDEHYDE INACTIVATED) 0.5 ML: 60; 60; 60 INJECTION, SUSPENSION INTRAMUSCULAR at 19:32

## 2019-10-18 RX ADMIN — ANTACID TABLETS 500 MG: 500 TABLET, CHEWABLE ORAL at 04:58

## 2019-10-18 RX ADMIN — GABAPENTIN 200 MG: 100 CAPSULE ORAL at 21:54

## 2019-10-18 RX ADMIN — ANTACID TABLETS 500 MG: 500 TABLET, CHEWABLE ORAL at 16:40

## 2019-10-18 RX ADMIN — SIMVASTATIN 10 MG: 20 TABLET, FILM COATED ORAL at 16:40

## 2019-10-18 RX ADMIN — GABAPENTIN 200 MG: 100 CAPSULE ORAL at 12:54

## 2019-10-18 RX ADMIN — TIZANIDINE 2 MG: 4 TABLET ORAL at 05:03

## 2019-10-18 RX ADMIN — METFORMIN HYDROCHLORIDE 500 MG: 500 TABLET, EXTENDED RELEASE ORAL at 04:58

## 2019-10-18 RX ADMIN — LOSARTAN POTASSIUM 25 MG: 50 TABLET ORAL at 04:58

## 2019-10-18 RX ADMIN — HYDROMORPHONE HYDROCHLORIDE 2 MG: 2 TABLET ORAL at 12:54

## 2019-10-18 RX ADMIN — MONTELUKAST 10 MG: 10 TABLET, FILM COATED ORAL at 16:40

## 2019-10-18 RX ADMIN — TIZANIDINE 2 MG: 4 TABLET ORAL at 19:31

## 2019-10-18 ASSESSMENT — COGNITIVE AND FUNCTIONAL STATUS - GENERAL
HELP NEEDED FOR BATHING: A LITTLE
TOILETING: A LITTLE
SUGGESTED CMS G CODE MODIFIER MOBILITY: CK
MOBILITY SCORE: 18
STANDING UP FROM CHAIR USING ARMS: A LITTLE
CLIMB 3 TO 5 STEPS WITH RAILING: A LITTLE
MOVING TO AND FROM BED TO CHAIR: A LITTLE
WALKING IN HOSPITAL ROOM: A LITTLE
SUGGESTED CMS G CODE MODIFIER DAILY ACTIVITY: CK
DRESSING REGULAR LOWER BODY CLOTHING: A LITTLE
DRESSING REGULAR UPPER BODY CLOTHING: A LITTLE
MOVING FROM LYING ON BACK TO SITTING ON SIDE OF FLAT BED: A LITTLE
PERSONAL GROOMING: A LITTLE
TURNING FROM BACK TO SIDE WHILE IN FLAT BAD: A LITTLE
DAILY ACTIVITIY SCORE: 19

## 2019-10-18 ASSESSMENT — GAIT ASSESSMENTS
GAIT LEVEL OF ASSIST: MINIMAL ASSIST
DEVIATION: BRADYKINETIC
DISTANCE (FEET): 80
ASSISTIVE DEVICE: FRONT WHEEL WALKER

## 2019-10-18 NOTE — PROGRESS NOTES
Bedside report received.  Assessment complete.  A&O x 4. Patient calls appropriately.  Patient walks with 1 assist + FWW..   Patient has 0-5/10 pain. PRN meds given  Denies N&V. Tolerating diet.  Surgical drain removed, and dressing in place.  + void, + flatus,  BM 10-15.  Patient denies SOB.  SCD's on..  Review plan with of care with patient. Call light and personal belongings with in reach. Hourly rounding in place. All needs met at this time.

## 2019-10-18 NOTE — PROGRESS NOTES
Neurosurgery Progress Note    Subjective:  Pt states that her back is sore, legs w/o symptoms. Taking po, the brace she got from our office has been found    Exam:  AAOX3, fc's, selby's strong. Dressing c&d, drain 10cc     BP  Min: 99/44  Max: 123/54  Pulse  Av  Min: 85  Max: 105  Resp  Avg: 15.4  Min: 12  Max: 17  Temp  Av °C (98.6 °F)  Min: 36.9 °C (98.4 °F)  Max: 37.1 °C (98.8 °F)  SpO2  Av %  Min: 90 %  Max: 94 %    No data recorded    Recent Labs     10/16/19  0349 10/17/19  0344   WBC 13.7* 8.5   RBC 3.80* 3.08*   HEMOGLOBIN 12.0 9.9*   HEMATOCRIT 36.1* 28.9*   MCV 94.7 93.8   MCH 31.1 32.1   MCHC 32.8* 34.3   RDW 45.1 45.7   PLATELETCT 271 220   MPV 9.2 9.5     Recent Labs     10/16/19  0349 10/17/19  0344   SODIUM 135 133*   POTASSIUM 4.2 3.4*   CHLORIDE 96 95*   CO2 29 30   GLUCOSE 261* 166*   BUN 12 7*   CREATININE 0.58 0.41*   CALCIUM 8.6 8.6               Intake/Output       10/17/19 0700 - 10/18/19 0659 10/18/19 0700 - 10/19/19 0659       Total  Total       Intake    P.O.  490  -- 490  --  -- --    P.O. 490 -- 490 -- -- --    Total Intake 490 -- 490 -- -- --       Output    Urine  500  -- 500  --  -- --    Number of Times Voided 1 x 1 x 2 x -- -- --    Output (mL) ([REMOVED] Urethral Catheter Latex 16 Fr.) 500 -- 500 -- -- --    Drains  60  50 110  --  -- --    Output (mL) (Closed/Suction Drain 1 Midline Back) 60 50 110 -- -- --    Total Output 560 50 610 -- -- --       Net I/O     -70 -50 -120 -- -- --            Intake/Output Summary (Last 24 hours) at 10/18/2019 0843  Last data filed at 10/18/2019 0400  Gross per 24 hour   Intake 490 ml   Output 610 ml   Net -120 ml            • HYDROmorphone  2 mg Q3HRS PRN   • oxyCODONE-acetaminophen  1-2 Tab Q4HRS PRN   • HYDROmorphone  0.5 mg Q3HRS PRN   • Mometasone Furoate  2 Puff BID   • gabapentin  200 mg TID   • metFORMIN ER  500 mg BID   • montelukast  10 mg Q EVENING   • omeprazole  20 mg QDAY   •  simvastatin  10 mg Q EVENING   • Pharmacy Consult Request  1 Each PHARMACY TO DOSE   • MD ALERT...DO NOT ADMINISTER NSAIDS or ASPIRIN unless ORDERED By Neurosurgery  1 Each PRN   • docusate sodium  100 mg BID   • senna-docusate  1 Tab Q24HRS PRN   • polyethylene glycol/lytes  1 Packet BID PRN   • magnesium hydroxide  30 mL QDAY PRN   • bisacodyl  10 mg Q24HRS PRN   • fleet  1 Each Once PRN   • 0.9 % NaCl with KCl 20 mEq 1,000 mL   Continuous   • diphenhydrAMINE  25 mg Q6HRS PRN    Or   • diphenhydrAMINE  25 mg Q6HRS PRN   • ondansetron  4 mg Q4HRS PRN   • ondansetron  4 mg Q4HRS PRN   • tizanidine  2 mg TID PRN   • benzocaine-menthol  1 Lozenge Q2HRS PRN   • calcium carbonate  500 mg BID   • hydroCHLOROthiazide  25 mg QDAY   • losartan  25 mg QDAY       Assessment and Plan:  POD # 3 L4 - Sacrum  NM as above - pt to continue to mobilize  Prophylactic anticoagulation: no         Start date/time: no need  DC drain - possible dc home later today vs in am

## 2019-10-18 NOTE — THERAPY
"Physical Therapy Treatment completed.   Bed Mobility:  Supine to Sit: Minimal Assist  Transfers: Sit to Stand: Supervised  Gait: Level Of Assist: Minimal Assist with Front-Wheel Walker       Plan of Care: Will benefit from Physical Therapy 5 times per week  Discharge Recommendations: Equipment: Will Continue to Assess for Equipment Needs.   See \"Rehab Therapy-Acute\" Patient Summary Report for complete documentation.     Pt was pleasant and agreeable to therapy session. She completed log rolling with SPV and Jackie for trunk to reach EOB. Pt reporting plans on sleeping in recliner for first few days. Pt urgently requesting use of restroom. No symptoms at all with mobility today. She was able to increase total gait distance with fww and Jackie strictly hands on for safety as pt did buckle in BLE yesterday. No buckling noted today at all. She completed transfers with SPV but vc for proper hand placement. Occassionally did require vc to maintain spinal precautions and  was also able to correct her with vc throughout tx session. She completed 1 platform step again hands on for safety. Provided education on correct technique to pt and her . Answered any questions they had at this time. Will continue to follow while in house.   "

## 2019-10-18 NOTE — CARE PLAN
Problem: Communication  Goal: The ability to communicate needs accurately and effectively will improve  Outcome: PROGRESSING AS EXPECTED  Note:   Plan of care discussed. Plan for dc to home tomorrow with [facility].      Problem: Safety  Goal: Will remain free from injury  Outcome: PROGRESSING AS EXPECTED  Intervention: Provide assistance with mobility  Flowsheets (Taken 10/18/2019 1421)  Assistance: Assistance of One  Ambulation Tolerance: Tolerates Well  Note:   Pt up with one assist and walker. Gait steady. Pt has TLSO brace when OOB > 5 min. Pt will be dc'd with [facility]. Bed in lowest and locked position. Pt calls appropriately.

## 2019-10-18 NOTE — CARE PLAN
Problem: Communication  Goal: The ability to communicate needs accurately and effectively will improve  Outcome: PROGRESSING AS EXPECTED     Problem: Safety  Goal: Will remain free from injury  Outcome: PROGRESSING AS EXPECTED     Problem: Bowel/Gastric:  Goal: Normal bowel function is maintained or improved  Outcome: PROGRESSING AS EXPECTED

## 2019-10-18 NOTE — PROGRESS NOTES
RN MOBILITY NOTE     Surgery patient?: yes  Date of surgery: 10/16/19  Ambulated 50 ft on day of surgery? (N/A if today is not date of surgery): n/a  Number of times ambulated 50 feet or greater today: 4  Patient has been up to chair, edge of bed or HOB 90 degrees for all meals?: yes  Goal met? (goal is ambulating at least 50 feet 2 times on day shift, one time on night shift): yes  If patient did not meet mobility goal, why?: n/a

## 2019-10-18 NOTE — THERAPY
"Occupational Therapy Treatment completed with focus on ADLs, ADL transfers and patient education.  Functional Status:  Pt seen for OT tx. Min A supine>sit with frequent cueing to maintain spinal precautions. Pt verbalized spinal precautions. Supv sit>stand w/fww. Supv ambulation to BR for toileting. Supv sit>stand. Pt demonstrated full body dressing with frequent cueing to maintain spinal precautions. Min A sit >supine to maintain spinal precautions. Pt and family education on AE and DME. Pt very pleasant, cooperative, and eager to learn ADL and ADL txf strategies. Pt will continue to benefit from OT service while in house to regain strength, endurance and independence in ADLs and ADL txfs.  Plan of Care: Will benefit from Occupational Therapy 4 times per week  Discharge Recommendations:  Equipment Will Continue to Assess for Equipment Needs. Post-acute therapy Discharge to home with home health for additional skilled therapy services.    See \"Rehab Therapy-Acute\" Patient Summary Report for complete documentation.   "

## 2019-10-19 ENCOUNTER — APPOINTMENT (OUTPATIENT)
Dept: RADIOLOGY | Facility: MEDICAL CENTER | Age: 74
DRG: 460 | End: 2019-10-19
Attending: NURSE PRACTITIONER
Payer: MEDICARE

## 2019-10-19 VITALS
WEIGHT: 143 LBS | OXYGEN SATURATION: 92 % | BODY MASS INDEX: 26.31 KG/M2 | HEIGHT: 62 IN | DIASTOLIC BLOOD PRESSURE: 57 MMHG | RESPIRATION RATE: 16 BRPM | TEMPERATURE: 98.6 F | SYSTOLIC BLOOD PRESSURE: 148 MMHG | HEART RATE: 99 BPM

## 2019-10-19 LAB
ANION GAP SERPL CALC-SCNC: 8 MMOL/L (ref 0–11.9)
BUN SERPL-MCNC: 7 MG/DL (ref 8–22)
CALCIUM SERPL-MCNC: 9 MG/DL (ref 8.5–10.5)
CHLORIDE SERPL-SCNC: 91 MMOL/L (ref 96–112)
CO2 SERPL-SCNC: 33 MMOL/L (ref 20–33)
CREAT SERPL-MCNC: 0.4 MG/DL (ref 0.5–1.4)
ERYTHROCYTE [DISTWIDTH] IN BLOOD BY AUTOMATED COUNT: 43.8 FL (ref 35.9–50)
GLUCOSE BLD-MCNC: 206 MG/DL (ref 65–99)
GLUCOSE BLD-MCNC: 226 MG/DL (ref 65–99)
GLUCOSE SERPL-MCNC: 230 MG/DL (ref 65–99)
HCT VFR BLD AUTO: 29.4 % (ref 37–47)
HGB BLD-MCNC: 10 G/DL (ref 12–16)
MCH RBC QN AUTO: 31.9 PG (ref 27–33)
MCHC RBC AUTO-ENTMCNC: 34 G/DL (ref 33.6–35)
MCV RBC AUTO: 93.9 FL (ref 81.4–97.8)
PLATELET # BLD AUTO: 237 K/UL (ref 164–446)
PMV BLD AUTO: 9 FL (ref 9–12.9)
POTASSIUM SERPL-SCNC: 3.5 MMOL/L (ref 3.6–5.5)
RBC # BLD AUTO: 3.13 M/UL (ref 4.2–5.4)
SODIUM SERPL-SCNC: 132 MMOL/L (ref 135–145)
WBC # BLD AUTO: 9 K/UL (ref 4.8–10.8)

## 2019-10-19 PROCEDURE — 82962 GLUCOSE BLOOD TEST: CPT | Mod: 91

## 2019-10-19 PROCEDURE — 700102 HCHG RX REV CODE 250 W/ 637 OVERRIDE(OP): Performed by: NEUROLOGICAL SURGERY

## 2019-10-19 PROCEDURE — A9270 NON-COVERED ITEM OR SERVICE: HCPCS | Performed by: NEUROLOGICAL SURGERY

## 2019-10-19 PROCEDURE — 700111 HCHG RX REV CODE 636 W/ 250 OVERRIDE (IP): Performed by: NURSE PRACTITIONER

## 2019-10-19 PROCEDURE — 700102 HCHG RX REV CODE 250 W/ 637 OVERRIDE(OP): Performed by: NURSE PRACTITIONER

## 2019-10-19 PROCEDURE — 85027 COMPLETE CBC AUTOMATED: CPT

## 2019-10-19 PROCEDURE — 97116 GAIT TRAINING THERAPY: CPT

## 2019-10-19 PROCEDURE — A9270 NON-COVERED ITEM OR SERVICE: HCPCS | Performed by: NURSE PRACTITIONER

## 2019-10-19 PROCEDURE — 71046 X-RAY EXAM CHEST 2 VIEWS: CPT

## 2019-10-19 PROCEDURE — 36415 COLL VENOUS BLD VENIPUNCTURE: CPT

## 2019-10-19 PROCEDURE — 700112 HCHG RX REV CODE 229: Performed by: NURSE PRACTITIONER

## 2019-10-19 PROCEDURE — 80048 BASIC METABOLIC PNL TOTAL CA: CPT

## 2019-10-19 RX ORDER — BISACODYL 10 MG
10 SUPPOSITORY, RECTAL RECTAL DAILY
Status: DISCONTINUED | OUTPATIENT
Start: 2019-10-19 | End: 2019-10-19 | Stop reason: HOSPADM

## 2019-10-19 RX ORDER — METHYLPREDNISOLONE 4 MG/1
TABLET ORAL
Qty: 1 KIT | Refills: 0 | Status: SHIPPED | OUTPATIENT
Start: 2019-10-19 | End: 2019-10-19

## 2019-10-19 RX ORDER — DEXAMETHASONE SODIUM PHOSPHATE 4 MG/ML
4 INJECTION, SOLUTION INTRA-ARTICULAR; INTRALESIONAL; INTRAMUSCULAR; INTRAVENOUS; SOFT TISSUE ONCE
Status: COMPLETED | OUTPATIENT
Start: 2019-10-19 | End: 2019-10-19

## 2019-10-19 RX ADMIN — DOCUSATE SODIUM 100 MG: 100 CAPSULE, LIQUID FILLED ORAL at 05:12

## 2019-10-19 RX ADMIN — HYDROMORPHONE HYDROCHLORIDE 2 MG: 2 TABLET ORAL at 14:28

## 2019-10-19 RX ADMIN — METFORMIN HYDROCHLORIDE 500 MG: 500 TABLET, EXTENDED RELEASE ORAL at 07:44

## 2019-10-19 RX ADMIN — LOSARTAN POTASSIUM 25 MG: 50 TABLET ORAL at 05:11

## 2019-10-19 RX ADMIN — TIZANIDINE 2 MG: 4 TABLET ORAL at 05:20

## 2019-10-19 RX ADMIN — ANTACID TABLETS 500 MG: 500 TABLET, CHEWABLE ORAL at 05:11

## 2019-10-19 RX ADMIN — HYDROCHLOROTHIAZIDE 25 MG: 25 TABLET ORAL at 05:11

## 2019-10-19 RX ADMIN — HYDROMORPHONE HYDROCHLORIDE 2 MG: 2 TABLET ORAL at 10:58

## 2019-10-19 RX ADMIN — GABAPENTIN 200 MG: 100 CAPSULE ORAL at 05:12

## 2019-10-19 RX ADMIN — OMEPRAZOLE 20 MG: 20 CAPSULE, DELAYED RELEASE ORAL at 05:11

## 2019-10-19 RX ADMIN — GABAPENTIN 200 MG: 100 CAPSULE ORAL at 13:09

## 2019-10-19 RX ADMIN — TIZANIDINE 2 MG: 4 TABLET ORAL at 16:57

## 2019-10-19 RX ADMIN — HYDROMORPHONE HYDROCHLORIDE 2 MG: 2 TABLET ORAL at 05:11

## 2019-10-19 RX ADMIN — DEXAMETHASONE SODIUM PHOSPHATE 4 MG: 4 INJECTION, SOLUTION INTRA-ARTICULAR; INTRALESIONAL; INTRAMUSCULAR; INTRAVENOUS; SOFT TISSUE at 10:58

## 2019-10-19 ASSESSMENT — COGNITIVE AND FUNCTIONAL STATUS - GENERAL
WALKING IN HOSPITAL ROOM: A LITTLE
TURNING FROM BACK TO SIDE WHILE IN FLAT BAD: A LITTLE
MOVING FROM LYING ON BACK TO SITTING ON SIDE OF FLAT BED: A LITTLE
MOVING TO AND FROM BED TO CHAIR: A LITTLE
SUGGESTED CMS G CODE MODIFIER MOBILITY: CK
CLIMB 3 TO 5 STEPS WITH RAILING: A LITTLE
MOBILITY SCORE: 18
STANDING UP FROM CHAIR USING ARMS: A LITTLE

## 2019-10-19 ASSESSMENT — GAIT ASSESSMENTS
ASSISTIVE DEVICE: FRONT WHEEL WALKER
DISTANCE (FEET): 100
GAIT LEVEL OF ASSIST: SUPERVISED

## 2019-10-19 NOTE — DISCHARGE PLANNING
Anticipated Discharge Disposition:   Home with Home Health and Home Oxygen     Action:   RN CM met with patient and patient's spouse, Uday, concerning discharge planning.  Patient has recommendations for home health from therapies and face to face for home oxygen.    RN CM reviewed choice form for HHC and Home O2.  [facility] for home oxygen and [facility]    Barriers to Discharge:   HHC and Home O2 arrangements    Plan:   RN CM faxed signed choice forms to KUMAR Lott

## 2019-10-19 NOTE — DISCHARGE PLANNING
Anticipated Discharge Disposition:   Home with Home Health and Home Oxygen    Action:   RN CM notified patient that the insurance would not cover her home oxygen use. [facility] found the most affordable home oxygen for patient.  Patient to pay $150 / month for home oxygen.  Payment to be made, then oxygen and all its equipment will be delivered.    RN CM contacted on call for order and face to face for home health.  [facility] selected.      Barriers to Discharge:   Home health orders / home health face to face    Plan:   RN CM to follow up with [facility] to confirm arrangements made and orders received

## 2019-10-19 NOTE — DISCHARGE PLANNING
Agency/Facility Name: [facility]  Spoke To: Alicia  Outcome: Patient diagnosis on order does not qualify for home 02 under medicare guidelines. Patient can self pay for home 02.      RN IKER Johnson notified.

## 2019-10-19 NOTE — PROGRESS NOTES
Desat study done. Pt will need home O2. 2L at rest and continuous. Pt has a hx of asthma. Pt has been using IS frequently up to 1500ml.

## 2019-10-19 NOTE — DISCHARGE INSTRUCTIONS
Discharge Instructions    Discharged to home by car with relative. Discharged via wheelchair, hospital escort: Yes.  Special equipment needed: TLSO    Be sure to schedule a follow-up appointment with your primary care doctor or any specialists as instructed.     Discharge Plan:   Diet Plan: Discussed  Activity Level: Discussed  Confirmed Follow up Appointment: Patient to Call and Schedule Appointment  Confirmed Symptoms Management: Discussed  Medication Reconciliation Updated: Yes  Influenza Vaccine Indication: Not indicated: Previously immunized this influenza season and > 8 years of age  Influenza Vaccine Given - only chart on this line when given: Influenza Vaccine Given (See MAR)    I understand that a diet low in cholesterol, fat, and sodium is recommended for good health. Unless I have been given specific instructions below for another diet, I accept this instruction as my diet prescription.   Other diet: Diabetic    Special Instructions:   • You may shower. Leave incision open to air and pat dry.   • Don't apply creams to your incision  • No baths or hot tubs for 6 weeks post surgery or until MD   • No bending, lifting greater than 10lbs or twisting  • Wear TLSO brace if up more than 5 minutes  • No NSAIDs (non-steroidal anti-inflammatories) for 14 days after surgery (Advil, Celebrex, Naproxen, Ibuprofen).   • Don't drive for 2 weeks or while taking narcotics  • Take stool softeners/laxatives while taking narcotics  • Schedule follow up appointment with [facility] 2 weeks after surgery  • Inspect the incision and call the office if there is redness, swelling, increased pain, drainage from the incision.   • Wear home O2 2L at all times. Check pulse oximetry several times a day.  • [facility] will contact you in 48hrs of discharge for start of care. If you haven't heard from [facility], call them on Monday.       Hypoxemia  Hypoxemia occurs when your blood does not contain enough oxygen.  The body cannot work well when it does not have enough oxygen because every part of your body needs oxygen. Oxygen travels to all parts of the body through your blood. Hypoxemia can develop suddenly or can come on slowly.  CAUSES  Some common causes of hypoxemia include:  · Long-term (chronic) lung diseases, such as chronic obstructive pulmonary disease (COPD) or interstitial lung disease.   · Disorders that affect breathing at night, such as sleep apnea.  · Fluid buildup in your lungs (pulmonary edema).   · Lung infection (pneumonia).  · Lung or throat cancer.  · Abnormal blood flow that bypasses the lungs (shunt).  · Certain diseases that affect nerves or muscles.  · A collapsed lung (pneumothorax).  · A blood clot in the lungs (pulmonary embolus).  · Certain types of heart disease.  · Slow or shallow breathing (hypoventilation).   · Certain medicines.  · High altitudes.  · Toxic chemicals and gases.  SIGNS AND SYMPTOMS  Not everyone who has hypoxemia will develop symptoms. If the hypoxemia developed quickly, you will likely have symptoms such as shortness of breath. If the hypoxemia came on slowly over months or years, you may not notice any symptoms. Symptoms can include:  · Shortness of breath (dyspnea).  · Bluish color of the skin, lips, or nail beds.  · Breathing that is fast, noisy, or shallow.  · A fast heartbeat.  · Feeling tired or sleepy.  · Being confused or feeling anxious.  DIAGNOSIS  To determine if you have hypoxemia, your health care provider may perform:  · A physical exam.  · Blood tests.  · A pulse oximetry. A sensor will be put on your finger, toe, or earlobe to measure the percent of oxygen in your blood.  TREATMENT  You will likely be treated with oxygen therapy. Depending on the cause of your hypoxemia, you may need oxygen for a short time (weeks or months), or you may need it indefinitely. Your health care provider may also recommend other therapies to treat the underlying cause of your  hypoxemia.  HOME CARE INSTRUCTIONS  · Only take over-the-counter or prescription medicines as directed by your health care provider.  · Follow oxygen safety measures if you are on oxygen therapy. These may include:  ¨ Always having a backup supply of oxygen.  ¨ Not allowing anyone to smoke around oxygen.  ¨ Handling the oxygen tanks carefully and as instructed.  · If you smoke, quit. Stay away from people who smoke.  · Follow up with your health care provider as directed.  SEEK MEDICAL CARE IF:  · You have any concerns about your oxygen therapy.  · You still have trouble breathing.  · You become short of breath when you exercise.  · You are tired when you wake up.  · You have a headache when you wake up.  SEEK IMMEDIATE MEDICAL CARE IF:   · Your breathing gets worse.  · You have new shortness of breath with normal activity.  · You have a bluish color of the skin, lips, or nail beds.  · You have confusion or cloudy thinking.  · You cough up dark mucus.  · You have chest pain.  · You have a fever.  MAKE SURE YOU:  · Understand these instructions.  · Will watch your condition.  · Will get help right away if you are not doing well or get worse.  This information is not intended to replace advice given to you by your health care provider. Make sure you discuss any questions you have with your health care provider.  Document Released: 07/02/2012 Document Revised: 12/23/2014 Document Reviewed: 07/17/2014  [employer] Patient Education © 2017 [employer]    Surgical Spinal Decompression, Care After  Introduction  Refer to this sheet in the next few weeks. These instructions provide you with information about caring for yourself after your procedure. Your health care provider may also give you more specific instructions. Your treatment has been planned according to current medical practices, but problems sometimes occur. Call your health care provider if you have any problems or questions after your  procedure.  What can I expect after the procedure?  It is common to have pain for the first few days after the procedure. Some people continue to have mild pain even after making a full recovery.  Follow these instructions at home:  Medicine  · Take medicines only as directed by your health care provider.  · Avoid taking over-the-counter pain medicines unless your health care provider tells you otherwise. These medicines interfere with the development and growth of new bone cells.  · If you were prescribed a narcotic pain medicine, take it exactly as told by your health care provider.  ¨ Do not drink alcohol while on the medicine.  ¨ Do not drive while on the medicine.  Injury care  · Care for your back brace as told by your health care provider.  · If directed, apply ice to the injured area:  ¨ Put ice in a plastic bag.  ¨ Place a towel between your skin and the bag.  ¨ Leave the ice on for 20 minutes, 2-3 times a day.  Activity  · Perform physical therapy exercises as told by your health care provider.  · Exercise regularly. Start by taking short walks. Slowly increase your activity level over time. Gentle exercise helps to ease pain.  · Sit, stand, walk, turn in bed, and reposition yourself as told by your health care provider. This will help to keep your spine in proper alignment.  · Avoid bending and twisting your body.  · Avoid doing strenuous household chores, such as vacuuming.  · Do not lift anything that is heavier than 10 lb (4.5 kg).  Other Instructions  · Keep all follow-up visits as directed by your health care provider. This is important.  · Do not use any tobacco products, including cigarettes, chewing tobacco, or electronic cigarettes. If you need help quitting, ask your health care provider. Nicotine affects the way bones heal.  Contact a health care provider if:  · Your pain gets worse.  · You have a fever.  · You have redness, swelling, or pain at the site of your incision.  · You have fluid,  blood, or pus coming from your incision.  · You have numbness, tingling, or weakness in any part of your body.  Get help right away if:  · Your incision feels swollen and tender, and the surrounding area looks like a lump. The lump may be red or bluish in color.  · You cannot move any part of your body (paralysis).  · You cannot control your bladder or bowels.  This information is not intended to replace advice given to you by your health care provider. Make sure you discuss any questions you have with your health care provider.  Document Released: 05/03/2016 Document Revised: 05/25/2017 Document Reviewed: 12/21/2015 © 2017 [employer]        · Is patient discharged on Warfarin / Coumadin?   No       Depression / Suicide Risk    As you are discharged from this [facility] facility, it is important to learn how to keep safe from harming yourself.    Recognize the warning signs:  · Abrupt changes in personality, positive or negative- including increase in energy   · Giving away possessions  · Change in eating patterns- significant weight changes-  positive or negative  · Change in sleeping patterns- unable to sleep or sleeping all the time   · Unwillingness or inability to communicate  · Depression  · Unusual sadness, discouragement and loneliness  · Talk of wanting to die  · Neglect of personal appearance   · Rebelliousness- reckless behavior  · Withdrawal from people/activities they love  · Confusion- inability to concentrate     If you or a loved one observes any of these behaviors or has concerns about self-harm, here's what you can do:  · Talk about it- your feelings and reasons for harming yourself  · Remove any means that you might use to hurt yourself (examples: pills, rope, extension cords, firearm)  · Get professional help from the community (Mental Health, Substance Abuse, psychological counseling)  · Do not be alone:Call your Safe Contact- someone whom you trust who will be there for you.  · Call your  local CRISIS HOTLINE 090-5426 or 511-444-2561  · Call your local Children's Mobile Crisis Response Team [place] (052) 559-8760 or www.[employer]erraWi  · Call the toll free National Suicide Prevention Hotlines   · National Suicide Prevention Lifeline 186-017-GNVI (3191)  · National GoodRx Line Network 800-SUICIDE (055-0600)

## 2019-10-19 NOTE — FACE TO FACE
Face to Face Supporting Documentation - Home Health    The encounter with this patient was in whole or in part the primary reason for home health admission.    Date of encounter:   Patient:                    MRN:                       YOB: 2017  Rossy Lopez  [id number]  1945     Home health to see patient for:  Skilled Nursing care for assessment, interventions & education and Physical Therapy evaluation and treatment    Skilled need for:  Surgical Aftercare s/p lumbar fusion     Skilled nursing interventions to include:  Comment: help with mobility, monitor bp/ incision/ meds     Homebound status evidenced by:  Need the aid of supportive devices such as crutches, canes, wheelchairs or walkers. Leaving home requires a considerable and taxing effort. There is a normal inability to leave the home.    Community Physician to provide follow up care: Louise Alvarado M.D.     Optional Interventions? No      I certify the face to face encounter for this home health care referral meets the CMS requirements and the encounter/clinical assessment with the patient was, in whole, or in part, for the medical condition(s) listed above, which is the primary reason for home health care. Based on my clinical findings: the service(s) are medically necessary, support the need for home health care, and the homebound criteria are met.  I certify that this patient has had a face to face encounter by myself.  OFELIA Neville. - NPI: [id number]

## 2019-10-19 NOTE — DISCHARGE PLANNING
Received Choice form at 1424  Agency/Facility Name: [facility]   Referral sent per Choice form @ 1434    Received Choice form at 1429  Agency/Facility Name: [facility]   Referral sent per Choice form @ 1434

## 2019-10-19 NOTE — THERAPY
"Physical Therapy Treatment completed.   Bed Mobility:  Supine to Sit: Minimal Assist( provided assist )  Transfers: Sit to Stand: Supervised  Gait: Level Of Assist: Supervised with Front-Wheel Walker       Plan of Care: Will benefit from Physical Therapy 5 times per week  Discharge Recommendations: Equipment: Will Continue to Assess for Equipment Needs. Post-acute therapy  Recommend home health transitional care for continued physical therapy services.     See \"Rehab Therapy-Acute\" Patient Summary Report for complete documentation.     Pt was pleasant and continues to make improvement with mobility. She is reporting she is feeling better with no symptoms throughout mobility. She completed bed mobiltiy with assist from .She progressed to SPV with fww and increase gait distance. No LOB noted and no knee buckling throughout mobility. Answered any questions pt and  had. Continue to recommend dc home with  assist at dc and .   "

## 2019-10-19 NOTE — PROGRESS NOTES
Pt being dc'd to home with [facility], TLSO brace and home O2. DC paperwork discussed with spouse and patient. All questions answered. Pt dc'd with all belongings. IV dc'd. Pt received flu shot. Pt received scripts for Zanaflex and Dilaudid. O2 tank delivered to bedside.

## 2019-10-19 NOTE — PROGRESS NOTES
Neurosurgery Progress Note    Subjective:  Pt awake, c/o right ant thigh pain to knee, constant  States pain meds/mr does not decrease that pain  Voiding  No BM  Requiring 2L O2 - denies sob or breathing issues     Exam:    A&O x3, GCS 15  PERRL  Face symm  LE str 5/5, sens equal  Bruising to back from surgery     BP  Min: 110/52  Max: 143/65  Pulse  Av  Min: 86  Max: 99  Resp  Avg: 15.6  Min: 14  Max: 16  Temp  Av.1 °C (98.8 °F)  Min: 36.7 °C (98.1 °F)  Max: 37.7 °C (99.9 °F)  SpO2  Av %  Min: 91 %  Max: 97 %    No data recorded    Recent Labs     10/17/19  034   WBC 8.5   RBC 3.08*   HEMOGLOBIN 9.9*   HEMATOCRIT 28.9*   MCV 93.8   MCH 32.1   MCHC 34.3   RDW 45.7   PLATELETCT 220   MPV 9.5     Recent Labs     10/17/19  034   SODIUM 133*   POTASSIUM 3.4*   CHLORIDE 95*   CO2 30   GLUCOSE 166*   BUN 7*   CREATININE 0.41*   CALCIUM 8.6               Intake/Output       10/18/19 0700 - 10/19/19 0659 10/19/19 0700 - 10/20/19 0659      [id number] 3813-6387 Total 0504-9810 2048-7100 Total       Intake    P.O.  --  -- --  100  -- 100    P.O. -- -- -- 100 -- 100    Total Intake -- -- -- 100 -- 100       Output    Urine  --  -- --  --  -- --    Number of Times Voided 1 x 2 x 3 x 1 x -- 1 x    Total Output -- -- -- -- -- --       Net I/O     -- -- -- 100 -- 100            Intake/Output Summary (Last 24 hours) at 10/19/2019 0835  Last data filed at 10/19/2019 0800  Gross per 24 hour   Intake 100 ml   Output --   Net 100 ml            • HYDROmorphone  2 mg Q3HRS PRN   • oxyCODONE-acetaminophen  1-2 Tab Q4HRS PRN   • HYDROmorphone  0.5 mg Q3HRS PRN   • Mometasone Furoate  2 Puff BID   • gabapentin  200 mg TID   • metFORMIN ER  500 mg BID   • montelukast  10 mg Q EVENING   • omeprazole  20 mg QDAY   • simvastatin  10 mg Q EVENING   • Pharmacy Consult Request  1 Each PHARMACY TO DOSE   • MD ALERT...DO NOT ADMINISTER NSAIDS or ASPIRIN unless ORDERED By Neurosurgery  1 Each PRN   • docusate sodium  100 mg BID   •  senna-docusate  1 Tab Q24HRS PRN   • polyethylene glycol/lytes  1 Packet BID PRN   • magnesium hydroxide  30 mL QDAY PRN   • bisacodyl  10 mg Q24HRS PRN   • fleet  1 Each Once PRN   • 0.9 % NaCl with KCl 20 mEq 1,000 mL   Continuous   • diphenhydrAMINE  25 mg Q6HRS PRN    Or   • diphenhydrAMINE  25 mg Q6HRS PRN   • ondansetron  4 mg Q4HRS PRN   • ondansetron  4 mg Q4HRS PRN   • tizanidine  2 mg TID PRN   • benzocaine-menthol  1 Lozenge Q2HRS PRN   • calcium carbonate  500 mg BID   • hydroCHLOROthiazide  25 mg QDAY   • losartan  25 mg QDAY       Assessment and Plan:  POD # 4 L4 -S1 TLIF  Prophylactic anticoagulation: no         Start date/time: no need  PT/OT  CBC/BMP now, CXR  Home health ordered - has fww at home  Will give 4mg decadron IV x 1 now, not able to be on mdp d/t glucose.  Dulcolax supp   Home O2 if cxr / labs wnl.

## 2019-10-19 NOTE — FACE TO FACE
Face to Face Note  -  Durable Medical Equipment    TASNEEM Neville - NPI: [id number]  I certify that this patient is under my care and that they had a durable medical equipment(DME)face to face encounter by myself that meets the physician DME face-to-face encounter requirements with this patient on:    Date of encounter:   Patient:                    MRN:                       YOB: 2017  Rossy Lopez  [id number]  1945     The encounter with the patient was in whole, or in part, for the following medical condition, which is the primary reason for durable medical equipment:  Post-Op Surgery    I certify that, based on my findings, the following durable medical equipment is medically necessary:  Oxygen.    HOME O2 Saturation Measurements:(Values must be present for Home Oxygen orders)  Room air sat at rest: 88  Room air sat with amb: 80  With liters of O2: 2, O2 sat at rest with O2: 93  With Liters of O2: 2, O2 sat with amb with O2 : 90  Is the patient mobile?: Yes    My Clinical findings support the need for the above equipment due to:  Hypoxia    Supporting Symptoms: 2L NC / 24 hrs/day

## 2019-10-19 NOTE — PROGRESS NOTES
Shift Summary [id number]    -Neuro: A&Ox4, pleasant  -Pain to right hip; PO dilaudid slightly effective and zanaflex  -LBM 10/15; active BS  -No complaints of nausea   -Dressing remains CDI to back   -Refused SCDs for most of night; education provided  -Activity: AX1 FWW, BA on, calls appropriately, rounding in place, bed in lowest position, call light within reach  -Possible discharge today

## 2019-10-19 NOTE — DISCHARGE SUMMARY
DATE OF ADMISSION:  10/15/2019    DATE OF DISCHARGE:  10/19/2019    PRIMARY CARE PROVIDER:  Louise Alvarado MD.    FINAL DIAGNOSES:  L4-L5 spondylolisthesis with severe lumbar stenosis.    SURGICAL PROCEDURE PERFORMED ON THIS ADMISSION:  A posterior L4-L5   decompression with placement of interbody graft at L4-L5 and L5-S1 with   instrumented stabilization posteriorly.    History of present illness and admit physical has been previously dictated.    HOSPITAL COURSE:  Following surgery, the patient was admitted to the surgical   unit.  First day postoperatively, she was seen by myself indicating that she   had quite a bit of back discomfort.  She denied lower extremity radicular   symptoms.  She had a couple of bouts of emesis after surgery, but in the   morning when I saw her, she indicated that she did not feel nauseated.  She   had full strength to her lower extremities.  She had a drain in place.  Her   vital signs were stable.  H and H was 12 and 36, white count 13.7.  Patient   was transitioned on to oral pain medication.  She was given time to mobilize   with therapy.  She was a bit upset that the hospital misplaced her brace which   she brought from our office, but we were able to get her one in the hospital.    Her drain remained.  She continued to do well.  Second day postop, she   continued to have back pain.  She expressed concern about the brace that was   gotten for her stating that it is not as comfortable to the ones she had from   our office.  The Mercer catheter was discontinued second day postoperatively,   she remained with the drain, and H and H again remained stable at 9.9 and   28.9.  This morning, she is found in bed.  She states that they miraculously   found her brace and she is very pleased with that.  She indicated still   soreness in her back pain.  Medication is helpful.  No symptoms down her legs.    Vital signs stable.  Her drain has 10 mL and this will be discontinued.  The   Mercer  catheter was discontinued yesterday and she is voiding well.    PLAN:  She will be discharged home more than likely tomorrow.  However, I   stated that if she is doing extremely well later on today and she wants to be   discharged, she may be able to.  They will just need to call me.    Prescriptions I am giving her are Dilaudid 2 mg 1 every 3 hours as needed for   pain for 7 days, #50, no refills as well as Zanaflex 2 mg tablets one 3 times   a day as needed for spasms, #60, no refills.  She is instructed to wear her   brace when she is up out of bed.  She is told no repetitive bending, twisting   or lifting.  Her weight limit is less than 15 pounds.  She is told that she   can take a shower tomorrow and allow the silver dressing to become wet.  I   have asked that she remove her silver dressing 5 days post-surgery and then   leave the incision open to air.  She is also instructed to avoid aspirin and   nonsteroidal medication.  We will see her in the office 2 weeks after surgery,   and she will call if she has further questions.       ____________________________________     ANTONIO FRIEND / JEFERSON    DD:  10/18/2019 09:07:08  DT:  10/19/2019 02:05:24    D#:  [id number]  Job#:  [id number]    cc: Louise Alvarado MD, [facility]

## 2019-10-20 ENCOUNTER — HOME HEALTH ADMISSION (OUTPATIENT)
Dept: HOME HEALTH SERVICES | Facility: HOME HEALTHCARE | Age: 74
End: 2019-10-20
Payer: MEDICARE

## 2019-10-20 NOTE — DISCHARGE PLANNING
ATTN: Case Management  RE: Referral for Home Health    As of 10/20/2019, we have accepted the Home Health referral for the patient listed above.    A [facility] clinician will be out to see the patient within 48 hours. If you have any questions or concerns regarding the patient’s transition to Home Health, please do not hesitate to contact us at [phone].      We look forward to collaborating with you,  [facility] Home Health Team

## 2019-10-22 ENCOUNTER — ANTICOAGULATION MONITORING (OUTPATIENT)
Dept: VASCULAR LAB | Facility: MEDICAL CENTER | Age: 74
End: 2019-10-22

## 2019-10-22 ENCOUNTER — HOME CARE VISIT (OUTPATIENT)
Dept: HOME HEALTH SERVICES | Facility: HOME HEALTHCARE | Age: 74
End: 2019-10-22
Payer: MEDICARE

## 2019-10-22 VITALS
BODY MASS INDEX: 26.52 KG/M2 | HEART RATE: 94 BPM | TEMPERATURE: 97.7 F | OXYGEN SATURATION: 98 % | HEIGHT: 62 IN | RESPIRATION RATE: 16 BRPM | WEIGHT: 144.13 LBS | DIASTOLIC BLOOD PRESSURE: 64 MMHG | SYSTOLIC BLOOD PRESSURE: 118 MMHG

## 2019-10-22 PROCEDURE — G0493 RN CARE EA 15 MIN HH/HOSPICE: HCPCS

## 2019-10-22 PROCEDURE — 665001 SOC-HOME HEALTH

## 2019-10-22 PROCEDURE — 665998 HH PPS REVENUE CREDIT

## 2019-10-22 PROCEDURE — 665999 HH PPS REVENUE DEBIT

## 2019-10-22 SDOH — ECONOMIC STABILITY: HOUSING INSECURITY
HOME SAFETY: FIRE ESCAPE PLAN DEVELOPED. PATIENT DOES NOT HAVE FLAMMABLE MATERIALS PRESENT IN THE HOME PRESENTING A FIRE HAZARD. NO EVIDENCE FOUND OF SMOKING MATERIALS PRESENT IN THE HOME.

## 2019-10-22 SDOH — ECONOMIC STABILITY: HOUSING INSECURITY
HOME SAFETY: OXYGEN SAFETY RISK ASSESSMENT PERFORMED. PATIENT DOES HAVE A NO SMOKING SIGN POSTED IN THE HOME. PATIENT DOES HAVE A WORKING FIRE EXTINGUISHER PRESENT IN THE HOME. SMOKE ALARMS ARE PRESENT AND FUNCTIONAL ON EACH LEVEL OF THE HOME. PATIENT DOES HAVE A

## 2019-10-22 SDOH — ECONOMIC STABILITY: HOUSING INSECURITY: EVIDENCE OF SMOKING MATERIAL: 0

## 2019-10-22 ASSESSMENT — PATIENT HEALTH QUESTIONNAIRE - PHQ9
1. LITTLE INTEREST OR PLEASURE IN DOING THINGS: 00
CLINICAL INTERPRETATION OF PHQ2 SCORE: 0
2. FEELING DOWN, DEPRESSED, IRRITABLE, OR HOPELESS: 00

## 2019-10-22 ASSESSMENT — ENCOUNTER SYMPTOMS
DEBILITATING PAIN: 1
SHORTNESS OF BREATH: T
MENTAL STATUS CHANGE: 0

## 2019-10-22 ASSESSMENT — ACTIVITIES OF DAILY LIVING (ADL): OASIS_M1830: 03

## 2019-10-22 NOTE — PROGRESS NOTES
Received referral from [facility]. Medications reviewed. No clinically significant interactions noted.

## 2019-10-23 PROCEDURE — 665999 HH PPS REVENUE DEBIT

## 2019-10-23 PROCEDURE — 665998 HH PPS REVENUE CREDIT

## 2019-10-24 PROCEDURE — 665998 HH PPS REVENUE CREDIT

## 2019-10-24 PROCEDURE — 665999 HH PPS REVENUE DEBIT

## 2019-10-25 ENCOUNTER — HOME CARE VISIT (OUTPATIENT)
Dept: HOME HEALTH SERVICES | Facility: HOME HEALTHCARE | Age: 74
End: 2019-10-25
Payer: MEDICARE

## 2019-10-25 VITALS
OXYGEN SATURATION: 95 % | HEART RATE: 92 BPM | RESPIRATION RATE: 16 BRPM | TEMPERATURE: 98.6 F | DIASTOLIC BLOOD PRESSURE: 60 MMHG | SYSTOLIC BLOOD PRESSURE: 120 MMHG

## 2019-10-25 PROCEDURE — 665999 HH PPS REVENUE DEBIT

## 2019-10-25 PROCEDURE — 665998 HH PPS REVENUE CREDIT

## 2019-10-25 PROCEDURE — G0493 RN CARE EA 15 MIN HH/HOSPICE: HCPCS

## 2019-10-25 SDOH — ECONOMIC STABILITY: HOUSING INSECURITY: HOME SAFETY: PATIENT IS REPORTING SHE IS WEANING OFF OXYGEN AND IS AT 1L/MIN PER NC

## 2019-10-25 SDOH — ECONOMIC STABILITY: HOUSING INSECURITY: EVIDENCE OF SMOKING MATERIAL: 0

## 2019-10-25 ASSESSMENT — ENCOUNTER SYMPTOMS
SEVERE DYSPNEA: 1
NAUSEA: DENIES
MENTAL STATUS CHANGE: 0
VOMITING: DENIES

## 2019-10-26 ENCOUNTER — HOME CARE VISIT (OUTPATIENT)
Dept: HOME HEALTH SERVICES | Facility: HOME HEALTHCARE | Age: 74
End: 2019-10-26
Payer: MEDICARE

## 2019-10-26 VITALS
HEART RATE: 86 BPM | DIASTOLIC BLOOD PRESSURE: 72 MMHG | RESPIRATION RATE: 18 BRPM | OXYGEN SATURATION: 95 % | SYSTOLIC BLOOD PRESSURE: 124 MMHG | TEMPERATURE: 97.7 F

## 2019-10-26 PROCEDURE — 665999 HH PPS REVENUE DEBIT

## 2019-10-26 PROCEDURE — G0151 HHCP-SERV OF PT,EA 15 MIN: HCPCS

## 2019-10-26 PROCEDURE — 665998 HH PPS REVENUE CREDIT

## 2019-10-26 SDOH — ECONOMIC STABILITY: HOUSING INSECURITY
HOME SAFETY: DISCUSSED PICKING UP THROW RUGS OR SECURING LARGER AREA RUGS TO THE FLOOR - PATIENT STATES HER HUSBAND WILL TAKE CARE OF IT

## 2019-10-27 PROCEDURE — 665999 HH PPS REVENUE DEBIT

## 2019-10-27 PROCEDURE — 665998 HH PPS REVENUE CREDIT

## 2019-10-28 ENCOUNTER — HOME CARE VISIT (OUTPATIENT)
Dept: HOME HEALTH SERVICES | Facility: HOME HEALTHCARE | Age: 74
End: 2019-10-28
Payer: MEDICARE

## 2019-10-28 VITALS
HEART RATE: 73 BPM | OXYGEN SATURATION: 95 % | SYSTOLIC BLOOD PRESSURE: 137 MMHG | TEMPERATURE: 98.1 F | DIASTOLIC BLOOD PRESSURE: 61 MMHG | RESPIRATION RATE: 17 BRPM

## 2019-10-28 PROCEDURE — 665999 HH PPS REVENUE DEBIT

## 2019-10-28 PROCEDURE — G0151 HHCP-SERV OF PT,EA 15 MIN: HCPCS

## 2019-10-28 PROCEDURE — 665998 HH PPS REVENUE CREDIT

## 2019-10-29 ENCOUNTER — HOME CARE VISIT (OUTPATIENT)
Dept: HOME HEALTH SERVICES | Facility: HOME HEALTHCARE | Age: 74
End: 2019-10-29
Payer: MEDICARE

## 2019-10-29 VITALS
HEART RATE: 75 BPM | TEMPERATURE: 97.6 F | OXYGEN SATURATION: 95 % | DIASTOLIC BLOOD PRESSURE: 64 MMHG | SYSTOLIC BLOOD PRESSURE: 108 MMHG | RESPIRATION RATE: 18 BRPM

## 2019-10-29 PROCEDURE — 665999 HH PPS REVENUE DEBIT

## 2019-10-29 PROCEDURE — 665998 HH PPS REVENUE CREDIT

## 2019-10-29 PROCEDURE — G0495 RN CARE TRAIN/EDU IN HH: HCPCS

## 2019-10-29 SDOH — ECONOMIC STABILITY: HOUSING INSECURITY: EVIDENCE OF SMOKING MATERIAL: 0

## 2019-10-29 ASSESSMENT — ENCOUNTER SYMPTOMS
VOMITING: DENIES ANY VOMITING
NAUSEA: DENIES C/O NAUSEA

## 2019-10-30 PROCEDURE — 665999 HH PPS REVENUE DEBIT

## 2019-10-30 PROCEDURE — 665998 HH PPS REVENUE CREDIT

## 2019-10-30 RX ORDER — HYDROMORPHONE HYDROCHLORIDE 2 MG/1
2 TABLET ORAL
Qty: 50 TAB | Refills: 0 | Status: CANCELLED | OUTPATIENT
Start: 2019-10-30 | End: 2019-11-06

## 2019-10-31 PROCEDURE — 665998 HH PPS REVENUE CREDIT

## 2019-10-31 PROCEDURE — 665999 HH PPS REVENUE DEBIT

## 2019-11-01 ENCOUNTER — OUTPATIENT INFUSION SERVICES (OUTPATIENT)
Dept: ONCOLOGY | Facility: MEDICAL CENTER | Age: 74
End: 2019-11-01
Attending: INTERNAL MEDICINE
Payer: MEDICARE

## 2019-11-01 VITALS
BODY MASS INDEX: 27.26 KG/M2 | SYSTOLIC BLOOD PRESSURE: 126 MMHG | HEART RATE: 109 BPM | OXYGEN SATURATION: 95 % | WEIGHT: 144.4 LBS | TEMPERATURE: 97.3 F | HEIGHT: 61 IN | DIASTOLIC BLOOD PRESSURE: 72 MMHG | RESPIRATION RATE: 18 BRPM

## 2019-11-01 PROCEDURE — 665999 HH PPS REVENUE DEBIT

## 2019-11-01 PROCEDURE — 700111 HCHG RX REV CODE 636 W/ 250 OVERRIDE (IP): Mod: JG | Performed by: INTERNAL MEDICINE

## 2019-11-01 PROCEDURE — 665998 HH PPS REVENUE CREDIT

## 2019-11-01 PROCEDURE — 96372 THER/PROPH/DIAG INJ SC/IM: CPT | Performed by: CLINICAL NURSE SPECIALIST

## 2019-11-01 RX ADMIN — MEPOLIZUMAB 100 MG: 100 INJECTION, POWDER, FOR SOLUTION SUBCUTANEOUS at 11:27

## 2019-11-01 NOTE — PROGRESS NOTES
Patient to infusion for nucala injection.  No new concerns/complaints.  Denies recent dyspnea.  Tolerated nucala in left back of arm.  Next appointment verified.  Patient discharged ambulatory to home in stable condition.

## 2019-11-02 PROCEDURE — 665999 HH PPS REVENUE DEBIT

## 2019-11-02 PROCEDURE — 665998 HH PPS REVENUE CREDIT

## 2019-11-03 PROCEDURE — 665998 HH PPS REVENUE CREDIT

## 2019-11-03 PROCEDURE — 665999 HH PPS REVENUE DEBIT

## 2019-11-04 ENCOUNTER — HOME CARE VISIT (OUTPATIENT)
Dept: HOME HEALTH SERVICES | Facility: HOME HEALTHCARE | Age: 74
End: 2019-11-04
Payer: MEDICARE

## 2019-11-04 VITALS
OXYGEN SATURATION: 93 % | SYSTOLIC BLOOD PRESSURE: 132 MMHG | RESPIRATION RATE: 17 BRPM | DIASTOLIC BLOOD PRESSURE: 77 MMHG | TEMPERATURE: 98.1 F | HEART RATE: 85 BPM

## 2019-11-04 PROCEDURE — 665998 HH PPS REVENUE CREDIT

## 2019-11-04 PROCEDURE — G0151 HHCP-SERV OF PT,EA 15 MIN: HCPCS

## 2019-11-04 PROCEDURE — 665999 HH PPS REVENUE DEBIT

## 2019-11-04 ASSESSMENT — ACTIVITIES OF DAILY LIVING (ADL)
AMBULATION ASSISTANCE ON FLAT SURFACES: 1
AMBULATION ASSISTANCE ON UNEVEN SURFACES: 1

## 2019-11-05 ENCOUNTER — HOME CARE VISIT (OUTPATIENT)
Dept: HOME HEALTH SERVICES | Facility: HOME HEALTHCARE | Age: 74
End: 2019-11-05
Payer: MEDICARE

## 2019-11-05 PROCEDURE — G0495 RN CARE TRAIN/EDU IN HH: HCPCS

## 2019-11-05 PROCEDURE — 665998 HH PPS REVENUE CREDIT

## 2019-11-05 PROCEDURE — 665999 HH PPS REVENUE DEBIT

## 2019-11-05 ASSESSMENT — ENCOUNTER SYMPTOMS
VOMITING: DENIES ANY VOMITING
NAUSEA: DENIES C/O NAUSEA

## 2019-11-06 VITALS
RESPIRATION RATE: 18 BRPM | OXYGEN SATURATION: 94 % | HEART RATE: 89 BPM | SYSTOLIC BLOOD PRESSURE: 116 MMHG | DIASTOLIC BLOOD PRESSURE: 72 MMHG | TEMPERATURE: 98 F

## 2019-11-06 PROCEDURE — 665999 HH PPS REVENUE DEBIT

## 2019-11-06 PROCEDURE — 665998 HH PPS REVENUE CREDIT

## 2019-11-06 ASSESSMENT — ENCOUNTER SYMPTOMS: LIMITED RANGE OF MOTION: 1

## 2019-11-07 PROCEDURE — 665998 HH PPS REVENUE CREDIT

## 2019-11-07 PROCEDURE — 665999 HH PPS REVENUE DEBIT

## 2019-11-08 PROCEDURE — 665998 HH PPS REVENUE CREDIT

## 2019-11-08 PROCEDURE — 665999 HH PPS REVENUE DEBIT

## 2019-11-09 PROCEDURE — 665998 HH PPS REVENUE CREDIT

## 2019-11-09 PROCEDURE — 665999 HH PPS REVENUE DEBIT

## 2019-11-10 PROCEDURE — 665999 HH PPS REVENUE DEBIT

## 2019-11-10 PROCEDURE — 665998 HH PPS REVENUE CREDIT

## 2019-11-11 ENCOUNTER — HOME CARE VISIT (OUTPATIENT)
Dept: HOME HEALTH SERVICES | Facility: HOME HEALTHCARE | Age: 74
End: 2019-11-11
Payer: MEDICARE

## 2019-11-11 VITALS
SYSTOLIC BLOOD PRESSURE: 136 MMHG | RESPIRATION RATE: 17 BRPM | DIASTOLIC BLOOD PRESSURE: 64 MMHG | TEMPERATURE: 97.6 F | HEART RATE: 67 BPM | OXYGEN SATURATION: 94 %

## 2019-11-11 PROCEDURE — G0151 HHCP-SERV OF PT,EA 15 MIN: HCPCS

## 2019-11-11 PROCEDURE — 665998 HH PPS REVENUE CREDIT

## 2019-11-11 PROCEDURE — 665999 HH PPS REVENUE DEBIT

## 2019-11-12 ENCOUNTER — HOME CARE VISIT (OUTPATIENT)
Dept: HOME HEALTH SERVICES | Facility: HOME HEALTHCARE | Age: 74
End: 2019-11-12
Payer: MEDICARE

## 2019-11-12 VITALS
SYSTOLIC BLOOD PRESSURE: 132 MMHG | RESPIRATION RATE: 18 BRPM | TEMPERATURE: 98.2 F | OXYGEN SATURATION: 95 % | DIASTOLIC BLOOD PRESSURE: 62 MMHG | HEART RATE: 82 BPM

## 2019-11-12 PROCEDURE — G0299 HHS/HOSPICE OF RN EA 15 MIN: HCPCS

## 2019-11-12 PROCEDURE — 665998 HH PPS REVENUE CREDIT

## 2019-11-12 PROCEDURE — 665999 HH PPS REVENUE DEBIT

## 2019-11-12 ASSESSMENT — ENCOUNTER SYMPTOMS
SHORTNESS OF BREATH: T
VOMITING: DENIES
NAUSEA: DENIES

## 2019-11-13 PROCEDURE — 665998 HH PPS REVENUE CREDIT

## 2019-11-13 PROCEDURE — 665999 HH PPS REVENUE DEBIT

## 2019-11-14 PROCEDURE — 665998 HH PPS REVENUE CREDIT

## 2019-11-14 PROCEDURE — 665999 HH PPS REVENUE DEBIT

## 2019-11-15 PROCEDURE — 665998 HH PPS REVENUE CREDIT

## 2019-11-15 PROCEDURE — 665999 HH PPS REVENUE DEBIT

## 2019-11-16 PROCEDURE — 665998 HH PPS REVENUE CREDIT

## 2019-11-16 PROCEDURE — 665999 HH PPS REVENUE DEBIT

## 2019-11-17 PROCEDURE — 665998 HH PPS REVENUE CREDIT

## 2019-11-17 PROCEDURE — 665999 HH PPS REVENUE DEBIT

## 2019-11-18 PROCEDURE — 665998 HH PPS REVENUE CREDIT

## 2019-11-18 PROCEDURE — 665999 HH PPS REVENUE DEBIT

## 2019-11-19 ENCOUNTER — HOME CARE VISIT (OUTPATIENT)
Dept: HOME HEALTH SERVICES | Facility: HOME HEALTHCARE | Age: 74
End: 2019-11-19
Payer: MEDICARE

## 2019-11-19 PROCEDURE — 665998 HH PPS REVENUE CREDIT

## 2019-11-19 PROCEDURE — 665999 HH PPS REVENUE DEBIT

## 2019-11-19 PROCEDURE — G0299 HHS/HOSPICE OF RN EA 15 MIN: HCPCS

## 2019-11-20 VITALS
SYSTOLIC BLOOD PRESSURE: 132 MMHG | RESPIRATION RATE: 18 BRPM | OXYGEN SATURATION: 92 % | TEMPERATURE: 97.2 F | DIASTOLIC BLOOD PRESSURE: 60 MMHG | HEART RATE: 88 BPM

## 2019-11-20 PROCEDURE — 665998 HH PPS REVENUE CREDIT

## 2019-11-20 PROCEDURE — 665999 HH PPS REVENUE DEBIT

## 2019-11-20 ASSESSMENT — ENCOUNTER SYMPTOMS
SHORTNESS OF BREATH: T
NAUSEA: DENIES
VOMITING: DENIES

## 2019-11-21 PROCEDURE — 665999 HH PPS REVENUE DEBIT

## 2019-11-21 PROCEDURE — 665998 HH PPS REVENUE CREDIT

## 2019-11-22 PROCEDURE — 665999 HH PPS REVENUE DEBIT

## 2019-11-22 PROCEDURE — 665998 HH PPS REVENUE CREDIT

## 2019-11-23 PROCEDURE — 665998 HH PPS REVENUE CREDIT

## 2019-11-23 PROCEDURE — 665999 HH PPS REVENUE DEBIT

## 2019-11-24 PROCEDURE — 665998 HH PPS REVENUE CREDIT

## 2019-11-24 PROCEDURE — 665999 HH PPS REVENUE DEBIT

## 2019-11-25 PROCEDURE — 665998 HH PPS REVENUE CREDIT

## 2019-11-25 PROCEDURE — 665999 HH PPS REVENUE DEBIT

## 2019-11-26 ENCOUNTER — HOME CARE VISIT (OUTPATIENT)
Dept: HOME HEALTH SERVICES | Facility: HOME HEALTHCARE | Age: 74
End: 2019-11-26
Payer: MEDICARE

## 2019-11-26 VITALS
HEART RATE: 99 BPM | TEMPERATURE: 97.4 F | RESPIRATION RATE: 16 BRPM | DIASTOLIC BLOOD PRESSURE: 62 MMHG | OXYGEN SATURATION: 98 % | SYSTOLIC BLOOD PRESSURE: 106 MMHG

## 2019-11-26 PROCEDURE — 665998 HH PPS REVENUE CREDIT

## 2019-11-26 PROCEDURE — 665999 HH PPS REVENUE DEBIT

## 2019-11-26 PROCEDURE — G0493 RN CARE EA 15 MIN HH/HOSPICE: HCPCS

## 2019-11-26 ASSESSMENT — ENCOUNTER SYMPTOMS: LIMITED RANGE OF MOTION: 1

## 2019-11-27 PROCEDURE — 665998 HH PPS REVENUE CREDIT

## 2019-11-27 PROCEDURE — 665999 HH PPS REVENUE DEBIT

## 2019-11-28 PROCEDURE — 665999 HH PPS REVENUE DEBIT

## 2019-11-28 PROCEDURE — 665998 HH PPS REVENUE CREDIT

## 2019-11-29 PROCEDURE — 665999 HH PPS REVENUE DEBIT

## 2019-11-29 PROCEDURE — 665998 HH PPS REVENUE CREDIT

## 2019-11-30 PROCEDURE — 665999 HH PPS REVENUE DEBIT

## 2019-11-30 PROCEDURE — 665998 HH PPS REVENUE CREDIT

## 2019-12-01 PROCEDURE — 665999 HH PPS REVENUE DEBIT

## 2019-12-01 PROCEDURE — 665998 HH PPS REVENUE CREDIT

## 2019-12-02 PROCEDURE — 665998 HH PPS REVENUE CREDIT

## 2019-12-02 PROCEDURE — 665999 HH PPS REVENUE DEBIT

## 2019-12-03 PROCEDURE — 665999 HH PPS REVENUE DEBIT

## 2019-12-03 PROCEDURE — 665998 HH PPS REVENUE CREDIT

## 2019-12-04 ENCOUNTER — HOME CARE VISIT (OUTPATIENT)
Dept: HOME HEALTH SERVICES | Facility: HOME HEALTHCARE | Age: 74
End: 2019-12-04
Payer: MEDICARE

## 2019-12-04 VITALS
RESPIRATION RATE: 18 BRPM | DIASTOLIC BLOOD PRESSURE: 64 MMHG | SYSTOLIC BLOOD PRESSURE: 112 MMHG | HEART RATE: 88 BPM | TEMPERATURE: 97.9 F | OXYGEN SATURATION: 94 %

## 2019-12-04 PROCEDURE — 665999 HH PPS REVENUE DEBIT

## 2019-12-04 PROCEDURE — 665998 HH PPS REVENUE CREDIT

## 2019-12-04 PROCEDURE — G0495 RN CARE TRAIN/EDU IN HH: HCPCS

## 2019-12-04 ASSESSMENT — ENCOUNTER SYMPTOMS
NAUSEA: DENIES C/O NAUSEA
VOMITING: DENIES ANY VOMITING

## 2019-12-05 PROCEDURE — 665999 HH PPS REVENUE DEBIT

## 2019-12-05 PROCEDURE — 665998 HH PPS REVENUE CREDIT

## 2019-12-06 ENCOUNTER — OUTPATIENT INFUSION SERVICES (OUTPATIENT)
Dept: ONCOLOGY | Facility: MEDICAL CENTER | Age: 74
End: 2019-12-06
Attending: INTERNAL MEDICINE
Payer: MEDICARE

## 2019-12-06 VITALS
BODY MASS INDEX: 26.39 KG/M2 | TEMPERATURE: 97.2 F | SYSTOLIC BLOOD PRESSURE: 136 MMHG | RESPIRATION RATE: 18 BRPM | WEIGHT: 139.77 LBS | HEART RATE: 96 BPM | HEIGHT: 61 IN | DIASTOLIC BLOOD PRESSURE: 66 MMHG | OXYGEN SATURATION: 94 %

## 2019-12-06 PROCEDURE — 665998 HH PPS REVENUE CREDIT

## 2019-12-06 PROCEDURE — 665999 HH PPS REVENUE DEBIT

## 2019-12-06 PROCEDURE — 96372 THER/PROPH/DIAG INJ SC/IM: CPT

## 2019-12-06 PROCEDURE — 700111 HCHG RX REV CODE 636 W/ 250 OVERRIDE (IP): Mod: JG | Performed by: INTERNAL MEDICINE

## 2019-12-06 RX ORDER — AZITHROMYCIN 250 MG/1
TABLET, FILM COATED ORAL
COMMUNITY
Start: 2019-12-03 | End: 2020-01-10

## 2019-12-06 RX ADMIN — MEPOLIZUMAB 100 MG: 100 INJECTION, POWDER, FOR SOLUTION SUBCUTANEOUS at 11:28

## 2019-12-06 NOTE — PROGRESS NOTES
Nucala administered. Tolerated well. Discharged home to self care in no distress. at this time. Next appointment in place.

## 2019-12-06 NOTE — PROGRESS NOTES
Here for Nucala injection today. Denies any complaints. Is on preventative antibiotic azythromycin at this time. Last pill tomorrow. Dr. Hickey notified and is ok to proceed with injection today. Chart to pharmacy. Awaiting medications.

## 2019-12-07 PROCEDURE — 665999 HH PPS REVENUE DEBIT

## 2019-12-07 PROCEDURE — 665998 HH PPS REVENUE CREDIT

## 2019-12-08 PROCEDURE — 665998 HH PPS REVENUE CREDIT

## 2019-12-08 PROCEDURE — 665999 HH PPS REVENUE DEBIT

## 2019-12-09 PROCEDURE — 665998 HH PPS REVENUE CREDIT

## 2019-12-09 PROCEDURE — 665999 HH PPS REVENUE DEBIT

## 2019-12-10 ENCOUNTER — HOME CARE VISIT (OUTPATIENT)
Dept: HOME HEALTH SERVICES | Facility: HOME HEALTHCARE | Age: 74
End: 2019-12-10
Payer: MEDICARE

## 2019-12-10 VITALS
HEART RATE: 100 BPM | SYSTOLIC BLOOD PRESSURE: 122 MMHG | RESPIRATION RATE: 18 BRPM | OXYGEN SATURATION: 95 % | DIASTOLIC BLOOD PRESSURE: 76 MMHG | TEMPERATURE: 97.3 F

## 2019-12-10 PROCEDURE — 665998 HH PPS REVENUE CREDIT

## 2019-12-10 PROCEDURE — G0493 RN CARE EA 15 MIN HH/HOSPICE: HCPCS

## 2019-12-10 PROCEDURE — 665999 HH PPS REVENUE DEBIT

## 2019-12-10 ASSESSMENT — ACTIVITIES OF DAILY LIVING (ADL)
OASIS_M1830: 00
HOME_HEALTH_OASIS: 00

## 2019-12-10 ASSESSMENT — PATIENT HEALTH QUESTIONNAIRE - PHQ9: CLINICAL INTERPRETATION OF PHQ2 SCORE: 0

## 2019-12-11 PROCEDURE — 665998 HH PPS REVENUE CREDIT

## 2019-12-11 PROCEDURE — 665999 HH PPS REVENUE DEBIT

## 2019-12-12 PROCEDURE — 665998 HH PPS REVENUE CREDIT

## 2019-12-12 PROCEDURE — 665999 HH PPS REVENUE DEBIT

## 2019-12-13 PROCEDURE — 665999 HH PPS REVENUE DEBIT

## 2019-12-13 PROCEDURE — 665998 HH PPS REVENUE CREDIT

## 2019-12-14 PROCEDURE — 665999 HH PPS REVENUE DEBIT

## 2019-12-14 PROCEDURE — 665998 HH PPS REVENUE CREDIT

## 2019-12-15 PROCEDURE — 665999 HH PPS REVENUE DEBIT

## 2019-12-15 PROCEDURE — 665998 HH PPS REVENUE CREDIT

## 2019-12-16 PROCEDURE — 665999 HH PPS REVENUE DEBIT

## 2019-12-16 PROCEDURE — 665998 HH PPS REVENUE CREDIT

## 2019-12-17 PROCEDURE — 665999 HH PPS REVENUE DEBIT

## 2019-12-17 PROCEDURE — 665998 HH PPS REVENUE CREDIT

## 2019-12-23 ENCOUNTER — HOSPITAL ENCOUNTER (OUTPATIENT)
Dept: RADIOLOGY | Facility: MEDICAL CENTER | Age: 74
End: 2019-12-23
Attending: INTERNAL MEDICINE
Payer: MEDICARE

## 2019-12-23 DIAGNOSIS — Z12.31 VISIT FOR SCREENING MAMMOGRAM: ICD-10-CM

## 2019-12-23 PROCEDURE — 77063 BREAST TOMOSYNTHESIS BI: CPT

## 2019-12-30 ENCOUNTER — PHYSICAL THERAPY (OUTPATIENT)
Dept: PHYSICAL THERAPY | Facility: REHABILITATION | Age: 74
End: 2019-12-30
Attending: NEUROLOGICAL SURGERY
Payer: MEDICARE

## 2019-12-30 DIAGNOSIS — M48.061 SPINAL STENOSIS, LUMBAR REGION WITHOUT NEUROGENIC CLAUDICATION: ICD-10-CM

## 2019-12-30 PROCEDURE — 97162 PT EVAL MOD COMPLEX 30 MIN: CPT

## 2019-12-30 PROCEDURE — 97110 THERAPEUTIC EXERCISES: CPT

## 2019-12-30 SDOH — ECONOMIC STABILITY: GENERAL: QUALITY OF LIFE: GOOD

## 2019-12-30 ASSESSMENT — ENCOUNTER SYMPTOMS
EXACERBATED BY: LIFTING
ALLEVIATING FACTORS: EXERCISE
QUALITY: NUMBNESS
EXACERBATED BY: CARRYING
PAIN TIMING: INTERMITTENT
ALLEVIATING FACTORS: MOVEMENT
PAIN SCALE: 0
EXACERBATED BY: BENDING
PAIN SCALE AT LOWEST: 0
QUALITY: DISCOMFORT
PAIN SCALE AT HIGHEST: 0

## 2019-12-30 NOTE — OP THERAPY EVALUATION
Outpatient Physical Therapy  INITIAL EVALUATION    [facility] Physical Therapy [place] [id number], [address]  [place] [address]  Phone:  [phone]    Date of Evaluation: 12/30/2019    Patient: Rossy Lopez  YOB: 1945  MRN: [id number]     Referring Provider: Jovani Alvares M.D.  [address]  [place] [id number]   Referring Diagnosis Spinal stenosis, lumbar region without neurogenic claudication [M48.061]     Time Calculation  Start time: 1330  Stop time: 1400 Time Calculation (min): 30 minutes       Physical Therapy Occurrence Codes    Date of onset of impairment:  10/26/19   Date physical therapy care plan established or reviewed:  12/30/19   Date physical therapy treatment started:  12/30/19          Chief Complaint: Back Problem and Back Injury    Visit Diagnoses     ICD-10-CM   1. Spinal stenosis, lumbar region without neurogenic claudication M48.061         Subjective:   History of Present Illness:     Date of onset:  10/26/2019    Date of surgery:  10/15/2019    Mechanism of injury:  The patient is a 74 year old female who is post op 10/15/2019 for lumbar fusion L4-L5, L5-S1. The patient had home health physical therapy 2-3 days later and had physial therapy for 4-5 weeks. She initially had sciatica to the (L) side, had epidurals but did not have any pain relief and scheduled surgery. She has not had the previous level of pain she encountered previously and has been able to walk further as time goes. She is limited with walking distances, cannot get laundry out of dryer. She is currently wearing a lumbar brace for activity on her feet or exercising . She has paresthesia to the (L) LE to the bottom of her foot and the outside of her (L) leg occasionally but this goes away if she moves. She no longer uses the walker for walking. She used to sleep in the recliner but is now sleeping in her own bed. She has hx of scoliosis in her thoracolumbar region.   Quality of life:  Good  Sleep  disturbance:  Not disrupted  Pain:     Current pain ratin    At best pain ratin    At worst pain ratin    Quality:  Discomfort and numbness    Pain timing:  Intermittent    Relieving factors:  Exercise and movement    Aggravating factors:  Carrying, bending and lifting    Pain Comments::  Limited with bending and lifting activity at this point.  Social Support:     Lives in:  One-story house  Patient Goals:     Other patient goals:  To able to walk like she used to and be able to go fishing walking further distance       Past Medical History:   Diagnosis Date   • Anesthesia     difficult to wake and extended sleep (couple of days)   • Asthma     prn inhalers   • Cataract 2016    bilat iol   • Delayed emergence from general anesthesia    • Diabetes    • Diabetes mellitus type 2, controlled (HCC) 2012    oral medication   • Family history of colon cancer 2012   • Heart burn    • High cholesterol    • Hyperlipidemia    • Hypertension    • Pain     hips     Past Surgical History:   Procedure Laterality Date   • LUMBAR FUSION POSTERIOR  10/15/2019    Procedure: POSTERIOR LUMBAR 4 - 5 DECOMPRESSION AND TRANSFORAMINAL INTERBODY FUSION;  Surgeon: Jovani Alvares M.D.;  Location: SURGERY [facility];  Service: Neurosurgery   • LUMBAR DECOMPRESSION  10/15/2019    Procedure: POSTERIOR LUMBAR 4 - 5 DECOMPRESSION;  Surgeon: Jovani Alvares M.D.;  Location: [facility];  Service: Neurosurgery   • CARPAL TUNNEL RELEASE Right 2016    Procedure: CARPAL TUNNEL RELEASE;  Surgeon: Orlando Hunter M.D.;  Location: [facility];  Service:    • INJ,EPIDURAL/LUMB/SAC SINGLE  2014    Performed by Cale Alejandro M.D. at SURGERY [facility]   • ABDOMINAL HYSTERECTOMY TOTAL         • TONSILLECTOMY       Social History     Tobacco Use   • Smoking status: Former Smoker     Packs/day: 1.00     Years: 5.00     Pack years: 5.00     Types: Cigarettes     Last attempt to quit:  1970     Years since quittin.0   • Smokeless tobacco: Never Used   • Tobacco comment: quit    Substance Use Topics   • Alcohol use: Not Currently     Alcohol/week: 0.0 oz     Comment: occasionally     Family and Occupational History     Patient does not qualify to have social determinant information on file (likely too young).   Socioeconomic History   • Marital status:      Spouse name: Not on file   • Number of children: Not on file   • Years of education: Not on file   • Highest education level: Not on file   Occupational History   • Not on file       Objective     Static Posture   General Observations  Asymmetrical weight bearing and scoliosis.     Thoracic Spine  Concave curve right.    Lumbar Spine   Decreased lordosis.   Lumbar spine (Right): Concave curve.      Pelvis   Pelvis (Left): Depressed.   Pelvis (Right): Elevated.     Postural Observations  Seated posture: fair  Standing posture: fair    Additional Postural Observation Details  C-curve (R) at thoracic level     Active Range of Motion     Lumbar   Flexion: decreased  Extension: within functional limits  Left lateral flexion: within functional limits  Right lateral flexion: decreased  Left rotation: decreased  Right rotation: within functional limits    Additional Active Range of Motion Details  Difficulty with (L) trunk rotation and side-bending to the (R) side; bilateral hamstring tightness during SLR's ~ 60-70 deg with connective tissue restriction    Joint Play   Spine     Unilateral PA Glide (left)        L1: hypomobile       L2: hypomobile       L3: hypomobile       L4: hypomobile       L5: hypomobile    Unilateral PA Glide (right)        T12: hypomobile       L1: hypomobile       L2: hypomobile       L3: hypomobile       L4: hypomobile       L5: hypomobile        Strength:      Abdominals   Left: 4+  Right: 4+  Lower abdominals: Able to maintain neutral statically    Back   Flexion: 4  Extension: 4-  Lateral bend left:  4+  Lateral bend right: 4+    Left Hip   Planes of Motion   Flexion: 4+  Extension: 5  Abduction: 5  Adduction: 5    Right Hip   Planes of Motion   Flexion: 4+  Extension: 4+  Abduction: 5  Adduction: 5    Left Knee   Flexion: 5  Extension: 5    Right Knee   Flexion: 5  Extension: 5    Left Ankle/Foot   Dorsiflexion: 5  Plantar flexion: 5    Right Ankle/Foot   Plantar flexion: 5    Functional Assessment   Squat   Trunk lean left.     Single Leg Squat   Left Leg  Left trunk side bending.     Right Leg  Not right trunk side bending.     Single Leg Stance   Left: 10 seconds  Right: 10 seconds    Single Leg Stance - Eyes Open   Left  Trial 1: 5 seconds    Right  Trial 1: 5 seconds    Single Leg Stance - Eyes Closed   Left  Trial 1: 0 seconds    Right  Trial 1: 0 seconds        Therapeutic Exercises (CPT 39395):     1. Posterior pelvic tilts, 1 x10 reps, hip soreness bilaterally    2. Bridges;, 1 x 5 reps , developed glute hip soreness/ LB discomfort    Therapeutic Treatments and Modalities:     1. Manual Therapy (CPT 55699), Lumbar , P/A's mobilizations UPA's grade 2; L1-L5 hypomobile     Time-based treatments/modalities:  Manual therapy minutes (CPT 65660): 5 minutes  Therapeutic exercise minutes (CPT 05180): 8 minutes       Assessment, Response and Plan:   Impairments: abnormal ADL function, activity intolerance, limited mobility and weight-bearing intolerance    Assessment details:  The patient is a 74 year old female post op lumbar fusion L4-L5, L5-S1 ~10/15/2019. The patient has presents with lumbar brace and observed to have C-curve (R) scoliosis to the thoracic spine at levels T4-T7. The patient currently has difficulty with lifting and bending at the low back to get laundry out of the clothes dryer. She cannot walk extended distances yet due to pain and fatigue to her low back. She has bilateral hamstring tightness and tightness/hypomobility at lumbar L1-L5 upon palpation. She has limited trunk motion at lumbar  side-bending (R) and (L) trunk rotation. Limitations in trunk strength to the upper and lower abdominals during testing. Patient would benefit from skilled Physical Therapy following post op lumbar fusion to improve activity toleration, increased strength and conditioning, AROM and postural modification education to continue to protect her spine in the future. She would like to be able to go for longer walks and go fishing with less fatigue response.     Barriers to therapy:  Hearing  Goals:   Short Term Goals:   1) Indep HEP  2) Able to go up and down the steps wit less reliance on support from the handrails  3) Bends and kneels down 25-50% of the time without pain   Short term goal time span:  2-4 weeks      Long Term Goals:    1) Progression/ regression advancing in HEP  2) Negotiates steps reciprically with less hesitation and improved balance   3) Able to walk in the community longer and reach 10,000 steps with out pain   Long term goal time span:  4-6 weeks    Plan:   Therapy options:  Physical therapy treatment to continue  Planned therapy interventions:  E Stim Unattended (CPT 14199), Functional Training, Self Care (CPT 64364), Manual Therapy (CPT 35628), Neuromuscular Re-education (CPT 77494), Self Care ADL Training (CPT 06805), Therapeutic Exercise (CPT 97518) and Therapeutic Activities (CPT 67842)  Frequency:  2x week  Duration in weeks:  6  Duration in visits:  12  Discussed with:  Patient      Functional Assessment Used        Referring provider co-signature:  I have reviewed this plan of care and my co-signature certifies the need for services.  Certification Dates:   From 12/30/19   To 02/10/20    Physician Signature: ________________________________ Date: ______________

## 2020-01-06 ENCOUNTER — PHYSICAL THERAPY (OUTPATIENT)
Dept: PHYSICAL THERAPY | Facility: REHABILITATION | Age: 75
End: 2020-01-06
Attending: NEUROLOGICAL SURGERY
Payer: MEDICARE

## 2020-01-06 DIAGNOSIS — M48.061 SPINAL STENOSIS, LUMBAR REGION WITHOUT NEUROGENIC CLAUDICATION: ICD-10-CM

## 2020-01-06 PROCEDURE — 97110 THERAPEUTIC EXERCISES: CPT

## 2020-01-06 PROCEDURE — 97140 MANUAL THERAPY 1/> REGIONS: CPT

## 2020-01-06 NOTE — OP THERAPY DAILY TREATMENT
"  Outpatient Physical Therapy  DAILY TREATMENT     [facility] Outpatient Physical Therapy [address], [address]  [place] [address]  Phone:  [phone]    Date: 01/06/2020    Patient: Rossy Lopez  YOB: 1945  MRN: [id number]     Time Calculation  Start time: 1400  Stop time: 1430 Time Calculation (min): 30 minutes       Chief Complaint: Back Problem    Visit #: 2    SUBJECTIVE:  The patient reports no pain currently to the back of (L) LE at this time, but she had some discomfort to the low back and (R) LE.. She continues to perform her HEP     OBJECTIVE:  Current objective measures:     AROM to the trunk      Therapeutic Exercises (CPT 78584):     1. Posterior pelvic tilts, 1 x10 reps, hip soreness bilaterally    2. Bridges, 1 x 5 reps , developed glute hip soreness/ LB discomfort    3. Seated trunk flexion , 3 x30 sec, \"feels good\"    4. Child's pose    Therapeutic Treatments and Modalities:     1. Manual Therapy (CPT 88794), Lumbar , P/A's mobilizations UPA's grade 2; L1-L5 hypomobile     Time-based treatments/modalities:  Manual therapy minutes (CPT 80017): 15 minutes  Therapeutic exercise minutes (CPT 92904): 15 minutes       ASSESSMENT:   Response to treatment:   Increased trunk flexion with less connective tissue resistance following treatment session and no pain; tolerated well patiet able to sit and reach down to tie her shoes with less discomfort both in trunk flexion and returning into seated position     PLAN/RECOMMENDATIONS:   Plan for treatment: therapy treatment to continue next visit.  Planned interventions for next visit: continue with current treatment and prosthetic training (CPT 68404).       "

## 2020-01-08 ENCOUNTER — PHYSICAL THERAPY (OUTPATIENT)
Dept: PHYSICAL THERAPY | Facility: REHABILITATION | Age: 75
End: 2020-01-08
Attending: NEUROLOGICAL SURGERY
Payer: MEDICARE

## 2020-01-08 ENCOUNTER — HOSPITAL ENCOUNTER (OUTPATIENT)
Dept: LAB | Facility: MEDICAL CENTER | Age: 75
End: 2020-01-08
Attending: INTERNAL MEDICINE
Payer: MEDICARE

## 2020-01-08 DIAGNOSIS — M48.061 SPINAL STENOSIS, LUMBAR REGION WITHOUT NEUROGENIC CLAUDICATION: ICD-10-CM

## 2020-01-08 LAB
25(OH)D3 SERPL-MCNC: 45 NG/ML (ref 30–100)
ALBUMIN SERPL BCP-MCNC: 4 G/DL (ref 3.2–4.9)
ALBUMIN/GLOB SERPL: 1.5 G/DL
ALP SERPL-CCNC: 99 U/L (ref 30–99)
ALT SERPL-CCNC: 13 U/L (ref 2–50)
ANION GAP SERPL CALC-SCNC: 11 MMOL/L (ref 0–11.9)
AST SERPL-CCNC: 13 U/L (ref 12–45)
BASOPHILS # BLD AUTO: 0.6 % (ref 0–1.8)
BASOPHILS # BLD: 0.04 K/UL (ref 0–0.12)
BILIRUB SERPL-MCNC: 0.3 MG/DL (ref 0.1–1.5)
BUN SERPL-MCNC: 8 MG/DL (ref 8–22)
CALCIUM SERPL-MCNC: 9.3 MG/DL (ref 8.5–10.5)
CHLORIDE SERPL-SCNC: 98 MMOL/L (ref 96–112)
CHOLEST SERPL-MCNC: 170 MG/DL (ref 100–199)
CO2 SERPL-SCNC: 30 MMOL/L (ref 20–33)
CREAT SERPL-MCNC: 0.52 MG/DL (ref 0.5–1.4)
CREAT UR-MCNC: 70.3 MG/DL
EOSINOPHIL # BLD AUTO: 0.05 K/UL (ref 0–0.51)
EOSINOPHIL NFR BLD: 0.7 % (ref 0–6.9)
ERYTHROCYTE [DISTWIDTH] IN BLOOD BY AUTOMATED COUNT: 40.8 FL (ref 35.9–50)
EST. AVERAGE GLUCOSE BLD GHB EST-MCNC: 157 MG/DL
FASTING STATUS PATIENT QL REPORTED: NORMAL
GLOBULIN SER CALC-MCNC: 2.7 G/DL (ref 1.9–3.5)
GLUCOSE SERPL-MCNC: 133 MG/DL (ref 65–99)
HBA1C MFR BLD: 7.1 % (ref 0–5.6)
HCT VFR BLD AUTO: 42.9 % (ref 37–47)
HDLC SERPL-MCNC: 64 MG/DL
HGB BLD-MCNC: 14 G/DL (ref 12–16)
IMM GRANULOCYTES # BLD AUTO: 0.02 K/UL (ref 0–0.11)
IMM GRANULOCYTES NFR BLD AUTO: 0.3 % (ref 0–0.9)
LDLC SERPL CALC-MCNC: 81 MG/DL
LYMPHOCYTES # BLD AUTO: 2.43 K/UL (ref 1–4.8)
LYMPHOCYTES NFR BLD: 35.3 % (ref 22–41)
MCH RBC QN AUTO: 29.2 PG (ref 27–33)
MCHC RBC AUTO-ENTMCNC: 32.6 G/DL (ref 33.6–35)
MCV RBC AUTO: 89.6 FL (ref 81.4–97.8)
MICROALBUMIN UR-MCNC: <0.7 MG/DL
MICROALBUMIN/CREAT UR: NORMAL MG/G (ref 0–30)
MONOCYTES # BLD AUTO: 0.55 K/UL (ref 0–0.85)
MONOCYTES NFR BLD AUTO: 8 % (ref 0–13.4)
NEUTROPHILS # BLD AUTO: 3.8 K/UL (ref 2–7.15)
NEUTROPHILS NFR BLD: 55.1 % (ref 44–72)
NRBC # BLD AUTO: 0 K/UL
NRBC BLD-RTO: 0 /100 WBC
PLATELET # BLD AUTO: 358 K/UL (ref 164–446)
PMV BLD AUTO: 9.3 FL (ref 9–12.9)
POTASSIUM SERPL-SCNC: 3.2 MMOL/L (ref 3.6–5.5)
PROT SERPL-MCNC: 6.7 G/DL (ref 6–8.2)
RBC # BLD AUTO: 4.79 M/UL (ref 4.2–5.4)
SODIUM SERPL-SCNC: 139 MMOL/L (ref 135–145)
TRIGL SERPL-MCNC: 123 MG/DL (ref 0–149)
WBC # BLD AUTO: 6.9 K/UL (ref 4.8–10.8)

## 2020-01-08 PROCEDURE — 97110 THERAPEUTIC EXERCISES: CPT

## 2020-01-08 PROCEDURE — 97140 MANUAL THERAPY 1/> REGIONS: CPT

## 2020-01-08 PROCEDURE — 82570 ASSAY OF URINE CREATININE: CPT

## 2020-01-08 PROCEDURE — 82043 UR ALBUMIN QUANTITATIVE: CPT

## 2020-01-08 PROCEDURE — 80061 LIPID PANEL: CPT

## 2020-01-08 PROCEDURE — 82306 VITAMIN D 25 HYDROXY: CPT

## 2020-01-08 PROCEDURE — 36415 COLL VENOUS BLD VENIPUNCTURE: CPT

## 2020-01-08 PROCEDURE — 83036 HEMOGLOBIN GLYCOSYLATED A1C: CPT | Mod: GA

## 2020-01-08 PROCEDURE — 80053 COMPREHEN METABOLIC PANEL: CPT

## 2020-01-08 PROCEDURE — 85025 COMPLETE CBC W/AUTO DIFF WBC: CPT

## 2020-01-08 NOTE — OP THERAPY DAILY TREATMENT
"  Outpatient Physical Therapy  DAILY TREATMENT     [facility] Outpatient Physical Therapy [address] [facility], [address]  [place] [address]  Phone:  [phone]    Date: 01/08/2020    Patient: Rossy Lopez  YOB: 1945  MRN: [id number]     Time Calculation  Start time: 1330  Stop time: 1400 Time Calculation (min): 30 minutes       Chief Complaint: Back Problem    Visit #: 3    SUBJECTIVE:  The patient reports her hips are a little sore but the back of her legs are feeling better. The back is a little tight today.    OBJECTIVE:  Current objective measures:     AROM to the trunk; tightness to the lumbar       Therapeutic Exercises (CPT 71081):     1. Posterior pelvic tilts, 1 x10 reps, hip soreness bilaterally    2. Bridges, 1 x 5 reps , developed glute hip soreness/ LB discomfort    3. Seated trunk flexion , 3 x30 sec, \"feels good\"    4. Child's pose, 3 x30 sec    5. Clamshells    6. Prone alternating LE lifts     Therapeutic Treatments and Modalities:     1. Manual Therapy (CPT 44984), Lumbar , P/A's mobilizations UPA's grade 2-3; L2-L3 hypomobile with tenderness at L3     Time-based treatments/modalities:  Manual therapy minutes (CPT 33542): 15 minutes  Therapeutic exercise minutes (CPT 39566): 15 minutes         ASSESSMENT:   Response to treatment:   Performed lumbar UPA 's grade 2-3 to L1-L3; hypomobility at L2-L3; increased sensitivity to the (R)  side at L3; increased AROM to the trunk in flexion by ~ 25% with less tightness to the low back.    PLAN/RECOMMENDATIONS:   Plan for treatment: therapy treatment to continue next visit.  Planned interventions for next visit: continue with current treatment and sterile whirlpool (CPT 60526).       "

## 2020-01-10 ENCOUNTER — OUTPATIENT INFUSION SERVICES (OUTPATIENT)
Dept: ONCOLOGY | Facility: MEDICAL CENTER | Age: 75
End: 2020-01-10
Attending: NEUROLOGICAL SURGERY
Payer: MEDICARE

## 2020-01-10 VITALS
DIASTOLIC BLOOD PRESSURE: 73 MMHG | RESPIRATION RATE: 18 BRPM | TEMPERATURE: 97.7 F | BODY MASS INDEX: 26.33 KG/M2 | SYSTOLIC BLOOD PRESSURE: 142 MMHG | HEART RATE: 95 BPM | OXYGEN SATURATION: 92 % | WEIGHT: 143.08 LBS | HEIGHT: 62 IN

## 2020-01-10 PROCEDURE — 96372 THER/PROPH/DIAG INJ SC/IM: CPT

## 2020-01-10 PROCEDURE — 700111 HCHG RX REV CODE 636 W/ 250 OVERRIDE (IP): Mod: JG | Performed by: INTERNAL MEDICINE

## 2020-01-10 RX ADMIN — MEPOLIZUMAB 100 MG: 100 INJECTION, POWDER, FOR SOLUTION SUBCUTANEOUS at 15:20

## 2020-01-10 NOTE — PROGRESS NOTES
Patient seen today for Nucala. Plan of care reviewed. Nucala subcutaneous injection given. Tolerated well. Future appointments reviewed and written appointments provided.

## 2020-01-13 ENCOUNTER — PHYSICAL THERAPY (OUTPATIENT)
Dept: PHYSICAL THERAPY | Facility: REHABILITATION | Age: 75
End: 2020-01-13
Attending: NEUROLOGICAL SURGERY
Payer: MEDICARE

## 2020-01-13 DIAGNOSIS — M48.061 SPINAL STENOSIS, LUMBAR REGION WITHOUT NEUROGENIC CLAUDICATION: ICD-10-CM

## 2020-01-13 PROCEDURE — 97110 THERAPEUTIC EXERCISES: CPT

## 2020-01-13 PROCEDURE — 97140 MANUAL THERAPY 1/> REGIONS: CPT

## 2020-01-13 NOTE — OP THERAPY DAILY TREATMENT
"  Outpatient Physical Therapy  DAILY TREATMENT     [facility] Outpatient Physical Therapy [address], [address]  [place] [address]  Phone:  [phone]    Date: 01/13/2020    Patient: Rossy Lopez  YOB: 1945  MRN: [id number]     Time Calculation  Start time: 1130  Stop time: 1200 Time Calculation (min): 30 minutes       Chief Complaint: Back Problem    Visit #: 4    SUBJECTIVE:  The patient reports feeling a \"kink\" to the (L) side of her back; experienced a \"quick\" sharpness to her lower back last week following.     OBJECTIVE:  Current objective measures:     tenderness and tightness to the lower back and (L) hip; limitations in hip extension      Therapeutic Exercises (CPT 94446):     1. Posterior pelvic tilts, 1 x10 reps, hip soreness bilaterally    2. Bridges, 1 x 8 reps , developed glute hip soreness/ LB discomfort    3. Seated trunk flexion , 3 x30 sec, \"feels good\"    4. Child's pose, 3 x30 sec    5. Clamshells    6. Prone alternating LE lifts     7. Trunk rotations , 3 x30 sec    8. Knees to chest , 3 x30 sec    Therapeutic Treatments and Modalities:     1. Manual Therapy (CPT 55348), Lumbar , P/A's mobilizations UPA's grade 2-3; L2-L3 hypomobile with tenderness at L3     Time-based treatments/modalities:  Manual therapy minutes (CPT 50569): 10 minutes  Therapeutic exercise minutes (CPT 53860): 20 minutes         ASSESSMENT:   Response to treatment:   Performed STM to the lumbar paraspinals; crepitus response at level ~L3 to the (L) side; scar tissue release to soft connective tissue. Patient exhibited hypomobility at L1-L2 during UPA's; decreased as continuation of manual therapy proceeded.    PLAN/RECOMMENDATIONS:   Plan for treatment: therapy treatment to continue next visit.  Planned interventions for next visit: continue with current treatment.       "

## 2020-01-15 ENCOUNTER — PHYSICAL THERAPY (OUTPATIENT)
Dept: PHYSICAL THERAPY | Facility: REHABILITATION | Age: 75
End: 2020-01-15
Attending: NEUROLOGICAL SURGERY
Payer: MEDICARE

## 2020-01-15 DIAGNOSIS — M48.061 SPINAL STENOSIS, LUMBAR REGION WITHOUT NEUROGENIC CLAUDICATION: ICD-10-CM

## 2020-01-15 PROCEDURE — 97140 MANUAL THERAPY 1/> REGIONS: CPT

## 2020-01-15 PROCEDURE — 97110 THERAPEUTIC EXERCISES: CPT

## 2020-01-20 ENCOUNTER — PHYSICAL THERAPY (OUTPATIENT)
Dept: PHYSICAL THERAPY | Facility: REHABILITATION | Age: 75
End: 2020-01-20
Attending: NEUROLOGICAL SURGERY
Payer: MEDICARE

## 2020-01-20 DIAGNOSIS — M48.061 SPINAL STENOSIS, LUMBAR REGION WITHOUT NEUROGENIC CLAUDICATION: ICD-10-CM

## 2020-01-20 PROCEDURE — 97110 THERAPEUTIC EXERCISES: CPT

## 2020-01-20 PROCEDURE — 97140 MANUAL THERAPY 1/> REGIONS: CPT

## 2020-01-20 NOTE — OP THERAPY DAILY TREATMENT
"  Outpatient Physical Therapy  DAILY TREATMENT     [facility] Outpatient Physical Therapy [address] [facility], [address]  [place] [address]  Phone:  [phone]    Date: 01/20/2020    Patient: Rossy Lopez  YOB: 1945  MRN: [id number]     Time Calculation  Start time: 1400  Stop time: 1430 Time Calculation (min): 30 minutes       Chief Complaint: Back Problem    Visit #: 6    SUBJECTIVE:  The patient reports having increased soreness and stiffness to the lower back and into her gluteal muscles    OBJECTIVE:  Current objective measures:       Tenderness to the low back; AROM in the trunk    Therapeutic Exercises (CPT 30384):     1. Posterior pelvic tilts, 1 x10 reps, used a towel across low back fr increased support    2. Bridges with/without  ball, 1 x 8 reps , developed glute hip soreness/ LB discomfort    3. Seated trunk flexion , 3 x30 sec, \"feels good\"    4. Child's pose, 3 x30 sec    5. Clamshells sidelying , 1 x10 reps    6. Prone alternating LE lifts , pain with HEP; stopped exercise  1/18/2020    7. Trunk rotations , 3 x30 sec    8. Squats     Therapeutic Treatments and Modalities:     1. Manual Therapy (CPT 42835), Lumbar , P/A's mobilizations UPA's grade 2-3; L2-L3 hypomobile with tenderness at L3     Time-based treatments/modalities:  Manual therapy minutes (CPT 07565): 15 minutes  Therapeutic exercise minutes (CPT 82571): 15 minutes         ASSESSMENT:   Response to treatment:   Patient experienced some increased tenderness at L4-5 bilaterally during P/A's; no symptoms at sacral region; less connective tissue tenderness following STM    PLAN/RECOMMENDATIONS:   Plan for treatment: therapy treatment to continue next visit.  Planned interventions for next visit: continue with current treatment.       "

## 2020-01-21 ENCOUNTER — HOSPITAL ENCOUNTER (OUTPATIENT)
Dept: LAB | Facility: MEDICAL CENTER | Age: 75
End: 2020-01-21
Attending: INTERNAL MEDICINE
Payer: MEDICARE

## 2020-01-21 LAB — POTASSIUM SERPL-SCNC: 3.9 MMOL/L (ref 3.6–5.5)

## 2020-01-21 PROCEDURE — 36415 COLL VENOUS BLD VENIPUNCTURE: CPT

## 2020-01-21 PROCEDURE — 84132 ASSAY OF SERUM POTASSIUM: CPT

## 2020-01-22 ENCOUNTER — PHYSICAL THERAPY (OUTPATIENT)
Dept: PHYSICAL THERAPY | Facility: REHABILITATION | Age: 75
End: 2020-01-22
Attending: NEUROLOGICAL SURGERY
Payer: MEDICARE

## 2020-01-22 DIAGNOSIS — M48.061 SPINAL STENOSIS, LUMBAR REGION WITHOUT NEUROGENIC CLAUDICATION: ICD-10-CM

## 2020-01-22 PROCEDURE — 97110 THERAPEUTIC EXERCISES: CPT

## 2020-01-22 PROCEDURE — 97140 MANUAL THERAPY 1/> REGIONS: CPT

## 2020-01-22 NOTE — OP THERAPY DAILY TREATMENT
"  Outpatient Physical Therapy  DAILY TREATMENT     [facility] Outpatient Physical Therapy [place] [address] [facility], [address]  [place] [address]  Phone:  [phone]    Date: 01/22/2020    Patient: Rossy Lopez  YOB: 1945  MRN: [id number]     Time Calculation  Start time: 1330  Stop time: 1400 Time Calculation (min): 30 minutes       Chief Complaint: Back Problem    Visit #: 7    SUBJECTIVE:  The patient reports less soreness to the low back today, but she experienced enzo to her hips from doing the last new exercise.     OBJECTIVE:  Current objective measures:     Trunk AROM; mobility and strengthening       Therapeutic Exercises (CPT 14381):     1. Posterior pelvic tilts, 1 x10 reps, used a towel across low back fr increased support    2. Bridges with/without  ball, 1 x 8 reps , developed glute hip soreness/ LB discomfort    3. Seated trunk flexion , 3 x30 sec, \"feels good\"    4. Child's pose, 3 x30 sec    5. Clamshells sidelying , 1 x10 reps, green tb    6. Prone alternating LE lifts , pain with HEP; stopped exercise  1/18/2020    7. Trunk rotations with theraball, 3 x30 sec    8. Squats with theraball, 1 x10 reps, decreased hip flexion    Therapeutic Treatments and Modalities:     1. Manual Therapy (CPT 06992), Lumbar , P/A's mobilizations UPA's grade 2-3; L2-L3 hypomobile with tenderness at L3     Time-based treatments/modalities:  Manual therapy minutes (CPT 47676): 10 minutes  Therapeutic exercise minutes (CPT 41874): 20 minutes         ASSESSMENT:   Response to treatment:   STM to the lumbar paraspinals; incisional tenderness at the lumbar level; less to the (L) side today; becoming easier to roll onto table and turn from side to side. Instruction on wall squats using ball; patient needed verbal cuing to address proper hip flexion during squat motion; fear avoidance using ball; prefers to do wall squats without ball for fear of falling. Next visit add bridges with fallouts using resistance " t-band.    PLAN/RECOMMENDATIONS:   Plan for treatment: therapy treatment to continue next visit.  Planned interventions for next visit: continue with current treatment.

## 2020-01-27 ENCOUNTER — PHYSICAL THERAPY (OUTPATIENT)
Dept: PHYSICAL THERAPY | Facility: REHABILITATION | Age: 75
End: 2020-01-27
Attending: NEUROLOGICAL SURGERY
Payer: MEDICARE

## 2020-01-27 DIAGNOSIS — M48.061 SPINAL STENOSIS, LUMBAR REGION WITHOUT NEUROGENIC CLAUDICATION: ICD-10-CM

## 2020-01-27 PROCEDURE — 97110 THERAPEUTIC EXERCISES: CPT

## 2020-01-27 PROCEDURE — 97140 MANUAL THERAPY 1/> REGIONS: CPT

## 2020-01-27 NOTE — OP THERAPY DAILY TREATMENT
"  Outpatient Physical Therapy  DAILY TREATMENT     [facility] Outpatient Physical Therapy [address] [facility], [address]  [place] [address]  Phone:  [phone]    Date: 01/27/2020    Patient: Rossy Lopez  YOB: 1945  MRN: [id number]     Time Calculation               Chief Complaint: Back Problem and Back Injury    Visit #: 8    SUBJECTIVE:  The patient reports increased soreness ans tightness to the low back today after having increased pain following a ride out and back to [facility].    OBJECTIVE:  Current objective measures:    tightness;soreness to the low back;       Therapeutic Exercises (CPT 33319):     1. Posterior pelvic tilts, 1 x10 reps, used a towel across low back fr increased support    2. Bridges with/without  ball, 1 x 8 reps , developed glute hip soreness/ LB discomfort    3. Seated trunk flexion , 3 x30 sec, \"feels good\"    4. Child's pose, 3 x30 sec    5. Clamshells sidelying , 2 x10 reps, green tb    6. Quadriped, 1x 10 reps    7. Trunk rotations with theraball, 3 x30 sec    8. Squats with theraball, 1 x10 reps, decreased hip flexion    Therapeutic Treatments and Modalities:     1. Manual Therapy (CPT 40024), Lumbar , P/A's mobilizations UPA's grade 2-3; L2-L3 hypomobile with tenderness at L3     Time-based treatments/modalities:          ASSESSMENT:   Response to treatment:   Increased connective tissue tightness to the lower back with STM to the lateral aspect of the lumbar spine; paraspinals and erector spinae; patient was able to reach down to the floor with less soreness    PLAN/RECOMMENDATIONS:   Plan for treatment: therapy treatment to continue next visit.  Planned interventions for next visit: continue with current treatment.       "

## 2020-01-28 NOTE — OP THERAPY DAILY TREATMENT
"  Outpatient Physical Therapy  DAILY TREATMENT     [facility] Outpatient Physical Therapy [place] [id number] [address] [facility], [address]  [place] [address]  Phone:  [phone]    Date: 01/29/2020    Patient: Rossy Lopez  YOB: 1945  MRN: [id number]     Time Calculation               Chief Complaint: Hip Problem and Back Problem    Visit #: 9    SUBJECTIVE:  The patient reports having some aching pain to her (R) hip last night but it feels better today. The patient notes tightness to the low back but it has been getting sandra since starting therapy.     OBJECTIVE:  Current objective measures:     tightness tenderness to the low back and (R) hip       Therapeutic Exercises (CPT 35836):     1. Posterior pelvic tilts, 1 x10 reps, used a towel across low back fr increased support    2. Bridges with/without  ball, 1 x 8 reps , developed glute hip soreness/ LB discomfort    3. Seated trunk flexion , 3 x30 sec, \"feels good\"    4. Child's pose, 3 x30 sec    5. Clamshells sidelying , 2 x10 reps, green tb    6. Quadriped, 1x 10 reps    7. Trunk rotations with theraball, 3 x30 sec    8. Squats with theraball, 1 x10 reps, decreased hip flexion    Therapeutic Treatments and Modalities:     1. Manual Therapy (CPT 46259), Lumbar , P/A's mobilizations UPA's grade 2-3; L2-L3 hypomobile with tenderness at L3     Time-based treatments/modalities:            ASSESSMENT:   Response to treatment:   STM to the paraspinals and multifidi; less restriction in trunk flexion while reaching towards floor and in trunk rotation following functional movements to the (R) and (L) sides.    PLAN/RECOMMENDATIONS:   Plan for treatment: therapy treatment to continue next visit.  Planned interventions for next visit: continue with current treatment.       "

## 2020-01-29 ENCOUNTER — PHYSICAL THERAPY (OUTPATIENT)
Dept: PHYSICAL THERAPY | Facility: REHABILITATION | Age: 75
End: 2020-01-29
Attending: NEUROLOGICAL SURGERY
Payer: MEDICARE

## 2020-01-29 DIAGNOSIS — M48.061 SPINAL STENOSIS, LUMBAR REGION WITHOUT NEUROGENIC CLAUDICATION: ICD-10-CM

## 2020-01-29 PROCEDURE — 97140 MANUAL THERAPY 1/> REGIONS: CPT

## 2020-01-29 PROCEDURE — 97110 THERAPEUTIC EXERCISES: CPT

## 2020-01-31 NOTE — OP THERAPY DAILY TREATMENT
"  Outpatient Physical Therapy  DAILY TREATMENT     [facility] Outpatient Physical Therapy [place] [address] [facility], [address]  [place] [address]  Phone:  [phone]    Date: 02/03/2020    Patient: Rossy Lopez  YOB: 1945  MRN: [id number]     Time Calculation               Chief Complaint: Back Problem and Back Injury    Visit #: 10    SUBJECTIVE:  The patient reports that her back is getting better with  less stiffness, but less pain than when she started therapy.    OBJECTIVE:  Current objective measures:     lumbar tightness at L-3-4      Therapeutic Exercises (CPT 17141):     1. Posterior pelvic tilts, 1 x10 reps    2. Bridges with/without  ball, 1 x 10 reps , limited trunk extension during bridging motion using ball     3. Steps, \"feels good\"    4. Child's pose/ lateral stretch to sides, 3 x30 sec each    5. Clamshells sidelying , 2 x10 reps using green tb    6. Shuttle leg press, 3 B at 2 x10 reps     7. Trunk rotations with theraball, 3 x30 sec each side     Therapeutic Treatments and Modalities:     1. Manual Therapy (CPT 37975), Lumbar , P/A's mobilizations CPA's grade 2 at levels; L3-L4     Time-based treatments/modalities:            ASSESSMENT:   Response to treatment    Lumbar P/A's at L3-L4 grade level 2 with some sensitivity and tightness at L3 to the (R) side; patient demonstrated increased trunk rotation and flexion with less tightness     PLAN/RECOMMENDATIONS:   Plan for treatment: therapy treatment to continue next visit.  Planned interventions for next visit: continue with current treatment.       "

## 2020-02-03 ENCOUNTER — PHYSICAL THERAPY (OUTPATIENT)
Dept: PHYSICAL THERAPY | Facility: REHABILITATION | Age: 75
End: 2020-02-03
Attending: NEUROLOGICAL SURGERY
Payer: MEDICARE

## 2020-02-03 DIAGNOSIS — M48.061 SPINAL STENOSIS, LUMBAR REGION WITHOUT NEUROGENIC CLAUDICATION: ICD-10-CM

## 2020-02-03 PROCEDURE — 97140 MANUAL THERAPY 1/> REGIONS: CPT

## 2020-02-03 PROCEDURE — 97110 THERAPEUTIC EXERCISES: CPT

## 2020-02-05 ENCOUNTER — PHYSICAL THERAPY (OUTPATIENT)
Dept: PHYSICAL THERAPY | Facility: REHABILITATION | Age: 75
End: 2020-02-05
Attending: NEUROLOGICAL SURGERY
Payer: MEDICARE

## 2020-02-05 DIAGNOSIS — M48.061 SPINAL STENOSIS, LUMBAR REGION WITHOUT NEUROGENIC CLAUDICATION: ICD-10-CM

## 2020-02-05 PROCEDURE — 97140 MANUAL THERAPY 1/> REGIONS: CPT

## 2020-02-05 PROCEDURE — 97110 THERAPEUTIC EXERCISES: CPT

## 2020-02-05 NOTE — OP THERAPY DAILY TREATMENT
Outpatient Physical Therapy  DAILY TREATMENT     [facility] Outpatient Physical Therapy [address] [facility], [address]  [place] [address]  Phone:  [phone]    Date: 02/05/2020    Patient: Rossy Lopez  YOB: 1945  MRN: [id number]     Time Calculation  Start time: 1330  Stop time: 1400 Time Calculation (min): 30 minutes       Chief Complaint: Back Problem    Visit #: 11    SUBJECTIVE:  The patient reports having no pain other than a quick sharp pain that goes away with activity movement but is far better than when starting. She has been able to do more activity consistently on her feet.    OBJECTIVE:  Current objective measures:     AROM to the trunk in (L) rotation, side-bendiing, and flexion      Therapeutic Exercises (CPT 17212):     1. Posterior pelvic tilts/ ab crunches, 1 x10 reps    2. Bridges with/without  ball, 1 x 10 reps     3. Wall slides , 1 x10 reps     4. Child's pose/ lateral stretch to sides, 3 x30 sec each    5. Clamshells sidelying , 2 x10 reps using green tb    6. Shuttle leg press, 3 B at 2 x10 reps     7. Trunk rotations with theraball, 3 x30 sec each side     Therapeutic Treatments and Modalities:     1. Manual Therapy (CPT 45635), Lumbar , STM to the paraspinals of L1-L5 ; some tightness and tenderness to the (L) side initially but dissipated following treatment    Time-based treatments/modalities:  Manual therapy minutes (CPT 39892): 9 minutes  Therapeutic exercise minutes (CPT 49546): 21 minutes         ASSESSMENT:   Response to treatment:   STM to the paraspinals of L1-L5 ; some tightness and tenderness to the (L) side initially but dissipated following treatment; AROM in trunk flexion side-bending and lateral flexion pain free. Patient was given review of HEP and acknowledged receipt of handout       PLAN/RECOMMENDATIONS:   Plan for treatment: therapy treatment to continue next visit.  Planned interventions for next visit: continue with current treatment.

## 2020-02-07 ENCOUNTER — OUTPATIENT INFUSION SERVICES (OUTPATIENT)
Dept: ONCOLOGY | Facility: MEDICAL CENTER | Age: 75
End: 2020-02-07
Attending: INTERNAL MEDICINE
Payer: MEDICARE

## 2020-02-07 VITALS
HEART RATE: 88 BPM | BODY MASS INDEX: 26.65 KG/M2 | DIASTOLIC BLOOD PRESSURE: 65 MMHG | HEIGHT: 62 IN | WEIGHT: 144.84 LBS | TEMPERATURE: 97.5 F | SYSTOLIC BLOOD PRESSURE: 144 MMHG | RESPIRATION RATE: 18 BRPM | OXYGEN SATURATION: 95 %

## 2020-02-07 PROCEDURE — 700111 HCHG RX REV CODE 636 W/ 250 OVERRIDE (IP): Mod: JG | Performed by: INTERNAL MEDICINE

## 2020-02-07 PROCEDURE — 96372 THER/PROPH/DIAG INJ SC/IM: CPT

## 2020-02-07 RX ORDER — SODIUM FLUORIDE 6.1 MG/ML
GEL, DENTIFRICE DENTAL
COMMUNITY
Start: 2020-01-13

## 2020-02-07 RX ADMIN — MEPOLIZUMAB 100 MG: 100 INJECTION, POWDER, FOR SOLUTION SUBCUTANEOUS at 12:16

## 2020-02-07 NOTE — PROGRESS NOTES
Pt ambulatory to [facility] for Nucala injection for asthma.  Pt w/ no s/s of infection, pt w/ no complaints at this time.  Pt states her asthma improving w/ injection.  Nucala injected into left back arm, no band-aid placed as pt is allergic.  Pt left on foot in NAD.  Confirmed pt's next appt.

## 2020-03-06 ENCOUNTER — OUTPATIENT INFUSION SERVICES (OUTPATIENT)
Dept: ONCOLOGY | Facility: MEDICAL CENTER | Age: 75
End: 2020-03-06
Attending: INTERNAL MEDICINE
Payer: MEDICARE

## 2020-03-06 VITALS
DIASTOLIC BLOOD PRESSURE: 94 MMHG | RESPIRATION RATE: 18 BRPM | WEIGHT: 145.5 LBS | SYSTOLIC BLOOD PRESSURE: 142 MMHG | TEMPERATURE: 97.6 F | OXYGEN SATURATION: 93 % | BODY MASS INDEX: 26.78 KG/M2 | HEART RATE: 66 BPM | HEIGHT: 62 IN

## 2020-03-06 PROCEDURE — 96372 THER/PROPH/DIAG INJ SC/IM: CPT | Performed by: CLINICAL NURSE SPECIALIST

## 2020-03-06 PROCEDURE — 700111 HCHG RX REV CODE 636 W/ 250 OVERRIDE (IP): Mod: JG | Performed by: INTERNAL MEDICINE

## 2020-03-06 RX ADMIN — MEPOLIZUMAB 100 MG: 100 INJECTION, POWDER, FOR SOLUTION SUBCUTANEOUS at 12:26

## 2020-03-06 ASSESSMENT — FIBROSIS 4 INDEX: FIB4 SCORE: 0.74

## 2020-03-06 NOTE — PROGRESS NOTES
Patient to infusion for nucala injection.  No new concerns. No recent asthma exacerbations or illnesses.  Has had this injection many times previously.  Nucala injected into left back arm without issue.  Patient discharged ambulatory to home in stable condition.

## 2020-04-02 ENCOUNTER — TELEPHONE (OUTPATIENT)
Dept: ONCOLOGY | Facility: MEDICAL CENTER | Age: 75
End: 2020-04-02

## 2020-04-02 ENCOUNTER — TELEPHONE (OUTPATIENT)
Dept: HEALTH INFORMATION MANAGEMENT | Facility: OTHER | Age: 75
End: 2020-04-02

## 2020-04-02 NOTE — TELEPHONE ENCOUNTER
1. Caller Name: Rossy Lopez                          Call Back Number: [phone]            No travel outside of [place].  Patient does not share a household with a COVID 19 patient and has had no contact with anyone confirmed or suspected to have COVID 19.  Patient does have a cough due to Asthma. Sent to nurse triage line.

## 2020-04-02 NOTE — TELEPHONE ENCOUNTER
1. Caller Name: Rossy Lopez                       Call Back Number: [phone]  [facility] PCP or Specialty Provider: Elisabeth Alvraado        2.  Does patient have any active symptoms of respiratory illness (fever OR cough OR shortness of breath OR sore throat)? No. Has a cough but it is not worse or out of the ordinary for her with her asthma.    3.  Does patient have any comoribidities? None Patient has hx DM on orals.  Has asthma x approximately 4 years.      4.  Has the patient traveled in the last 14 days OR had any known contact with someone who is suspected or confirmed to have COVID-19?  No.    5. Disposition: Cleared by RN Triage; OK to keep/schedule appointment    Note routed to [facility] Provider: ROHINI only.

## 2020-04-03 ENCOUNTER — OUTPATIENT INFUSION SERVICES (OUTPATIENT)
Dept: ONCOLOGY | Facility: MEDICAL CENTER | Age: 75
End: 2020-04-03
Attending: INTERNAL MEDICINE
Payer: MEDICARE

## 2020-04-03 VITALS
WEIGHT: 146.61 LBS | BODY MASS INDEX: 26.98 KG/M2 | HEIGHT: 62 IN | HEART RATE: 91 BPM | RESPIRATION RATE: 18 BRPM | OXYGEN SATURATION: 93 % | SYSTOLIC BLOOD PRESSURE: 137 MMHG | DIASTOLIC BLOOD PRESSURE: 69 MMHG | TEMPERATURE: 97.4 F

## 2020-04-03 PROCEDURE — 96372 THER/PROPH/DIAG INJ SC/IM: CPT

## 2020-04-03 PROCEDURE — 700111 HCHG RX REV CODE 636 W/ 250 OVERRIDE (IP): Mod: JG | Performed by: INTERNAL MEDICINE

## 2020-04-03 RX ORDER — MV-MIN/VIT C/GLUT/LYSINE/HB124 250-12.5MG
TABLET,CHEWABLE ORAL 2 TIMES DAILY
COMMUNITY
End: 2022-05-02

## 2020-04-03 RX ADMIN — MEPOLIZUMAB 100 MG: 100 INJECTION, POWDER, FOR SOLUTION SUBCUTANEOUS at 12:00

## 2020-04-03 ASSESSMENT — FIBROSIS 4 INDEX: FIB4 SCORE: 0.74

## 2020-04-03 NOTE — PROGRESS NOTES
Pt arrives to [facility] for Nucala injection.  Pt denies signs of infection or worsening Asthma symptoms.  Nucala given SC to back of RUE.  Pt declines bandage.  Confirmed next appt with pt.  Email sent to scheduling dept to schedule out future appts.  Pt dc home to self care.

## 2020-04-30 ENCOUNTER — TELEPHONE (OUTPATIENT)
Dept: ONCOLOGY | Facility: MEDICAL CENTER | Age: 75
End: 2020-04-30

## 2020-05-01 ENCOUNTER — OUTPATIENT INFUSION SERVICES (OUTPATIENT)
Dept: ONCOLOGY | Facility: MEDICAL CENTER | Age: 75
End: 2020-05-01
Attending: INTERNAL MEDICINE
Payer: MEDICARE

## 2020-05-01 VITALS
DIASTOLIC BLOOD PRESSURE: 80 MMHG | BODY MASS INDEX: 27.3 KG/M2 | RESPIRATION RATE: 17 BRPM | WEIGHT: 148.37 LBS | HEIGHT: 62 IN | HEART RATE: 74 BPM | OXYGEN SATURATION: 95 % | SYSTOLIC BLOOD PRESSURE: 163 MMHG | TEMPERATURE: 97.3 F

## 2020-05-01 PROCEDURE — 700111 HCHG RX REV CODE 636 W/ 250 OVERRIDE (IP): Mod: JG | Performed by: INTERNAL MEDICINE

## 2020-05-01 PROCEDURE — 96372 THER/PROPH/DIAG INJ SC/IM: CPT

## 2020-05-01 RX ADMIN — MEPOLIZUMAB 100 MG: 100 INJECTION, POWDER, FOR SOLUTION SUBCUTANEOUS at 12:08

## 2020-05-01 ASSESSMENT — FIBROSIS 4 INDEX: FIB4 SCORE: 0.74

## 2020-05-01 NOTE — PROGRESS NOTES
Pt returns to [facility] for monthly Nucala injection for asthma.  Pt denies s/sx of infection or worsening symptoms.  Nucala given SC to back of LUE.  Band-Aid not applied due to pt's allergy.  Confirmed next appt with pt.  Pt dc home to self care.

## 2020-05-28 ENCOUNTER — TELEPHONE (OUTPATIENT)
Dept: ONCOLOGY | Facility: MEDICAL CENTER | Age: 75
End: 2020-05-28

## 2020-05-29 ENCOUNTER — OUTPATIENT INFUSION SERVICES (OUTPATIENT)
Dept: ONCOLOGY | Facility: MEDICAL CENTER | Age: 75
End: 2020-05-29
Attending: INTERNAL MEDICINE
Payer: MEDICARE

## 2020-05-29 VITALS
HEART RATE: 93 BPM | TEMPERATURE: 98 F | OXYGEN SATURATION: 95 % | BODY MASS INDEX: 27.26 KG/M2 | WEIGHT: 148.15 LBS | SYSTOLIC BLOOD PRESSURE: 143 MMHG | RESPIRATION RATE: 18 BRPM | HEIGHT: 62 IN | DIASTOLIC BLOOD PRESSURE: 83 MMHG

## 2020-05-29 PROCEDURE — 96372 THER/PROPH/DIAG INJ SC/IM: CPT

## 2020-05-29 PROCEDURE — 700111 HCHG RX REV CODE 636 W/ 250 OVERRIDE (IP): Mod: JG | Performed by: INTERNAL MEDICINE

## 2020-05-29 RX ADMIN — MEPOLIZUMAB 100 MG: 100 INJECTION, POWDER, FOR SOLUTION SUBCUTANEOUS at 12:05

## 2020-05-29 ASSESSMENT — FIBROSIS 4 INDEX: FIB4 SCORE: 0.74

## 2020-05-29 NOTE — PROGRESS NOTES
[facility] for asthma. Pt denied having any new or acute complaints today, reports tolerating past treatments well. Pt given nucala as prescribed, tolerated well, denied having any complaints during or after injection. Patient declines bandaid at this time. Next appointment scheduled. Rossy discharged home to self care.

## 2020-06-25 ENCOUNTER — TELEPHONE (OUTPATIENT)
Dept: ONCOLOGY | Facility: MEDICAL CENTER | Age: 75
End: 2020-06-25

## 2020-06-26 ENCOUNTER — OUTPATIENT INFUSION SERVICES (OUTPATIENT)
Dept: ONCOLOGY | Facility: MEDICAL CENTER | Age: 75
End: 2020-06-26
Attending: INTERNAL MEDICINE
Payer: MEDICARE

## 2020-06-26 VITALS
TEMPERATURE: 97.6 F | BODY MASS INDEX: 27.02 KG/M2 | DIASTOLIC BLOOD PRESSURE: 73 MMHG | OXYGEN SATURATION: 97 % | RESPIRATION RATE: 18 BRPM | HEART RATE: 103 BPM | SYSTOLIC BLOOD PRESSURE: 133 MMHG | WEIGHT: 146.83 LBS | HEIGHT: 62 IN

## 2020-06-26 PROCEDURE — 96372 THER/PROPH/DIAG INJ SC/IM: CPT

## 2020-06-26 PROCEDURE — 700111 HCHG RX REV CODE 636 W/ 250 OVERRIDE (IP): Mod: JG | Performed by: INTERNAL MEDICINE

## 2020-06-26 RX ADMIN — MEPOLIZUMAB 100 MG: 100 INJECTION, POWDER, FOR SOLUTION SUBCUTANEOUS at 11:49

## 2020-06-26 ASSESSMENT — FIBROSIS 4 INDEX: FIB4 SCORE: 0.74

## 2020-06-26 NOTE — PROGRESS NOTES
[facility] nucala injection for asthma. Pt denied having any new or acute complaints today, reports tolerating past treatments well. Pt given nucala as prescribed in left back arm SQ, tolerated well, denied having any complaints during or after injection. Patient declines bandaid at this time. Next appointment scheduled. Rossy discharged home to self care.

## 2020-06-29 ENCOUNTER — APPOINTMENT (RX ONLY)
Dept: URBAN - METROPOLITAN AREA CLINIC 20 | Facility: CLINIC | Age: 75
Setting detail: DERMATOLOGY
End: 2020-06-29

## 2020-06-29 DIAGNOSIS — Z85.828 PERSONAL HISTORY OF OTHER MALIGNANT NEOPLASM OF SKIN: ICD-10-CM

## 2020-06-29 DIAGNOSIS — L82.1 OTHER SEBORRHEIC KERATOSIS: ICD-10-CM

## 2020-06-29 DIAGNOSIS — L85.3 XEROSIS CUTIS: ICD-10-CM

## 2020-06-29 DIAGNOSIS — Z71.89 OTHER SPECIFIED COUNSELING: ICD-10-CM

## 2020-06-29 DIAGNOSIS — D22 MELANOCYTIC NEVI: ICD-10-CM

## 2020-06-29 DIAGNOSIS — D69.2 OTHER NONTHROMBOCYTOPENIC PURPURA: ICD-10-CM

## 2020-06-29 DIAGNOSIS — L72.8 OTHER FOLLICULAR CYSTS OF THE SKIN AND SUBCUTANEOUS TISSUE: ICD-10-CM

## 2020-06-29 DIAGNOSIS — D18.0 HEMANGIOMA: ICD-10-CM

## 2020-06-29 DIAGNOSIS — L81.4 OTHER MELANIN HYPERPIGMENTATION: ICD-10-CM

## 2020-06-29 DIAGNOSIS — L82.0 INFLAMED SEBORRHEIC KERATOSIS: ICD-10-CM

## 2020-06-29 PROBLEM — D18.01 HEMANGIOMA OF SKIN AND SUBCUTANEOUS TISSUE: Status: ACTIVE | Noted: 2020-06-29

## 2020-06-29 PROBLEM — D22.62 MELANOCYTIC NEVI OF LEFT UPPER LIMB, INCLUDING SHOULDER: Status: ACTIVE | Noted: 2020-06-29

## 2020-06-29 PROBLEM — D22.39 MELANOCYTIC NEVI OF OTHER PARTS OF FACE: Status: ACTIVE | Noted: 2020-06-29

## 2020-06-29 PROBLEM — D22.61 MELANOCYTIC NEVI OF RIGHT UPPER LIMB, INCLUDING SHOULDER: Status: ACTIVE | Noted: 2020-06-29

## 2020-06-29 PROBLEM — D22.71 MELANOCYTIC NEVI OF RIGHT LOWER LIMB, INCLUDING HIP: Status: ACTIVE | Noted: 2020-06-29

## 2020-06-29 PROBLEM — D22.72 MELANOCYTIC NEVI OF LEFT LOWER LIMB, INCLUDING HIP: Status: ACTIVE | Noted: 2020-06-29

## 2020-06-29 PROBLEM — D22.5 MELANOCYTIC NEVI OF TRUNK: Status: ACTIVE | Noted: 2020-06-29

## 2020-06-29 PROCEDURE — ? SUNSCREEN RECOMMENDATIONS

## 2020-06-29 PROCEDURE — ? COUNSELING

## 2020-06-29 PROCEDURE — ? ADDITIONAL NOTES

## 2020-06-29 PROCEDURE — 99214 OFFICE O/P EST MOD 30 MIN: CPT

## 2020-06-29 ASSESSMENT — LOCATION ZONE DERM
LOCATION ZONE: ARM
LOCATION ZONE: LEG
LOCATION ZONE: FEET
LOCATION ZONE: VULVA
LOCATION ZONE: FACE
LOCATION ZONE: SCALP
LOCATION ZONE: TRUNK

## 2020-06-29 ASSESSMENT — LOCATION SIMPLE DESCRIPTION DERM
LOCATION SIMPLE: RIGHT LOWER BACK
LOCATION SIMPLE: RIGHT FOREHEAD
LOCATION SIMPLE: RIGHT POSTERIOR UPPER ARM
LOCATION SIMPLE: LEFT POSTERIOR THIGH
LOCATION SIMPLE: LEFT FOOT
LOCATION SIMPLE: RIGHT CHEEK
LOCATION SIMPLE: POSTERIOR SCALP
LOCATION SIMPLE: RIGHT POSTERIOR THIGH
LOCATION SIMPLE: RIGHT UPPER ARM
LOCATION SIMPLE: RIGHT FOREARM
LOCATION SIMPLE: LEFT FOREARM
LOCATION SIMPLE: LEFT PRETIBIAL REGION
LOCATION SIMPLE: RIGHT UPPER BACK
LOCATION SIMPLE: GROIN
LOCATION SIMPLE: ABDOMEN
LOCATION SIMPLE: LEFT POSTERIOR UPPER ARM
LOCATION SIMPLE: RIGHT PRETIBIAL REGION
LOCATION SIMPLE: UPPER BACK

## 2020-06-29 ASSESSMENT — LOCATION DETAILED DESCRIPTION DERM
LOCATION DETAILED: RIGHT INFERIOR FOREHEAD
LOCATION DETAILED: RIGHT VENTRAL PROXIMAL FOREARM
LOCATION DETAILED: PERIUMBILICAL SKIN
LOCATION DETAILED: SUPERIOR THORACIC SPINE
LOCATION DETAILED: LEFT DORSAL FOOT
LOCATION DETAILED: RIGHT DISTAL POSTERIOR THIGH
LOCATION DETAILED: RIGHT SUPERIOR POSTAURICULAR SKIN
LOCATION DETAILED: INFERIOR THORACIC SPINE
LOCATION DETAILED: RIGHT PROXIMAL PRETIBIAL REGION
LOCATION DETAILED: RIGHT PROXIMAL DORSAL FOREARM
LOCATION DETAILED: RIGHT INFERIOR MEDIAL MIDBACK
LOCATION DETAILED: RIGHT DISTAL PRETIBIAL REGION
LOCATION DETAILED: LEFT LATERAL PROXIMAL PRETIBIAL REGION
LOCATION DETAILED: RIGHT ANTERIOR DISTAL UPPER ARM
LOCATION DETAILED: LEFT PROXIMAL DORSAL FOREARM
LOCATION DETAILED: LEFT LATERAL DISTAL PRETIBIAL REGION
LOCATION DETAILED: RIGHT INFERIOR MEDIAL UPPER BACK
LOCATION DETAILED: RIGHT SUPERIOR UPPER BACK
LOCATION DETAILED: LEFT VENTRAL PROXIMAL FOREARM
LOCATION DETAILED: LEFT DISTAL POSTERIOR THIGH
LOCATION DETAILED: RIGHT INFERIOR CENTRAL MALAR CHEEK
LOCATION DETAILED: RIGHT DISTAL POSTERIOR UPPER ARM
LOCATION DETAILED: MONS PUBIS
LOCATION DETAILED: LEFT PROXIMAL POSTERIOR UPPER ARM

## 2020-07-23 ENCOUNTER — TELEPHONE (OUTPATIENT)
Dept: ONCOLOGY | Facility: MEDICAL CENTER | Age: 75
End: 2020-07-23

## 2020-07-24 ENCOUNTER — OUTPATIENT INFUSION SERVICES (OUTPATIENT)
Dept: ONCOLOGY | Facility: MEDICAL CENTER | Age: 75
End: 2020-07-24
Attending: INTERNAL MEDICINE
Payer: MEDICARE

## 2020-07-24 VITALS
TEMPERATURE: 97.7 F | WEIGHT: 146.61 LBS | DIASTOLIC BLOOD PRESSURE: 69 MMHG | HEART RATE: 75 BPM | OXYGEN SATURATION: 94 % | RESPIRATION RATE: 18 BRPM | BODY MASS INDEX: 25.98 KG/M2 | SYSTOLIC BLOOD PRESSURE: 114 MMHG | HEIGHT: 63 IN

## 2020-07-24 DIAGNOSIS — J45.20 MILD INTERMITTENT REACTIVE AIRWAY DISEASE WITHOUT COMPLICATION: ICD-10-CM

## 2020-07-24 PROCEDURE — 700111 HCHG RX REV CODE 636 W/ 250 OVERRIDE (IP): Mod: JG | Performed by: INTERNAL MEDICINE

## 2020-07-24 PROCEDURE — 96372 THER/PROPH/DIAG INJ SC/IM: CPT

## 2020-07-24 RX ADMIN — MEPOLIZUMAB 100 MG: 100 INJECTION, POWDER, FOR SOLUTION SUBCUTANEOUS at 12:19

## 2020-07-24 ASSESSMENT — FIBROSIS 4 INDEX: FIB4 SCORE: 0.74

## 2020-07-24 NOTE — PROGRESS NOTES
Pt returns to [facility] for monthly Nucala injection.  Pt denies s/sx of infection.  Nucala given SC to back of RUE.  Pt declines Band-aid d/t to tape allergy.  Spoke to scheduling to set up next appts.  Informed pt of next appt.  Pt dc home to self care.

## 2020-08-20 ENCOUNTER — TELEPHONE (OUTPATIENT)
Dept: ONCOLOGY | Facility: MEDICAL CENTER | Age: 75
End: 2020-08-20

## 2020-08-21 ENCOUNTER — OUTPATIENT INFUSION SERVICES (OUTPATIENT)
Dept: ONCOLOGY | Facility: MEDICAL CENTER | Age: 75
End: 2020-08-21
Attending: INTERNAL MEDICINE
Payer: MEDICARE

## 2020-08-21 VITALS
HEIGHT: 62 IN | SYSTOLIC BLOOD PRESSURE: 142 MMHG | DIASTOLIC BLOOD PRESSURE: 72 MMHG | HEART RATE: 107 BPM | BODY MASS INDEX: 26.86 KG/M2 | WEIGHT: 145.94 LBS | OXYGEN SATURATION: 94 % | RESPIRATION RATE: 18 BRPM | TEMPERATURE: 97 F

## 2020-08-21 DIAGNOSIS — J45.20 MILD INTERMITTENT REACTIVE AIRWAY DISEASE WITHOUT COMPLICATION: ICD-10-CM

## 2020-08-21 PROCEDURE — 96372 THER/PROPH/DIAG INJ SC/IM: CPT

## 2020-08-21 PROCEDURE — 700111 HCHG RX REV CODE 636 W/ 250 OVERRIDE (IP): Mod: JG | Performed by: INTERNAL MEDICINE

## 2020-08-21 RX ORDER — MONTELUKAST SODIUM 10 MG/1
TABLET ORAL
COMMUNITY
Start: 2020-06-16 | End: 2020-08-21

## 2020-08-21 RX ADMIN — MEPOLIZUMAB 100 MG: 100 INJECTION, POWDER, FOR SOLUTION SUBCUTANEOUS at 11:17

## 2020-08-21 ASSESSMENT — FIBROSIS 4 INDEX: FIB4 SCORE: 0.74

## 2020-08-21 NOTE — PROGRESS NOTES
Pt returns for monthly Nucala. Pt reports she feels very good with the Nucala and has needed her rescue inhaler less. Pt assessed, pt denies any current s/s of infection. Injection given to back of R arm, no band aide applied per pt request. Pt knows when to return, discharged home under self care in no apparent distress.

## 2020-09-08 ENCOUNTER — HOSPITAL ENCOUNTER (OUTPATIENT)
Dept: LAB | Facility: MEDICAL CENTER | Age: 75
End: 2020-09-08
Attending: INTERNAL MEDICINE
Payer: MEDICARE

## 2020-09-08 LAB
ALBUMIN SERPL BCP-MCNC: 4.4 G/DL (ref 3.2–4.9)
ALBUMIN/GLOB SERPL: 1.6 G/DL
ALP SERPL-CCNC: 106 U/L (ref 30–99)
ALT SERPL-CCNC: 24 U/L (ref 2–50)
ANION GAP SERPL CALC-SCNC: 14 MMOL/L (ref 7–16)
AST SERPL-CCNC: 18 U/L (ref 12–45)
BASOPHILS # BLD AUTO: 0.5 % (ref 0–1.8)
BASOPHILS # BLD: 0.05 K/UL (ref 0–0.12)
BILIRUB SERPL-MCNC: 0.5 MG/DL (ref 0.1–1.5)
BUN SERPL-MCNC: 8 MG/DL (ref 8–22)
CALCIUM SERPL-MCNC: 9.6 MG/DL (ref 8.5–10.5)
CHLORIDE SERPL-SCNC: 93 MMOL/L (ref 96–112)
CHOLEST SERPL-MCNC: 159 MG/DL (ref 100–199)
CO2 SERPL-SCNC: 30 MMOL/L (ref 20–33)
CREAT SERPL-MCNC: 0.51 MG/DL (ref 0.5–1.4)
EOSINOPHIL # BLD AUTO: 0.07 K/UL (ref 0–0.51)
EOSINOPHIL NFR BLD: 0.8 % (ref 0–6.9)
ERYTHROCYTE [DISTWIDTH] IN BLOOD BY AUTOMATED COUNT: 42.6 FL (ref 35.9–50)
EST. AVERAGE GLUCOSE BLD GHB EST-MCNC: 169 MG/DL
FASTING STATUS PATIENT QL REPORTED: NORMAL
GLOBULIN SER CALC-MCNC: 2.8 G/DL (ref 1.9–3.5)
GLUCOSE SERPL-MCNC: 159 MG/DL (ref 65–99)
HBA1C MFR BLD: 7.5 % (ref 0–5.6)
HCT VFR BLD AUTO: 43.1 % (ref 37–47)
HDLC SERPL-MCNC: 63 MG/DL
HGB BLD-MCNC: 14.6 G/DL (ref 12–16)
IMM GRANULOCYTES # BLD AUTO: 0.03 K/UL (ref 0–0.11)
IMM GRANULOCYTES NFR BLD AUTO: 0.3 % (ref 0–0.9)
LDLC SERPL CALC-MCNC: 70 MG/DL
LYMPHOCYTES # BLD AUTO: 3 K/UL (ref 1–4.8)
LYMPHOCYTES NFR BLD: 32.7 % (ref 22–41)
MCH RBC QN AUTO: 28.9 PG (ref 27–33)
MCHC RBC AUTO-ENTMCNC: 33.9 G/DL (ref 33.6–35)
MCV RBC AUTO: 85.2 FL (ref 81.4–97.8)
MONOCYTES # BLD AUTO: 0.93 K/UL (ref 0–0.85)
MONOCYTES NFR BLD AUTO: 10.1 % (ref 0–13.4)
NEUTROPHILS # BLD AUTO: 5.1 K/UL (ref 2–7.15)
NEUTROPHILS NFR BLD: 55.6 % (ref 44–72)
NRBC # BLD AUTO: 0 K/UL
NRBC BLD-RTO: 0 /100 WBC
PLATELET # BLD AUTO: 359 K/UL (ref 164–446)
PMV BLD AUTO: 9.5 FL (ref 9–12.9)
POTASSIUM SERPL-SCNC: 3.6 MMOL/L (ref 3.6–5.5)
PROT SERPL-MCNC: 7.2 G/DL (ref 6–8.2)
RBC # BLD AUTO: 5.06 M/UL (ref 4.2–5.4)
SODIUM SERPL-SCNC: 137 MMOL/L (ref 135–145)
TRIGL SERPL-MCNC: 132 MG/DL (ref 0–149)
WBC # BLD AUTO: 9.2 K/UL (ref 4.8–10.8)

## 2020-09-08 PROCEDURE — 36415 COLL VENOUS BLD VENIPUNCTURE: CPT

## 2020-09-08 PROCEDURE — 83036 HEMOGLOBIN GLYCOSYLATED A1C: CPT | Mod: GA

## 2020-09-08 PROCEDURE — 85025 COMPLETE CBC W/AUTO DIFF WBC: CPT

## 2020-09-08 PROCEDURE — 80053 COMPREHEN METABOLIC PANEL: CPT

## 2020-09-08 PROCEDURE — 80061 LIPID PANEL: CPT

## 2020-09-18 ENCOUNTER — OUTPATIENT INFUSION SERVICES (OUTPATIENT)
Dept: ONCOLOGY | Facility: MEDICAL CENTER | Age: 75
End: 2020-09-18
Attending: INTERNAL MEDICINE
Payer: MEDICARE

## 2020-09-18 VITALS
RESPIRATION RATE: 17 BRPM | SYSTOLIC BLOOD PRESSURE: 128 MMHG | WEIGHT: 146.61 LBS | DIASTOLIC BLOOD PRESSURE: 80 MMHG | HEART RATE: 88 BPM | OXYGEN SATURATION: 95 % | HEIGHT: 62 IN | TEMPERATURE: 97.6 F | BODY MASS INDEX: 26.98 KG/M2

## 2020-09-18 DIAGNOSIS — J45.20 MILD INTERMITTENT REACTIVE AIRWAY DISEASE WITHOUT COMPLICATION: ICD-10-CM

## 2020-09-18 PROCEDURE — 96372 THER/PROPH/DIAG INJ SC/IM: CPT

## 2020-09-18 PROCEDURE — 700111 HCHG RX REV CODE 636 W/ 250 OVERRIDE (IP): Mod: JG | Performed by: INTERNAL MEDICINE

## 2020-09-18 RX ORDER — CYCLOBENZAPRINE HCL 10 MG
TABLET ORAL
COMMUNITY
Start: 2020-09-16 | End: 2023-01-31 | Stop reason: SDUPTHER

## 2020-09-18 RX ADMIN — MEPOLIZUMAB 100 MG: 100 INJECTION, POWDER, FOR SOLUTION SUBCUTANEOUS at 11:58

## 2020-09-18 ASSESSMENT — FIBROSIS 4 INDEX: FIB4 SCORE: 0.75

## 2020-09-18 NOTE — PROGRESS NOTES
Pt returns to [facility] for monthly Nucala injection.  Pt denies s/sx of infection or worsening of asthma symptoms. Nucala given SC to back of LUE.  Confirmed next appt with pt.  Pt dc home to self care.

## 2020-10-16 ENCOUNTER — OUTPATIENT INFUSION SERVICES (OUTPATIENT)
Dept: ONCOLOGY | Facility: MEDICAL CENTER | Age: 75
End: 2020-10-16
Attending: INTERNAL MEDICINE
Payer: MEDICARE

## 2020-10-16 VITALS
SYSTOLIC BLOOD PRESSURE: 148 MMHG | RESPIRATION RATE: 18 BRPM | HEART RATE: 91 BPM | WEIGHT: 146.83 LBS | OXYGEN SATURATION: 98 % | DIASTOLIC BLOOD PRESSURE: 95 MMHG | TEMPERATURE: 97.5 F | HEIGHT: 62 IN | BODY MASS INDEX: 27.02 KG/M2

## 2020-10-16 DIAGNOSIS — J45.20 MILD INTERMITTENT REACTIVE AIRWAY DISEASE WITHOUT COMPLICATION: ICD-10-CM

## 2020-10-16 DIAGNOSIS — J45.50 SEVERE PERSISTENT ASTHMA, UNSPECIFIED WHETHER COMPLICATED: ICD-10-CM

## 2020-10-16 PROCEDURE — 96372 THER/PROPH/DIAG INJ SC/IM: CPT

## 2020-10-16 PROCEDURE — 700111 HCHG RX REV CODE 636 W/ 250 OVERRIDE (IP): Mod: JG | Performed by: INTERNAL MEDICINE

## 2020-10-16 RX ADMIN — MEPOLIZUMAB 100 MG: 100 INJECTION, POWDER, FOR SOLUTION SUBCUTANEOUS at 11:51

## 2020-10-16 ASSESSMENT — FIBROSIS 4 INDEX: FIB4 SCORE: 0.75

## 2020-11-13 ENCOUNTER — OUTPATIENT INFUSION SERVICES (OUTPATIENT)
Dept: ONCOLOGY | Facility: MEDICAL CENTER | Age: 75
End: 2020-11-13
Attending: INTERNAL MEDICINE
Payer: MEDICARE

## 2020-11-13 VITALS
HEIGHT: 62 IN | BODY MASS INDEX: 26.98 KG/M2 | OXYGEN SATURATION: 98 % | HEART RATE: 100 BPM | RESPIRATION RATE: 17 BRPM | SYSTOLIC BLOOD PRESSURE: 158 MMHG | WEIGHT: 146.61 LBS | TEMPERATURE: 97.4 F | DIASTOLIC BLOOD PRESSURE: 82 MMHG

## 2020-11-13 DIAGNOSIS — J45.50 SEVERE PERSISTENT ASTHMA, UNSPECIFIED WHETHER COMPLICATED: ICD-10-CM

## 2020-11-13 PROCEDURE — 96372 THER/PROPH/DIAG INJ SC/IM: CPT

## 2020-11-13 PROCEDURE — 700111 HCHG RX REV CODE 636 W/ 250 OVERRIDE (IP): Mod: JG | Performed by: INTERNAL MEDICINE

## 2020-11-13 RX ADMIN — MEPOLIZUMAB 100 MG: 100 INJECTION, POWDER, FOR SOLUTION SUBCUTANEOUS at 11:51

## 2020-11-13 ASSESSMENT — FIBROSIS 4 INDEX: FIB4 SCORE: 0.75

## 2020-11-13 NOTE — PROGRESS NOTES
Pt returns to [facility] for monthly Nucala injection.  Pt denies s/sx of infection or worsening asthma symptoms.  Nucala given SC to back of RUE.  Pt declines Band-aid due to tape allergy.  Confirmed next appt time with pt.  Pt dc home to self care.

## 2020-12-08 ENCOUNTER — HOSPITAL ENCOUNTER (OUTPATIENT)
Dept: LAB | Facility: MEDICAL CENTER | Age: 75
End: 2020-12-08
Attending: INTERNAL MEDICINE
Payer: MEDICARE

## 2020-12-08 LAB
EST. AVERAGE GLUCOSE BLD GHB EST-MCNC: 203 MG/DL
HBA1C MFR BLD: 8.7 % (ref 0–5.6)

## 2020-12-08 PROCEDURE — 83036 HEMOGLOBIN GLYCOSYLATED A1C: CPT | Mod: GA

## 2020-12-08 PROCEDURE — 36415 COLL VENOUS BLD VENIPUNCTURE: CPT | Mod: GA

## 2020-12-11 ENCOUNTER — OUTPATIENT INFUSION SERVICES (OUTPATIENT)
Dept: ONCOLOGY | Facility: MEDICAL CENTER | Age: 75
End: 2020-12-11
Attending: INTERNAL MEDICINE
Payer: MEDICARE

## 2020-12-11 VITALS
SYSTOLIC BLOOD PRESSURE: 136 MMHG | WEIGHT: 145.5 LBS | HEART RATE: 109 BPM | OXYGEN SATURATION: 98 % | DIASTOLIC BLOOD PRESSURE: 85 MMHG | HEIGHT: 62 IN | RESPIRATION RATE: 18 BRPM | TEMPERATURE: 97.2 F | BODY MASS INDEX: 26.78 KG/M2

## 2020-12-11 DIAGNOSIS — J45.50 SEVERE PERSISTENT ASTHMA, UNSPECIFIED WHETHER COMPLICATED: ICD-10-CM

## 2020-12-11 PROCEDURE — 700111 HCHG RX REV CODE 636 W/ 250 OVERRIDE (IP): Mod: JG | Performed by: INTERNAL MEDICINE

## 2020-12-11 PROCEDURE — 96372 THER/PROPH/DIAG INJ SC/IM: CPT

## 2020-12-11 RX ADMIN — MEPOLIZUMAB 100 MG: 100 INJECTION, POWDER, FOR SOLUTION SUBCUTANEOUS at 11:58

## 2020-12-11 ASSESSMENT — FIBROSIS 4 INDEX: FIB4 SCORE: 0.75

## 2020-12-11 NOTE — PROGRESS NOTES
Pt arrived ambulatory to [facility] for monthly Nucala. Pt denied fever, signs or symptoms of infection or acute illness today. POC discussed and pt verbalized understanding.     Nucala administered per MAR in back of L arm and pt tolerated well. No signs or symptoms of reaction or complications noted. Pt declined band-aid at this time. Follow-up care discussed and future appointments confirmed with pt; pt states she has MyChart and can see her appointments. Pt discharged home ambulatory to self care in no apparent distress at this time.

## 2020-12-29 ENCOUNTER — HOSPITAL ENCOUNTER (OUTPATIENT)
Dept: RADIOLOGY | Facility: MEDICAL CENTER | Age: 75
End: 2020-12-29
Attending: INTERNAL MEDICINE
Payer: MEDICARE

## 2020-12-29 DIAGNOSIS — Z12.31 VISIT FOR SCREENING MAMMOGRAM: ICD-10-CM

## 2020-12-29 PROCEDURE — 77067 SCR MAMMO BI INCL CAD: CPT

## 2021-01-07 ENCOUNTER — TELEPHONE (OUTPATIENT)
Dept: ONCOLOGY | Facility: MEDICAL CENTER | Age: 76
End: 2021-01-07

## 2021-01-08 ENCOUNTER — OUTPATIENT INFUSION SERVICES (OUTPATIENT)
Dept: ONCOLOGY | Facility: MEDICAL CENTER | Age: 76
End: 2021-01-08
Attending: INTERNAL MEDICINE
Payer: MEDICARE

## 2021-01-08 VITALS
RESPIRATION RATE: 18 BRPM | HEART RATE: 83 BPM | HEIGHT: 62 IN | OXYGEN SATURATION: 95 % | WEIGHT: 148.59 LBS | TEMPERATURE: 97.2 F | DIASTOLIC BLOOD PRESSURE: 72 MMHG | BODY MASS INDEX: 27.34 KG/M2 | SYSTOLIC BLOOD PRESSURE: 147 MMHG

## 2021-01-08 DIAGNOSIS — J45.50 SEVERE PERSISTENT ASTHMA, UNSPECIFIED WHETHER COMPLICATED: ICD-10-CM

## 2021-01-08 PROCEDURE — 96372 THER/PROPH/DIAG INJ SC/IM: CPT

## 2021-01-08 PROCEDURE — 700111 HCHG RX REV CODE 636 W/ 250 OVERRIDE (IP): Mod: JG | Performed by: INTERNAL MEDICINE

## 2021-01-08 RX ORDER — GLIMEPIRIDE 4 MG/1
1 TABLET ORAL DAILY
COMMUNITY
Start: 2020-12-16 | End: 2021-10-15

## 2021-01-08 RX ADMIN — MEPOLIZUMAB 100 MG: 100 INJECTION, POWDER, FOR SOLUTION SUBCUTANEOUS at 12:07

## 2021-01-08 ASSESSMENT — FIBROSIS 4 INDEX: FIB4 SCORE: 0.75

## 2021-01-08 NOTE — PROGRESS NOTES
Pt returns to [facility] for Nucala injection.  Pt denies s/sx of infection or worsening asthma symptoms.  Nucala given SC to back of RUE.  Spoke to scheduling dept to set up more appts for pt.  Informed pt of next appt time.  Pt dc home to self care.

## 2021-01-08 NOTE — TELEPHONE ENCOUNTER
"Called Rossy regarding COVID-19 screening questions and updated check-in process at this time. Rossy states understanding of updated process and is aware to call provided number. Patient answered \"no\" to screening questions. Rossy is ok to proceed with treatment as scheduled for Nucala.  "

## 2021-01-15 DIAGNOSIS — Z23 NEED FOR VACCINATION: ICD-10-CM

## 2021-01-22 ENCOUNTER — IMMUNIZATION (OUTPATIENT)
Dept: FAMILY PLANNING/WOMEN'S HEALTH CLINIC | Facility: IMMUNIZATION CENTER | Age: 76
End: 2021-01-22
Attending: INTERNAL MEDICINE
Payer: MEDICARE

## 2021-01-22 DIAGNOSIS — Z23 NEED FOR VACCINATION: ICD-10-CM

## 2021-01-22 DIAGNOSIS — Z23 ENCOUNTER FOR VACCINATION: Primary | ICD-10-CM

## 2021-01-22 PROCEDURE — 0001A PFIZER SARS-COV-2 VACCINE: CPT | Performed by: NURSE PRACTITIONER

## 2021-01-22 PROCEDURE — 91300 PFIZER SARS-COV-2 VACCINE: CPT | Performed by: NURSE PRACTITIONER

## 2021-02-05 ENCOUNTER — OUTPATIENT INFUSION SERVICES (OUTPATIENT)
Dept: ONCOLOGY | Facility: MEDICAL CENTER | Age: 76
End: 2021-02-05
Attending: INTERNAL MEDICINE
Payer: MEDICARE

## 2021-02-05 VITALS
SYSTOLIC BLOOD PRESSURE: 142 MMHG | TEMPERATURE: 97.7 F | RESPIRATION RATE: 18 BRPM | OXYGEN SATURATION: 94 % | HEART RATE: 93 BPM | WEIGHT: 149.69 LBS | BODY MASS INDEX: 28.26 KG/M2 | HEIGHT: 61 IN | DIASTOLIC BLOOD PRESSURE: 96 MMHG

## 2021-02-05 DIAGNOSIS — J45.50 SEVERE PERSISTENT ASTHMA, UNSPECIFIED WHETHER COMPLICATED: ICD-10-CM

## 2021-02-05 PROCEDURE — 96372 THER/PROPH/DIAG INJ SC/IM: CPT

## 2021-02-05 PROCEDURE — 700111 HCHG RX REV CODE 636 W/ 250 OVERRIDE (IP): Mod: JG | Performed by: INTERNAL MEDICINE

## 2021-02-05 RX ADMIN — MEPOLIZUMAB 100 MG: 100 INJECTION, POWDER, FOR SOLUTION SUBCUTANEOUS at 11:55

## 2021-02-05 ASSESSMENT — FIBROSIS 4 INDEX: FIB4 SCORE: 0.75

## 2021-02-05 NOTE — LETTER
[facility]   [address] [place] [id number]  Phone: [phone]  Fax: [phone]              Dear Dr. Cross,    Your patient, Rossy Lopez (: 1945), was scheduled at [facility].  She arrived for her appointment, and Nucala was given. These medications were administered to the patient: We administered mepolizumab..  Rossy Lopez  tolerated treatment. In addition, the following labs were drawn  No results found for this or any previous visit (from the past 24 hour(s)).         Her next appointment is scheduled for 3/5/2021. Rossy's current orders will be expiring after this appt in March. We will need new orders after this appt. Thank you. Our fax number [phone].    For more information, you may review the nurse's progress notes in chart review under the notes section.       Sincerely,  Viri Escudero R.N.

## 2021-02-05 NOTE — PROGRESS NOTES
Pt returns for Nucala; reports the medication is working well for her. Pt assessed. Injection given to back of L arm, no band aide applied per pt request. Pt knows when to return, aware that new orders will be needed after next appt. Pt discharged home under self care in no apparent distress.

## 2021-02-12 ENCOUNTER — IMMUNIZATION (OUTPATIENT)
Dept: FAMILY PLANNING/WOMEN'S HEALTH CLINIC | Facility: IMMUNIZATION CENTER | Age: 76
End: 2021-02-12
Attending: INTERNAL MEDICINE
Payer: MEDICARE

## 2021-02-12 DIAGNOSIS — Z23 ENCOUNTER FOR VACCINATION: Primary | ICD-10-CM

## 2021-02-12 PROCEDURE — 0002A PFIZER SARS-COV-2 VACCINE: CPT

## 2021-02-12 PROCEDURE — 91300 PFIZER SARS-COV-2 VACCINE: CPT

## 2021-03-05 ENCOUNTER — OUTPATIENT INFUSION SERVICES (OUTPATIENT)
Dept: ONCOLOGY | Facility: MEDICAL CENTER | Age: 76
End: 2021-03-05
Attending: INTERNAL MEDICINE
Payer: MEDICARE

## 2021-03-05 VITALS
HEIGHT: 61 IN | HEART RATE: 109 BPM | WEIGHT: 150.13 LBS | DIASTOLIC BLOOD PRESSURE: 82 MMHG | RESPIRATION RATE: 18 BRPM | TEMPERATURE: 97.7 F | BODY MASS INDEX: 28.35 KG/M2 | SYSTOLIC BLOOD PRESSURE: 136 MMHG | OXYGEN SATURATION: 92 %

## 2021-03-05 DIAGNOSIS — J45.50 SEVERE PERSISTENT ASTHMA, UNSPECIFIED WHETHER COMPLICATED: ICD-10-CM

## 2021-03-05 PROCEDURE — 700111 HCHG RX REV CODE 636 W/ 250 OVERRIDE (IP): Mod: JG | Performed by: ALLERGY & IMMUNOLOGY

## 2021-03-05 PROCEDURE — 96372 THER/PROPH/DIAG INJ SC/IM: CPT

## 2021-03-05 RX ADMIN — MEPOLIZUMAB 100 MG: 100 INJECTION, POWDER, FOR SOLUTION SUBCUTANEOUS at 11:45

## 2021-03-05 ASSESSMENT — FIBROSIS 4 INDEX: FIB4 SCORE: 0.76

## 2021-03-05 NOTE — PROGRESS NOTES
Rossy arrived ambulatory to [facility] for Nucala injection. POC discussed with pt and she agrees with plan. Pt medicated per MAR. Pt tolerated injection without s/s adverse reaction. Pt discharged to self care, NAD. Scheduling contacted for future appts. Pt to monitor My Chart for appts.

## 2021-03-17 ENCOUNTER — HOSPITAL ENCOUNTER (OUTPATIENT)
Dept: LAB | Facility: MEDICAL CENTER | Age: 76
End: 2021-03-17
Attending: INTERNAL MEDICINE
Payer: MEDICARE

## 2021-03-17 PROCEDURE — 36415 COLL VENOUS BLD VENIPUNCTURE: CPT | Mod: GA

## 2021-03-17 PROCEDURE — 83036 HEMOGLOBIN GLYCOSYLATED A1C: CPT | Mod: GA

## 2021-03-18 LAB
EST. AVERAGE GLUCOSE BLD GHB EST-MCNC: 189 MG/DL
HBA1C MFR BLD: 8.2 % (ref 4–5.6)

## 2021-04-01 ENCOUNTER — OUTPATIENT INFUSION SERVICES (OUTPATIENT)
Dept: ONCOLOGY | Facility: MEDICAL CENTER | Age: 76
End: 2021-04-01
Attending: INTERNAL MEDICINE
Payer: MEDICARE

## 2021-04-01 VITALS
TEMPERATURE: 97.4 F | HEIGHT: 61 IN | HEART RATE: 80 BPM | DIASTOLIC BLOOD PRESSURE: 43 MMHG | SYSTOLIC BLOOD PRESSURE: 126 MMHG | WEIGHT: 152.78 LBS | BODY MASS INDEX: 28.84 KG/M2 | OXYGEN SATURATION: 99 % | RESPIRATION RATE: 18 BRPM

## 2021-04-01 DIAGNOSIS — J45.50 SEVERE PERSISTENT ASTHMA, UNSPECIFIED WHETHER COMPLICATED: ICD-10-CM

## 2021-04-01 PROCEDURE — 700111 HCHG RX REV CODE 636 W/ 250 OVERRIDE (IP): Mod: JG | Performed by: ALLERGY & IMMUNOLOGY

## 2021-04-01 PROCEDURE — 96372 THER/PROPH/DIAG INJ SC/IM: CPT

## 2021-04-01 RX ADMIN — MEPOLIZUMAB 100 MG: 100 INJECTION, POWDER, FOR SOLUTION SUBCUTANEOUS at 13:56

## 2021-04-01 ASSESSMENT — FIBROSIS 4 INDEX: FIB4 SCORE: 0.76

## 2021-04-01 NOTE — PROGRESS NOTES
Pt presented to [facility] for mepolizumab injection for asthma. Mepolizumab injected into L back arm with no s/s of adverse reactions and pt tolerated well and band-aid dressing applied. Next appointment confirmed. Pt discharged to self care with all personal belongings and in NAD.

## 2021-04-30 ENCOUNTER — OUTPATIENT INFUSION SERVICES (OUTPATIENT)
Dept: ONCOLOGY | Facility: MEDICAL CENTER | Age: 76
End: 2021-04-30
Attending: INTERNAL MEDICINE
Payer: MEDICARE

## 2021-04-30 VITALS
DIASTOLIC BLOOD PRESSURE: 57 MMHG | RESPIRATION RATE: 18 BRPM | WEIGHT: 153.22 LBS | SYSTOLIC BLOOD PRESSURE: 131 MMHG | TEMPERATURE: 98.5 F | HEIGHT: 61 IN | BODY MASS INDEX: 28.93 KG/M2 | HEART RATE: 78 BPM | OXYGEN SATURATION: 98 %

## 2021-04-30 DIAGNOSIS — J45.50 SEVERE PERSISTENT ASTHMA, UNSPECIFIED WHETHER COMPLICATED: ICD-10-CM

## 2021-04-30 PROCEDURE — 96372 THER/PROPH/DIAG INJ SC/IM: CPT

## 2021-04-30 PROCEDURE — 700111 HCHG RX REV CODE 636 W/ 250 OVERRIDE (IP): Mod: JG | Performed by: ALLERGY & IMMUNOLOGY

## 2021-04-30 RX ORDER — IBUPROFEN 200 MG
200 TABLET ORAL EVERY 6 HOURS PRN
COMMUNITY
End: 2022-05-02

## 2021-04-30 RX ADMIN — MEPOLIZUMAB 100 MG: 100 INJECTION, POWDER, FOR SOLUTION SUBCUTANEOUS at 11:19

## 2021-04-30 ASSESSMENT — FIBROSIS 4 INDEX: FIB4 SCORE: 0.76

## 2021-04-30 NOTE — PROGRESS NOTES
Patient arrived ambulatory to [facility] for monthly Nucala.  She denies any s/s of infection or fevers.  Nucala given to right back arm, she tolerated well.  Confirmed next appointment and she ambulated out of clinic in no apparent distress.

## 2021-05-28 ENCOUNTER — OUTPATIENT INFUSION SERVICES (OUTPATIENT)
Dept: ONCOLOGY | Facility: MEDICAL CENTER | Age: 76
End: 2021-05-28
Attending: INTERNAL MEDICINE
Payer: MEDICARE

## 2021-05-28 VITALS
HEART RATE: 76 BPM | DIASTOLIC BLOOD PRESSURE: 64 MMHG | OXYGEN SATURATION: 98 % | HEIGHT: 61 IN | RESPIRATION RATE: 18 BRPM | BODY MASS INDEX: 29.05 KG/M2 | WEIGHT: 153.88 LBS | SYSTOLIC BLOOD PRESSURE: 123 MMHG | TEMPERATURE: 98.5 F

## 2021-05-28 DIAGNOSIS — J45.50 SEVERE PERSISTENT ASTHMA, UNSPECIFIED WHETHER COMPLICATED: ICD-10-CM

## 2021-05-28 PROCEDURE — 700111 HCHG RX REV CODE 636 W/ 250 OVERRIDE (IP): Mod: JG | Performed by: ALLERGY & IMMUNOLOGY

## 2021-05-28 PROCEDURE — 96372 THER/PROPH/DIAG INJ SC/IM: CPT

## 2021-05-28 RX ADMIN — MEPOLIZUMAB 100 MG: 100 INJECTION, POWDER, FOR SOLUTION SUBCUTANEOUS at 11:40

## 2021-05-28 ASSESSMENT — FIBROSIS 4 INDEX: FIB4 SCORE: 0.76

## 2021-05-28 NOTE — PROGRESS NOTES
[facility]. Rossy states she has tolerated injection prior and has no further questions at this time. Nucala injection given subcutaneously as ordered to back of upper left arm. Rossy declines band-aid at site. Next appointment scheduled, Rossy discharged home to self care.

## 2021-06-25 ENCOUNTER — OUTPATIENT INFUSION SERVICES (OUTPATIENT)
Dept: ONCOLOGY | Facility: MEDICAL CENTER | Age: 76
End: 2021-06-25
Attending: ALLERGY & IMMUNOLOGY
Payer: MEDICARE

## 2021-06-25 VITALS
WEIGHT: 153.88 LBS | TEMPERATURE: 98.1 F | BODY MASS INDEX: 29.05 KG/M2 | DIASTOLIC BLOOD PRESSURE: 65 MMHG | OXYGEN SATURATION: 98 % | HEART RATE: 89 BPM | RESPIRATION RATE: 18 BRPM | SYSTOLIC BLOOD PRESSURE: 116 MMHG | HEIGHT: 61 IN

## 2021-06-25 DIAGNOSIS — J45.50 SEVERE PERSISTENT ASTHMA, UNSPECIFIED WHETHER COMPLICATED: ICD-10-CM

## 2021-06-25 PROCEDURE — 700111 HCHG RX REV CODE 636 W/ 250 OVERRIDE (IP): Mod: JG | Performed by: ALLERGY & IMMUNOLOGY

## 2021-06-25 PROCEDURE — 96372 THER/PROPH/DIAG INJ SC/IM: CPT

## 2021-06-25 RX ADMIN — MEPOLIZUMAB 100 MG: 100 INJECTION, POWDER, FOR SOLUTION SUBCUTANEOUS at 11:45

## 2021-06-25 ASSESSMENT — FIBROSIS 4 INDEX: FIB4 SCORE: 0.76

## 2021-06-25 NOTE — PROGRESS NOTES
Pt arrives to [facility] for Nucala injection.  Pt denies s/sx of infection or worsening of asthma symptoms today.  Nucala given SC to back of RUE.  Pt declines Band-aid due to allergy.  Confirmed next month's appt time with pt.  Pt dc home to self care.

## 2021-07-23 ENCOUNTER — OUTPATIENT INFUSION SERVICES (OUTPATIENT)
Dept: ONCOLOGY | Facility: MEDICAL CENTER | Age: 76
End: 2021-07-23
Attending: ALLERGY & IMMUNOLOGY
Payer: MEDICARE

## 2021-07-23 VITALS
OXYGEN SATURATION: 98 % | TEMPERATURE: 97.3 F | HEART RATE: 83 BPM | BODY MASS INDEX: 28.56 KG/M2 | HEIGHT: 62 IN | RESPIRATION RATE: 18 BRPM | SYSTOLIC BLOOD PRESSURE: 136 MMHG | DIASTOLIC BLOOD PRESSURE: 49 MMHG | WEIGHT: 155.2 LBS

## 2021-07-23 DIAGNOSIS — J45.50 SEVERE PERSISTENT ASTHMA, UNSPECIFIED WHETHER COMPLICATED: ICD-10-CM

## 2021-07-23 PROCEDURE — 96372 THER/PROPH/DIAG INJ SC/IM: CPT

## 2021-07-23 PROCEDURE — 700111 HCHG RX REV CODE 636 W/ 250 OVERRIDE (IP): Mod: JG | Performed by: ALLERGY & IMMUNOLOGY

## 2021-07-23 RX ADMIN — MEPOLIZUMAB 100 MG: 100 INJECTION, POWDER, FOR SOLUTION SUBCUTANEOUS at 11:57

## 2021-07-23 ASSESSMENT — FIBROSIS 4 INDEX: FIB4 SCORE: 0.76

## 2021-07-23 NOTE — PROGRESS NOTES
Rossy arrived to [facility] for q4wk Nucala injection. Pt medicated per MAR. Pt tolerated treatment without s/s adverse reaction. Rossy discharged to self care, [facility]. Pt's next appt 8/202/2021.

## 2021-08-20 ENCOUNTER — OUTPATIENT INFUSION SERVICES (OUTPATIENT)
Dept: ONCOLOGY | Facility: MEDICAL CENTER | Age: 76
End: 2021-08-20
Attending: ALLERGY & IMMUNOLOGY
Payer: MEDICARE

## 2021-08-20 VITALS
HEART RATE: 77 BPM | OXYGEN SATURATION: 95 % | SYSTOLIC BLOOD PRESSURE: 135 MMHG | WEIGHT: 154.76 LBS | HEIGHT: 62 IN | DIASTOLIC BLOOD PRESSURE: 78 MMHG | TEMPERATURE: 97.4 F | RESPIRATION RATE: 18 BRPM | BODY MASS INDEX: 28.48 KG/M2

## 2021-08-20 DIAGNOSIS — J45.50 SEVERE PERSISTENT ASTHMA, UNSPECIFIED WHETHER COMPLICATED: ICD-10-CM

## 2021-08-20 PROCEDURE — 700111 HCHG RX REV CODE 636 W/ 250 OVERRIDE (IP): Mod: JG | Performed by: ALLERGY & IMMUNOLOGY

## 2021-08-20 PROCEDURE — 96372 THER/PROPH/DIAG INJ SC/IM: CPT

## 2021-08-20 RX ADMIN — MEPOLIZUMAB 100 MG: 100 INJECTION, POWDER, FOR SOLUTION SUBCUTANEOUS at 11:39

## 2021-08-20 ASSESSMENT — FIBROSIS 4 INDEX: FIB4 SCORE: 0.76

## 2021-08-20 NOTE — PROGRESS NOTES
Pt arrives to [facility] for Nucala injection.  Pt denies s/sx of infection or recent worsening of asthma symptoms today.  Nucala given SC to back of RUE.  Pt declines Band-aid due to allergy.  Confirmed next month's appt time with pt.  Scheduled out future appts through December 2021.  Pt dc home to self care.

## 2021-08-25 ENCOUNTER — HOSPITAL ENCOUNTER (OUTPATIENT)
Facility: MEDICAL CENTER | Age: 76
End: 2021-08-25
Attending: SURGERY
Payer: MEDICARE

## 2021-08-25 LAB — PATHOLOGY CONSULT NOTE: NORMAL

## 2021-08-25 PROCEDURE — 88304 TISSUE EXAM BY PATHOLOGIST: CPT

## 2021-08-26 LAB — AMBIGUOUS DTTM AMBI4: NORMAL

## 2021-09-17 ENCOUNTER — OUTPATIENT INFUSION SERVICES (OUTPATIENT)
Dept: ONCOLOGY | Facility: MEDICAL CENTER | Age: 76
End: 2021-09-17
Attending: ALLERGY & IMMUNOLOGY
Payer: MEDICARE

## 2021-09-17 VITALS
HEIGHT: 62 IN | HEART RATE: 65 BPM | OXYGEN SATURATION: 97 % | TEMPERATURE: 97.8 F | WEIGHT: 154.98 LBS | RESPIRATION RATE: 17 BRPM | SYSTOLIC BLOOD PRESSURE: 130 MMHG | BODY MASS INDEX: 28.52 KG/M2 | DIASTOLIC BLOOD PRESSURE: 65 MMHG

## 2021-09-17 DIAGNOSIS — J45.50 SEVERE PERSISTENT ASTHMA, UNSPECIFIED WHETHER COMPLICATED: ICD-10-CM

## 2021-09-17 PROCEDURE — 700111 HCHG RX REV CODE 636 W/ 250 OVERRIDE (IP): Mod: JG | Performed by: ALLERGY & IMMUNOLOGY

## 2021-09-17 PROCEDURE — 96372 THER/PROPH/DIAG INJ SC/IM: CPT

## 2021-09-17 RX ADMIN — MEPOLIZUMAB 100 MG: 100 INJECTION, POWDER, FOR SOLUTION SUBCUTANEOUS at 11:43

## 2021-09-17 ASSESSMENT — FIBROSIS 4 INDEX: FIB4 SCORE: 0.76

## 2021-09-17 NOTE — PROGRESS NOTES
Pt presented to [facility] for mepolizumab injection for asthma. Mepolizumab injected into L back arm of abdomen with no s/s of adverse reactions and pt tolerated well. Next appointment confirmed. Pt discharged to self care with all personal belongings and in NAD.

## 2021-09-21 ENCOUNTER — HOSPITAL ENCOUNTER (OUTPATIENT)
Dept: LAB | Facility: MEDICAL CENTER | Age: 76
End: 2021-09-21
Attending: STUDENT IN AN ORGANIZED HEALTH CARE EDUCATION/TRAINING PROGRAM
Payer: MEDICARE

## 2021-09-21 LAB
ALBUMIN SERPL BCP-MCNC: 4.2 G/DL (ref 3.2–4.9)
ALBUMIN/GLOB SERPL: 1.3 G/DL
ALP SERPL-CCNC: 120 U/L (ref 30–99)
ALT SERPL-CCNC: 16 U/L (ref 2–50)
ANION GAP SERPL CALC-SCNC: 13 MMOL/L (ref 7–16)
AST SERPL-CCNC: 16 U/L (ref 12–45)
BASOPHILS # BLD AUTO: 0.6 % (ref 0–1.8)
BASOPHILS # BLD: 0.05 K/UL (ref 0–0.12)
BILIRUB SERPL-MCNC: 0.4 MG/DL (ref 0.1–1.5)
BUN SERPL-MCNC: 11 MG/DL (ref 8–22)
CALCIUM SERPL-MCNC: 9.9 MG/DL (ref 8.5–10.5)
CHLORIDE SERPL-SCNC: 94 MMOL/L (ref 96–112)
CHOLEST SERPL-MCNC: 173 MG/DL (ref 100–199)
CO2 SERPL-SCNC: 30 MMOL/L (ref 20–33)
CREAT SERPL-MCNC: 0.63 MG/DL (ref 0.5–1.4)
EOSINOPHIL # BLD AUTO: 0.05 K/UL (ref 0–0.51)
EOSINOPHIL NFR BLD: 0.6 % (ref 0–6.9)
ERYTHROCYTE [DISTWIDTH] IN BLOOD BY AUTOMATED COUNT: 43.1 FL (ref 35.9–50)
GLOBULIN SER CALC-MCNC: 3.3 G/DL (ref 1.9–3.5)
GLUCOSE SERPL-MCNC: 171 MG/DL (ref 65–99)
HCT VFR BLD AUTO: 42.1 % (ref 37–47)
HDLC SERPL-MCNC: 60 MG/DL
HGB BLD-MCNC: 13.7 G/DL (ref 12–16)
IMM GRANULOCYTES # BLD AUTO: 0.05 K/UL (ref 0–0.11)
IMM GRANULOCYTES NFR BLD AUTO: 0.6 % (ref 0–0.9)
LDLC SERPL CALC-MCNC: 94 MG/DL
LYMPHOCYTES # BLD AUTO: 2.23 K/UL (ref 1–4.8)
LYMPHOCYTES NFR BLD: 25.4 % (ref 22–41)
MCH RBC QN AUTO: 27.8 PG (ref 27–33)
MCHC RBC AUTO-ENTMCNC: 32.5 G/DL (ref 33.6–35)
MCV RBC AUTO: 85.4 FL (ref 81.4–97.8)
MONOCYTES # BLD AUTO: 0.76 K/UL (ref 0–0.85)
MONOCYTES NFR BLD AUTO: 8.7 % (ref 0–13.4)
NEUTROPHILS # BLD AUTO: 5.63 K/UL (ref 2–7.15)
NEUTROPHILS NFR BLD: 64.1 % (ref 44–72)
NRBC # BLD AUTO: 0 K/UL
NRBC BLD-RTO: 0 /100 WBC
PLATELET # BLD AUTO: 365 K/UL (ref 164–446)
PMV BLD AUTO: 9.8 FL (ref 9–12.9)
POTASSIUM SERPL-SCNC: 3.6 MMOL/L (ref 3.6–5.5)
PROT SERPL-MCNC: 7.5 G/DL (ref 6–8.2)
RBC # BLD AUTO: 4.93 M/UL (ref 4.2–5.4)
SODIUM SERPL-SCNC: 137 MMOL/L (ref 135–145)
TRIGL SERPL-MCNC: 94 MG/DL (ref 0–149)
WBC # BLD AUTO: 8.8 K/UL (ref 4.8–10.8)

## 2021-09-21 PROCEDURE — 36415 COLL VENOUS BLD VENIPUNCTURE: CPT

## 2021-09-21 PROCEDURE — 80053 COMPREHEN METABOLIC PANEL: CPT

## 2021-09-21 PROCEDURE — 85025 COMPLETE CBC W/AUTO DIFF WBC: CPT

## 2021-09-21 PROCEDURE — 80061 LIPID PANEL: CPT

## 2021-10-05 ENCOUNTER — OFFICE VISIT (OUTPATIENT)
Dept: INTERNAL MEDICINE | Facility: OTHER | Age: 76
End: 2021-10-05
Payer: MEDICARE

## 2021-10-05 VITALS
SYSTOLIC BLOOD PRESSURE: 118 MMHG | HEIGHT: 62 IN | OXYGEN SATURATION: 93 % | WEIGHT: 156.8 LBS | BODY MASS INDEX: 28.85 KG/M2 | HEART RATE: 62 BPM | DIASTOLIC BLOOD PRESSURE: 60 MMHG | TEMPERATURE: 98.4 F

## 2021-10-05 DIAGNOSIS — E78.2 MIXED HYPERLIPIDEMIA: ICD-10-CM

## 2021-10-05 DIAGNOSIS — I10 PRIMARY HYPERTENSION: ICD-10-CM

## 2021-10-05 DIAGNOSIS — Z79.899 POLYPHARMACY: ICD-10-CM

## 2021-10-05 DIAGNOSIS — E11.22 CONTROLLED TYPE 2 DIABETES MELLITUS WITH STAGE 1 CHRONIC KIDNEY DISEASE, WITHOUT LONG-TERM CURRENT USE OF INSULIN (HCC): ICD-10-CM

## 2021-10-05 DIAGNOSIS — N18.1 CONTROLLED TYPE 2 DIABETES MELLITUS WITH STAGE 1 CHRONIC KIDNEY DISEASE, WITHOUT LONG-TERM CURRENT USE OF INSULIN (HCC): ICD-10-CM

## 2021-10-05 DIAGNOSIS — Z12.31 ENCOUNTER FOR SCREENING MAMMOGRAM FOR BREAST CANCER: ICD-10-CM

## 2021-10-05 PROBLEM — R63.4 WEIGHT LOSS: Status: RESOLVED | Noted: 2018-01-31 | Resolved: 2021-10-05

## 2021-10-05 PROBLEM — R53.83 FATIGUE: Status: RESOLVED | Noted: 2018-01-31 | Resolved: 2021-10-05

## 2021-10-05 PROBLEM — E87.1 HYPONATREMIA: Status: RESOLVED | Noted: 2018-01-31 | Resolved: 2021-10-05

## 2021-10-05 PROBLEM — R93.89 ABNORMAL CT SCAN, CHEST: Status: RESOLVED | Noted: 2018-02-28 | Resolved: 2021-10-05

## 2021-10-05 PROBLEM — Z98.890 PONV (POSTOPERATIVE NAUSEA AND VOMITING): Status: RESOLVED | Noted: 2019-10-15 | Resolved: 2021-10-05

## 2021-10-05 PROBLEM — J45.909 REACTIVE AIRWAY DISEASE WITHOUT COMPLICATION: Status: RESOLVED | Noted: 2017-08-25 | Resolved: 2021-10-05

## 2021-10-05 PROBLEM — D17.20 LIPOMA OF SHOULDER: Status: ACTIVE | Noted: 2021-08-31

## 2021-10-05 PROBLEM — M54.50 LOW BACK PAIN: Status: ACTIVE | Noted: 2020-09-16

## 2021-10-05 PROBLEM — R11.2 PONV (POSTOPERATIVE NAUSEA AND VOMITING): Status: RESOLVED | Noted: 2019-10-15 | Resolved: 2021-10-05

## 2021-10-05 PROCEDURE — 99214 OFFICE O/P EST MOD 30 MIN: CPT | Mod: GC | Performed by: INTERNAL MEDICINE

## 2021-10-05 RX ORDER — MONTELUKAST SODIUM 10 MG/1
TABLET ORAL
COMMUNITY
Start: 2021-09-27 | End: 2021-10-15

## 2021-10-05 RX ORDER — METFORMIN HYDROCHLORIDE 500 MG/1
500 TABLET, EXTENDED RELEASE ORAL 2 TIMES DAILY
Qty: 180 TABLET | Refills: 0 | Status: SHIPPED | OUTPATIENT
Start: 2021-10-05 | End: 2021-10-23

## 2021-10-05 ASSESSMENT — PATIENT HEALTH QUESTIONNAIRE - PHQ9: CLINICAL INTERPRETATION OF PHQ2 SCORE: 0

## 2021-10-05 ASSESSMENT — ENCOUNTER SYMPTOMS
VOMITING: 0
BLURRED VISION: 0
SHORTNESS OF BREATH: 0
WHEEZING: 0
ABDOMINAL PAIN: 0
FALLS: 0
SORE THROAT: 0
NERVOUS/ANXIOUS: 0
FEVER: 0
CHILLS: 0
DOUBLE VISION: 0
PALPITATIONS: 0
HEADACHES: 0
NAUSEA: 0
SPEECH CHANGE: 0
DIARRHEA: 0
MYALGIAS: 0
DIZZINESS: 0
CONSTIPATION: 0
COUGH: 0
SEIZURES: 0

## 2021-10-05 ASSESSMENT — FIBROSIS 4 INDEX: FIB4 SCORE: 0.81

## 2021-10-05 ASSESSMENT — LIFESTYLE VARIABLES: SUBSTANCE_ABUSE: 0

## 2021-10-05 NOTE — PATIENT INSTRUCTIONS
Hello! Today we saw you for:    -Diabetes: Please stop the glimipride and start the metformin extended release twice daily. Please monitor for side effects such as diarrhea.  -Breast cancer screening: I will order during your next visit.  See me in weeks, we will follow up on your diabetes then.    Thank you!

## 2021-10-05 NOTE — PROGRESS NOTES
Established Patient    Rossy Lopez is a 76 y.o. female who presents today with the following:    CC:follow up, diabetes    HPI: Patient is a 75 y/o F with a PMH significant for diabetes, hyperlipidemia, GERD, and hypertension who presents for follow-up of diabetes.    She checks her BG levels regularly 3x/day. In the AM, her levels go around 180s-190s. Most levels are 170s-190s. Her diet has not changed. She has symptoms of hypoglycemia such as dizziness/tremors when levels are around 120s. She rarely has hypoglycemia symptoms.  She would like to have her A1c goal below 7%.  She was previously started on glimepiride by Dr. Alvarado in December 2020.  Her diabetic foot exam was normal during her last visit.  She would like to go back to her previous Metformin medication, which she had previously decreased/stopped due to diarrhea side effect.    Got diabetic foot exam 8/2021  Needs urine microalbumin  Got diabetic eye exam on Aug 12, 2021.    Lab results: Reviewed all labs with the patient.  Cholesterol panel: repeat in 6 months, repeat CMP for ALP in 6 mo.    HCM:  Got both COVID vaccines (Pfizer), Has not yet gotten COVID booster  Has not gotten flu shot for 7480-9348 season  Colonoscopies: Not need anymore, last one was 1-2 years ago, no need for more  Mammo: Last one in Dec 2020, normal. Order mammo during next visit; patient's insurance will pay for 1 every year  Shingrix vaccines x2: Obtained  Pneumonia: Has received PPSV23 + PCV13      Review of Systems   Constitutional: Negative for chills, fever and malaise/fatigue.   HENT: Negative for congestion and sore throat.    Eyes: Negative for blurred vision and double vision.   Respiratory: Negative for cough, shortness of breath and wheezing.    Cardiovascular: Negative for chest pain, palpitations and leg swelling.   Gastrointestinal: Negative for abdominal pain, constipation, diarrhea, nausea and vomiting.   Genitourinary: Negative for dysuria, frequency and  urgency.   Musculoskeletal: Negative for falls and myalgias.   Neurological: Negative for dizziness, speech change, seizures and headaches.   Psychiatric/Behavioral: Negative for substance abuse and suicidal ideas. The patient is not nervous/anxious.        Patient Active Problem List    Diagnosis Date Noted   • Controlled type 2 diabetes mellitus with stage 1 chronic kidney disease, without long-term current use of insulin (HCC) 2012   • Hyperlipidemia 2012   • Polypharmacy 10/05/2021   • Encounter for screening mammogram for breast cancer 10/05/2021   • Hypertension 2012   • Severe persistent asthma 2021   • Vitamin D deficiency 2017   • Gastroesophageal reflux disease without esophagitis 2017   • Family history of colon cancer 2012       Social History     Tobacco Use   • Smoking status: Former Smoker     Packs/day: 1.00     Years: 5.00     Pack years: 5.00     Types: Cigarettes     Quit date: 1970     Years since quittin.7   • Smokeless tobacco: Never Used   • Tobacco comment: quit 1972   Vaping Use   • Vaping Use: Never used   Substance Use Topics   • Alcohol use: Not Currently     Alcohol/week: 0.0 oz     Comment: occasionally   • Drug use: No       Current Outpatient Medications   Medication Sig Dispense Refill   • metFORMIN ER (GLUCOPHAGE XR) 500 MG TABLET SR 24 HR Take 1 Tablet by mouth 2 times a day. 180 Tablet 0   • ibuprofen (MOTRIN) 200 MG Tab Take 200 mg by mouth every 6 hours as needed.     • cyanocobalamin 100 MCG tablet [employer] VITAMIN B-12 TABS     • glimepiride (AMARYL) 4 MG Tab Take 1 Tab by mouth every day. TAKE 1 TABLET BY MOUTH DAILY     • cyclobenzaprine (FLEXERIL) 10 MG Tab      • B Complex Vitamins (VITAMIN B COMPLEX PO) Take  by mouth 2 Times a Day.     • Multiple Vitamins-Minerals (AIRBORNE GUMMIES) Chew Tab Take  by mouth 2 Times a Day.     • PREVIDENT 5000 DRY MOUTH 1.1 % Gel BRUSH UTD     • albuterol (VENTOLIN HFA) 108 (90 Base) MCG/ACT  "Aero Soln inhalation aerosol Inhale 2 Puffs by mouth every four hours as needed for Shortness of Breath.     • Ca Phosphate-Cholecalciferol (CALCIUM 500 + D3) 250-500 MG-UNIT Chew Tab Take 1 Tab by mouth 2 Times a Day.     • losartan (COZAAR) 25 MG Tab TAKE 1 TABLET BY MOUTH EVERY DAY 90 Tab 0   • Mometasone Furoate (ASMANEX HFA) 200 MCG/ACT Aerosol Inhale 2 Puffs by mouth 2 Times a Day.     • omeprazole (PRILOSEC) 20 MG delayed-release capsule TAKE 1 CAPSULE BY MOUTH EVERY DAY 90 Cap 0   • hydroCHLOROthiazide (HYDRODIURIL) 25 MG Tab TAKE 1 TABLET BY MOUTH EVERY DAY 90 Tab 0   • simvastatin (ZOCOR) 10 MG Tab TAKE 1 TABLET BY MOUTH EVERY EVENING 90 Tab 3   • Multiple Vitamins-Minerals (MULTIVITAMIN ADULT PO) Take 1 Tab by mouth every day.       No current facility-administered medications for this visit.       /60 (BP Location: Right arm, Patient Position: Sitting, BP Cuff Size: Adult)   Pulse 62   Temp 36.9 °C (98.4 °F)   Ht 1.575 m (5' 2\")   Wt 71.1 kg (156 lb 12.8 oz)   SpO2 93%   BMI 28.68 kg/m²     PHYSICAL EXAM:  Physical Exam  Vitals and nursing note reviewed.   Constitutional:       General: She is not in acute distress.     Appearance: Normal appearance. She is normal weight. She is not ill-appearing.   HENT:      Head: Normocephalic and atraumatic.      Right Ear: External ear normal.      Left Ear: External ear normal.      Nose: Nose normal. No congestion or rhinorrhea.      Mouth/Throat:      Mouth: Mucous membranes are moist.      Pharynx: Oropharynx is clear. No oropharyngeal exudate.   Eyes:      General:         Right eye: No discharge.         Left eye: No discharge.      Extraocular Movements: Extraocular movements intact.      Conjunctiva/sclera: Conjunctivae normal.      Pupils: Pupils are equal, round, and reactive to light.      Comments: Wearing glasses   Cardiovascular:      Rate and Rhythm: Normal rate and regular rhythm.      Pulses: Normal pulses.   Pulmonary:      Effort: " Pulmonary effort is normal. No respiratory distress.      Breath sounds: Normal breath sounds. No wheezing or rales.   Abdominal:      General: Bowel sounds are normal. There is no distension.      Palpations: Abdomen is soft.      Tenderness: There is no abdominal tenderness. There is no right CVA tenderness or left CVA tenderness.      Comments: Protuberant   Musculoskeletal:         General: No swelling or tenderness. Normal range of motion.      Cervical back: Normal range of motion and neck supple. No rigidity or tenderness.      Right lower leg: No edema.      Left lower leg: No edema.   Skin:     General: Skin is warm and dry.      Capillary Refill: Capillary refill takes less than 2 seconds.      Findings: No bruising.   Neurological:      Mental Status: She is alert and oriented to person, place, and time. Mental status is at baseline.      Cranial Nerves: No cranial nerve deficit.      Sensory: No sensory deficit.      Coordination: Coordination normal.      Gait: Gait normal.   Psychiatric:         Mood and Affect: Mood normal.         Behavior: Behavior normal.         Thought Content: Thought content normal.         Judgment: Judgment normal.       Assessment and Plan  Controlled type 2 diabetes mellitus with stage 1 chronic kidney disease, without long-term current use of insulin ([facility])  A1c in 8/2021 was 7.4%. Goal for patient in this age group is 7-8%. Patient wishes to decrease her A1c to below 7.    Through shared physician-patient decision making, likely better to discontinue glimepiride as this can cause multiple side effects. Will go back to metformin ER and see if this will control her BG levels well.  -Follow up in 15 weeks; repeat HgA1c at that time  -Patient to self-monitor for side effects  -Repeat urine microalbumin at next visit  -Patient to continue measuring BG levels 3x/day    Hypertension  Well controlled.  -Continue on losartan and HCTZ    Hyperlipidemia  Per guidelines, LDL should be  less than 70 for diabetics.  Patient's most recent LDL was higher than this; however, patient historically has had lower levels.    Patient to continue watching diet.  -Repeat lipid panel in 3-6 months  -Continue simvastatin as prescribed  -If LDL continues to be greater than 70, consider switching from simvastatin to atorvastatin 40 mg daily (high-intensity statin per guidelines)    Polypharmacy  Patient has a history of taking multiple multivitamins as well as several B vitamins, etc.  This is concerning in that this is both difficult for the body to absorb with all of these vitamin supplements as well as increasing cost of medications perhaps unnecessarily    -Patient to continue calcium-vitamin D supplements at lowest dose  -Discontinue all other vitamin supplements  -Encourage well-balanced, healthy diet as the body will absorb vitamins more easily this way    Encounter for screening mammogram for breast cancer  Last mammogram was performed in 12/2020, was normal.  Patient should have mammogram every 1-2 years per guidelines.    Patient wishes to receive yearly mammogram due to family history of breast cancer.  -We will order mammogram during next visit; insurance will pay for 1 every year      Return to clinic in 15 weeks for follow-up      Signed by: Pat Rosenthal M.D.

## 2021-10-05 NOTE — ASSESSMENT & PLAN NOTE
Per guidelines, LDL should be less than 70 for diabetics.  Patient's most recent LDL was higher than this; however, patient historically has had lower levels.    Patient to continue watching diet.  -Repeat lipid panel in 3-6 months  -Continue simvastatin as prescribed  -If LDL continues to be greater than 70, consider switching from simvastatin to atorvastatin 40 mg daily (high-intensity statin per guidelines)

## 2021-10-05 NOTE — ASSESSMENT & PLAN NOTE
A1c in 8/2021 was 7.4%. Goal for patient in this age group is 7-8%. Patient wishes to decrease her A1c to below 7.    Through shared physician-patient decision making, likely better to discontinue glimepiride as this can cause multiple side effects. Will go back to metformin ER and see if this will control her BG levels well.  -Follow up in 15 weeks; repeat HgA1c at that time  -Patient to self-monitor for side effects  -Repeat urine microalbumin at next visit  -Patient to continue measuring BG levels 3x/day

## 2021-10-05 NOTE — ASSESSMENT & PLAN NOTE
Last mammogram was performed in 12/2020, was normal.  Patient should have mammogram every 1-2 years per guidelines.    Patient wishes to receive yearly mammogram due to family history of breast cancer.  -We will order mammogram during next visit; insurance will pay for 1 every year

## 2021-10-05 NOTE — ASSESSMENT & PLAN NOTE
Patient has a history of taking multiple multivitamins as well as several B vitamins, etc.  This is concerning in that this is both difficult for the body to absorb with all of these vitamin supplements as well as increasing cost of medications perhaps unnecessarily    -Patient to continue calcium-vitamin D supplements at lowest dose  -Discontinue all other vitamin supplements  -Encourage well-balanced, healthy diet as the body will absorb vitamins more easily this way

## 2021-10-15 ENCOUNTER — OUTPATIENT INFUSION SERVICES (OUTPATIENT)
Dept: ONCOLOGY | Facility: MEDICAL CENTER | Age: 76
End: 2021-10-15
Attending: ALLERGY & IMMUNOLOGY
Payer: MEDICARE

## 2021-10-15 VITALS
DIASTOLIC BLOOD PRESSURE: 64 MMHG | OXYGEN SATURATION: 97 % | TEMPERATURE: 98.6 F | HEIGHT: 61 IN | RESPIRATION RATE: 18 BRPM | WEIGHT: 153 LBS | HEART RATE: 93 BPM | BODY MASS INDEX: 28.89 KG/M2 | SYSTOLIC BLOOD PRESSURE: 110 MMHG

## 2021-10-15 DIAGNOSIS — J45.50 SEVERE PERSISTENT ASTHMA, UNSPECIFIED WHETHER COMPLICATED: ICD-10-CM

## 2021-10-15 PROCEDURE — 700111 HCHG RX REV CODE 636 W/ 250 OVERRIDE (IP): Mod: JG | Performed by: ALLERGY & IMMUNOLOGY

## 2021-10-15 PROCEDURE — 96372 THER/PROPH/DIAG INJ SC/IM: CPT

## 2021-10-15 RX ADMIN — MEPOLIZUMAB 100 MG: 100 INJECTION, POWDER, FOR SOLUTION SUBCUTANEOUS at 11:46

## 2021-10-15 ASSESSMENT — FIBROSIS 4 INDEX: FIB4 SCORE: 0.81

## 2021-10-15 NOTE — PROGRESS NOTES
Pt returns for q 4 week Nucala. Assessment completed, pt denies any changes from last visit. Injection given to back of R arm, no band aide applied per pt request. Next appt confirmed. Pt discharged home under self care in no apparent distress.

## 2021-10-21 ENCOUNTER — TELEPHONE (OUTPATIENT)
Dept: INTERNAL MEDICINE | Facility: OTHER | Age: 76
End: 2021-10-21

## 2021-10-21 NOTE — TELEPHONE ENCOUNTER
"Received a call from Rossy, she states she would like to talk to you about her Metformin, she started this on 10/7/21, she takes it BID, since then she has had 3 \"bouts of diarrhea\" and would like to discuss that, and see if she needs an appt or to stop the medication. She states that her sugars in the morning have been high, ranging from 146-198. Her phone number is [phone]. Please advise  "

## 2021-10-23 ENCOUNTER — TELEPHONE (OUTPATIENT)
Dept: HOSPITALIST | Facility: MEDICAL CENTER | Age: 76
End: 2021-10-23

## 2021-10-23 DIAGNOSIS — E11.22 CONTROLLED TYPE 2 DIABETES MELLITUS WITH STAGE 1 CHRONIC KIDNEY DISEASE, WITHOUT LONG-TERM CURRENT USE OF INSULIN (HCC): ICD-10-CM

## 2021-10-23 DIAGNOSIS — N18.1 CONTROLLED TYPE 2 DIABETES MELLITUS WITH STAGE 1 CHRONIC KIDNEY DISEASE, WITHOUT LONG-TERM CURRENT USE OF INSULIN (HCC): ICD-10-CM

## 2021-10-24 NOTE — TELEPHONE ENCOUNTER
I called Ms. Lopez regarding elevated blood sugars (140s-190s) as well as diarrhea from metformin ER. We had previously discontinued glimepiride and started metformin ER as (1) glimepiride [a sulfonylurea] has many adverse side effects and would not be optimal, (2) she did not want to try an injectable GLP-1 medication and Rybelsus (the pill version of semaglutide) was not covered by her insurance, and (3) metformin ER has been shown to have lower rates of diarrhea than metformin SR.    Patient states that she has been having diarrhea 1x/week. She states she is okay with trying an injectable medication. I explained the risks and benefits of using GLP-1 inhibitors, to which she expressed understanding of and agreed to. I also discussed other options such as SGLT-2 inhibitors (e.g. Invokana), which she declined due to the possible side effects (e.g. UTIs).    We came up with a plan:  1) I will prescribe Trulicity (dulaglutide) to her pharmacy at [facility].    2) If Trulicity is not covered, I will run Victoza through to see if it is covered. We will see what medication(s) in this class are covered and prescribe from there.    3) In the meantime, patient will continue taking metformin ER with loperamide (Imodium) as needed. If Imodium does not work, she can also take Metamucil as metamucil has been shown to help both constipation and diarrhea.    4) She will continue checking her BG levels regularly. We discussed her BG monitor - it is ~1 year old so less likely to be calibrated incorrectly. However, we will check her A1c during her next visit, and if it is off from the 140s-190s, then will check calibration of BG monitor/prescribe new one if needed.    5) We will repeat her HgA1c POCT during her next visit and proceed from there.    She agreed with the plan and expressed understanding via the teach-back method.    Will continue to monitor closely.    Pat Rosenthal MD, MPH  [facility], PGY-2

## 2021-10-25 ENCOUNTER — TELEPHONE (OUTPATIENT)
Dept: INTERNAL MEDICINE | Facility: OTHER | Age: 76
End: 2021-10-25

## 2021-10-25 NOTE — TELEPHONE ENCOUNTER
Pt called and would like to know if she is supposed to take her Metformin and Trulicity at the same time, please advise.

## 2021-10-26 ENCOUNTER — TELEPHONE (OUTPATIENT)
Dept: HOSPITALIST | Facility: MEDICAL CENTER | Age: 76
End: 2021-10-26

## 2021-10-26 NOTE — LETTER
October 27, 2021        [place] [address]        Dear Rossy    Here are the results of your test(s):   {LAB LETTER RESULTS:48203}    Comments:  {LAB LETTER COMMENTS:96463}        Sincerely,        Pat Rosenthal M.D.  Signed Electronically

## 2021-11-03 ENCOUNTER — TELEPHONE (OUTPATIENT)
Dept: HOSPITALIST | Facility: MEDICAL CENTER | Age: 76
End: 2021-11-03

## 2021-11-03 DIAGNOSIS — N18.1 CONTROLLED TYPE 2 DIABETES MELLITUS WITH STAGE 1 CHRONIC KIDNEY DISEASE, WITHOUT LONG-TERM CURRENT USE OF INSULIN (HCC): ICD-10-CM

## 2021-11-03 DIAGNOSIS — E11.22 CONTROLLED TYPE 2 DIABETES MELLITUS WITH STAGE 1 CHRONIC KIDNEY DISEASE, WITHOUT LONG-TERM CURRENT USE OF INSULIN (HCC): ICD-10-CM

## 2021-11-03 NOTE — TELEPHONE ENCOUNTER
Ms. Lopez had a question about repeat labs. The lab I would like to repeat is a HgA1c. I will order this right now.    Kirstie: Could you yahir call the patient and let her know that I have sent this order? Thank you!

## 2021-11-12 ENCOUNTER — OUTPATIENT INFUSION SERVICES (OUTPATIENT)
Dept: ONCOLOGY | Facility: MEDICAL CENTER | Age: 76
End: 2021-11-12
Attending: ALLERGY & IMMUNOLOGY
Payer: MEDICARE

## 2021-11-12 VITALS
SYSTOLIC BLOOD PRESSURE: 109 MMHG | WEIGHT: 151.24 LBS | DIASTOLIC BLOOD PRESSURE: 73 MMHG | OXYGEN SATURATION: 95 % | HEIGHT: 61 IN | TEMPERATURE: 97.3 F | BODY MASS INDEX: 28.55 KG/M2 | HEART RATE: 83 BPM | RESPIRATION RATE: 18 BRPM

## 2021-11-12 DIAGNOSIS — J45.50 SEVERE PERSISTENT ASTHMA, UNSPECIFIED WHETHER COMPLICATED: ICD-10-CM

## 2021-11-12 PROCEDURE — 700111 HCHG RX REV CODE 636 W/ 250 OVERRIDE (IP): Mod: JG | Performed by: ALLERGY & IMMUNOLOGY

## 2021-11-12 PROCEDURE — 96372 THER/PROPH/DIAG INJ SC/IM: CPT

## 2021-11-12 RX ADMIN — MEPOLIZUMAB 100 MG: 100 INJECTION, POWDER, FOR SOLUTION SUBCUTANEOUS at 11:49

## 2021-11-12 ASSESSMENT — FIBROSIS 4 INDEX: FIB4 SCORE: 0.81

## 2021-11-15 NOTE — TELEPHONE ENCOUNTER
Last seen: 10/05/21 by Dr. Rosenthal  Next appt: 0118/22 with Dr. Rosenthal    Was the patient seen in the last year in this department? Yes   Does patient have an active prescription for medications requested? No   Received Request Via: Pharmacy   Statement Selected

## 2021-11-24 ENCOUNTER — OFFICE VISIT (OUTPATIENT)
Dept: INTERNAL MEDICINE | Facility: OTHER | Age: 76
End: 2021-11-24
Payer: MEDICARE

## 2021-11-24 VITALS
SYSTOLIC BLOOD PRESSURE: 130 MMHG | TEMPERATURE: 97.7 F | DIASTOLIC BLOOD PRESSURE: 82 MMHG | OXYGEN SATURATION: 92 % | WEIGHT: 149 LBS | HEART RATE: 87 BPM | HEIGHT: 62 IN | BODY MASS INDEX: 27.42 KG/M2

## 2021-11-24 DIAGNOSIS — E11.22 CONTROLLED TYPE 2 DIABETES MELLITUS WITH STAGE 1 CHRONIC KIDNEY DISEASE, WITHOUT LONG-TERM CURRENT USE OF INSULIN (HCC): Chronic | ICD-10-CM

## 2021-11-24 DIAGNOSIS — L29.9 PRURITUS: ICD-10-CM

## 2021-11-24 DIAGNOSIS — N18.1 CONTROLLED TYPE 2 DIABETES MELLITUS WITH STAGE 1 CHRONIC KIDNEY DISEASE, WITHOUT LONG-TERM CURRENT USE OF INSULIN (HCC): Chronic | ICD-10-CM

## 2021-11-24 PROCEDURE — 99214 OFFICE O/P EST MOD 30 MIN: CPT | Mod: GC | Performed by: STUDENT IN AN ORGANIZED HEALTH CARE EDUCATION/TRAINING PROGRAM

## 2021-11-24 ASSESSMENT — FIBROSIS 4 INDEX: FIB4 SCORE: 0.81

## 2021-11-24 ASSESSMENT — ENCOUNTER SYMPTOMS
HEARTBURN: 0
ABDOMINAL PAIN: 0
EYE PAIN: 0
CONSTIPATION: 0
DOUBLE VISION: 0
BLURRED VISION: 0
FEVER: 0
HEADACHES: 0
PALPITATIONS: 0
COUGH: 0
TINGLING: 0
DIARRHEA: 1
NAUSEA: 0
SPUTUM PRODUCTION: 0
DIZZINESS: 0
VOMITING: 0
SHORTNESS OF BREATH: 0
WEAKNESS: 0
CHILLS: 0

## 2021-11-24 NOTE — ASSESSMENT & PLAN NOTE
2 week duration of pruritis, does not appear allergic or overtly dermatitis/eczema. No triggering events/lifestyle changes identified, EXTREMELY unlikely to be a trulicity allergic reaction/intolerance.  -Lotions/creams q4h/PRN  -Claritin 10mg PO BID/PRN (increased frequency due to intensity of itch; trial of up to 2 weeks)  -Follow up PRN or in January as scheduled

## 2021-11-24 NOTE — ASSESSMENT & PLAN NOTE
Changed from metformin to trulicity due to intolerance of therapy (GI upset with moderate dose). Last A1c was 7.3 (per [facility] records of [facility]) which was done in August.  -A1c due in February  -Trulicity SC qWeekly  -Follow up PCP in January, possible conversion to medicare wellness

## 2021-11-24 NOTE — PROGRESS NOTES
CC:   Chief Complaint   Patient presents with   • Rash     R Arm, Possible reaction to Trulicity   • Itchy                                                                                                                                           HPI:   Rossy presents today with the following.    2 weeks of rash/itching. Started 2 weeks after initiation of trulicity. Has changed any soaps, shampoos, new clothes, no new medications. Itches throughout entire day, predominantly on the upper arms and trunk. Has been using hydrocortisone cream with with improvement, though temporary relief. Attempted claritin (2 pills) did help to a degree, though she did so at night and isn't completely sure how effective this was, though does note she was able to fall asleep easier.    No active pain.    There are no diagnoses linked to this encounter.    Patient Active Problem List    Diagnosis Date Noted   • Pruritus 11/24/2021   • Polypharmacy 10/05/2021   • Encounter for screening mammogram for breast cancer 10/05/2021   • Lipoma of shoulder 08/31/2021   • Severe persistent asthma 03/05/2021   • Low back pain 09/16/2020   • Vitamin D deficiency 08/25/2017   • Gastroesophageal reflux disease without esophagitis 08/25/2017   • Hypertension 06/19/2012   • Controlled type 2 diabetes mellitus with stage 1 chronic kidney disease, without long-term current use of insulin (HCC) 06/19/2012   • Hyperlipidemia 06/19/2012   • Family history of colon cancer 06/19/2012   • Essential hypertension, benign 06/19/2012       Current Outpatient Medications   Medication Sig Dispense Refill   • glucose blood strip Use as prn for checking BS levels 3 times a day. Dx: E11.9 300 Strip 3   • Dulaglutide 0.75 MG/0.5ML Solution Pen-injector Inject 0.5 mL under the skin every 7 days. Monitor blood sugar levels, if not optimal, call clinic to increase dosage. Please STOP metformin ER. 0.5 mL 4   • ibuprofen (MOTRIN) 200 MG Tab Take 200 mg by mouth every 6  "hours as needed.     • cyclobenzaprine (FLEXERIL) 10 MG Tab      • Multiple Vitamins-Minerals (AIRBORNE GUMMIES) Chew Tab Take  by mouth 2 Times a Day.     • PREVIDENT 5000 DRY MOUTH 1.1 % Gel BRUSH UTD     • albuterol (VENTOLIN HFA) 108 (90 Base) MCG/ACT Aero Soln inhalation aerosol Inhale 2 Puffs by mouth every four hours as needed for Shortness of Breath.     • Ca Phosphate-Cholecalciferol (CALCIUM 500 + D3) 250-500 MG-UNIT Chew Tab Take 1 Tab by mouth 2 Times a Day.     • losartan (COZAAR) 25 MG Tab TAKE 1 TABLET BY MOUTH EVERY DAY 90 Tab 0   • Mometasone Furoate (ASMANEX HFA) 200 MCG/ACT Aerosol Inhale 2 Puffs by mouth 2 Times a Day.     • omeprazole (PRILOSEC) 20 MG delayed-release capsule TAKE 1 CAPSULE BY MOUTH EVERY DAY 90 Cap 0   • hydroCHLOROthiazide (HYDRODIURIL) 25 MG Tab TAKE 1 TABLET BY MOUTH EVERY DAY 90 Tab 0   • simvastatin (ZOCOR) 10 MG Tab TAKE 1 TABLET BY MOUTH EVERY EVENING 90 Tab 3   • Multiple Vitamins-Minerals (MULTIVITAMIN ADULT PO) Take 1 Tab by mouth every day.       No current facility-administered medications for this visit.         Allergies as of 11/24/2021 - Reviewed 11/24/2021   Allergen Reaction Noted   • Other [facility] Hives 07/29/2011   • Phisohex [hexachlorophene] Rash 08/31/2016   • Sulfa drugs Rash and Itching 07/16/2010   • Tape Hives and Itching 09/12/2016          /82 (BP Location: Left arm, Patient Position: Sitting, BP Cuff Size: Adult)   Pulse 87   Temp 36.5 °C (97.7 °F) (Temporal)   Ht 1.575 m (5' 2\")   Wt 67.6 kg (149 lb)   SpO2 92%   BMI 27.25 kg/m²     Review of Systems   Constitutional: Negative for chills, fever and malaise/fatigue.   Eyes: Negative for blurred vision, double vision and pain.   Respiratory: Negative for cough, sputum production and shortness of breath.    Cardiovascular: Negative for chest pain, palpitations and leg swelling.   Gastrointestinal: Positive for diarrhea. Negative for abdominal pain, constipation, heartburn, nausea and " vomiting.        Had diarrhoea with metformin, improved since cessation.   Skin: Positive for itching and rash.   Neurological: Negative for dizziness, tingling, weakness and headaches.        Physical Exam:  Gen:  Alert and oriented, No apparent distress.  Neuro:  CN II-XII intact, no focal deficits  Lungs:  CTAB  CV:  RRR no m/g/r  Abd:  Soft, nontender, nondistended  Skin:  Bilateral shoulder erythema measuring approximately 4x3cm, with excoriations and rare macular elevations; bilateral flank excoriations; suprapubic superficial skin ulceration/intertrigo, grade 1.  Ext:  Shoulder/elbow flexion/extension 5/5 bilaterally and symmetric; ambulates independently with steady gait      Assessment and Plan.   Rossy Lopez is a 76 y.o. female with the following issues:    Controlled type 2 diabetes mellitus with stage 1 chronic kidney disease, without long-term current use of insulin (HCC)  Changed from metformin to trulicity due to intolerance of therapy (GI upset with moderate dose). Last A1c was 7.3 (per [facility] records of [facility]) which was done in August.  -A1c due in February  -Trulicity SC qWeekly  -Follow up PCP in January, possible conversion to medicare wellness    Pruritus  2 week duration of pruritis, does not appear allergic or overtly dermatitis/eczema. No triggering events/lifestyle changes identified, EXTREMELY unlikely to be a trulicity allergic reaction/intolerance.  -Lotions/creams q4h/PRN  -Claritin 10mg PO BID/PRN (increased frequency due to intensity of itch; trial of up to 2 weeks)  -Follow up PRN or in January as scheduled

## 2021-11-24 NOTE — PATIENT INSTRUCTIONS
Use lotion on back and itching spots, may use cold compresses as needed; apply lotion AFTER a shower; also, if you are going to use the hydrocortisone cream, apply some lotion after the hydrocortisone application.  I will send in a claritin script today, check to make sure insurance vs. Over-the-counter is the better cost option.  Follow up with Dr. Rosenthal as needed, or in 3 months for diabetes check.

## 2021-12-10 ENCOUNTER — OUTPATIENT INFUSION SERVICES (OUTPATIENT)
Dept: ONCOLOGY | Facility: MEDICAL CENTER | Age: 76
End: 2021-12-10
Attending: ALLERGY & IMMUNOLOGY
Payer: MEDICARE

## 2021-12-10 VITALS
HEART RATE: 65 BPM | SYSTOLIC BLOOD PRESSURE: 121 MMHG | RESPIRATION RATE: 18 BRPM | HEIGHT: 61 IN | WEIGHT: 146.61 LBS | TEMPERATURE: 97.6 F | OXYGEN SATURATION: 93 % | BODY MASS INDEX: 27.68 KG/M2 | DIASTOLIC BLOOD PRESSURE: 59 MMHG

## 2021-12-10 DIAGNOSIS — J45.50 SEVERE PERSISTENT ASTHMA, UNSPECIFIED WHETHER COMPLICATED: ICD-10-CM

## 2021-12-10 PROCEDURE — 96372 THER/PROPH/DIAG INJ SC/IM: CPT

## 2021-12-10 PROCEDURE — 700111 HCHG RX REV CODE 636 W/ 250 OVERRIDE (IP): Mod: JG | Performed by: ALLERGY & IMMUNOLOGY

## 2021-12-10 RX ORDER — HYDROXYZINE HYDROCHLORIDE 25 MG/1
25 TABLET, FILM COATED ORAL 3 TIMES DAILY PRN
COMMUNITY
End: 2022-04-29

## 2021-12-10 RX ORDER — TRIAMCINOLONE ACETONIDE 1 MG/G
CREAM TOPICAL 2 TIMES DAILY
COMMUNITY
End: 2022-04-29

## 2021-12-10 RX ADMIN — MEPOLIZUMAB 100 MG: 100 INJECTION, POWDER, FOR SOLUTION SUBCUTANEOUS at 12:05

## 2021-12-10 ASSESSMENT — FIBROSIS 4 INDEX: FIB4 SCORE: 0.81

## 2021-12-10 NOTE — PROGRESS NOTES
Rossy arrives to [facility] for q 4 week Nucala for severe persistent asthma. Patient denies active infections, denies recent asthma exacerbations. Nucala administered SQ to back of left arm without adverse s/s. Next appointment confirmed. Discharged home to self care in no apparent distress.

## 2021-12-13 ENCOUNTER — OFFICE VISIT (OUTPATIENT)
Dept: INTERNAL MEDICINE | Facility: OTHER | Age: 76
End: 2021-12-13
Payer: MEDICARE

## 2021-12-13 ENCOUNTER — TELEPHONE (OUTPATIENT)
Dept: INTERNAL MEDICINE | Facility: OTHER | Age: 76
End: 2021-12-13

## 2021-12-13 VITALS
HEART RATE: 61 BPM | WEIGHT: 147.4 LBS | BODY MASS INDEX: 27.12 KG/M2 | TEMPERATURE: 97.2 F | HEIGHT: 62 IN | OXYGEN SATURATION: 91 % | SYSTOLIC BLOOD PRESSURE: 150 MMHG | DIASTOLIC BLOOD PRESSURE: 77 MMHG

## 2021-12-13 DIAGNOSIS — E78.2 MIXED HYPERLIPIDEMIA: ICD-10-CM

## 2021-12-13 DIAGNOSIS — E11.22 CONTROLLED TYPE 2 DIABETES MELLITUS WITH STAGE 1 CHRONIC KIDNEY DISEASE, WITHOUT LONG-TERM CURRENT USE OF INSULIN (HCC): ICD-10-CM

## 2021-12-13 DIAGNOSIS — N18.1 CONTROLLED TYPE 2 DIABETES MELLITUS WITH STAGE 1 CHRONIC KIDNEY DISEASE, WITHOUT LONG-TERM CURRENT USE OF INSULIN (HCC): ICD-10-CM

## 2021-12-13 DIAGNOSIS — L23.9 ALLERGIC DERMATITIS: ICD-10-CM

## 2021-12-13 PROCEDURE — 99214 OFFICE O/P EST MOD 30 MIN: CPT | Mod: GC | Performed by: STUDENT IN AN ORGANIZED HEALTH CARE EDUCATION/TRAINING PROGRAM

## 2021-12-13 RX ORDER — LINAGLIPTIN 5 MG/1
5 TABLET, FILM COATED ORAL DAILY
Qty: 30 TABLET | Refills: 1 | Status: SHIPPED | OUTPATIENT
Start: 2021-12-13 | End: 2022-02-11 | Stop reason: SDUPTHER

## 2021-12-13 RX ORDER — ATORVASTATIN CALCIUM 40 MG/1
40 TABLET, FILM COATED ORAL
Qty: 90 TABLET | Refills: 0 | Status: SHIPPED | OUTPATIENT
Start: 2021-12-13 | End: 2022-03-08 | Stop reason: SDUPTHER

## 2021-12-13 ASSESSMENT — ENCOUNTER SYMPTOMS
DIARRHEA: 0
HEADACHES: 0
NAUSEA: 0
ABDOMINAL PAIN: 0
SEIZURES: 0
COUGH: 0
SPEECH CHANGE: 0
CONSTIPATION: 0
SHORTNESS OF BREATH: 0
MYALGIAS: 0
VOMITING: 0
BLURRED VISION: 0
DIZZINESS: 0
NERVOUS/ANXIOUS: 0
WHEEZING: 0
CHILLS: 0
PALPITATIONS: 0
DOUBLE VISION: 0
FEVER: 0
FALLS: 0
SORE THROAT: 0

## 2021-12-13 ASSESSMENT — LIFESTYLE VARIABLES: SUBSTANCE_ABUSE: 0

## 2021-12-13 ASSESSMENT — FIBROSIS 4 INDEX: FIB4 SCORE: 0.81

## 2021-12-13 NOTE — ASSESSMENT & PLAN NOTE
A1c in August 2021 was 7.3% goal for patient at patient's age is A1c of 7 to 8%.    -Discontinue Trulicity due to skin rash side effects; added to allergies list  -Per guidelines for diabetes type 2 management, start DPP-4 inhibitor, Tradjenta (Januvia is not on the formulary for the patient's insurance). I have explained the benefits and risks/side effects of Tradjenta to the patient; the patient agreed with the plan and expressed understanding of the risks and benefits.  -Continue checking blood glucose levels  -Monitor for hypoglycemia symptoms  -Daily foot checks  -Follow-up with PCP in January 2021, I have ordered the patient's hemoglobin A1c lab to be completed before her visit in January

## 2021-12-13 NOTE — TELEPHONE ENCOUNTER
Discussion with patient: Need to start high-intensity statin due to diabetes with goal of LDL < 70.    Starting atorvastatin 40 mg qD. Also ordering lipid panel and LFTs (CMP) in 5 weeks to check for progress of lipid levels and also to look for possible side effects of atorvastatin.    Discussed with the patient, who agrees with the plan.    IM PLUMAS MA POOL: Please print and mail labs (CMP and lipid panel) to patient, thank you      Pat Rosenthal MD, MPH  [facility], PGY-2

## 2021-12-13 NOTE — PROGRESS NOTES
Established Patient    Rossy Lopez is a 76 y.o. female who presents today with the following:    CC: rash    HPI:   Patient is a 76-year-old female with a past medical history significant for type 2 diabetes mellitus (see 7.3% in August 2021), hypertension, reflux, and hyperlipidemia who presents for follow-up of rash.    Diabetes and rash: Patient was previously started on Trulicity for improvement of diabetes medication management (could not tolerate Metformin due to diarrhea side effect).  About 2 to 3 weeks after starting Trulicity, patient started having a diffuse rash across her entire body with severe itching.  She saw Dr. Foster on 11/24/2021 and was put on Claritin and lotions/cream.  Despite using these regimens, the patient continued to have a rash that increased in itching.  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  She also went to an allergist who also concluded that her rash was likely due to Trulicity.  Patient had not changed any of her medication regimen other than Trulicity, and had not used any new detergents/lotions/soaps/new clothing/new exposure to plants.   She has been using the triamcinalone cream for 2 weeks along with hydroxyzine at night which helps her symptoms.  Her last use of Trulicity was 1 week ago; she was supposed to take her next dose today, however, she did not take it due to doctor's advice.  She also stated that Trulicity made her feel like she was having low energy and increased reflux needing Tums.  She did lose 3 to 4 pounds on Trulicity; however, she feels that the side effects vastly outweigh the benefits of this medication.  Her blood glucose levels have been in the 140s-180s like they were before she started Trulicity.    Patient does not have any hypoglycemic symptoms.  He has been checking her blood glucose levels twice daily.      HCM:  Mammo:  Scheduled for 2021  Got 3 COVID vaccines  Got Flu shot for 2021 season  Got shingles and pneumonia vaccines    Review of Systems   Constitutional: Negative for chills, fever and malaise/fatigue.        +itchy skin   HENT: Negative for congestion and sore throat.    Eyes: Negative for blurred vision and double vision.   Respiratory: Negative for cough, shortness of breath and wheezing.    Cardiovascular: Negative for chest pain, palpitations and leg swelling.   Gastrointestinal: Negative for abdominal pain, constipation, diarrhea, nausea and vomiting.   Genitourinary: Negative for dysuria, frequency and urgency.   Musculoskeletal: Negative for falls and myalgias.   Neurological: Negative for dizziness, speech change, seizures and headaches.   Psychiatric/Behavioral: Negative for substance abuse and suicidal ideas. The patient is not nervous/anxious.        Patient Active Problem List    Diagnosis Date Noted   • Controlled type 2 diabetes mellitus with stage 1 chronic kidney disease, without long-term current use of insulin (HCC) 2012   • Hyperlipidemia 2012   • Polypharmacy 10/05/2021   • Encounter for screening mammogram for breast cancer 10/05/2021   • Hypertension 2012   • Allergic dermatitis 2021   • Lipoma of shoulder 2021   • Severe persistent asthma 2021   • Low back pain 2020   • Vitamin D deficiency 2017   • Gastroesophageal reflux disease without esophagitis 2017   • Family history of colon cancer 2012   • Essential hypertension, benign 2012       Social History     Tobacco Use   • Smoking status: Former Smoker     Packs/day: 1.00     Years: 5.00     Pack years: 5.00     Types: Cigarettes     Quit date: 1970     Years since quittin.9   • Smokeless tobacco: Never Used   • Tobacco comment: quit    Vaping Use   • Vaping Use: Never used   Substance Use Topics   • Alcohol use: Not Currently     Alcohol/week: 0.0 oz      "Comment: occasionally   • Drug use: No       Current Outpatient Medications   Medication Sig Dispense Refill   • linagliptin (TRADJENTA) 5 MG Tab tablet Take 1 Tablet by mouth every day. 30 Tablet 1   • hydrOXYzine HCl (ATARAX) 25 MG Tab Take 25 mg by mouth 3 times a day as needed for Itching.     • triamcinolone acetonide (KENALOG) 0.1 % Cream Apply  topically 2 times a day.     • glucose blood strip Use as prn for checking BS levels 3 times a day. Dx: E11.9 300 Strip 3   • ibuprofen (MOTRIN) 200 MG Tab Take 200 mg by mouth every 6 hours as needed.     • cyclobenzaprine (FLEXERIL) 10 MG Tab      • Multiple Vitamins-Minerals (AIRBORNE GUMMIES) Chew Tab Take  by mouth 2 Times a Day.     • PREVIDENT 5000 DRY MOUTH 1.1 % Gel BRUSH UTD     • albuterol (VENTOLIN HFA) 108 (90 Base) MCG/ACT Aero Soln inhalation aerosol Inhale 2 Puffs by mouth every four hours as needed for Shortness of Breath.     • Ca Phosphate-Cholecalciferol (CALCIUM 500 + D3) 250-500 MG-UNIT Chew Tab Take 1 Tab by mouth 2 Times a Day.     • losartan (COZAAR) 25 MG Tab TAKE 1 TABLET BY MOUTH EVERY DAY 90 Tab 0   • Mometasone Furoate (ASMANEX HFA) 200 MCG/ACT Aerosol Inhale 2 Puffs by mouth 2 Times a Day.     • omeprazole (PRILOSEC) 20 MG delayed-release capsule TAKE 1 CAPSULE BY MOUTH EVERY DAY 90 Cap 0   • hydroCHLOROthiazide (HYDRODIURIL) 25 MG Tab TAKE 1 TABLET BY MOUTH EVERY DAY 90 Tab 0   • simvastatin (ZOCOR) 10 MG Tab TAKE 1 TABLET BY MOUTH EVERY EVENING 90 Tab 3   • Multiple Vitamins-Minerals (MULTIVITAMIN ADULT PO) Take 1 Tab by mouth every day.       No current facility-administered medications for this visit.       /77 (BP Location: Left arm, Patient Position: Sitting, BP Cuff Size: Adult)   Pulse 61   Temp 36.2 °C (97.2 °F) (Temporal)   Ht 1.575 m (5' 2\")   Wt 66.9 kg (147 lb 6.4 oz)   SpO2 91%   BMI 26.96 kg/m²     PHYSICAL EXAM:  Physical Exam  Vitals and nursing note reviewed.   Constitutional:       General: She is not in " acute distress.     Appearance: Normal appearance. She is normal weight. She is not ill-appearing.   HENT:      Head: Normocephalic and atraumatic.      Right Ear: External ear normal.      Left Ear: External ear normal.      Nose: Nose normal. No congestion or rhinorrhea.      Mouth/Throat:      Mouth: Mucous membranes are moist.      Pharynx: Oropharynx is clear. No oropharyngeal exudate.   Eyes:      General:         Right eye: No discharge.         Left eye: No discharge.      Extraocular Movements: Extraocular movements intact.      Conjunctiva/sclera: Conjunctivae normal.      Pupils: Pupils are equal, round, and reactive to light.   Cardiovascular:      Rate and Rhythm: Normal rate and regular rhythm.      Pulses: Normal pulses.   Pulmonary:      Effort: Pulmonary effort is normal. No respiratory distress.      Breath sounds: Normal breath sounds. No wheezing or rales.   Abdominal:      General: Abdomen is flat. Bowel sounds are normal. There is no distension.      Palpations: Abdomen is soft.      Tenderness: There is no abdominal tenderness. There is no right CVA tenderness or left CVA tenderness.   Musculoskeletal:         General: No swelling or tenderness. Normal range of motion.      Cervical back: Normal range of motion and neck supple. No rigidity or tenderness.      Right lower leg: No edema.      Left lower leg: No edema.   Skin:     General: Skin is warm and dry.      Capillary Refill: Capillary refill takes less than 2 seconds.      Findings: Rash present. No bruising.      Comments: Few papules in anterior aspect of trunk. Itchy to sensation, as well as posterior aspect of left thigh.   Neurological:      Mental Status: She is alert and oriented to person, place, and time. Mental status is at baseline.      Cranial Nerves: No cranial nerve deficit.      Sensory: No sensory deficit.      Coordination: Coordination normal.   Psychiatric:         Mood and Affect: Mood normal.         Behavior:  Behavior normal.         Thought Content: Thought content normal.         Judgment: Judgment normal.           Assessment and Plan  Allergic dermatitis  This is likely due to Trulicity.  This is an extremely rare side effect; per up-to-date, the incidence of hypersensitivity reactions to Trulicity is less than 1%, and only 1 post marketing dermatologic side effect case report has been published for it (moribilliform rash by Sherie 2020).    I have independently reviewed the records from the [facility] regarding dermatology skin biopsy report.  This record has been scanned into the chart by SIVAN Pressley.    -Discontinue Trulicity  -Continue hydroxyzine and triamcinolone cream as prescribed  -Start Tradjenta (DPP-4 inhibitor)    Also see problem of controlled type 2 diabetes    Controlled type 2 diabetes mellitus with stage 1 chronic kidney disease, without long-term current use of insulin (HCC)  A1c in August 2021 was 7.3% goal for patient at patient's age is A1c of 7 to 8%.    -Discontinue Trulicity due to skin rash side effects; added to allergies list  -Per guidelines for diabetes type 2 management, start DPP-4 inhibitor, Tradjenta (Januvia is not on the formulary for the patient's insurance). I have explained the benefits and risks/side effects of Tradjenta to the patient; the patient agreed with the plan and expressed understanding of the risks and benefits.  -Continue checking blood glucose levels  -Monitor for hypoglycemia symptoms  -Daily foot checks  -Follow-up with PCP in January 2021, I have ordered the patient's hemoglobin A1c lab to be completed before her visit in January    Signed by: Pat Rosenthal M.D.

## 2021-12-13 NOTE — PATIENT INSTRUCTIONS
Hello! Today we saw you for:    Rash: Discontinue Trulicity, start tradjenta.  at your pharmacy.  Continue monitoring for rash, use hydroxyzine and triamcinalone cream.  Continue checking your blood sugars.    Get your HgA1c lab done in January before your next appointment with me.    Thank you and Happy Holidays!

## 2021-12-13 NOTE — TELEPHONE ENCOUNTER
Addendum:    I will see the patient today in clinic (12/13/2021), and will discuss results with her at that time.    Pat Rosenthal MD, MPH  [facility], PGY-2

## 2021-12-13 NOTE — TELEPHONE ENCOUNTER
Regarding: Trulicity and Metformin  Dr Rosenthal,  The drug the insurance would like me to change to is Atorvastatin in place of simvastatin.  Thanks for your help today  Rossy Lopez

## 2022-01-03 DIAGNOSIS — I10 ESSENTIAL HYPERTENSION: ICD-10-CM

## 2022-01-03 DIAGNOSIS — K21.9 GERD WITHOUT ESOPHAGITIS: ICD-10-CM

## 2022-01-03 DIAGNOSIS — Z00.00 MEDICARE ANNUAL WELLNESS VISIT, SUBSEQUENT: ICD-10-CM

## 2022-01-03 RX ORDER — SIMVASTATIN 10 MG
10 TABLET ORAL EVERY EVENING
Qty: 90 TABLET | Status: CANCELLED | OUTPATIENT
Start: 2022-01-03

## 2022-01-03 RX ORDER — OMEPRAZOLE 20 MG/1
20 CAPSULE, DELAYED RELEASE ORAL
Qty: 90 CAPSULE | Refills: 1 | Status: SHIPPED | OUTPATIENT
Start: 2022-01-03 | End: 2022-07-05 | Stop reason: SDUPTHER

## 2022-01-03 RX ORDER — HYDROCHLOROTHIAZIDE 25 MG/1
25 TABLET ORAL
Qty: 90 TABLET | Refills: 1 | Status: SHIPPED | OUTPATIENT
Start: 2022-01-03 | End: 2022-07-05 | Stop reason: SDUPTHER

## 2022-01-03 RX ORDER — LOSARTAN POTASSIUM 25 MG/1
25 TABLET ORAL
Qty: 90 TABLET | Refills: 1 | Status: SHIPPED | OUTPATIENT
Start: 2022-01-03 | End: 2022-07-06 | Stop reason: SDUPTHER

## 2022-01-03 RX ORDER — SIMVASTATIN 10 MG
10 TABLET ORAL NIGHTLY
COMMUNITY
End: 2022-01-03

## 2022-01-03 NOTE — TELEPHONE ENCOUNTER
Last seen: 12/131/21 by Dr. Rosenthal  Next appt: 01/18/22 with Dr. Rosenthal    Was the patient seen in the last year in this department? Yes   Does patient have an active prescription for medications requested? No   Received Request Via: Pharmacy

## 2022-01-04 ENCOUNTER — HOSPITAL ENCOUNTER (OUTPATIENT)
Dept: RADIOLOGY | Facility: MEDICAL CENTER | Age: 77
End: 2022-01-04
Attending: STUDENT IN AN ORGANIZED HEALTH CARE EDUCATION/TRAINING PROGRAM
Payer: MEDICARE

## 2022-01-04 DIAGNOSIS — Z12.31 VISIT FOR SCREENING MAMMOGRAM: ICD-10-CM

## 2022-01-04 PROCEDURE — 77063 BREAST TOMOSYNTHESIS BI: CPT

## 2022-01-04 NOTE — TELEPHONE ENCOUNTER
Please call the patient and let her know I Have refilled 3 meds: losartan, hydrochlorothiazide, omeprazole. I am discontinuing her simvastatin because we have started atorvastatin which we discussed beforehand.    Pat Rosenthal MD, MPH  [facility], PGY-2

## 2022-01-07 ENCOUNTER — APPOINTMENT (OUTPATIENT)
Dept: ONCOLOGY | Facility: MEDICAL CENTER | Age: 77
End: 2022-01-07
Attending: ALLERGY & IMMUNOLOGY
Payer: MEDICARE

## 2022-01-10 ENCOUNTER — HOSPITAL ENCOUNTER (OUTPATIENT)
Dept: LAB | Facility: MEDICAL CENTER | Age: 77
End: 2022-01-10
Attending: STUDENT IN AN ORGANIZED HEALTH CARE EDUCATION/TRAINING PROGRAM
Payer: MEDICARE

## 2022-01-10 DIAGNOSIS — E78.2 MIXED HYPERLIPIDEMIA: ICD-10-CM

## 2022-01-10 DIAGNOSIS — E11.22 CONTROLLED TYPE 2 DIABETES MELLITUS WITH STAGE 1 CHRONIC KIDNEY DISEASE, WITHOUT LONG-TERM CURRENT USE OF INSULIN (HCC): ICD-10-CM

## 2022-01-10 DIAGNOSIS — N18.1 CONTROLLED TYPE 2 DIABETES MELLITUS WITH STAGE 1 CHRONIC KIDNEY DISEASE, WITHOUT LONG-TERM CURRENT USE OF INSULIN (HCC): ICD-10-CM

## 2022-01-10 LAB
ALBUMIN SERPL BCP-MCNC: 4.3 G/DL (ref 3.2–4.9)
ALBUMIN/GLOB SERPL: 1.4 G/DL
ALP SERPL-CCNC: 118 U/L (ref 30–99)
ALT SERPL-CCNC: 22 U/L (ref 2–50)
ANION GAP SERPL CALC-SCNC: 13 MMOL/L (ref 7–16)
AST SERPL-CCNC: 17 U/L (ref 12–45)
BILIRUB SERPL-MCNC: 0.5 MG/DL (ref 0.1–1.5)
BUN SERPL-MCNC: 12 MG/DL (ref 8–22)
CALCIUM SERPL-MCNC: 9.8 MG/DL (ref 8.5–10.5)
CHLORIDE SERPL-SCNC: 93 MMOL/L (ref 96–112)
CHOLEST SERPL-MCNC: 152 MG/DL (ref 100–199)
CO2 SERPL-SCNC: 30 MMOL/L (ref 20–33)
CREAT SERPL-MCNC: 0.61 MG/DL (ref 0.5–1.4)
EST. AVERAGE GLUCOSE BLD GHB EST-MCNC: 206 MG/DL
GLOBULIN SER CALC-MCNC: 3.1 G/DL (ref 1.9–3.5)
GLUCOSE SERPL-MCNC: 184 MG/DL (ref 65–99)
HBA1C MFR BLD: 8.8 % (ref 4–5.6)
HDLC SERPL-MCNC: 61 MG/DL
LDLC SERPL CALC-MCNC: 75 MG/DL
POTASSIUM SERPL-SCNC: 3.7 MMOL/L (ref 3.6–5.5)
PROT SERPL-MCNC: 7.4 G/DL (ref 6–8.2)
SODIUM SERPL-SCNC: 136 MMOL/L (ref 135–145)
TRIGL SERPL-MCNC: 81 MG/DL (ref 0–149)

## 2022-01-10 PROCEDURE — 83036 HEMOGLOBIN GLYCOSYLATED A1C: CPT | Mod: GA

## 2022-01-10 PROCEDURE — 36415 COLL VENOUS BLD VENIPUNCTURE: CPT

## 2022-01-10 PROCEDURE — 80053 COMPREHEN METABOLIC PANEL: CPT

## 2022-01-10 PROCEDURE — 80061 LIPID PANEL: CPT

## 2022-01-18 ENCOUNTER — OFFICE VISIT (OUTPATIENT)
Dept: INTERNAL MEDICINE | Facility: OTHER | Age: 77
End: 2022-01-18
Payer: MEDICARE

## 2022-01-18 VITALS
OXYGEN SATURATION: 91 % | BODY MASS INDEX: 27.02 KG/M2 | TEMPERATURE: 97.9 F | SYSTOLIC BLOOD PRESSURE: 139 MMHG | HEART RATE: 52 BPM | DIASTOLIC BLOOD PRESSURE: 64 MMHG | WEIGHT: 146.8 LBS | HEIGHT: 62 IN

## 2022-01-18 DIAGNOSIS — Z91.89 AT RISK FOR OSTEOPOROSIS: ICD-10-CM

## 2022-01-18 DIAGNOSIS — E11.22 CONTROLLED TYPE 2 DIABETES MELLITUS WITH STAGE 1 CHRONIC KIDNEY DISEASE, WITHOUT LONG-TERM CURRENT USE OF INSULIN (HCC): ICD-10-CM

## 2022-01-18 DIAGNOSIS — E28.310 SYMPTOMATIC PREMATURE MENOPAUSE: ICD-10-CM

## 2022-01-18 DIAGNOSIS — N18.1 CONTROLLED TYPE 2 DIABETES MELLITUS WITH STAGE 1 CHRONIC KIDNEY DISEASE, WITHOUT LONG-TERM CURRENT USE OF INSULIN (HCC): ICD-10-CM

## 2022-01-18 DIAGNOSIS — E78.2 MIXED HYPERLIPIDEMIA: Chronic | ICD-10-CM

## 2022-01-18 DIAGNOSIS — E55.9 VITAMIN D DEFICIENCY: ICD-10-CM

## 2022-01-18 DIAGNOSIS — M21.612 BUNION OF LEFT FOOT: ICD-10-CM

## 2022-01-18 PROCEDURE — 99214 OFFICE O/P EST MOD 30 MIN: CPT | Mod: GC | Performed by: STUDENT IN AN ORGANIZED HEALTH CARE EDUCATION/TRAINING PROGRAM

## 2022-01-18 RX ORDER — COBALT GLUCONATE
POWDER (GRAM) MISCELLANEOUS
COMMUNITY
End: 2022-01-18

## 2022-01-18 ASSESSMENT — ENCOUNTER SYMPTOMS
HEADACHES: 0
CHILLS: 0
CONSTIPATION: 0
VOMITING: 0
NAUSEA: 0
NERVOUS/ANXIOUS: 1
SHORTNESS OF BREATH: 0
PALPITATIONS: 0
DIARRHEA: 0
WHEEZING: 0
SEIZURES: 0
SPEECH CHANGE: 0
FALLS: 0
ABDOMINAL PAIN: 0
MYALGIAS: 0
BLURRED VISION: 0
DOUBLE VISION: 0
FEVER: 0
DIZZINESS: 0
COUGH: 0
SORE THROAT: 0

## 2022-01-18 ASSESSMENT — FIBROSIS 4 INDEX: FIB4 SCORE: 0.73

## 2022-01-18 ASSESSMENT — LIFESTYLE VARIABLES: SUBSTANCE_ABUSE: 0

## 2022-01-18 ASSESSMENT — PATIENT HEALTH QUESTIONNAIRE - PHQ9: CLINICAL INTERPRETATION OF PHQ2 SCORE: 0

## 2022-01-18 NOTE — PATIENT INSTRUCTIONS
Hello! Today we saw you for:  -Rash on skin: continue using triamcinalone cream  -Diabetes: Finished your Tradjenta/Trulicity. Start low-dose metformin.    Please get your urine microalbumin and DEXA scans.    See me in 15 weeks.  Thank you!

## 2022-01-18 NOTE — PROGRESS NOTES
Established Patient    Rossy Lopez is a 76 y.o. female who presents today with the following:    CC:   Chief Complaint   Patient presents with   • Follow-Up     15 week follow up, doesnt think arabella is working   • Lab Results   • Results     Had allergy testing done and would like to review those       HPI:     Patient is a pleasant 77 y/o F with PMH significant for T2DM (A1c 8.8% in 1/2022), HTN/HLD, and acid reflux who presents for management of diabetes, foot bunion, and labwork.    Diabetes and rash:    Briefly, for many years, her diabetes was well controlled on metformin (per since perhaps 2006), which she tolerated well.  Then she changed to a new physician who put her on metformin extended release, which gave her diarrhea.  She was then switched to glimepiride per Dr. Alvarado, then switched to Trulicity, then switched her Tradjenta during her last visit in December 2021 due to rash as a side effect.  Since stopping the Trulicity and starting Tradjenta, she continued to have a rash on her body that would come out intermittently. She went back to Dr. Cross (Asthma-Allergy) who performed 80+ allergy tests and concluded she was allergic to cobalt II chloride-hexahydrate and N,N-Diphenylguanidine (scanned into EMR). Dr. Cross concluded her skin allergy couldn't be ruled out for either DM med (tradjdenta or Trulicity) so she went back to using the Tradjenta. She still has the rash, whish she use a triamcinalone cream for, which is helping.  She states that the rash is gradually getting better, especially now since she no longer needs this lather it on her entire body constantly.  She is not happy with her Tradjenta because her blood sugar levels have not been well controlled; she generally checks her blood glucose levels 2-3 times a day and often times her blood glucose peaks to over 200 on Tradjenta.  When she was on Trulicity and metformin, she rarely had elevated BG levels above 200. She does not have any  hypoglycemic symptoms. She feels very stressed because of all of these medication changes and is tired of always having things moved around.    -Due for urine microalbumin (order this visit)  -Completed annual retinal exam (next one in Aug 2022)      Foot bunion: She has noticed a bunion on her left foot during the last month that has a peculiar sensation and is worried it may be her neuropathy. Diabetic foot exam performed on 12/13/2021 was normal.    Hyperlipidemia: Continuing atorvastatin, patient does not have any side effects such as myalgias. LFTs normal during labs in Jan 2022.    HCM:    Due for repeat DEXA scan: Last one was in 2016 (normal).  Patient has history of early menopause due to numerous fibroids resulting in hysterectomy around 40s.         Review of Systems   Constitutional: Negative for chills, fever and malaise/fatigue.   HENT: Negative for congestion and sore throat.    Eyes: Negative for blurred vision and double vision.   Respiratory: Negative for cough, shortness of breath and wheezing.    Cardiovascular: Negative for chest pain, palpitations and leg swelling.   Gastrointestinal: Negative for abdominal pain, constipation, diarrhea, nausea and vomiting.   Genitourinary: Negative for dysuria, frequency and urgency.   Musculoskeletal: Negative for falls and myalgias.        +left foot bunion pressure sensation   Neurological: Negative for dizziness, speech change, seizures and headaches.   Psychiatric/Behavioral: Negative for substance abuse and suicidal ideas. The patient is nervous/anxious.        Patient Active Problem List    Diagnosis Date Noted   • Controlled type 2 diabetes mellitus with stage 1 chronic kidney disease, without long-term current use of insulin (HCC) 06/19/2012   • Hyperlipidemia 06/19/2012   • Polypharmacy 10/05/2021   • Encounter for screening mammogram for breast cancer 10/05/2021   • Hypertension 06/19/2012   • Symptomatic premature menopause  01/18/2022   • Bunion  of left foot 2022   • Allergic dermatitis 2021   • Lipoma of shoulder 2021   • Severe persistent asthma 2021   • Low back pain 2020   • Vitamin D deficiency 2017   • Gastroesophageal reflux disease without esophagitis 2017   • Family history of colon cancer 2012   • Essential hypertension, benign 2012       Social History     Tobacco Use   • Smoking status: Former Smoker     Packs/day: 1.00     Years: 5.00     Pack years: 5.00     Types: Cigarettes     Quit date: 1970     Years since quittin.0   • Smokeless tobacco: Never Used   • Tobacco comment: quit    Vaping Use   • Vaping Use: Never used   Substance Use Topics   • Alcohol use: Not Currently     Alcohol/week: 0.0 oz     Comment: occasionally   • Drug use: No       Current Outpatient Medications   Medication Sig Dispense Refill   • metFORMIN (GLUCOPHAGE) 500 MG Tab Take 1 Tablet by mouth every day. 30 Tablet 0   • omeprazole (PRILOSEC) 20 MG delayed-release capsule Take 1 Capsule by mouth every day. 90 Capsule 1   • hydroCHLOROthiazide (HYDRODIURIL) 25 MG Tab Take 1 Tablet by mouth every day. 90 Tablet 1   • losartan (COZAAR) 25 MG Tab Take 1 Tablet by mouth every day. 90 Tablet 1   • linagliptin (TRADJENTA) 5 MG Tab tablet Take 1 Tablet by mouth every day. 30 Tablet 1   • atorvastatin (LIPITOR) 40 MG Tab Take 1 Tablet by mouth at bedtime. 90 Tablet 0   • hydrOXYzine HCl (ATARAX) 25 MG Tab Take 25 mg by mouth 3 times a day as needed for Itching.     • triamcinolone acetonide (KENALOG) 0.1 % Cream Apply  topically 2 times a day.     • glucose blood strip Use as prn for checking BS levels 3 times a day. Dx: E11.9 300 Strip 3   • ibuprofen (MOTRIN) 200 MG Tab Take 200 mg by mouth every 6 hours as needed.     • cyclobenzaprine (FLEXERIL) 10 MG Tab      • Multiple Vitamins-Minerals (AIRBORNE GUMMIES) Chew Tab Take  by mouth 2 Times a Day.     • PREVIDENT 5000 DRY MOUTH 1.1 % Gel BRUSH UTD     •  "albuterol (VENTOLIN HFA) 108 (90 Base) MCG/ACT Aero Soln inhalation aerosol Inhale 2 Puffs by mouth every four hours as needed for Shortness of Breath.     • Ca Phosphate-Cholecalciferol (CALCIUM 500 + D3) 250-500 MG-UNIT Chew Tab Take 1 Tab by mouth 2 Times a Day.     • Mometasone Furoate (ASMANEX HFA) 200 MCG/ACT Aerosol Inhale 2 Puffs by mouth 2 Times a Day.     • Multiple Vitamins-Minerals (MULTIVITAMIN ADULT PO) Take 1 Tab by mouth every day.       No current facility-administered medications for this visit.       /64 (BP Location: Left arm, Patient Position: Sitting, BP Cuff Size: Adult)   Pulse (!) 52   Temp 36.6 °C (97.9 °F) (Temporal)   Ht 1.575 m (5' 2\")   Wt 66.6 kg (146 lb 12.8 oz)   SpO2 91%   BMI 26.85 kg/m²     PHYSICAL EXAM:  Physical Exam  Vitals and nursing note reviewed.   Constitutional:       General: She is not in acute distress.     Appearance: Normal appearance. She is normal weight. She is not ill-appearing.   HENT:      Head: Normocephalic and atraumatic.      Right Ear: External ear normal.      Left Ear: External ear normal.      Nose: Nose normal. No congestion or rhinorrhea.      Mouth/Throat:      Mouth: Mucous membranes are moist.      Pharynx: Oropharynx is clear. No oropharyngeal exudate.   Eyes:      General:         Right eye: No discharge.         Left eye: No discharge.      Extraocular Movements: Extraocular movements intact.      Conjunctiva/sclera: Conjunctivae normal.      Pupils: Pupils are equal, round, and reactive to light.   Cardiovascular:      Rate and Rhythm: Normal rate and regular rhythm.      Pulses: Normal pulses.   Pulmonary:      Effort: Pulmonary effort is normal. No respiratory distress.      Breath sounds: Normal breath sounds. No wheezing or rales.   Abdominal:      General: Abdomen is flat. Bowel sounds are normal. There is no distension.      Palpations: Abdomen is soft.      Tenderness: There is no abdominal tenderness. There is no right CVA " tenderness or left CVA tenderness.   Musculoskeletal:         General: No swelling or tenderness. Normal range of motion.      Cervical back: Normal range of motion and neck supple. No rigidity or tenderness.      Right lower leg: No edema.      Left lower leg: No edema.      Comments: Foot bunion present on medial aspect of left great toe, red   Skin:     General: Skin is warm and dry.      Capillary Refill: Capillary refill takes less than 2 seconds.      Findings: Rash present. No bruising.      Comments: Few papules in anterior aspect of trunk and upper chest area. Itchy to sensation, as well as posterior aspect of left thigh. Number of papules decreased from previous visit   Neurological:      Mental Status: She is alert and oriented to person, place, and time. Mental status is at baseline.      Cranial Nerves: No cranial nerve deficit.      Sensory: No sensory deficit.      Coordination: Coordination normal.   Psychiatric:         Mood and Affect: Mood normal.         Behavior: Behavior normal.         Thought Content: Thought content normal.         Judgment: Judgment normal.           Assessment and Plan  Controlled type 2 diabetes mellitus with stage 1 chronic kidney disease, without long-term current use of insulin ([facility])  Latest A1c in 1/2022 is 8.8%. Ideal A1c for patient's age range is 7-8%. Allergy from skin rash likely not due to tradjenta or trulicity, given history of symptoms. Patient is frustrated with all of her medication changes. Patient does not wish to waste any of her medications and would like to use her current ones up.    -Went through entire history of medications patient has been on and side effects  -Finish tradjenta dosages.  -Start on low-dose metformin 500 mg qD.  -Will start over regimen with only metformin once tradjenta/trulicity is completed.    -Continue monitoring BG levels  -Daily foot checks  -Follow up on urine microalbumin lab (ordered during this visit)  -Repeat HgA1c in  15 weeks (can be in clinic)    Symptomatic premature menopause   Patient has h/o menopause in 40s due to hysterectomy with multiple fibroids. Is at risk for osteoporosis. Last Dexa in 2016 was normal.    -Ordered repeat Dexa    Bunion of left foot  Patient has bunion/pressure point on left toe. Reassured patient that this is not diabetic neuropathy. Will continue to monitor. Encourage patient to use comfortable foot wear/use bunion corrector guard if needed    Hyperlipidemia  Switched from simvastatin to atorvastatin 40 mg qD. Tolerated without side effects.    -Continue atorvastatin  -Repeat lipid panel during next visit  -If LDL >70, then increase atorvastatin to 80 mg qD        Signed by: Pat Rosenthal M.D.

## 2022-01-19 NOTE — ASSESSMENT & PLAN NOTE
Patient has h/o menopause in 40s due to hysterectomy with multiple fibroids. Is at risk for osteoporosis. Last Dexa in 2016 was normal.    -Ordered repeat Dexa

## 2022-01-19 NOTE — ASSESSMENT & PLAN NOTE
Switched from simvastatin to atorvastatin 40 mg qD. Tolerated without side effects.    -Continue atorvastatin  -Repeat lipid panel during next visit  -If LDL >70, then increase atorvastatin to 80 mg qD

## 2022-01-19 NOTE — ASSESSMENT & PLAN NOTE
Patient has bunion/pressure point on left toe. Reassured patient that this is not diabetic neuropathy. Will continue to monitor. Encourage patient to use comfortable foot wear/use bunion corrector guard if needed

## 2022-02-04 ENCOUNTER — HOSPITAL ENCOUNTER (OUTPATIENT)
Facility: MEDICAL CENTER | Age: 77
End: 2022-02-04
Attending: STUDENT IN AN ORGANIZED HEALTH CARE EDUCATION/TRAINING PROGRAM
Payer: MEDICARE

## 2022-02-04 ENCOUNTER — OUTPATIENT INFUSION SERVICES (OUTPATIENT)
Dept: ONCOLOGY | Facility: MEDICAL CENTER | Age: 77
End: 2022-02-04
Attending: ALLERGY & IMMUNOLOGY
Payer: MEDICARE

## 2022-02-04 VITALS
RESPIRATION RATE: 18 BRPM | TEMPERATURE: 97.4 F | SYSTOLIC BLOOD PRESSURE: 100 MMHG | DIASTOLIC BLOOD PRESSURE: 66 MMHG | HEIGHT: 61 IN | HEART RATE: 85 BPM | OXYGEN SATURATION: 93 % | BODY MASS INDEX: 27.6 KG/M2 | WEIGHT: 146.16 LBS

## 2022-02-04 DIAGNOSIS — J45.50 SEVERE PERSISTENT ASTHMA, UNSPECIFIED WHETHER COMPLICATED: ICD-10-CM

## 2022-02-04 DIAGNOSIS — N18.1 CONTROLLED TYPE 2 DIABETES MELLITUS WITH STAGE 1 CHRONIC KIDNEY DISEASE, WITHOUT LONG-TERM CURRENT USE OF INSULIN (HCC): ICD-10-CM

## 2022-02-04 DIAGNOSIS — E11.22 CONTROLLED TYPE 2 DIABETES MELLITUS WITH STAGE 1 CHRONIC KIDNEY DISEASE, WITHOUT LONG-TERM CURRENT USE OF INSULIN (HCC): ICD-10-CM

## 2022-02-04 LAB
CREAT UR-MCNC: 69.08 MG/DL
MICROALBUMIN UR-MCNC: <1.2 MG/DL
MICROALBUMIN/CREAT UR: NORMAL MG/G (ref 0–30)

## 2022-02-04 PROCEDURE — 700111 HCHG RX REV CODE 636 W/ 250 OVERRIDE (IP): Mod: JG | Performed by: INTERNAL MEDICINE

## 2022-02-04 PROCEDURE — 82043 UR ALBUMIN QUANTITATIVE: CPT

## 2022-02-04 PROCEDURE — 96372 THER/PROPH/DIAG INJ SC/IM: CPT

## 2022-02-04 PROCEDURE — 82570 ASSAY OF URINE CREATININE: CPT

## 2022-02-04 RX ADMIN — MEPOLIZUMAB 100 MG: 100 INJECTION, POWDER, FOR SOLUTION SUBCUTANEOUS at 11:43

## 2022-02-04 ASSESSMENT — FIBROSIS 4 INDEX: FIB4 SCORE: 0.73

## 2022-02-04 NOTE — PROGRESS NOTES
Rossy arrives to [facility] for q 4 week Nucala for severe persistent asthma. Per patient, since she missed last month's dose, she has had to use her inhaler more frequently and c/o intermittent dyspnea and chest tightness. Denies active infections. Nucala administered SQ to back of left arm without issues. Emailed scheduling regarding assigning future appointments. Discharged home to self care in no apparent distress.

## 2022-02-10 ENCOUNTER — HOSPITAL ENCOUNTER (OUTPATIENT)
Dept: RADIOLOGY | Facility: MEDICAL CENTER | Age: 77
End: 2022-02-10
Attending: STUDENT IN AN ORGANIZED HEALTH CARE EDUCATION/TRAINING PROGRAM
Payer: MEDICARE

## 2022-02-10 DIAGNOSIS — Z91.89 AT RISK FOR OSTEOPOROSIS: ICD-10-CM

## 2022-02-10 DIAGNOSIS — E28.310 SYMPTOMATIC PREMATURE MENOPAUSE: ICD-10-CM

## 2022-02-10 PROCEDURE — 77080 DXA BONE DENSITY AXIAL: CPT

## 2022-02-11 DIAGNOSIS — N18.1 CONTROLLED TYPE 2 DIABETES MELLITUS WITH STAGE 1 CHRONIC KIDNEY DISEASE, WITHOUT LONG-TERM CURRENT USE OF INSULIN (HCC): ICD-10-CM

## 2022-02-11 DIAGNOSIS — E11.22 CONTROLLED TYPE 2 DIABETES MELLITUS WITH STAGE 1 CHRONIC KIDNEY DISEASE, WITHOUT LONG-TERM CURRENT USE OF INSULIN (HCC): ICD-10-CM

## 2022-02-11 RX ORDER — LINAGLIPTIN 5 MG/1
5 TABLET, FILM COATED ORAL DAILY
Qty: 90 TABLET | Refills: 1 | Status: SHIPPED | OUTPATIENT
Start: 2022-02-11 | End: 2022-03-04

## 2022-02-11 NOTE — TELEPHONE ENCOUNTER
Last seen: 01/18/22 by Dr. Rosenthal  Next appt: 05/02/22  with Dr. Rosenthal     Was the patient seen in the last year in this department? Yes   Does patient have an active prescription for medications requested? No   Received Request Via: Pharmacy

## 2022-02-14 ENCOUNTER — TELEPHONE (OUTPATIENT)
Dept: INTERNAL MEDICINE | Facility: OTHER | Age: 77
End: 2022-02-14

## 2022-02-14 DIAGNOSIS — N18.1 CONTROLLED TYPE 2 DIABETES MELLITUS WITH STAGE 1 CHRONIC KIDNEY DISEASE, WITHOUT LONG-TERM CURRENT USE OF INSULIN (HCC): ICD-10-CM

## 2022-02-14 DIAGNOSIS — E11.22 CONTROLLED TYPE 2 DIABETES MELLITUS WITH STAGE 1 CHRONIC KIDNEY DISEASE, WITHOUT LONG-TERM CURRENT USE OF INSULIN (HCC): ICD-10-CM

## 2022-02-14 NOTE — TELEPHONE ENCOUNTER
Increasing dosage of metformin to BID. Instructed patient to hold off on tradjenta refill as it is costing her $120. Discussed with patient to continue following blood sugars and will check A1c again.    Pat Rosenthal MD, MPH  [employer], PGY-2

## 2022-02-14 NOTE — TELEPHONE ENCOUNTER
Last seen: 01/18/2022 by Dr. Rosenthal  Next appt: 05/02/2022 with Dr. Rosenthal    Was the patient seen in the last year in this department? Yes   Does patient have an active prescription for medications requested? No   Received Request Via: Pharmacy

## 2022-03-04 ENCOUNTER — OUTPATIENT INFUSION SERVICES (OUTPATIENT)
Dept: ONCOLOGY | Facility: MEDICAL CENTER | Age: 77
End: 2022-03-04
Attending: INTERNAL MEDICINE
Payer: MEDICARE

## 2022-03-04 VITALS
BODY MASS INDEX: 27.1 KG/M2 | WEIGHT: 143.52 LBS | HEIGHT: 61 IN | OXYGEN SATURATION: 91 % | TEMPERATURE: 97 F | SYSTOLIC BLOOD PRESSURE: 136 MMHG | DIASTOLIC BLOOD PRESSURE: 68 MMHG | HEART RATE: 62 BPM | RESPIRATION RATE: 18 BRPM

## 2022-03-04 DIAGNOSIS — J45.50 SEVERE PERSISTENT ASTHMA, UNSPECIFIED WHETHER COMPLICATED: ICD-10-CM

## 2022-03-04 PROCEDURE — 700111 HCHG RX REV CODE 636 W/ 250 OVERRIDE (IP): Mod: JG | Performed by: INTERNAL MEDICINE

## 2022-03-04 PROCEDURE — 96372 THER/PROPH/DIAG INJ SC/IM: CPT

## 2022-03-04 RX ADMIN — MEPOLIZUMAB 100 MG: 100 INJECTION, POWDER, FOR SOLUTION SUBCUTANEOUS at 12:15

## 2022-03-04 ASSESSMENT — FIBROSIS 4 INDEX: FIB4 SCORE: 0.74

## 2022-03-08 DIAGNOSIS — E78.2 MIXED HYPERLIPIDEMIA: ICD-10-CM

## 2022-03-08 RX ORDER — ATORVASTATIN CALCIUM 40 MG/1
40 TABLET, FILM COATED ORAL
Qty: 90 TABLET | Refills: 1 | Status: SHIPPED | OUTPATIENT
Start: 2022-03-08 | End: 2022-09-06 | Stop reason: SDUPTHER

## 2022-04-01 ENCOUNTER — OUTPATIENT INFUSION SERVICES (OUTPATIENT)
Dept: ONCOLOGY | Facility: MEDICAL CENTER | Age: 77
End: 2022-04-01
Attending: INTERNAL MEDICINE
Payer: MEDICARE

## 2022-04-01 VITALS
SYSTOLIC BLOOD PRESSURE: 126 MMHG | OXYGEN SATURATION: 93 % | TEMPERATURE: 97.4 F | HEART RATE: 89 BPM | HEIGHT: 61 IN | WEIGHT: 142.64 LBS | DIASTOLIC BLOOD PRESSURE: 77 MMHG | RESPIRATION RATE: 18 BRPM | BODY MASS INDEX: 26.93 KG/M2

## 2022-04-01 DIAGNOSIS — J45.50 SEVERE PERSISTENT ASTHMA, UNSPECIFIED WHETHER COMPLICATED: ICD-10-CM

## 2022-04-01 PROCEDURE — 700111 HCHG RX REV CODE 636 W/ 250 OVERRIDE (IP): Mod: JG | Performed by: INTERNAL MEDICINE

## 2022-04-01 PROCEDURE — 96372 THER/PROPH/DIAG INJ SC/IM: CPT

## 2022-04-01 RX ADMIN — MEPOLIZUMAB 100 MG: 100 INJECTION, POWDER, FOR SOLUTION SUBCUTANEOUS at 10:47

## 2022-04-01 ASSESSMENT — FIBROSIS 4 INDEX: FIB4 SCORE: 0.74

## 2022-04-01 NOTE — PROGRESS NOTES
Pt is here today for Nucala injection. She denies any recent asthmatic attacks, no difficulty breathing today. Injection given to back of left arm, tolerated well and discharged home in stable condition. Confirmed next appointments

## 2022-04-29 ENCOUNTER — OUTPATIENT INFUSION SERVICES (OUTPATIENT)
Dept: ONCOLOGY | Facility: MEDICAL CENTER | Age: 77
End: 2022-04-29
Attending: INTERNAL MEDICINE
Payer: MEDICARE

## 2022-04-29 VITALS
OXYGEN SATURATION: 92 % | HEART RATE: 82 BPM | WEIGHT: 143.3 LBS | SYSTOLIC BLOOD PRESSURE: 162 MMHG | RESPIRATION RATE: 18 BRPM | TEMPERATURE: 97.2 F | BODY MASS INDEX: 27.06 KG/M2 | HEIGHT: 61 IN | DIASTOLIC BLOOD PRESSURE: 79 MMHG

## 2022-04-29 DIAGNOSIS — J45.50 SEVERE PERSISTENT ASTHMA, UNSPECIFIED WHETHER COMPLICATED: ICD-10-CM

## 2022-04-29 PROCEDURE — 96372 THER/PROPH/DIAG INJ SC/IM: CPT

## 2022-04-29 PROCEDURE — 700111 HCHG RX REV CODE 636 W/ 250 OVERRIDE (IP): Mod: JG | Performed by: INTERNAL MEDICINE

## 2022-04-29 RX ADMIN — MEPOLIZUMAB 100 MG: 100 INJECTION, POWDER, FOR SOLUTION SUBCUTANEOUS at 11:35

## 2022-04-29 ASSESSMENT — FIBROSIS 4 INDEX: FIB4 SCORE: 0.74

## 2022-04-29 NOTE — PROGRESS NOTES
arrives for Nucala injection for Asthma.Rossy has had few doses prior to this and tolerated well. Nucala given subcut in Left back arm, no band aid needed, per patients preference. Rossy tolerated well and without incident. No signs or symptoms of adverse reaction observed or expressed. Rossy DC'd home to self care in [facility]. Appointment confirm for next treatment.

## 2022-05-02 ENCOUNTER — OFFICE VISIT (OUTPATIENT)
Dept: INTERNAL MEDICINE | Facility: OTHER | Age: 77
End: 2022-05-02
Payer: MEDICARE

## 2022-05-02 VITALS
TEMPERATURE: 98.3 F | HEART RATE: 72 BPM | BODY MASS INDEX: 26.81 KG/M2 | SYSTOLIC BLOOD PRESSURE: 164 MMHG | OXYGEN SATURATION: 95 % | WEIGHT: 142 LBS | HEIGHT: 61 IN | DIASTOLIC BLOOD PRESSURE: 86 MMHG

## 2022-05-02 DIAGNOSIS — E11.22 CONTROLLED TYPE 2 DIABETES MELLITUS WITH STAGE 1 CHRONIC KIDNEY DISEASE, WITHOUT LONG-TERM CURRENT USE OF INSULIN (HCC): ICD-10-CM

## 2022-05-02 DIAGNOSIS — R68.2 DRY MOUTH: ICD-10-CM

## 2022-05-02 DIAGNOSIS — I10 ESSENTIAL HYPERTENSION, BENIGN: ICD-10-CM

## 2022-05-02 DIAGNOSIS — N18.1 CONTROLLED TYPE 2 DIABETES MELLITUS WITH STAGE 1 CHRONIC KIDNEY DISEASE, WITHOUT LONG-TERM CURRENT USE OF INSULIN (HCC): ICD-10-CM

## 2022-05-02 DIAGNOSIS — L85.3 DRY SKIN: ICD-10-CM

## 2022-05-02 DIAGNOSIS — L23.9 ALLERGIC DERMATITIS: ICD-10-CM

## 2022-05-02 LAB
HBA1C MFR BLD: 8.7 % (ref 0–5.6)
INT CON NEG: NEGATIVE
INT CON POS: POSITIVE

## 2022-05-02 PROCEDURE — 83036 HEMOGLOBIN GLYCOSYLATED A1C: CPT | Performed by: STUDENT IN AN ORGANIZED HEALTH CARE EDUCATION/TRAINING PROGRAM

## 2022-05-02 PROCEDURE — 99214 OFFICE O/P EST MOD 30 MIN: CPT | Mod: GC | Performed by: STUDENT IN AN ORGANIZED HEALTH CARE EDUCATION/TRAINING PROGRAM

## 2022-05-02 ASSESSMENT — ENCOUNTER SYMPTOMS
ABDOMINAL PAIN: 0
VOMITING: 0
COUGH: 0
NERVOUS/ANXIOUS: 0
DIARRHEA: 0
WHEEZING: 0
DIZZINESS: 0
DOUBLE VISION: 0
SORE THROAT: 0
CONSTIPATION: 0
BLURRED VISION: 0
PALPITATIONS: 0
NAUSEA: 0
SPEECH CHANGE: 0
FALLS: 0
CHILLS: 0
MYALGIAS: 0
HEADACHES: 0
SEIZURES: 0
SHORTNESS OF BREATH: 0
FEVER: 0

## 2022-05-02 ASSESSMENT — LIFESTYLE VARIABLES: SUBSTANCE_ABUSE: 0

## 2022-05-02 ASSESSMENT — FIBROSIS 4 INDEX: FIB4 SCORE: 0.74

## 2022-05-02 NOTE — PROGRESS NOTES
Established Patient    Rossy Lopez is a 77 y.o. female who presents today with the following:    CC:   Chief Complaint   Patient presents with   • Diabetes     Follow up       HPI:     Patient is a pleasant 78 y/o F with a PMH significant for T2DM (A1c 8.7% in 5/2022), HTN/HLD, and GERD who presents for follow up of diabetes and HTN.    Type 2 diabetes mellitus: She has no neuropathy, no polydypsia/polyuria, regularly seeing ophthalmologist and dentist. She checks her BG levels every day. For the past month, they have been in the 160s-low 200s. Was prerviously on 2 metformin until 3/1/2022 when she returned to metformin 500 qD due to diarrhea. Extended release metformin did not improve her diarrhea symptoms.    She prefers not to use any medications in the same class as Trulicity because she still has bodily itching specifically in her abdominal and chest area which happened at the same time as when she started Trulicity. She has never tried tradjenta previously prescribed. She is open to trying other medications if needed.    Rash: She has a generalized rash throughout her entire upper body, including her abdomen, chest, and upper/lower back. It is very itchy and has been happening ever since she started the trulicity; it has only gotten slightly better since. She is regularly using skin creams including both medicated ones and Cerave. She has gone to both an Allergist and a dermatologist. She has gotten allergy skin testing which showed that she was allergic to cobalt and rubber and nothing else. She also has symptoms of dry skin and dry mouth. She is not sure what is really going on.    Hypertension: She checks her BP 3x/day. The numbers are usually 100s-140s/60s-70s. At times they become high to the 140s-150s rarely.         Review of Systems   Constitutional: Negative for chills, fever and malaise/fatigue.   HENT: Negative for congestion and sore throat.    Eyes: Negative for blurred vision and double  vision.   Respiratory: Negative for cough, shortness of breath and wheezing.    Cardiovascular: Negative for chest pain, palpitations and leg swelling.   Gastrointestinal: Negative for abdominal pain, constipation, diarrhea, nausea and vomiting.   Genitourinary: Negative for dysuria, frequency and urgency.   Musculoskeletal: Negative for falls and myalgias.   Skin: Positive for itching and rash.   Neurological: Negative for dizziness, speech change, seizures and headaches.   Psychiatric/Behavioral: Negative for substance abuse and suicidal ideas. The patient is not nervous/anxious.        Patient Active Problem List    Diagnosis Date Noted   • Controlled type 2 diabetes mellitus with stage 1 chronic kidney disease, without long-term current use of insulin ([facility]) 2012   • Hyperlipidemia 2012   • Polypharmacy 10/05/2021   • Encounter for screening mammogram for breast cancer 10/05/2021   • Hypertension 2012   • Dry mouth 2022   • Symptomatic premature menopause  2022   • Bunion of left foot 2022   • Allergic dermatitis 2021   • Lipoma of shoulder 2021   • Severe persistent asthma 2021   • Low back pain 2020   • Vitamin D deficiency 2017   • Gastroesophageal reflux disease without esophagitis 2017   • Family history of colon cancer 2012   • Essential hypertension, benign 2012       Social History     Tobacco Use   • Smoking status: Former Smoker     Packs/day: 1.00     Years: 5.00     Pack years: 5.00     Types: Cigarettes     Quit date: 1970     Years since quittin.3   • Smokeless tobacco: Never Used   • Tobacco comment: quit    Vaping Use   • Vaping Use: Never used   Substance Use Topics   • Alcohol use: Not Currently     Alcohol/week: 0.0 oz     Comment: occasionally   • Drug use: No       Current Outpatient Medications   Medication Sig Dispense Refill   • SITagliptin (JANUVIA) 25 MG Tab Take 1 Tablet by mouth every day.  "30 Tablet 3   • metFORMIN (GLUCOPHAGE) 500 MG Tab Take 1 Tablet by mouth every day. 90 Tablet 0   • Cetirizine HCl (ZYRTEC ALLERGY PO) Take  by mouth.     • atorvastatin (LIPITOR) 40 MG Tab Take 1 Tablet by mouth at bedtime. 90 Tablet 1   • omeprazole (PRILOSEC) 20 MG delayed-release capsule Take 1 Capsule by mouth every day. 90 Capsule 1   • hydroCHLOROthiazide (HYDRODIURIL) 25 MG Tab Take 1 Tablet by mouth every day. 90 Tablet 1   • losartan (COZAAR) 25 MG Tab Take 1 Tablet by mouth every day. 90 Tablet 1   • glucose blood strip Use as prn for checking BS levels 3 times a day. Dx: E11.9 300 Strip 3   • cyclobenzaprine (FLEXERIL) 10 MG Tab      • PREVIDENT 5000 DRY MOUTH 1.1 % Gel BRUSH UTD     • albuterol 108 (90 Base) MCG/ACT Aero Soln inhalation aerosol Inhale 2 Puffs by mouth every four hours as needed for Shortness of Breath.     • Ca Phosphate-Cholecalciferol 250-500 MG-UNIT Chew Tab Take 1 Tab by mouth 2 Times a Day.     • Multiple Vitamins-Minerals (MULTIVITAMIN ADULT PO) Take 1 Tab by mouth every day.       No current facility-administered medications for this visit.       BP (!) 164/86 (BP Location: Left arm, Patient Position: Sitting, BP Cuff Size: Adult)   Pulse 72   Temp 36.8 °C (98.3 °F) (Temporal)   Ht 1.549 m (5' 1\")   Wt 64.4 kg (142 lb)   SpO2 95%   BMI 26.83 kg/m²     PHYSICAL EXAM:  Physical Exam  Vitals and nursing note reviewed.   Constitutional:       General: She is not in acute distress.     Appearance: Normal appearance. She is not ill-appearing.   HENT:      Head: Normocephalic and atraumatic.      Right Ear: External ear normal.      Left Ear: External ear normal.      Nose: Nose normal. No congestion or rhinorrhea.      Mouth/Throat:      Mouth: Mucous membranes are moist.      Pharynx: Oropharynx is clear. No oropharyngeal exudate.   Eyes:      General:         Right eye: No discharge.         Left eye: No discharge.      Extraocular Movements: Extraocular movements intact.      " Conjunctiva/sclera: Conjunctivae normal.      Pupils: Pupils are equal, round, and reactive to light.   Cardiovascular:      Rate and Rhythm: Normal rate and regular rhythm.      Pulses: Normal pulses.   Pulmonary:      Effort: Pulmonary effort is normal. No respiratory distress.      Breath sounds: Normal breath sounds. No wheezing or rales.   Abdominal:      General: Abdomen is flat. Bowel sounds are normal. There is no distension.      Palpations: Abdomen is soft.      Tenderness: There is no abdominal tenderness. There is no right CVA tenderness or left CVA tenderness.   Musculoskeletal:         General: No swelling or tenderness. Normal range of motion.      Cervical back: Normal range of motion and neck supple. No rigidity or tenderness.      Right lower leg: No edema.      Left lower leg: No edema.   Skin:     General: Skin is warm and dry.      Capillary Refill: Capillary refill takes less than 2 seconds.      Findings: Rash present. No bruising.      Comments: Multiple red bumps - maculopapular rash on abdominal area, chest, back.   Neurological:      Mental Status: She is alert and oriented to person, place, and time. Mental status is at baseline.      Cranial Nerves: No cranial nerve deficit.      Sensory: No sensory deficit.      Coordination: Coordination normal.   Psychiatric:         Mood and Affect: Mood normal.         Behavior: Behavior normal.         Thought Content: Thought content normal.         Judgment: Judgment normal.           Assessment and Plan  Controlled type 2 diabetes mellitus with stage 1 chronic kidney disease, without long-term current use of insulin ([facility])  POC A1c ordered today in clinic.    A1c in 5/2022 is 8.7%. For the patient's age group, optimal A1c is 7-8%. Patient has been taking metformin 500 qD only.  Patient states she has never started tradjenta and did not like Trulicity. She prefers not to use SGLT-2 inhibitors due to a history of urinary tract infections.    -Start  Januvia (sitagliptin) - I have discussed the benefits and risks of the medications with the patient and she agrees and expressed understanding of them  -Follow up with A1c in 15 weeks in clinic.    Essential hypertension, benign  BP is slightly high at this visit. However, BP measurements from BP log at home are 110s-140s/60s-80s. Well controlled for the patient's age.    -Continue current HTN medication regimen    Allergic dermatitis  Still persisting. Unknown trigger as per Dr. Cross this is likely not causing her issue.    -Requesting records from Dr. Ruperto Cross (allergist) and FARNK Lucero (dermatologist)    Dry mouth  Has nonspecific symptoms of dry skin and dry mouth. In the context of the patient's rash it may be concerning for an autoimmune process such as Sjogren's disease.    -Ordering ESR and CRP      Return to clinic in 3 months for follow up of diabetes. Return to clinic earlier if CRP and ESR return positive - may need to do further workup for an autoimmune process.    Signed by: Pat Rsoenthal M.D.

## 2022-05-02 NOTE — ASSESSMENT & PLAN NOTE
Has nonspecific symptoms of dry skin and dry mouth. In the context of the patient's rash it may be concerning for an autoimmune process such as Sjogren's disease.    -Ordering ESR and CRP

## 2022-05-02 NOTE — LETTER
[facility]  Pat Rosenthal M.D.  [address] [place] [address]  Fax: [phone]   Authorization for Release/Disclosure of   Protected Health Information   Name: XI LOPEZ : 1945 SSN: [id number]   Address: [place] [address] Phone:    [phone] (home) [phone] (work)   I authorize the entity listed below to release/disclose the PHI below to:   [facility]/Pat Rosenthal M.D. and Pat Rosenthal M.D.   Provider or Entity Name:     Address   City, State, Zip   Phone:      Fax:     Reason for request: continuity of care   Information to be released:    [  ] LAST COLONOSCOPY,  including any PATH REPORT and follow-up  [  ] LAST FIT/COLOGUARD RESULT [  ] LAST DEXA  [  ] LAST MAMMOGRAM  [  ] LAST PAP  [  ] LAST LABS [  ] RETINA EXAM REPORT  [  ] IMMUNIZATION RECORDS  [  ] Release all info      [  ] Check here and initial the line next to each item to release ALL health information INCLUDING  _____ Care and treatment for drug and / or alcohol abuse  _____ HIV testing, infection status, or AIDS  _____ Genetic Testing    DATES OF SERVICE OR TIME PERIOD TO BE DISCLOSED: _____________  I understand and acknowledge that:  * This Authorization may be revoked at any time by you in writing, except if your health information has already been used or disclosed.  * Your health information that will be used or disclosed as a result of you signing this authorization could be re-disclosed by the recipient. If this occurs, your re-disclosed health information may no longer be protected by State or Federal laws.  * You may refuse to sign this Authorization. Your refusal will not affect your ability to obtain treatment.  * This Authorization becomes effective upon signing and will  on (date) __________.      If no date is indicated, this Authorization will  one (1) year from the signature date.    Name: Xi Lopez    Signature:   Date:     2022       PLEASE FAX REQUESTED RECORDS  BACK TO: [phone]

## 2022-05-02 NOTE — ASSESSMENT & PLAN NOTE
BP is slightly high at this visit. However, BP measurements from BP log at home are 110s-140s/60s-80s. Well controlled for the patient's age.    -Continue current HTN medication regimen

## 2022-05-02 NOTE — LETTER
[facility] the [facility]  Pat Rosetnhal M.D.  [address] [place] [address]  Fax: [phone]   Authorization for Release/Disclosure of   Protected Health Information   Name: XI LOPZE : 1945 SSN: [id number]   Address: [place] [address] Phone:    [phone] (home) [phone] (work)   I authorize the entity listed below to release/disclose the PHI below to:   [facility] the [facility]/Pat Rosenthal M.D. and Pat Rosenthal M.D.   Provider or Entity Name:     Address   City, State, Zip   Phone:      Fax:     Reason for request: continuity of care   Information to be released:    [  ] LAST COLONOSCOPY,  including any PATH REPORT and follow-up  [  ] LAST FIT/COLOGUARD RESULT [  ] LAST DEXA  [  ] LAST MAMMOGRAM  [  ] LAST PAP  [  ] LAST LABS [  ] RETINA EXAM REPORT  [  ] IMMUNIZATION RECORDS  [  ] Release all info      [  ] Check here and initial the line next to each item to release ALL health information INCLUDING  _____ Care and treatment for drug and / or alcohol abuse  _____ HIV testing, infection status, or AIDS  _____ Genetic Testing    DATES OF SERVICE OR TIME PERIOD TO BE DISCLOSED: _____________  I understand and acknowledge that:  * This Authorization may be revoked at any time by you in writing, except if your health information has already been used or disclosed.  * Your health information that will be used or disclosed as a result of you signing this authorization could be re-disclosed by the recipient. If this occurs, your re-disclosed health information may no longer be protected by State or Federal laws.  * You may refuse to sign this Authorization. Your refusal will not affect your ability to obtain treatment.  * This Authorization becomes effective upon signing and will  on (date) __________.      If no date is indicated, this Authorization will  one (1) year from the signature date.    Name: Xi Lopez    Signature:   Date:     2022       PLEASE FAX REQUESTED RECORDS  BACK TO: [phone]

## 2022-05-02 NOTE — PATIENT INSTRUCTIONS
Hello! Today we saw you for:    -Diabetes follow up: Hemoglobin A1c 8.7%  -Starting on Januvia (sitagliptin): Let me know on MyChart if your insurance is not covering it  -Continue metformin daily  -Keep using the creams for your rash  -Continue blood pressure medications as prescribed  -Take Zyrtec 2x/day    -See me in 3 months for follow up of diabetes.      Sitagliptin oral tablet  What is this medicine?  SITAGLIPTIN (sit a GLIP tin) helps to treat type 2 diabetes. It helps to control blood sugar. Treatment is combined with diet and exercise.  This medicine may be used for other purposes; ask your health care provider or pharmacist if you have questions.  COMMON BRAND NAME(S): Darreluvia  What should I tell my health care provider before I take this medicine?  They need to know if you have any of these conditions:  · diabetic ketoacidosis  · kidney disease  · pancreatitis  · previous swelling of the tongue, face, or lips with difficulty breathing, difficulty swallowing, hoarseness, or tightening of the throat  · type 1 diabetes  · an unusual or allergic reaction to sitagliptin, other medicines, foods, dyes, or preservatives  · pregnant or trying to get pregnant  · breast-feeding  How should I use this medicine?  Take this medicine by mouth with a glass of water. Follow the directions on the prescription label. You can take it with or without food. Do not cut, crush or chew this medicine. Take your dose at the same time each day. Do not take more often than directed. Do not stop taking except on your doctor's advice.  A special MedGuide will be given to you by the pharmacist with each prescription and refill. Be sure to read this information carefully each time.  Talk to your pediatrician regarding the use of this medicine in children. Special care may be needed.  Overdosage: If you think you have taken too much of this medicine contact a poison control center or emergency room at once.  NOTE: This medicine is only  for you. Do not share this medicine with others.  What if I miss a dose?  If you miss a dose, take it as soon as you can. If it is almost time for your next dose, take only that dose. Do not take double or extra doses.  What may interact with this medicine?  Do not take this medicine with any of the following medications:  · gatifloxacin  This medicine may also interact with the following medications:  · alcohol  · digoxin  · insulin  · sulfonylureas like glimepiride, glipizide, glyburide  This list may not describe all possible interactions. Give your health care provider a list of all the medicines, herbs, non-prescription drugs, or dietary supplements you use. Also tell them if you smoke, drink alcohol, or use illegal drugs. Some items may interact with your medicine.  What should I watch for while using this medicine?  Visit your doctor or health care professional for regular checks on your progress.  A test called the HbA1C (A1C) will be monitored. This is a simple blood test. It measures your blood sugar control over the last 2 to 3 months. You will receive this test every 3 to 6 months.  Learn how to check your blood sugar. Learn the symptoms of low and high blood sugar and how to manage them.  Always carry a quick-source of sugar with you in case you have symptoms of low blood sugar. Examples include hard sugar candy or glucose tablets. Make sure others know that you can choke if you eat or drink when you develop serious symptoms of low blood sugar, such as seizures or unconsciousness. They must get medical help at once.  Tell your doctor or health care professional if you have high blood sugar. You might need to change the dose of your medicine. If you are sick or exercising more than usual, you might need to change the dose of your medicine.  Do not skip meals. Ask your doctor or health care professional if you should avoid alcohol. Many nonprescription cough and cold products contain sugar or alcohol.  These can affect blood sugar.  Wear a medical ID bracelet or chain, and carry a card that describes your disease and details of your medicine and dosage times.  What side effects may I notice from receiving this medicine?  Side effects that you should report to your doctor or health care professional as soon as possible:  · allergic reactions like skin rash, itching or hives, swelling of the face, lips, or tongue  · breathing problems  · general ill feeling or flu-like symptoms  · joint pain  · loss of appetite  · redness, blistering, peeling or loosening of the skin, including inside the mouth  · signs and symptoms of heart failure like breathing problems, fast, irregular heartbeat, sudden weight gain; swelling of the ankles, feet, hands; unusually weak or tired  · signs and symptoms of low blood sugar such as feeling anxious, confusion, dizziness, increased hunger, unusually weak or tired, sweating, shakiness, cold, irritable, headache, blurred vision, fast heartbeat, loss of consciousness  · signs and symptoms of muscle injury like dark urine; trouble passing urine or change in the amount of urine; unusually weak or tired; muscle pain; back pain  · unusual stomach upset or pain  · vomiting  Side effects that usually do not require medical attention (report to your doctor or health care professional if they continue or are bothersome):  · diarrhea  · headache  · sore throat  · stomach upset  · stuffy or runny nose  This list may not describe all possible side effects. Call your doctor for medical advice about side effects. You may report side effects to FDA at 5-896-FDA-4051.  Where should I keep my medicine?  Keep out of the reach of children.  Store at room temperature between 15 and 30 degrees C (59 and 86 degrees F). Throw away any unused medicine after the expiration date.  NOTE: This sheet is a summary. It may not cover all possible information. If you have questions about this medicine, talk to your doctor,  pharmacist, or health care provider.  © 2020 [employer]vier/Gold Standard (2019-07-23 16:38:43)

## 2022-05-02 NOTE — ASSESSMENT & PLAN NOTE
Still persisting. Unknown trigger as per Dr. Cross this is likely not causing her issue.    -Requesting records from Dr. Ruperto Cross (allergist) and FRANK Lucero (dermatologist)

## 2022-05-02 NOTE — ASSESSMENT & PLAN NOTE
POC A1c ordered today in clinic.    A1c in 5/2022 is 8.7%. For the patient's age group, optimal A1c is 7-8%. Patient has been taking metformin 500 qD only.  Patient states she has never started tradjenta and did not like Trulicity. She prefers not to use SGLT-2 inhibitors due to a history of urinary tract infections.    -Start Januvia (sitagliptin) - I have discussed the benefits and risks of the medications with the patient and she agrees and expressed understanding of them  -Follow up with A1c in 15 weeks in clinic.

## 2022-05-05 ENCOUNTER — HOSPITAL ENCOUNTER (OUTPATIENT)
Dept: LAB | Facility: MEDICAL CENTER | Age: 77
End: 2022-05-05
Attending: STUDENT IN AN ORGANIZED HEALTH CARE EDUCATION/TRAINING PROGRAM
Payer: MEDICARE

## 2022-05-05 DIAGNOSIS — L85.3 DRY SKIN: ICD-10-CM

## 2022-05-05 DIAGNOSIS — R68.2 DRY MOUTH: ICD-10-CM

## 2022-05-05 LAB
CRP SERPL HS-MCNC: <0.3 MG/DL (ref 0–0.75)
ERYTHROCYTE [SEDIMENTATION RATE] IN BLOOD BY WESTERGREN METHOD: 7 MM/HOUR (ref 0–25)

## 2022-05-05 PROCEDURE — 36415 COLL VENOUS BLD VENIPUNCTURE: CPT

## 2022-05-05 PROCEDURE — 85652 RBC SED RATE AUTOMATED: CPT

## 2022-05-05 PROCEDURE — 86140 C-REACTIVE PROTEIN: CPT

## 2022-05-16 ENCOUNTER — TELEPHONE (OUTPATIENT)
Dept: INTERNAL MEDICINE | Facility: OTHER | Age: 77
End: 2022-05-16
Payer: MEDICARE

## 2022-05-16 DIAGNOSIS — E11.22 CONTROLLED TYPE 2 DIABETES MELLITUS WITH STAGE 1 CHRONIC KIDNEY DISEASE, WITHOUT LONG-TERM CURRENT USE OF INSULIN (HCC): ICD-10-CM

## 2022-05-16 DIAGNOSIS — N18.1 CONTROLLED TYPE 2 DIABETES MELLITUS WITH STAGE 1 CHRONIC KIDNEY DISEASE, WITHOUT LONG-TERM CURRENT USE OF INSULIN (HCC): ICD-10-CM

## 2022-05-17 NOTE — TELEPHONE ENCOUNTER
Titrating dosage of Januvia from 25 mg qD to 50 mg qD. Started at 25 mg qD in order to minimize risk of side effects like with previous new DM meds (e.g. Trulicity). Will monitor sugars again with the patient's 50 mg dosage. I have requested the patient to send a message via LiveProfile regarding her sugar levels in 2 weeks on the 50 mg dosage. Then will increase to 100 mg qD for Januvia.    Pat Rosenthal MD, MPH  [facility], PGY-2

## 2022-05-27 ENCOUNTER — OUTPATIENT INFUSION SERVICES (OUTPATIENT)
Dept: ONCOLOGY | Facility: MEDICAL CENTER | Age: 77
End: 2022-05-27
Attending: INTERNAL MEDICINE
Payer: MEDICARE

## 2022-05-27 VITALS
RESPIRATION RATE: 18 BRPM | HEART RATE: 75 BPM | SYSTOLIC BLOOD PRESSURE: 121 MMHG | TEMPERATURE: 98.1 F | OXYGEN SATURATION: 96 % | BODY MASS INDEX: 26.51 KG/M2 | DIASTOLIC BLOOD PRESSURE: 67 MMHG | WEIGHT: 140.43 LBS | HEIGHT: 61 IN

## 2022-05-27 DIAGNOSIS — J45.50 SEVERE PERSISTENT ASTHMA, UNSPECIFIED WHETHER COMPLICATED: ICD-10-CM

## 2022-05-27 PROCEDURE — 96372 THER/PROPH/DIAG INJ SC/IM: CPT

## 2022-05-27 PROCEDURE — 700111 HCHG RX REV CODE 636 W/ 250 OVERRIDE (IP): Mod: JG | Performed by: INTERNAL MEDICINE

## 2022-05-27 RX ADMIN — MEPOLIZUMAB 100 MG: 100 INJECTION, POWDER, FOR SOLUTION SUBCUTANEOUS at 11:09

## 2022-05-27 ASSESSMENT — FIBROSIS 4 INDEX: FIB4 SCORE: 0.74

## 2022-05-27 NOTE — PROGRESS NOTES
Patient presents for Nucala injection. Patient in good spirits today. Nucala given to the back of the patient's arm gauze and pressure to site. Patient scheduled for her next appointment and released in no acute distress.

## 2022-06-06 ENCOUNTER — TELEPHONE (OUTPATIENT)
Dept: INTERNAL MEDICINE | Facility: OTHER | Age: 77
End: 2022-06-06
Payer: MEDICARE

## 2022-06-06 DIAGNOSIS — E11.22 CONTROLLED TYPE 2 DIABETES MELLITUS WITH STAGE 1 CHRONIC KIDNEY DISEASE, WITHOUT LONG-TERM CURRENT USE OF INSULIN (HCC): ICD-10-CM

## 2022-06-06 DIAGNOSIS — N18.1 CONTROLLED TYPE 2 DIABETES MELLITUS WITH STAGE 1 CHRONIC KIDNEY DISEASE, WITHOUT LONG-TERM CURRENT USE OF INSULIN (HCC): ICD-10-CM

## 2022-06-06 NOTE — TELEPHONE ENCOUNTER
Increasing the patient's dosage of Januvia to 100 mg daily due to BG levels after starting Januvia and discontinuing sulfonylurea.     Message from the patient:  Parish Rosenthal-you asked me to send my numbers since starting Januvia.  I started 2 25 mg Januvia on May 17th. When the 25mg were gone I started the 50mg pills.   Here are  my numbers    225, 257, 197, 255, 206, 243, 236, 244, 216, 226, 213, 216, 212, 229, 228, 269, 211, and 235. That is once a day.  All still in the 200’s.    Thanks  Rossy Lopez

## 2022-06-30 ENCOUNTER — OUTPATIENT INFUSION SERVICES (OUTPATIENT)
Dept: ONCOLOGY | Facility: MEDICAL CENTER | Age: 77
End: 2022-06-30
Attending: INTERNAL MEDICINE
Payer: MEDICARE

## 2022-06-30 VITALS
RESPIRATION RATE: 18 BRPM | HEART RATE: 56 BPM | OXYGEN SATURATION: 98 % | WEIGHT: 141.98 LBS | BODY MASS INDEX: 26.81 KG/M2 | HEIGHT: 61 IN | SYSTOLIC BLOOD PRESSURE: 116 MMHG | DIASTOLIC BLOOD PRESSURE: 70 MMHG | TEMPERATURE: 97.3 F

## 2022-06-30 DIAGNOSIS — J45.50 SEVERE PERSISTENT ASTHMA, UNSPECIFIED WHETHER COMPLICATED: ICD-10-CM

## 2022-06-30 PROCEDURE — 96372 THER/PROPH/DIAG INJ SC/IM: CPT

## 2022-06-30 PROCEDURE — 700111 HCHG RX REV CODE 636 W/ 250 OVERRIDE (IP): Mod: JG | Performed by: INTERNAL MEDICINE

## 2022-06-30 RX ORDER — MOMETASONE FUROATE 200 UG/1
2 AEROSOL RESPIRATORY (INHALATION)
COMMUNITY
Start: 2022-06-13

## 2022-06-30 RX ORDER — TRIAMCINOLONE ACETONIDE 1 MG/G
CREAM TOPICAL 2 TIMES DAILY
COMMUNITY

## 2022-06-30 RX ADMIN — MEPOLIZUMAB 100 MG: 100 INJECTION, POWDER, FOR SOLUTION SUBCUTANEOUS at 14:02

## 2022-06-30 ASSESSMENT — FIBROSIS 4 INDEX: FIB4 SCORE: 0.74

## 2022-06-30 NOTE — PROGRESS NOTES
Pt presented to infusion for monthly Nucala for asthma. She denied any s/s of infection or open wounds. Nucala given in back of arm without issue, declined bandaid. Has next appt, left on foot to self care.

## 2022-07-05 DIAGNOSIS — K21.9 GERD WITHOUT ESOPHAGITIS: ICD-10-CM

## 2022-07-05 DIAGNOSIS — Z00.00 MEDICARE ANNUAL WELLNESS VISIT, SUBSEQUENT: ICD-10-CM

## 2022-07-05 RX ORDER — OMEPRAZOLE 20 MG/1
20 CAPSULE, DELAYED RELEASE ORAL
Qty: 90 CAPSULE | Refills: 1 | Status: SHIPPED | OUTPATIENT
Start: 2022-07-05 | End: 2023-01-03 | Stop reason: SDUPTHER

## 2022-07-05 RX ORDER — HYDROCHLOROTHIAZIDE 25 MG/1
25 TABLET ORAL
Qty: 90 TABLET | Refills: 1 | Status: SHIPPED | OUTPATIENT
Start: 2022-07-05 | End: 2023-01-03 | Stop reason: SDUPTHER

## 2022-07-05 NOTE — TELEPHONE ENCOUNTER
Last seen: 05.02.2022 by Dr. Rosenthal  Next appt: 07.06.2022 with Dr. Rosenthal      Was the patient seen in the last year in this department? Yes   Does patient have an active prescription for medications requested? No   Received Request Via: Pharmacy

## 2022-07-06 ENCOUNTER — OFFICE VISIT (OUTPATIENT)
Dept: INTERNAL MEDICINE | Facility: OTHER | Age: 77
End: 2022-07-06
Payer: MEDICARE

## 2022-07-06 VITALS
WEIGHT: 142 LBS | HEIGHT: 62 IN | TEMPERATURE: 98.2 F | DIASTOLIC BLOOD PRESSURE: 76 MMHG | OXYGEN SATURATION: 98 % | BODY MASS INDEX: 26.13 KG/M2 | HEART RATE: 78 BPM | SYSTOLIC BLOOD PRESSURE: 135 MMHG

## 2022-07-06 DIAGNOSIS — I10 ESSENTIAL HYPERTENSION: ICD-10-CM

## 2022-07-06 DIAGNOSIS — N18.1 CONTROLLED TYPE 2 DIABETES MELLITUS WITH STAGE 1 CHRONIC KIDNEY DISEASE, WITHOUT LONG-TERM CURRENT USE OF INSULIN (HCC): ICD-10-CM

## 2022-07-06 DIAGNOSIS — E11.22 CONTROLLED TYPE 2 DIABETES MELLITUS WITH STAGE 1 CHRONIC KIDNEY DISEASE, WITHOUT LONG-TERM CURRENT USE OF INSULIN (HCC): ICD-10-CM

## 2022-07-06 PROBLEM — R68.2 DRY MOUTH: Status: RESOLVED | Noted: 2022-05-02 | Resolved: 2022-07-06

## 2022-07-06 PROBLEM — L23.9 ALLERGIC DERMATITIS: Status: RESOLVED | Noted: 2021-11-24 | Resolved: 2022-07-06

## 2022-07-06 PROBLEM — Z12.31 ENCOUNTER FOR SCREENING MAMMOGRAM FOR BREAST CANCER: Status: RESOLVED | Noted: 2021-10-05 | Resolved: 2022-07-06

## 2022-07-06 PROBLEM — Z79.899 POLYPHARMACY: Status: RESOLVED | Noted: 2021-10-05 | Resolved: 2022-07-06

## 2022-07-06 PROCEDURE — 99213 OFFICE O/P EST LOW 20 MIN: CPT | Mod: GE | Performed by: STUDENT IN AN ORGANIZED HEALTH CARE EDUCATION/TRAINING PROGRAM

## 2022-07-06 RX ORDER — LOSARTAN POTASSIUM 25 MG/1
25 TABLET ORAL
Qty: 90 TABLET | Refills: 1 | Status: SHIPPED | OUTPATIENT
Start: 2022-07-06 | End: 2023-01-03 | Stop reason: SDUPTHER

## 2022-07-06 ASSESSMENT — FIBROSIS 4 INDEX: FIB4 SCORE: 0.74

## 2022-07-06 NOTE — PATIENT INSTRUCTIONS
Hello! Today we saw you for:    -Diabetes: Much improved! Check  your hemoglobin A1c in 2 months (with insurance coverage). Keep taking Januvia 100 mg.  -Continue recording blood sugars.  -Blood pressure: Refilled losartan    See me in 3 months.    Thank you!

## 2022-07-06 NOTE — PROGRESS NOTES
Established Patient    Rossy Lopez is a 77 y.o. female who presents today with the following:    CC:   Chief Complaint   Patient presents with   • Follow-Up     Check up after starting Januvia       HPI:     Patient is a pleasant 78 y/o F with a PMH significant for hypertension, hyperlipidemia, history of vitamin D deficiency, GERD, and diabetes mellitus (A1c 8.7% in 2022) who presents for a follow-up of diabetes:    Patient has been checking her blood glucose levels regularly.  Previously they were in the 190s-250; however, for the last few weeks, after she started on Januvia 100 mg daily, her blood glucose levels have gone down to the 150s to 180s.  She has no symptoms of hypoglycemia such as headache and dizziness, as well as feelings of fatigue/wanting to pass out.  She will go to the eye doctor this August or September (ophthalmologist), is due for a diabetic foot exam today.    Hypertension: Well-controlled.  Patient is here for a refill of losartan.    Rash: 99% resolved.    ROS See HPI    Patient Active Problem List    Diagnosis Date Noted   • Controlled type 2 diabetes mellitus with stage 1 chronic kidney disease, without long-term current use of insulin ([facility]) 2012   • Hyperlipidemia 2012   • Hypertension 2012   • Symptomatic premature menopause  2022   • Bunion of left foot 2022   • Lipoma of shoulder 2021   • Severe persistent asthma 2021   • Low back pain 2020   • Vitamin D deficiency 2017   • Gastroesophageal reflux disease without esophagitis 2017   • Family history of colon cancer 2012   • Essential hypertension, benign 2012       Social History     Tobacco Use   • Smoking status: Former Smoker     Packs/day: 1.00     Years: 5.00     Pack years: 5.00     Types: Cigarettes     Quit date: 1970     Years since quittin.5   • Smokeless tobacco: Never Used   • Tobacco comment: quit    Vaping Use   • Vaping Use: Never  "used   Substance Use Topics   • Alcohol use: Not Currently     Alcohol/week: 0.0 oz     Comment: occasionally   • Drug use: No       Current Outpatient Medications   Medication Sig Dispense Refill   • losartan (COZAAR) 25 MG Tab Take 1 Tablet by mouth every day. 90 Tablet 1   • hydroCHLOROthiazide (HYDRODIURIL) 25 MG Tab Take 1 Tablet by mouth every day. 90 Tablet 1   • omeprazole (PRILOSEC) 20 MG delayed-release capsule Take 1 Capsule by mouth every day. 90 Capsule 1   • ASMANEX  MCG/ACT Aerosol Inhale 2 Puffs 2 (two) times a day.     • triamcinolone acetonide (KENALOG) 0.1 % Cream Apply  topically 2 times a day.     • SITagliptin (JANUVIA) 100 MG Tab Take 1 Tablet by mouth every day. 30 Tablet 1   • Cetirizine HCl (ZYRTEC ALLERGY PO) Take  by mouth.     • atorvastatin (LIPITOR) 40 MG Tab Take 1 Tablet by mouth at bedtime. 90 Tablet 1   • glucose blood strip Use as prn for checking BS levels 3 times a day. Dx: E11.9 300 Strip 3   • cyclobenzaprine (FLEXERIL) 10 MG Tab      • PREVIDENT 5000 DRY MOUTH 1.1 % Gel BRUSH UTD     • albuterol 108 (90 Base) MCG/ACT Aero Soln inhalation aerosol Inhale 2 Puffs by mouth every four hours as needed for Shortness of Breath.     • Ca Phosphate-Cholecalciferol 250-500 MG-UNIT Chew Tab Take 1 Tab by mouth 2 Times a Day.     • Multiple Vitamins-Minerals (MULTIVITAMIN ADULT PO) Take 1 Tab by mouth every day.       No current facility-administered medications for this visit.       /76 (BP Location: Right arm, Patient Position: Sitting, BP Cuff Size: Adult)   Pulse 78   Temp 36.8 °C (98.2 °F) (Temporal)   Ht 1.575 m (5' 2\")   Wt 64.4 kg (142 lb)   SpO2 98%   BMI 25.97 kg/m²     PHYSICAL EXAM:  Physical Exam  Vitals and nursing note reviewed.   Constitutional:       General: She is not in acute distress.     Appearance: Normal appearance. She is normal weight. She is not ill-appearing.   HENT:      Head: Normocephalic and atraumatic.      Right Ear: External ear " normal.      Left Ear: External ear normal.      Nose: Nose normal. No congestion or rhinorrhea.      Mouth/Throat:      Mouth: Mucous membranes are moist.      Pharynx: Oropharynx is clear. No oropharyngeal exudate.   Eyes:      General:         Right eye: No discharge.         Left eye: No discharge.      Extraocular Movements: Extraocular movements intact.      Conjunctiva/sclera: Conjunctivae normal.      Pupils: Pupils are equal, round, and reactive to light.   Cardiovascular:      Rate and Rhythm: Normal rate and regular rhythm.      Pulses: Normal pulses.           Dorsalis pedis pulses are 2+ on the right side and 2+ on the left side.   Pulmonary:      Effort: Pulmonary effort is normal. No respiratory distress.      Breath sounds: Normal breath sounds. No wheezing or rales.   Abdominal:      General: Abdomen is flat. Bowel sounds are normal. There is no distension.      Palpations: Abdomen is soft.      Tenderness: There is no abdominal tenderness. There is no right CVA tenderness or left CVA tenderness.   Musculoskeletal:         General: No swelling or tenderness. Normal range of motion.      Cervical back: Normal range of motion and neck supple. No rigidity or tenderness.      Right lower leg: No edema.      Left lower leg: No edema.      Right foot: Normal range of motion. No deformity or bunion.      Left foot: Normal range of motion. No deformity or bunion.   Feet:      Right foot:      Protective Sensation: 10 sites tested. 10 sites sensed.      Skin integrity: Skin integrity normal. No ulcer, blister, skin breakdown, callus, dry skin or fissure.      Toenail Condition: Right toenails are normal.      Left foot:      Protective Sensation: 10 sites tested. 10 sites sensed.      Skin integrity: Skin integrity normal. No ulcer, blister, skin breakdown, callus, dry skin or fissure.      Toenail Condition: Left toenails are normal.   Skin:     General: Skin is warm and dry.      Capillary Refill:  Capillary refill takes less than 2 seconds.      Findings: No bruising.   Neurological:      Mental Status: She is alert and oriented to person, place, and time. Mental status is at baseline.      Cranial Nerves: No cranial nerve deficit.      Sensory: No sensory deficit.      Coordination: Coordination normal.   Psychiatric:         Mood and Affect: Mood normal.         Behavior: Behavior normal.         Thought Content: Thought content normal.         Judgment: Judgment normal.           Assessment and Plan  No problem-specific Assessment & Plan notes found for this encounter.    1. Essential hypertension  Well-controlled, goal is less than 140/90.  Continue losartan and hydrochlorothiazide, recheck CMP in 6 months.  - losartan (COZAAR) 25 MG Tab; Take 1 Tablet by mouth every day.  Dispense: 90 Tablet; Refill: 1    2. Controlled type 2 diabetes mellitus with stage 1 chronic kidney disease, without long-term current use of insulin ([facility])  Improved.  Patient was previously uncontrolled on diabetes; last A1c was 8.7%.  However, currently, her blood sugars are in the 150s to 180s.  This is much improved.  Control of her blood glucose is very good.  Continue on Januvia.  Repeat urine microalbumin exam in 1 year.  Repeat hemoglobin A1c in 2 months per insurance policies  - Diabetic Monofilament LE Exam-done today  - HEMOGLOBIN A1C; Future      Follow up in Sept for HgA1c lab to be obtained; follow-up appointment with me in October 2022.    Signed by: Pat Rosenthal M.D.

## 2022-07-16 NOTE — THERAPY
"Occupational Therapy Treatment completed with focus on ADLs, ADL transfers, patient education and caregiver training.  Functional Status:  Up at EOB, just finished breakfast, initially reported no dizziness good pain control and no fatigue. Donned LSO EOB w/CGA and spouse assist, able to complete sit>stand w/min A w/verbal cues. Walked to sink w/fww and min A while standing at sink for grooming observed BLE buckle 1x/, pt was able to use fww to prevent LOB, but also appeared to have increased fatigue, remained up for PTA for next session, however remained to observe pt w/2 more incidents of significant buckling of BLE. Pt also reported feeling \"hot, flush and increased dizziness\" mod A to safely return to EOB, where pt demonstrated decreased alertness, and worsening above mentioned symptoms. BTB w/mod A. BP checked in supine 107/52, unclear if pt had hypotensive issues because she was returned to supine for safety. ADL participate remains limited by activity tolerance. Pt wanting to d/c home but has not yet demonstrated functional safe ability to complete daily activities and txfs. Will continue to follow   Plan of Care: Will benefit from Occupational Therapy 4 times per week  Discharge Recommendations:  Equipment Will Continue to Assess for Equipment Needs. Post-acute therapy Recommend post-acute placement for additional occupational therapy services prior to discharge home. Patient can tolerate post-acute therapies at a 5x/week frequency.- Unless pt improves in next 24 hours then recommend HH       See \"Rehab Therapy-Acute\" Patient Summary Report for complete documentation.     Pt seen for OT tx pt demonstrating initially improved participation and application of spinal precautions during ADL's, however as session progressed pt demonstrated near fall d/t BLE buckling, dizziness and sweating. Will continue to follow in this setting   " I spoke with the patient's mother. The patient is having menstrual period. She is complaining of being dizzy. Orders placed for iron, TIBC, ferritin, CBC.   gait, locomotion, and balance/muscle strength

## 2022-07-29 ENCOUNTER — OUTPATIENT INFUSION SERVICES (OUTPATIENT)
Dept: ONCOLOGY | Facility: MEDICAL CENTER | Age: 77
End: 2022-07-29
Attending: INTERNAL MEDICINE
Payer: MEDICARE

## 2022-07-29 VITALS
BODY MASS INDEX: 27.01 KG/M2 | OXYGEN SATURATION: 94 % | HEART RATE: 81 BPM | DIASTOLIC BLOOD PRESSURE: 53 MMHG | SYSTOLIC BLOOD PRESSURE: 126 MMHG | TEMPERATURE: 97.8 F | WEIGHT: 143.08 LBS | HEIGHT: 61 IN | RESPIRATION RATE: 18 BRPM

## 2022-07-29 DIAGNOSIS — J45.50 SEVERE PERSISTENT ASTHMA, UNSPECIFIED WHETHER COMPLICATED: ICD-10-CM

## 2022-07-29 PROCEDURE — 96372 THER/PROPH/DIAG INJ SC/IM: CPT

## 2022-07-29 PROCEDURE — 700111 HCHG RX REV CODE 636 W/ 250 OVERRIDE (IP): Mod: JG | Performed by: INTERNAL MEDICINE

## 2022-07-29 RX ADMIN — MEPOLIZUMAB 100 MG: 100 INJECTION, POWDER, FOR SOLUTION SUBCUTANEOUS at 11:12

## 2022-07-29 ASSESSMENT — FIBROSIS 4 INDEX: FIB4 SCORE: 0.74

## 2022-07-29 NOTE — PROGRESS NOTES
Ms Lopez is here today for monthly Nucala for asthma. She denies any new health concerns or complaints. Nucala given to back of left arm. Injection was tolerated well. Discharged in stable condition. Confirmed next month's appointment.

## 2022-08-08 DIAGNOSIS — E11.22 CONTROLLED TYPE 2 DIABETES MELLITUS WITH STAGE 1 CHRONIC KIDNEY DISEASE, WITHOUT LONG-TERM CURRENT USE OF INSULIN (HCC): ICD-10-CM

## 2022-08-08 DIAGNOSIS — N18.1 CONTROLLED TYPE 2 DIABETES MELLITUS WITH STAGE 1 CHRONIC KIDNEY DISEASE, WITHOUT LONG-TERM CURRENT USE OF INSULIN (HCC): ICD-10-CM

## 2022-08-08 NOTE — TELEPHONE ENCOUNTER
Last seen: 07.06.2022 by Dr. Rosenthal  Next appt: 10.03.2022 with Dr. Rosenthal    Was the patient seen in the last year in this department? Yes   Does patient have an active prescription for medications requested? No   Received Request Via: Pharmacy

## 2022-08-10 DIAGNOSIS — L50.0 ALLERGIC URTICARIA: ICD-10-CM

## 2022-08-26 ENCOUNTER — OUTPATIENT INFUSION SERVICES (OUTPATIENT)
Dept: ONCOLOGY | Facility: MEDICAL CENTER | Age: 77
End: 2022-08-26
Attending: INTERNAL MEDICINE
Payer: MEDICARE

## 2022-08-26 VITALS
TEMPERATURE: 97.6 F | BODY MASS INDEX: 27.3 KG/M2 | SYSTOLIC BLOOD PRESSURE: 123 MMHG | DIASTOLIC BLOOD PRESSURE: 71 MMHG | OXYGEN SATURATION: 94 % | RESPIRATION RATE: 18 BRPM | WEIGHT: 144.62 LBS | HEART RATE: 89 BPM | HEIGHT: 61 IN

## 2022-08-26 DIAGNOSIS — J45.40 MODERATE PERSISTENT ASTHMA WITHOUT COMPLICATION: ICD-10-CM

## 2022-08-26 PROCEDURE — 700111 HCHG RX REV CODE 636 W/ 250 OVERRIDE (IP): Mod: JG | Performed by: INTERNAL MEDICINE

## 2022-08-26 PROCEDURE — 96372 THER/PROPH/DIAG INJ SC/IM: CPT

## 2022-08-26 RX ADMIN — MEPOLIZUMAB 100 MG: 100 INJECTION, POWDER, FOR SOLUTION SUBCUTANEOUS at 11:40

## 2022-08-26 ASSESSMENT — FIBROSIS 4 INDEX: FIB4 SCORE: 0.74

## 2022-08-26 NOTE — PROGRESS NOTES
Pt arrives to [facility] for Nucala injection.  Pt denies s/sx of infection or other complaints today.  Nucala given SC to RUE.  Pt declines Band-Aid to site d/t tape allergy.  Confirmed next appt time on 9/23/22 with pt.  Pt dc home to self care.

## 2022-09-06 DIAGNOSIS — E78.2 MIXED HYPERLIPIDEMIA: ICD-10-CM

## 2022-09-06 RX ORDER — ATORVASTATIN CALCIUM 40 MG/1
40 TABLET, FILM COATED ORAL
Qty: 90 TABLET | Refills: 0 | Status: SHIPPED | OUTPATIENT
Start: 2022-09-06 | End: 2022-11-30 | Stop reason: SDUPTHER

## 2022-09-09 NOTE — OP THERAPY DAILY TREATMENT
"  Outpatient Physical Therapy  DAILY TREATMENT     [facility] [address] [facility], [address]  [place] [address]  Phone:  [phone]    Date: 01/15/2020    Patient: Rossy Lopez  YOB: 1945  MRN: [id number]     Time Calculation  Start time: 1330  Stop time: 1400 Time Calculation (min): 30 minutes       Chief Complaint: Back Problem    Visit #: 5    SUBJECTIVE:  The patient reports having less soreness to the low back and has not had any increased symptoms of pain. She still has trouble getting up and down off the floor     OBJECTIVE:  Current objective measures:     tightness and tenderness to the lumbar       Therapeutic Exercises (CPT 96348):     1. Posterior pelvic tilts, 1 x10 reps, hip soreness bilaterally    2. Bridges, 1 x 8 reps , developed glute hip soreness/ LB discomfort    3. Seated trunk flexion , 3 x30 sec, \"feels good\"    4. Child's pose, 3 x30 sec    5. Clamshells    6. Prone alternating LE lifts , verbal cues to correct form; less trunk rotation while extending LE    7. Trunk rotations , 3 x30 sec    8. Knees to chest , 3 x30 sec    Therapeutic Treatments and Modalities:     1. Manual Therapy (CPT 11203), Lumbar , P/A's mobilizations UPA's grade 2-3; L2-L3 hypomobile with tenderness at L3     Time-based treatments/modalities:  Manual therapy minutes (CPT 35420): 15 minutes  Therapeutic exercise minutes (CPT 55131): 15 minutes         ASSESSMENT:   Response to treatment:   Performed STM to the lumbar paraspinals and multifidi with increased sensitivity to the (L) > (R) side of the lumbar spine; patient also indicated tightness with trunk flexion; patient had less AROM in the (L) hip during quadratus stretch and trunk rotation.       PLAN/RECOMMENDATIONS:   Plan for treatment: therapy treatment to continue next visit.  Planned interventions for next visit: continue with current treatment.       " Requested Prescriptions     Pending Prescriptions Disp Refills    rivaroxaban (Xarelto) 20 mg tab tablet [Pharmacy Med Name: Kirk Calix 20 MG TABLET] 30 Tablet 5     Sig: TAKE ONE TABLET BY MOUTH DAILY. TAKE WITH DINNER.

## 2022-09-15 ENCOUNTER — HOSPITAL ENCOUNTER (OUTPATIENT)
Dept: LAB | Facility: MEDICAL CENTER | Age: 77
End: 2022-09-15
Attending: STUDENT IN AN ORGANIZED HEALTH CARE EDUCATION/TRAINING PROGRAM
Payer: MEDICARE

## 2022-09-15 DIAGNOSIS — E11.22 CONTROLLED TYPE 2 DIABETES MELLITUS WITH STAGE 1 CHRONIC KIDNEY DISEASE, WITHOUT LONG-TERM CURRENT USE OF INSULIN (HCC): ICD-10-CM

## 2022-09-15 DIAGNOSIS — N18.1 CONTROLLED TYPE 2 DIABETES MELLITUS WITH STAGE 1 CHRONIC KIDNEY DISEASE, WITHOUT LONG-TERM CURRENT USE OF INSULIN (HCC): ICD-10-CM

## 2022-09-15 LAB
EST. AVERAGE GLUCOSE BLD GHB EST-MCNC: 214 MG/DL
HBA1C MFR BLD: 9.1 % (ref 4–5.6)

## 2022-09-15 PROCEDURE — 36415 COLL VENOUS BLD VENIPUNCTURE: CPT | Mod: GA

## 2022-09-15 PROCEDURE — 83036 HEMOGLOBIN GLYCOSYLATED A1C: CPT | Mod: GA

## 2022-09-23 ENCOUNTER — OUTPATIENT INFUSION SERVICES (OUTPATIENT)
Dept: ONCOLOGY | Facility: MEDICAL CENTER | Age: 77
End: 2022-09-23
Attending: INTERNAL MEDICINE
Payer: MEDICARE

## 2022-09-23 VITALS
SYSTOLIC BLOOD PRESSURE: 140 MMHG | DIASTOLIC BLOOD PRESSURE: 88 MMHG | OXYGEN SATURATION: 94 % | BODY MASS INDEX: 27.72 KG/M2 | WEIGHT: 146.83 LBS | TEMPERATURE: 97.2 F | RESPIRATION RATE: 18 BRPM | HEIGHT: 61 IN | HEART RATE: 101 BPM

## 2022-09-23 DIAGNOSIS — J45.40 MODERATE PERSISTENT ASTHMA WITHOUT COMPLICATION: ICD-10-CM

## 2022-09-23 PROCEDURE — 700111 HCHG RX REV CODE 636 W/ 250 OVERRIDE (IP): Mod: JG | Performed by: INTERNAL MEDICINE

## 2022-09-23 PROCEDURE — 96372 THER/PROPH/DIAG INJ SC/IM: CPT

## 2022-09-23 RX ADMIN — MEPOLIZUMAB 100 MG: 100 INJECTION, POWDER, FOR SOLUTION SUBCUTANEOUS at 11:11

## 2022-09-23 ASSESSMENT — FIBROSIS 4 INDEX: FIB4 SCORE: 0.74

## 2022-09-23 NOTE — PROGRESS NOTES
Rossy arrives to [facility] for q 4 week Nucala for moderate persistent asthma. Patient denies acute health concerns. Denies s/s active infections. Denies asthma exacerbations. Reports feeling well and is in good spirits. Nucala administered SQ to back of the left arm without issues. Patient has her next appointment. Discharged home to self care in no apparent distress.

## 2022-10-03 ENCOUNTER — HOSPITAL ENCOUNTER (OUTPATIENT)
Dept: LAB | Facility: MEDICAL CENTER | Age: 77
End: 2022-10-03
Attending: STUDENT IN AN ORGANIZED HEALTH CARE EDUCATION/TRAINING PROGRAM
Payer: MEDICARE

## 2022-10-03 ENCOUNTER — OFFICE VISIT (OUTPATIENT)
Dept: INTERNAL MEDICINE | Facility: OTHER | Age: 77
End: 2022-10-03
Payer: MEDICARE

## 2022-10-03 VITALS
HEART RATE: 96 BPM | BODY MASS INDEX: 25.3 KG/M2 | SYSTOLIC BLOOD PRESSURE: 121 MMHG | DIASTOLIC BLOOD PRESSURE: 74 MMHG | OXYGEN SATURATION: 96 % | HEIGHT: 61 IN | TEMPERATURE: 98.6 F | WEIGHT: 134 LBS

## 2022-10-03 DIAGNOSIS — E13.65 UNCONTROLLED OTHER SPECIFIED DIABETES MELLITUS WITH HYPERGLYCEMIA (HCC): ICD-10-CM

## 2022-10-03 DIAGNOSIS — I10 ESSENTIAL HYPERTENSION, BENIGN: ICD-10-CM

## 2022-10-03 DIAGNOSIS — L20.89 PAPULAR ATOPIC DERMATITIS: ICD-10-CM

## 2022-10-03 DIAGNOSIS — Z23 NEED FOR TETANUS, DIPHTHERIA, AND ACELLULAR PERTUSSIS (TDAP) VACCINE: ICD-10-CM

## 2022-10-03 PROBLEM — J45.50 SEVERE PERSISTENT ASTHMA: Status: RESOLVED | Noted: 2021-03-05 | Resolved: 2022-10-03

## 2022-10-03 PROCEDURE — 99213 OFFICE O/P EST LOW 20 MIN: CPT | Mod: GE,25 | Performed by: STUDENT IN AN ORGANIZED HEALTH CARE EDUCATION/TRAINING PROGRAM

## 2022-10-03 PROCEDURE — 86341 ISLET CELL ANTIBODY: CPT | Mod: 91

## 2022-10-03 PROCEDURE — 36415 COLL VENOUS BLD VENIPUNCTURE: CPT

## 2022-10-03 PROCEDURE — 90471 IMMUNIZATION ADMIN: CPT | Performed by: STUDENT IN AN ORGANIZED HEALTH CARE EDUCATION/TRAINING PROGRAM

## 2022-10-03 PROCEDURE — 90715 TDAP VACCINE 7 YRS/> IM: CPT | Performed by: STUDENT IN AN ORGANIZED HEALTH CARE EDUCATION/TRAINING PROGRAM

## 2022-10-03 ASSESSMENT — FIBROSIS 4 INDEX: FIB4 SCORE: 0.74

## 2022-10-03 NOTE — PATIENT INSTRUCTIONS
RE: Plan of Care    Dear Dr. Halima Dale MD    Thank you for referring Vince Toledo. The following information reflects my assessment and plan of care.           Plan of Care 02/10/20   Effective from: 2/10/2020  Effective to: 5/8/2020    Plan ID: [id number]           Participants     Name Type Comments Contact Info    Halima Dale MD Provider  [phone]    Leni Irwin OT OT  [phone]      Evaluation     Author: Leni Irwin OT Status: Signed Last edited: 2/10/2020 10:00 AM       Occupational Therapy Evaluation    Visit Count: 1     Referred by: Halima Dale MD; Next provider visit (if known/scheduled): none   Medical Diagnosis (from order):   Diagnosis Information      Diagnosis    434.91 (ICD-9-CM) - I63.81 (ICD-10-CM) - Lacunar stroke (CMS/[facility])              Treatment Diagnosis: Decreased ADL/IADL symptoms with increased pain/symptoms, impaired strength, impaired scapulohumeral rhythm, impaired motor function/performance/coordination, sensation    Date of onset/injury: 02/2020 script  Diagnosis Precautions: none  Chart reviewed at time of initial evaluation (relevant co-morbidities, allergies, tests and medications listed):    Hospital course:   Vince Toledo is a 72 Y with medical history significant for chronic pancreatitis, HTN, HLD, tobacco abuse, presenting with LUE numbness and weakness. His symptoms started on Tuesday evening and he called his PCP with symptoms. PCP initially ordered CT scan which was negative. PCP ordered an MRI brain the next day that showed an acute right centrum semiovale stroke. He takes Aspirin 81 mg daily and Lipitor 40 mg daily and he was instructed to take one en route. Patient has 55 year history of 1 ppd smoking. Also has history of alcohol use disorder, but reports no drinking since October 2019 after had acute on chronic pancreatitis. Initial NIHSS was 4. Work up showed small vessel disease without any significant evidence of atherosclerotic  Hello! Today we saw you for:  -Hypertension: Well controlled, continue current medications  -Diabetes: Go see your endocrinologist Next Tuesday. I am running some labs to see if you have a form of autoimmune diabetes, please get these labs done.  -Go to your endocrinologist to see if you need insulin.  -Use kenalog sparingly for your skin allergy.    See me in 3 months.    Thank you!   disease (ICAD or ECAD) vs cardioembolic source. Patient is cleared from PT/OT/ST with outpatient follow up being recommended. Patient was intended for at least 2 midnight stay for his presentation with acute ischemia, however, patient recovered from his presenting symptoms quicker than expected and stable for discharge home.  Was discharged on 01/18/20    Major diagnostic studies:     CT  Result Date: 1/16/2020  Impression 1. No acute intracranial hemorrhage. 2. Scattered areas of hypoattenuation within the supratentorial white matter, including a focus in the right corona radiata. Given the patient's risk factors, some or all of this may be related to chronic microvascular ischemic changes; however, acute/subacute ischemia cannot be entirely excluded and would recommend an MR for further evaluation. The above Red Level 1 findings were documented and communicated via the GIVTED system on 1/16/2020 12:42 PM, Message ID [id number].    Mr Brain W/o & W Cont    Result Date: 1/17/2020  Impression 1.Acute infarction in the right centrum semiovale. No evidence of significant mass effect or midline shift. No additional acute ischemic foci identified. 2. Supratentorial white matter T2 FLAIR hyperintensities are most likely secondary to chronic microvascular disease. The above Red Level 1 findings were documented and communicated via the GIVTED system on 1/17/2020 1:45 PM, Message ID [id number].    Ct Acute Stroke Head W/o Cont    Result Date: 1/17/2020  Impression 1. No acute intracranial hemorrhage. 2. Acute nonhemorrhagic infarct in the right centrum semiovale is better characterized on recent MR brain. CRITICAL RESULTS NOTIFICATION: Exam findings were directly reviewed by the faculty radiologist, Orlin Cali. Cedrick Albert was notified by telephone and acknowledged receipt of the imaging results on 01/17/2020 at 1702 hours.     Ct Angio Head & Neck W Cont    Result Date:  1/17/2020  Impression 1. No large vessel occlusion. 2. Moderate stenosis of the left vertebral artery origin. 3. Multilevel degenerative change of the spine as described.      SUBJECTIVE   My shoulder is more painful since the stroke  Current Symptoms:   Pain: 0/10 left shoulder at rest, 7/10 with overhead reach, wakes up 2-3x a night due to shoulder pain     Function:  Limitations and exacerbating factors: pain with lifting/carrying, pushing/pulling, reaching overhead, behind back and out to side  Prior level: independent with all activities of daily living and instrumental activities of daily living  Personal Occupations Profile Affected: bathing/showering, upper body dressing, lower body dressing, -------IADL, home establishment/managements, meal preparation/cleanup  Patient Goal: be able to do things with my left hand again, Get rid of this shoulder pain    Prior Treatment: inpatient physical therapy, inpatient occupational therapy and inpatient speech therapy in the past year for current condition. Hospitalization, home health services or skilled nursing facility in the last 30 days: Yes, per patient.    Home Environment/Social Support: Patient lives with his spouse. Daughter, son in law and grandson also staying with them. Lives in a one story home with basement 12 steps to enter. Retired from  company. Hx of alcohol abuse. Enjoys gambling, fishing(has boat) and spending time with wife.       Safety:  Do you feel safe at home, work and/or school? yes, per patient  Patient denies 2 or more falls or an unexplained fall with injury in the last year, further testing not required     OBJECTIVE   Blood Pressure: Not tested  Hand Dominance: right  Cognitive Status:   Orientation to person, to place, to time   Attention Intact   Following Direction follows 3 step commands   Executive Function TBA   Memory TBA   Comments: verbose, difficulty with recall of dates, etc when telling history, Reports having cognitve  screen in acute care and being discharged from speech    Visual/Perceptual - Perceptual Motor Status:   Visual Field Intact   Visual Acuity NT   Oculomotor                    Intact   Comments:   Functional Upper Extremity Range of Motion:     Right Left   Date     Place hand on opposite shoulder WFL WFL   Touch top of head WFL WFL   Place hand behind neck WFL WFL   Place hand behind back WFL WFL   Over head reach WFL WFL   able to complete motion above unless indicated by an \"X\" or comment    Strength: /Pinch   RUE proximal strength 5/5, Distal 5/5  LUE proximal strength 4/5 distal 4/5 decreased scapular humeral rhythm with elevation of scapula during reach    Shoulder pain eval  Point tenderness over bicipital groove  Positive Speeds test, Positive Neer impingement test       /Pinch (pounds of force) Right Left   Date Initial Initial    85 50, 49, 43   Lateral Pinch 23 17   3 Point Pinch 23 17   Tip Pinch 18 10   standard testing positions unless otherwise noted,* denotes pain    Norms:  : 70-74 years, male: left 46.7-82.9, right 53.8-96.8; female: left 31.3-51.7, right 37.9-61.3  Key/lateral pinch: 70-74 years, male: left 16.2-22.2, right 16.9-21.7; female: left 10.8-16.8, right 11.6-17.4  Palmar/3 point pinch: 70-74 years, male: left 15.5-22.1, right 14.7-21.5; female: left 12.1-15.9, right 11.8-17  Tip pinch: 70-74 years, male: left 10.7-15.9, right 11.2-16.4; female: left 7.5-12.1, right 7.5-12.7    Coordination: (seconds)   Right Result Left Result   Functional dexterity 26  40    9 Hole Peg  21  36    \"X\" indicates impairment in results; Norms: 9 hole: Age: 70-74: male: left 23.8 +/-3.9, right 22.0 +/-3.3;  female: left 22.0 +/-2.7, right 20.2 +/-2.7       Initial Treatment   Initial evaluation completed.    Neuromuscular Reeducation:  1. Stretching: supine pectoralis stretch  2. Strengthening RTC,    external rotation sidelying 10 reps x 2 with 3#   Push plus supine 10 reps manual assist to  set scapula  3. Fine motor dexterity including in hand translation, opposition, targeting using stacking and rotational movements, issued to [facility]    Self-care  1. Education on importance of posture and its relationship to shoulder pain  2. Education on bicipital tendonitis using shoulder model  3. Recommendation for daily icing over anterior aspect of shoulder    Skilled input: posture correction    Home Program:  * above=instructed home program    Exercise: Date issued Date DC Comments   theraputty 2/10     Fine motor coordination exercises using common objects 2./10                           Writer verbally educated the patient and received verbal consent from the patient on hand placement, positioning of patient, and techniques to be performed today  as described above and how they are pertinent to the patient's plan of care.     Suggestions for next session as indicated: progress per plan of care, icing, shoulder taping, stretching of anterior structures, RTC strengthening, scapular humeral rhythm, fine motor dexterity(buttons, cutting, tying for fishing), safety with showering, cog screen/drivers screen(trailmaking, dyanvision)      PSFS Quality measure  1. Donning button down shirt 5  2. Showering by himself 8  Total score 6.5       ASSESSMENT   72 year old male patient has signs and symptoms consistent with CVA that has reported functional limitations listed above. Pt has hx of RTC issues with increase in shoulder pain since stroke secondary to decreased mechanics, mild weakness and fine motor dexterity issues contributing to compensatory patterns. Pt was independent with all tasks prior to stroke including heavy household chores, meal prep, driving and shopping Pt currently requires supervision for showering and is dependent with meal prep and cleaning. He reports decreased ability to use LUE for overhead tasks, buttoning and tying.     Patient will benefit from skilled therapy and Rehabilitative potential is  excellent based on assessment above  Clinical decision making: Moderate - Patient has several limitations (3-5), comorbidities and/or complexities, as noted in detailed assessment above, that impact their occupational profile.  Resulting in several treatment options and minimal to moderate task modification consistent with moderate clinical decision making complexity.    PLAN   Goals: To be obtained by end of this plan of care:  1. Patient independent with modified and progressed home exercise program.  2. Pt will report decrease in LUE shoulder pain from 7 to 4 with ability to reach overhead to obtain items in cupboard  3. Pt will improve LUE fine motor skills with improvement in 9 hole peg by 5 seconds and ability to don a button down shirt including sleeve independently  4. Pt will complete daily shower at modified independence  5. Pt will resume moderately challenging household chores including vacumning and home repair  6. Patient Specific Functional Scale (PSFS) will improve an average score of 6.6/10 to 10 (minimal detectable change (90%CI) for average score is 2 points)    The following skilled interventions to be implemented to achieve above:  Activities of Daily Living/Self Care (42475)  Manual Therapy (11157)  Neuromuscular Re-Education (48014)  Therapeutic Activity (43147)  Therapeutic Exercise (32210)  Electrical Stimulation (56718//96962)     Frequency/Duration: 1 times per week for 8 weeks with tapering as the patient progresses for an estimated total of 8 visits    patient involved in and agreed to plan of care and goals.   Attendance policy provided at time of evaluation.        Patient Education:   Who will be receiving education: patient  Are they ready to learn: yes  Preferred learning style: written, verbal, demonstration  Barriers to learning: no barriers apparent at this time   Result of initial outlined education: Needs reinforcement    Procedures and total treatment time documented in  Time Entry flowsheet.         Updated Participants     Name Type Comments Contact Info    Halima Dale MD Provider  [phone]    Signature pending    Leni Irwin OT OT  [phone]            Please complete the attached form to indicate your approval of the plan of care upon receipt.  Insurance compliance requires your approval be on file.  Should you have any questions, feel free to contact me.     Leni Irwin OT  [facility]  [address]  [facility] [address]  Phone: [phone]                RE: Plan of Care    I certify the need for these services, furnished under this plan of treatment and while under my care.  I agree with the plan of care as stated and request that therapy proceed.        __________________________________________________________________________________  MD Signature         Date   Time

## 2022-10-03 NOTE — PROGRESS NOTES
Teaching Physician Attestation      Level of Participation    I discussed with the resident physician the patient's history, exam, assessment and plan in detail.  Topics listed in my addendum were the focus of the visit.  Healthcare maintenance was not addressed this visit unless listed as a topic in my addendum.  I agree with plan as written along with the following additions/modifications:          DM2  -no hypoglycemic sx.  Given near nl bmi but poorly controlled blood sugars with a1c > 9, suspicion for latent onset type I, will check antibodies.  Would initiate Lantus 10 units daily, but patient also was referred to endocrinology by an outside provider and has a visit next week, as such via shared decision making patient wishes to wait 1 additional week to see endo before initiating insulin to allow them to manage from the beginning, avg BS not > 350 so low risk for DKA.  Appreciate endo support.  -cont atorva 40  -renetta/cr nl  -eye and dental  -no wound feet, prev tests of protective sensation neg  -Declines flu today, tdap today, utd covid boosters  -Appreciate dermatology support for what is reported to be a diabetes related rash    HTN  -no sx, at goal, cmp utd.  Cont current hctz and losartan.    Rtc 6 weeks.
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
## 2022-10-06 LAB — ISLET CELL512 AB SER IA-ACNC: <5.4 U/ML (ref 0–7.4)

## 2022-10-09 LAB — GAD65 AB SER IA-ACNC: <5 IU/ML (ref 0–5)

## 2022-10-21 ENCOUNTER — OUTPATIENT INFUSION SERVICES (OUTPATIENT)
Dept: ONCOLOGY | Facility: MEDICAL CENTER | Age: 77
End: 2022-10-21
Attending: INTERNAL MEDICINE
Payer: MEDICARE

## 2022-10-21 VITALS
HEART RATE: 78 BPM | RESPIRATION RATE: 18 BRPM | BODY MASS INDEX: 27.76 KG/M2 | HEIGHT: 61 IN | OXYGEN SATURATION: 97 % | SYSTOLIC BLOOD PRESSURE: 124 MMHG | TEMPERATURE: 98.3 F | DIASTOLIC BLOOD PRESSURE: 65 MMHG | WEIGHT: 147.05 LBS

## 2022-10-21 DIAGNOSIS — J45.40 MODERATE PERSISTENT ASTHMA WITHOUT COMPLICATION: ICD-10-CM

## 2022-10-21 PROCEDURE — 96372 THER/PROPH/DIAG INJ SC/IM: CPT

## 2022-10-21 PROCEDURE — 700111 HCHG RX REV CODE 636 W/ 250 OVERRIDE (IP): Mod: JG | Performed by: INTERNAL MEDICINE

## 2022-10-21 RX ADMIN — MEPOLIZUMAB 100 MG: 100 INJECTION, POWDER, FOR SOLUTION SUBCUTANEOUS at 11:30

## 2022-10-21 ASSESSMENT — FIBROSIS 4 INDEX: FIB4 SCORE: 0.74

## 2022-10-21 NOTE — PROGRESS NOTES
Patient to [facility] for Nucala injection. Patient states she is feeling well. Nucala injected into left back of arm. Patient has future appointments. Patient to home in care of self.

## 2022-10-26 ENCOUNTER — HOSPITAL ENCOUNTER (OUTPATIENT)
Dept: LAB | Facility: MEDICAL CENTER | Age: 77
End: 2022-10-26
Attending: INTERNAL MEDICINE
Payer: MEDICARE

## 2022-10-26 LAB
ALBUMIN SERPL BCP-MCNC: 4.3 G/DL (ref 3.2–4.9)
ALBUMIN/GLOB SERPL: 1.4 G/DL
ALP SERPL-CCNC: 116 U/L (ref 30–99)
ALT SERPL-CCNC: 18 U/L (ref 2–50)
ANION GAP SERPL CALC-SCNC: 11 MMOL/L (ref 7–16)
AST SERPL-CCNC: 21 U/L (ref 12–45)
BASOPHILS # BLD AUTO: 0.4 % (ref 0–1.8)
BASOPHILS # BLD: 0.04 K/UL (ref 0–0.12)
BILIRUB SERPL-MCNC: 0.5 MG/DL (ref 0.1–1.5)
BUN SERPL-MCNC: 8 MG/DL (ref 8–22)
CALCIUM SERPL-MCNC: 9.7 MG/DL (ref 8.5–10.5)
CHLORIDE SERPL-SCNC: 96 MMOL/L (ref 96–112)
CO2 SERPL-SCNC: 31 MMOL/L (ref 20–33)
CREAT SERPL-MCNC: 0.6 MG/DL (ref 0.5–1.4)
EOSINOPHIL # BLD AUTO: 0.04 K/UL (ref 0–0.51)
EOSINOPHIL NFR BLD: 0.4 % (ref 0–6.9)
ERYTHROCYTE [DISTWIDTH] IN BLOOD BY AUTOMATED COUNT: 44.6 FL (ref 35.9–50)
GFR SERPLBLD CREATININE-BSD FMLA CKD-EPI: 93 ML/MIN/1.73 M 2
GLOBULIN SER CALC-MCNC: 3 G/DL (ref 1.9–3.5)
GLUCOSE SERPL-MCNC: 130 MG/DL (ref 65–99)
HCT VFR BLD AUTO: 40.4 % (ref 37–47)
HGB BLD-MCNC: 12.9 G/DL (ref 12–16)
IMM GRANULOCYTES # BLD AUTO: 0.04 K/UL (ref 0–0.11)
IMM GRANULOCYTES NFR BLD AUTO: 0.4 % (ref 0–0.9)
LYMPHOCYTES # BLD AUTO: 2.19 K/UL (ref 1–4.8)
LYMPHOCYTES NFR BLD: 23.9 % (ref 22–41)
MCH RBC QN AUTO: 27.8 PG (ref 27–33)
MCHC RBC AUTO-ENTMCNC: 31.9 G/DL (ref 33.6–35)
MCV RBC AUTO: 87.1 FL (ref 81.4–97.8)
MONOCYTES # BLD AUTO: 0.83 K/UL (ref 0–0.85)
MONOCYTES NFR BLD AUTO: 9.1 % (ref 0–13.4)
NEUTROPHILS # BLD AUTO: 6.01 K/UL (ref 2–7.15)
NEUTROPHILS NFR BLD: 65.8 % (ref 44–72)
NRBC # BLD AUTO: 0 K/UL
NRBC BLD-RTO: 0 /100 WBC
PLATELET # BLD AUTO: 388 K/UL (ref 164–446)
PMV BLD AUTO: 10 FL (ref 9–12.9)
POTASSIUM SERPL-SCNC: 3.1 MMOL/L (ref 3.6–5.5)
PROT SERPL-MCNC: 7.3 G/DL (ref 6–8.2)
RBC # BLD AUTO: 4.64 M/UL (ref 4.2–5.4)
SODIUM SERPL-SCNC: 138 MMOL/L (ref 135–145)
TSH SERPL DL<=0.005 MIU/L-ACNC: 1.18 UIU/ML (ref 0.38–5.33)
WBC # BLD AUTO: 9.2 K/UL (ref 4.8–10.8)

## 2022-10-26 PROCEDURE — 86003 ALLG SPEC IGE CRUDE XTRC EA: CPT | Mod: 91

## 2022-10-26 PROCEDURE — 82785 ASSAY OF IGE: CPT

## 2022-10-26 PROCEDURE — 80053 COMPREHEN METABOLIC PANEL: CPT

## 2022-10-26 PROCEDURE — 85025 COMPLETE CBC W/AUTO DIFF WBC: CPT

## 2022-10-26 PROCEDURE — 36415 COLL VENOUS BLD VENIPUNCTURE: CPT

## 2022-10-26 PROCEDURE — 84443 ASSAY THYROID STIM HORMONE: CPT | Mod: GA

## 2022-10-28 LAB
A ALTERNATA IGE QN: <0.1 KU/L
A FUMIGATUS IGE QN: 0.12 KU/L
BERMUDA GRASS IGE QN: 0.2 KU/L
BOXELDER IGE QN: 0.31 KU/L
C SPHAEROSPERMUM IGE QN: <0.1 KU/L
CAT DANDER IGE QN: <0.1 KU/L
CMN PIGWEED IGE QN: <0.1 KU/L
COMMON RAGWEED IGE QN: 0.27 KU/L
COTTONWOOD IGE QN: 0.25 KU/L
D FARINAE IGE QN: <0.1 KU/L
D PTERONYSS IGE QN: <0.1 KU/L
DEPRECATED MISC ALLERGEN IGE RAST QL: NORMAL
DOG DANDER IGE QN: <0.1 KU/L
IGE SERPL-ACNC: 374 KU/L
M RACEMOSUS IGE QN: <0.1 KU/L
MOUSE EPITH IGE QN: <0.1 KU/L
MT JUNIPER IGE QN: 0.75 KU/L
MUGWORT IGE QN: 0.13 KU/L
OLIVE POLN IGE QN: 0.27 KU/L
P NOTATUM IGE QN: <0.1 KU/L
ROACH IGE QN: <0.1 KU/L
SALTWORT IGE QN: 0.31 KU/L
TIMOTHY IGE QN: 0.32 KU/L
WHITE ELM IGE QN: 0.42 KU/L
WHITE MULBERRY IGE QN: 0.36 KU/L
WHITE OAK IGE QN: 0.31 KU/L

## 2022-11-11 ENCOUNTER — PATIENT MESSAGE (OUTPATIENT)
Dept: HEALTH INFORMATION MANAGEMENT | Facility: OTHER | Age: 77
End: 2022-11-11

## 2022-11-18 ENCOUNTER — OUTPATIENT INFUSION SERVICES (OUTPATIENT)
Dept: ONCOLOGY | Facility: MEDICAL CENTER | Age: 77
End: 2022-11-18
Attending: INTERNAL MEDICINE
Payer: MEDICARE

## 2022-11-18 VITALS
BODY MASS INDEX: 27.47 KG/M2 | OXYGEN SATURATION: 92 % | RESPIRATION RATE: 18 BRPM | TEMPERATURE: 97.5 F | WEIGHT: 145.5 LBS | HEART RATE: 61 BPM | SYSTOLIC BLOOD PRESSURE: 108 MMHG | DIASTOLIC BLOOD PRESSURE: 65 MMHG | HEIGHT: 61 IN

## 2022-11-18 DIAGNOSIS — J45.40 MODERATE PERSISTENT ASTHMA WITHOUT COMPLICATION: ICD-10-CM

## 2022-11-18 PROCEDURE — 96372 THER/PROPH/DIAG INJ SC/IM: CPT

## 2022-11-18 PROCEDURE — 700111 HCHG RX REV CODE 636 W/ 250 OVERRIDE (IP): Mod: JG | Performed by: INTERNAL MEDICINE

## 2022-11-18 RX ORDER — INSULIN ASPART INJECTION 100 [IU]/ML
INJECTION, SOLUTION SUBCUTANEOUS
COMMUNITY
End: 2023-05-26

## 2022-11-18 RX ADMIN — MEPOLIZUMAB 100 MG: 100 INJECTION, POWDER, FOR SOLUTION SUBCUTANEOUS at 11:23

## 2022-11-18 ASSESSMENT — FIBROSIS 4 INDEX: FIB4 SCORE: 0.96

## 2022-11-30 DIAGNOSIS — E78.2 MIXED HYPERLIPIDEMIA: ICD-10-CM

## 2022-12-01 RX ORDER — ATORVASTATIN CALCIUM 40 MG/1
40 TABLET, FILM COATED ORAL
Qty: 90 TABLET | Refills: 1 | Status: SHIPPED | OUTPATIENT
Start: 2022-12-01 | End: 2023-04-06 | Stop reason: SDUPTHER

## 2022-12-16 ENCOUNTER — OUTPATIENT INFUSION SERVICES (OUTPATIENT)
Dept: ONCOLOGY | Facility: MEDICAL CENTER | Age: 77
End: 2022-12-16
Attending: INTERNAL MEDICINE
Payer: MEDICARE

## 2022-12-16 VITALS
DIASTOLIC BLOOD PRESSURE: 69 MMHG | WEIGHT: 144.4 LBS | BODY MASS INDEX: 27.26 KG/M2 | OXYGEN SATURATION: 96 % | SYSTOLIC BLOOD PRESSURE: 128 MMHG | RESPIRATION RATE: 18 BRPM | HEIGHT: 61 IN | TEMPERATURE: 97.4 F | HEART RATE: 85 BPM

## 2022-12-16 DIAGNOSIS — J45.40 MODERATE PERSISTENT ASTHMA WITHOUT COMPLICATION: ICD-10-CM

## 2022-12-16 PROCEDURE — 96372 THER/PROPH/DIAG INJ SC/IM: CPT

## 2022-12-16 PROCEDURE — 700111 HCHG RX REV CODE 636 W/ 250 OVERRIDE (IP): Mod: JG | Performed by: INTERNAL MEDICINE

## 2022-12-16 RX ADMIN — MEPOLIZUMAB 100 MG: 100 INJECTION, POWDER, FOR SOLUTION SUBCUTANEOUS at 11:17

## 2022-12-16 ASSESSMENT — FIBROSIS 4 INDEX: FIB4 SCORE: 0.96

## 2022-12-16 NOTE — PROGRESS NOTES
Pt arrived to [facility], ambulatory, for Nucala injection. Pt voices no complaints. Nucala injected into the L-back arm with no complications. Pt left [facility] with no s/sx of distress. Follow up appointment confirmed.

## 2023-01-03 DIAGNOSIS — I10 ESSENTIAL HYPERTENSION: ICD-10-CM

## 2023-01-03 DIAGNOSIS — K21.9 GERD WITHOUT ESOPHAGITIS: ICD-10-CM

## 2023-01-03 DIAGNOSIS — Z00.00 MEDICARE ANNUAL WELLNESS VISIT, SUBSEQUENT: ICD-10-CM

## 2023-01-03 RX ORDER — LOSARTAN POTASSIUM 25 MG/1
25 TABLET ORAL
Qty: 90 TABLET | Refills: 1 | Status: SHIPPED | OUTPATIENT
Start: 2023-01-03 | End: 2023-09-28 | Stop reason: SDUPTHER

## 2023-01-03 RX ORDER — HYDROCHLOROTHIAZIDE 25 MG/1
25 TABLET ORAL
Qty: 90 TABLET | Refills: 1 | Status: SHIPPED | OUTPATIENT
Start: 2023-01-03 | End: 2023-07-10 | Stop reason: SDUPTHER

## 2023-01-03 RX ORDER — OMEPRAZOLE 20 MG/1
20 CAPSULE, DELAYED RELEASE ORAL
Qty: 90 CAPSULE | Refills: 1 | Status: SHIPPED | OUTPATIENT
Start: 2023-01-03 | End: 2023-07-14 | Stop reason: SDUPTHER

## 2023-01-05 ENCOUNTER — HOSPITAL ENCOUNTER (OUTPATIENT)
Dept: RADIOLOGY | Facility: MEDICAL CENTER | Age: 78
End: 2023-01-05
Attending: STUDENT IN AN ORGANIZED HEALTH CARE EDUCATION/TRAINING PROGRAM
Payer: MEDICARE

## 2023-01-05 DIAGNOSIS — Z12.31 VISIT FOR SCREENING MAMMOGRAM: ICD-10-CM

## 2023-01-05 PROCEDURE — 77063 BREAST TOMOSYNTHESIS BI: CPT

## 2023-01-06 ENCOUNTER — TELEPHONE (OUTPATIENT)
Dept: INTERNAL MEDICINE | Facility: OTHER | Age: 78
End: 2023-01-06
Payer: MEDICARE

## 2023-01-06 NOTE — TELEPHONE ENCOUNTER
I called the patient to discuss her mammogram results and needing a diagnostic mammogram (order I have already signed). Pt expressed understanding of the plan.    Pt also told me she saw her endocrinologist and was started on insulin and Jardiance, and her BG has improved in control (A1c is now 6.6%). I praised her for her improved success in controlling BG and encouraged her to continue the good work.    Pt's diagnostic mammo appt is: Next Thursday at 9:50 AM. Also confirmed the patient's appt with me at the end of Jan 2023.    Pt thanked me for the call.    Pat Rosenthal MD, MPH  [facility], PGY-3

## 2023-01-12 ENCOUNTER — HOSPITAL ENCOUNTER (OUTPATIENT)
Dept: RADIOLOGY | Facility: MEDICAL CENTER | Age: 78
End: 2023-01-12
Attending: STUDENT IN AN ORGANIZED HEALTH CARE EDUCATION/TRAINING PROGRAM
Payer: MEDICARE

## 2023-01-12 DIAGNOSIS — R92.8 ABNORMAL MAMMOGRAM: ICD-10-CM

## 2023-01-12 PROCEDURE — G0279 TOMOSYNTHESIS, MAMMO: HCPCS

## 2023-01-13 ENCOUNTER — APPOINTMENT (OUTPATIENT)
Dept: ONCOLOGY | Facility: MEDICAL CENTER | Age: 78
End: 2023-01-13
Attending: INTERNAL MEDICINE
Payer: MEDICARE

## 2023-01-15 ENCOUNTER — TELEPHONE (OUTPATIENT)
Dept: INTERNAL MEDICINE | Facility: OTHER | Age: 78
End: 2023-01-15
Payer: MEDICARE

## 2023-01-16 NOTE — TELEPHONE ENCOUNTER
I called the patient to discuss her diagnostic mammogram results. She told me that she knows that it was positive and that she needs a biopsy which she will go in for this Wednesday, January 18; she was told that a doctor also came in to explain the procedure and that it would take about 1 hour and should be painless. She stated she was ready for it. I told her to call me/our office if she had any questions or concerns. She thanked me for the call.    Pat Rosenthal MD, MPH  [facility], PGY-3

## 2023-01-18 ENCOUNTER — HOSPITAL ENCOUNTER (OUTPATIENT)
Dept: RADIOLOGY | Facility: MEDICAL CENTER | Age: 78
End: 2023-01-18
Attending: STUDENT IN AN ORGANIZED HEALTH CARE EDUCATION/TRAINING PROGRAM
Payer: MEDICARE

## 2023-01-18 DIAGNOSIS — R92.8 ABNORMAL FINDINGS ON DIAGNOSTIC IMAGING OF BREAST: ICD-10-CM

## 2023-01-18 LAB — PATHOLOGY CONSULT NOTE: NORMAL

## 2023-01-18 PROCEDURE — 77065 DX MAMMO INCL CAD UNI: CPT | Mod: RT

## 2023-01-18 PROCEDURE — 88305 TISSUE EXAM BY PATHOLOGIST: CPT

## 2023-01-19 ENCOUNTER — TELEPHONE (OUTPATIENT)
Dept: RADIOLOGY | Facility: MEDICAL CENTER | Age: 78
End: 2023-01-19
Payer: MEDICARE

## 2023-01-19 ENCOUNTER — OUTPATIENT INFUSION SERVICES (OUTPATIENT)
Dept: ONCOLOGY | Facility: MEDICAL CENTER | Age: 78
End: 2023-01-19
Attending: INTERNAL MEDICINE
Payer: MEDICARE

## 2023-01-19 VITALS
HEART RATE: 80 BPM | OXYGEN SATURATION: 92 % | TEMPERATURE: 98 F | RESPIRATION RATE: 18 BRPM | DIASTOLIC BLOOD PRESSURE: 61 MMHG | HEIGHT: 61 IN | WEIGHT: 143.96 LBS | BODY MASS INDEX: 27.18 KG/M2 | SYSTOLIC BLOOD PRESSURE: 142 MMHG

## 2023-01-19 DIAGNOSIS — J45.40 MODERATE PERSISTENT ASTHMA WITHOUT COMPLICATION: ICD-10-CM

## 2023-01-19 PROCEDURE — 96372 THER/PROPH/DIAG INJ SC/IM: CPT

## 2023-01-19 PROCEDURE — 700111 HCHG RX REV CODE 636 W/ 250 OVERRIDE (IP): Mod: JG | Performed by: INTERNAL MEDICINE

## 2023-01-19 RX ADMIN — MEPOLIZUMAB 100 MG: 100 INJECTION, POWDER, FOR SOLUTION SUBCUTANEOUS at 15:05

## 2023-01-19 ASSESSMENT — FIBROSIS 4 INDEX: FIB4 SCORE: 0.96

## 2023-01-19 NOTE — PROGRESS NOTES
Pt presented to [facility] for mepolizumab injection. Mepolizumab injected into L back arm with no s/s of adverse reactions and pt tolerated well and band-aid dressing applied. Next appointment confirmed. Pt discharged to self care with all personal belongings and in NAD.

## 2023-01-20 ENCOUNTER — TELEPHONE (OUTPATIENT)
Dept: INTERNAL MEDICINE | Facility: OTHER | Age: 78
End: 2023-01-20
Payer: MEDICARE

## 2023-01-20 NOTE — TELEPHONE ENCOUNTER
I called the patient and discussed the results of her breast biopsy - benign, will follow up in 6 months for continued surveillance. Patient thanked me for the call and stated that she will come to her appt on 1/30/2023.    Pat Rosenthal MD, MPH  [facility], PGY-3

## 2023-01-30 ENCOUNTER — OFFICE VISIT (OUTPATIENT)
Dept: INTERNAL MEDICINE | Facility: OTHER | Age: 78
End: 2023-01-30
Payer: MEDICARE

## 2023-01-30 VITALS
WEIGHT: 143 LBS | OXYGEN SATURATION: 95 % | BODY MASS INDEX: 25.34 KG/M2 | HEIGHT: 63 IN | SYSTOLIC BLOOD PRESSURE: 139 MMHG | DIASTOLIC BLOOD PRESSURE: 79 MMHG | TEMPERATURE: 97.4 F | HEART RATE: 73 BPM

## 2023-01-30 DIAGNOSIS — E11.9 TYPE 2 DIABETES MELLITUS WITHOUT COMPLICATION, WITH LONG-TERM CURRENT USE OF INSULIN (HCC): ICD-10-CM

## 2023-01-30 DIAGNOSIS — Z79.4 TYPE 2 DIABETES MELLITUS WITHOUT COMPLICATION, WITH LONG-TERM CURRENT USE OF INSULIN (HCC): ICD-10-CM

## 2023-01-30 DIAGNOSIS — Z00.00 HEALTHCARE MAINTENANCE: ICD-10-CM

## 2023-01-30 DIAGNOSIS — I10 PRIMARY HYPERTENSION: ICD-10-CM

## 2023-01-30 PROCEDURE — 99214 OFFICE O/P EST MOD 30 MIN: CPT | Mod: GC | Performed by: STUDENT IN AN ORGANIZED HEALTH CARE EDUCATION/TRAINING PROGRAM

## 2023-01-30 RX ORDER — NYSTATIN 100000 [USP'U]/G
POWDER TOPICAL
COMMUNITY
Start: 2022-12-14

## 2023-01-30 RX ORDER — EMPAGLIFLOZIN 10 MG/1
1 TABLET, FILM COATED ORAL
COMMUNITY
Start: 2023-01-20

## 2023-01-30 ASSESSMENT — FIBROSIS 4 INDEX: FIB4 SCORE: 0.96

## 2023-01-30 ASSESSMENT — PATIENT HEALTH QUESTIONNAIRE - PHQ9: CLINICAL INTERPRETATION OF PHQ2 SCORE: 0

## 2023-01-30 NOTE — LETTER
[facility]  Pat Rosenthal M.D.  [address] [place] [address]  Fax: [phone]   Authorization for Release/Disclosure of   Protected Health Information   Name: XI LOPEZ : 1945 SSN: [id number]   Address: [place] [address] Phone:    [phone] (home) [phone] (work)   I authorize the entity listed below to release/disclose the PHI below to:   [facility] [employer]/Pat Rosenthal M.D. and Pat Rosenthal M.D.   Provider or Entity Name:  [facility]   Address   City, State, Zip   Phone:      Fax:     Reason for request: continuity of care   Information to be released:    [  ] LAST COLONOSCOPY,  including any PATH REPORT and follow-up  [  ] LAST FIT/COLOGUARD RESULT [  ] LAST DEXA  [  ] LAST MAMMOGRAM  [  ] LAST PAP  [  ] LAST LABS [  ] RETINA EXAM REPORT  [  ] IMMUNIZATION RECORDS  [X] Release all info      [  ] Check here and initial the line next to each item to release ALL health information INCLUDING  _____ Care and treatment for drug and / or alcohol abuse  _____ HIV testing, infection status, or AIDS  _____ Genetic Testing    DATES OF SERVICE OR TIME PERIOD TO BE DISCLOSED: _2022-2023_  I understand and acknowledge that:  * This Authorization may be revoked at any time by you in writing, except if your health information has already been used or disclosed.  * Your health information that will be used or disclosed as a result of you signing this authorization could be re-disclosed by the recipient. If this occurs, your re-disclosed health information may no longer be protected by State or Federal laws.  * You may refuse to sign this Authorization. Your refusal will not affect your ability to obtain treatment.  * This Authorization becomes effective upon signing and will  on (date) __________.      If no date is indicated, this Authorization will  one (1) year from the signature date.    Name: Xi Lopez    Signature:   Date:      1/30/2023       PLEASE FAX REQUESTED RECORDS BACK TO: [phone]

## 2023-01-30 NOTE — PROGRESS NOTES
Established Patient    Rossy Lopez is a 77 y.o. female who presents today with the following:    CC:   Chief Complaint   Patient presents with    Follow-Up       HPI:     Patient is a pleasant 76 y/o with a PMH significant for T2DM (A1c 9.1%), HTN, asthma, and HLD who presents for the following issues listed below:    DM: Dr. Maxx Hogue at [facility]. Is under control; on Tresiba currently as well as Jardiance and Fiasp. No hypoglycemia symptoms; levels of sugars are in the 110s-150s in general (tests herself several times per day).    Here for DMV paperwork; had eye test done by Dr. Crenshaw but needs medical clearance to be able to drive. Currently the patient does not have any difficulty with mobility, does not need any assistive devices to walk/ambulate.    Mammogram: Patient got a mammo recently which was abnormal in R breast (showed calcification); she got a biopsy which was benign. She will repeat a mammogram in 6 months.    HCM:  Due for hepatitis C screening  Will get repeat mammo in 6 months    ROS See HPI    Patient Active Problem List    Diagnosis Date Noted    Uncontrolled diabetes mellitus with hyperglycemia (HCC) 2012    Hyperlipidemia 2012    Hypertension 2012    Papular atopic dermatitis 10/03/2022    Moderate persistent asthma 2022    Allergic urticaria 08/10/2022    Symptomatic premature menopause  2022    Bunion of left foot 2022    Lipoma of shoulder 2021    Low back pain 2020    Vitamin D deficiency 2017    Gastroesophageal reflux disease without esophagitis 2017    Family history of colon cancer 2012    Essential hypertension, benign 2012       Social History     Tobacco Use    Smoking status: Former     Packs/day: 1.00     Years: 5.00     Pack years: 5.00     Types: Cigarettes     Quit date: 1970     Years since quittin.1    Smokeless tobacco: Never    Tobacco comments:     quit  "1972   Vaping Use    Vaping Use: Never used   Substance Use Topics    Alcohol use: Not Currently     Alcohol/week: 0.0 oz     Comment: occasionally    Drug use: No       Current Outpatient Medications   Medication Sig Dispense Refill    nystatin (MYCOSTATIN) powder APPLY TO GROIN EVERY DAY      losartan (COZAAR) 25 MG Tab Take 1 Tablet by mouth every day. 90 Tablet 1    omeprazole (PRILOSEC) 20 MG delayed-release capsule Take 1 Capsule by mouth every day. 90 Capsule 1    hydroCHLOROthiazide (HYDRODIURIL) 25 MG Tab Take 1 Tablet by mouth every day. 90 Tablet 1    atorvastatin (LIPITOR) 40 MG Tab Take 1 Tablet by mouth at bedtime. 90 Tablet 1    Empagliflozin (JARDIANCE PO) Take  by mouth.      Insulin Degludec (TRESIBA SC) Inject  under the skin. 4 units at bedtime      Insulin Aspart, w/Niacinamide, (FIASP) 100 UNIT/ML Solution Inject  as directed. 4 units with meals      triamcinolone acetonide (KENALOG) 0.1 % Cream Apply  topically 2 times a day.      PREVIDENT 5000 DRY MOUTH 1.1 % Gel BRUSH UTD      albuterol 108 (90 Base) MCG/ACT Aero Soln inhalation aerosol Inhale 2 Puffs by mouth every four hours as needed for Shortness of Breath.      JARDIANCE 10 MG Tab Take 1 Tablet by mouth every day.      ASMANEX  MCG/ACT Aerosol Inhale 2 Puffs 2 (two) times a day.      glucose blood strip Use as prn for checking BS levels 3 times a day. Dx: E11.9 300 Strip 3    cyclobenzaprine (FLEXERIL) 10 MG Tab       Ca Phosphate-Cholecalciferol 250-500 MG-UNIT Chew Tab Take 1 Tab by mouth 2 Times a Day.      Multiple Vitamins-Minerals (MULTIVITAMIN ADULT PO) Take 1 Tab by mouth every day.       No current facility-administered medications for this visit.       /79 (BP Location: Left arm, Patient Position: Sitting, BP Cuff Size: Adult)   Pulse 73   Temp 36.3 °C (97.4 °F) (Temporal)   Ht 1.6 m (5' 3\")   Wt 64.9 kg (143 lb)   SpO2 95%   BMI 25.33 kg/m²     PHYSICAL EXAM:  Physical Exam  Vitals and nursing note " reviewed.   Constitutional:       General: She is not in acute distress.     Appearance: Normal appearance. She is normal weight. She is not ill-appearing.   HENT:      Head: Normocephalic and atraumatic.      Right Ear: External ear normal.      Left Ear: External ear normal.      Nose: Nose normal. No congestion or rhinorrhea.      Mouth/Throat:      Mouth: Mucous membranes are moist.      Pharynx: Oropharynx is clear. No oropharyngeal exudate.   Eyes:      General:         Right eye: No discharge.         Left eye: No discharge.      Extraocular Movements: Extraocular movements intact.      Conjunctiva/sclera: Conjunctivae normal.      Pupils: Pupils are equal, round, and reactive to light.   Cardiovascular:      Rate and Rhythm: Normal rate and regular rhythm.      Pulses: Normal pulses.   Pulmonary:      Effort: Pulmonary effort is normal. No respiratory distress.      Breath sounds: Normal breath sounds. No wheezing or rales.   Abdominal:      General: Abdomen is flat. Bowel sounds are normal. There is no distension.      Palpations: Abdomen is soft.      Tenderness: There is no abdominal tenderness. There is no right CVA tenderness or left CVA tenderness.   Musculoskeletal:         General: No swelling or tenderness. Normal range of motion.      Cervical back: Normal range of motion and neck supple. No rigidity or tenderness.      Right lower leg: No edema.      Left lower leg: No edema.   Skin:     General: Skin is warm and dry.      Capillary Refill: Capillary refill takes less than 2 seconds.      Findings: No bruising.   Neurological:      General: No focal deficit present.      Mental Status: She is alert and oriented to person, place, and time. Mental status is at baseline.      Cranial Nerves: No cranial nerve deficit.      Sensory: No sensory deficit.      Motor: No weakness.      Coordination: Coordination normal.      Gait: Gait normal.   Psychiatric:         Mood and Affect: Mood normal.          Behavior: Behavior normal.         Thought Content: Thought content normal.         Judgment: Judgment normal.         Assessment and Plan  1. Type 2 diabetes mellitus without complication, with long-term current use of insulin (HCC)  Well controlled and managed by Dr Hogue. I have independently reviewed the patient's records from her glucometer - blood glucose levels are under control. Continue plan for treating DM per Dr. Hogue with Jardiance, Tresiba, and Fiasp. Will need fasting lipid profile lab during next visit. Patient regularly sees ophthalmologist (Dr Crenshaw) and monofilament test was previously completed.    2. Healthcare maintenance  Doing well, up-to-date with vaccines. Will need hepatitis C screening lab during next set of routine labwork. Also filled out DMV paperwork (scanned into chart)    Will repeat mammogram in 6 months.    3. Primary hypertension  Well controlled. Continue on HCTZ and losartan. Counseled patient to monitor for dizziness/prevent falls.    Follow up with Dr. Leonid Meyer in 6 months for continued management of chronic medical conditions (patient to call to make appt at [phone]).      Signed by: Pat Rosenthal M.D.

## 2023-02-17 ENCOUNTER — OUTPATIENT INFUSION SERVICES (OUTPATIENT)
Dept: ONCOLOGY | Facility: MEDICAL CENTER | Age: 78
End: 2023-02-17
Attending: INTERNAL MEDICINE
Payer: MEDICARE

## 2023-02-17 VITALS
DIASTOLIC BLOOD PRESSURE: 67 MMHG | TEMPERATURE: 97.7 F | SYSTOLIC BLOOD PRESSURE: 129 MMHG | OXYGEN SATURATION: 92 % | HEIGHT: 61 IN | BODY MASS INDEX: 26.76 KG/M2 | WEIGHT: 141.76 LBS | HEART RATE: 64 BPM | RESPIRATION RATE: 16 BRPM

## 2023-02-17 DIAGNOSIS — J45.40 MODERATE PERSISTENT ASTHMA WITHOUT COMPLICATION: ICD-10-CM

## 2023-02-17 PROCEDURE — 700111 HCHG RX REV CODE 636 W/ 250 OVERRIDE (IP): Mod: JG | Performed by: INTERNAL MEDICINE

## 2023-02-17 PROCEDURE — 96372 THER/PROPH/DIAG INJ SC/IM: CPT

## 2023-02-17 RX ADMIN — MEPOLIZUMAB 100 MG: 100 INJECTION, POWDER, FOR SOLUTION SUBCUTANEOUS at 16:34

## 2023-02-17 ASSESSMENT — FIBROSIS 4 INDEX: FIB4 SCORE: 0.97

## 2023-02-18 NOTE — PROGRESS NOTES
Pt arrived ambulatory for monthly Nucala. POC discussed and pt acknowledged understanding, denies acute illness today. Injection administered per MAR, pt declined band-aid and pt tolerated well. Next appt confirmed and pt discharged ambulatory to self care in [facility] at time of discharge.

## 2023-03-17 ENCOUNTER — OUTPATIENT INFUSION SERVICES (OUTPATIENT)
Dept: ONCOLOGY | Facility: MEDICAL CENTER | Age: 78
End: 2023-03-17
Attending: INTERNAL MEDICINE
Payer: MEDICARE

## 2023-03-17 VITALS
DIASTOLIC BLOOD PRESSURE: 71 MMHG | WEIGHT: 140.21 LBS | OXYGEN SATURATION: 95 % | HEART RATE: 85 BPM | HEIGHT: 61 IN | BODY MASS INDEX: 26.47 KG/M2 | TEMPERATURE: 97.5 F | RESPIRATION RATE: 16 BRPM | SYSTOLIC BLOOD PRESSURE: 117 MMHG

## 2023-03-17 DIAGNOSIS — J45.40 MODERATE PERSISTENT ASTHMA WITHOUT COMPLICATION: ICD-10-CM

## 2023-03-17 PROCEDURE — 96372 THER/PROPH/DIAG INJ SC/IM: CPT

## 2023-03-17 PROCEDURE — 700111 HCHG RX REV CODE 636 W/ 250 OVERRIDE (IP): Mod: JG | Performed by: INTERNAL MEDICINE

## 2023-03-17 RX ADMIN — MEPOLIZUMAB 100 MG: 100 INJECTION, POWDER, FOR SOLUTION SUBCUTANEOUS at 11:13

## 2023-03-17 ASSESSMENT — FIBROSIS 4 INDEX: FIB4 SCORE: 0.97

## 2023-04-14 ENCOUNTER — OUTPATIENT INFUSION SERVICES (OUTPATIENT)
Dept: ONCOLOGY | Facility: MEDICAL CENTER | Age: 78
End: 2023-04-14
Attending: INTERNAL MEDICINE
Payer: MEDICARE

## 2023-04-14 VITALS
DIASTOLIC BLOOD PRESSURE: 96 MMHG | HEART RATE: 72 BPM | SYSTOLIC BLOOD PRESSURE: 121 MMHG | HEIGHT: 61 IN | BODY MASS INDEX: 26.81 KG/M2 | OXYGEN SATURATION: 93 % | TEMPERATURE: 97.5 F | RESPIRATION RATE: 18 BRPM | WEIGHT: 141.98 LBS

## 2023-04-14 DIAGNOSIS — J45.40 MODERATE PERSISTENT ASTHMA WITHOUT COMPLICATION: ICD-10-CM

## 2023-04-14 PROCEDURE — 96372 THER/PROPH/DIAG INJ SC/IM: CPT

## 2023-04-14 PROCEDURE — 700111 HCHG RX REV CODE 636 W/ 250 OVERRIDE (IP): Mod: JG | Performed by: INTERNAL MEDICINE

## 2023-04-14 RX ORDER — INSULIN LISPRO-AABC 100 [IU]/ML
INJECTION, SOLUTION SUBCUTANEOUS
COMMUNITY
Start: 2023-03-06

## 2023-04-14 RX ORDER — INSULIN DEGLUDEC 100 U/ML
INJECTION, SOLUTION SUBCUTANEOUS
COMMUNITY
Start: 2023-03-19

## 2023-04-14 RX ADMIN — MEPOLIZUMAB 100 MG: 100 INJECTION, POWDER, FOR SOLUTION SUBCUTANEOUS at 11:30

## 2023-04-14 ASSESSMENT — FIBROSIS 4 INDEX: FIB4 SCORE: 0.97

## 2023-04-14 NOTE — PROGRESS NOTES
Pt arrives to [facility] for Nucala injection.  Pt denies s/sx of infection or other complaints today.  Nucala given SC to LUE.  Pt declines Band-Aid to site d/t tape allergy.  Confirmed next appt time on 5/12/23 with pt.  Pt dc home to self care.

## 2023-05-11 NOTE — TELEPHONE ENCOUNTER
Refill done.  Zeinab Jeronimo M.D.    
Was the patient seen in the last year in this department? Yes     Does patient have an active prescription for medications requested? No     Received Request Via: Pharmacy  
Unable to assess

## 2023-05-12 ENCOUNTER — OUTPATIENT INFUSION SERVICES (OUTPATIENT)
Dept: ONCOLOGY | Facility: MEDICAL CENTER | Age: 78
End: 2023-05-12
Attending: INTERNAL MEDICINE
Payer: MEDICARE

## 2023-05-12 VITALS
WEIGHT: 141.31 LBS | BODY MASS INDEX: 26.01 KG/M2 | DIASTOLIC BLOOD PRESSURE: 85 MMHG | HEART RATE: 82 BPM | HEIGHT: 62 IN | TEMPERATURE: 97.6 F | RESPIRATION RATE: 17 BRPM | OXYGEN SATURATION: 93 % | SYSTOLIC BLOOD PRESSURE: 115 MMHG

## 2023-05-12 DIAGNOSIS — J45.40 MODERATE PERSISTENT ASTHMA WITHOUT COMPLICATION: ICD-10-CM

## 2023-05-12 PROCEDURE — 700111 HCHG RX REV CODE 636 W/ 250 OVERRIDE (IP): Mod: JG | Performed by: INTERNAL MEDICINE

## 2023-05-12 PROCEDURE — 96372 THER/PROPH/DIAG INJ SC/IM: CPT

## 2023-05-12 RX ADMIN — MEPOLIZUMAB 100 MG: 100 INJECTION, POWDER, FOR SOLUTION SUBCUTANEOUS at 10:51

## 2023-05-12 ASSESSMENT — FIBROSIS 4 INDEX: FIB4 SCORE: 0.97

## 2023-05-12 NOTE — PROGRESS NOTES
[facility]. Mepolizumab injected SQ to back of left arm with no s/s of adverse reactions. Pt tolerated well. Pt declined an adhesive bandage. Next appointment confirmed. Pt has future appointments. Pt discharged to home in good condition.

## 2023-05-23 SDOH — ECONOMIC STABILITY: FOOD INSECURITY: WITHIN THE PAST 12 MONTHS, THE FOOD YOU BOUGHT JUST DIDN'T LAST AND YOU DIDN'T HAVE MONEY TO GET MORE.: PATIENT DECLINED

## 2023-05-23 SDOH — ECONOMIC STABILITY: INCOME INSECURITY: IN THE LAST 12 MONTHS, WAS THERE A TIME WHEN YOU WERE NOT ABLE TO PAY THE MORTGAGE OR RENT ON TIME?: PATIENT REFUSED

## 2023-05-23 SDOH — ECONOMIC STABILITY: HOUSING INSECURITY
IN THE LAST 12 MONTHS, WAS THERE A TIME WHEN YOU DID NOT HAVE A STEADY PLACE TO SLEEP OR SLEPT IN A SHELTER (INCLUDING NOW)?: NO

## 2023-05-23 SDOH — ECONOMIC STABILITY: TRANSPORTATION INSECURITY
IN THE PAST 12 MONTHS, HAS LACK OF TRANSPORTATION KEPT YOU FROM MEETINGS, WORK, OR FROM GETTING THINGS NEEDED FOR DAILY LIVING?: PATIENT DECLINED

## 2023-05-23 SDOH — ECONOMIC STABILITY: TRANSPORTATION INSECURITY
IN THE PAST 12 MONTHS, HAS THE LACK OF TRANSPORTATION KEPT YOU FROM MEDICAL APPOINTMENTS OR FROM GETTING MEDICATIONS?: PATIENT DECLINED

## 2023-05-23 SDOH — HEALTH STABILITY: PHYSICAL HEALTH
ON AVERAGE, HOW MANY DAYS PER WEEK DO YOU ENGAGE IN MODERATE TO STRENUOUS EXERCISE (LIKE A BRISK WALK)?: PATIENT DECLINED

## 2023-05-23 SDOH — HEALTH STABILITY: PHYSICAL HEALTH: ON AVERAGE, HOW MANY MINUTES DO YOU ENGAGE IN EXERCISE AT THIS LEVEL?: PATIENT DECLINED

## 2023-05-23 SDOH — ECONOMIC STABILITY: HOUSING INSECURITY
IN THE LAST 12 MONTHS, WAS THERE A TIME WHEN YOU DID NOT HAVE A STEADY PLACE TO SLEEP OR SLEPT IN A SHELTER (INCLUDING NOW)?: PATIENT REFUSED

## 2023-05-23 SDOH — ECONOMIC STABILITY: INCOME INSECURITY: HOW HARD IS IT FOR YOU TO PAY FOR THE VERY BASICS LIKE FOOD, HOUSING, MEDICAL CARE, AND HEATING?: PATIENT DECLINED

## 2023-05-23 SDOH — ECONOMIC STABILITY: HOUSING INSECURITY: IN THE LAST 12 MONTHS, HOW MANY PLACES HAVE YOU LIVED?: 1

## 2023-05-23 SDOH — ECONOMIC STABILITY: TRANSPORTATION INSECURITY
IN THE PAST 12 MONTHS, HAS LACK OF RELIABLE TRANSPORTATION KEPT YOU FROM MEDICAL APPOINTMENTS, MEETINGS, WORK OR FROM GETTING THINGS NEEDED FOR DAILY LIVING?: PATIENT DECLINED

## 2023-05-23 SDOH — ECONOMIC STABILITY: FOOD INSECURITY: WITHIN THE PAST 12 MONTHS, YOU WORRIED THAT YOUR FOOD WOULD RUN OUT BEFORE YOU GOT MONEY TO BUY MORE.: PATIENT DECLINED

## 2023-05-23 SDOH — HEALTH STABILITY: MENTAL HEALTH
STRESS IS WHEN SOMEONE FEELS TENSE, NERVOUS, ANXIOUS, OR CAN'T SLEEP AT NIGHT BECAUSE THEIR MIND IS TROUBLED. HOW STRESSED ARE YOU?: ONLY A LITTLE

## 2023-05-23 ASSESSMENT — SOCIAL DETERMINANTS OF HEALTH (SDOH)
HOW HARD IS IT FOR YOU TO PAY FOR THE VERY BASICS LIKE FOOD, HOUSING, MEDICAL CARE, AND HEATING?: PATIENT DECLINED
DO YOU BELONG TO ANY CLUBS OR ORGANIZATIONS SUCH AS CHURCH GROUPS UNIONS, FRATERNAL OR ATHLETIC GROUPS, OR SCHOOL GROUPS?: NO
HOW OFTEN DO YOU GET TOGETHER WITH FRIENDS OR RELATIVES?: PATIENT DECLINED
WITHIN THE PAST 12 MONTHS, YOU WORRIED THAT YOUR FOOD WOULD RUN OUT BEFORE YOU GOT THE MONEY TO BUY MORE: PATIENT DECLINED
HOW OFTEN DO YOU HAVE A DRINK CONTAINING ALCOHOL: PATIENT DECLINED
HOW MANY DRINKS CONTAINING ALCOHOL DO YOU HAVE ON A TYPICAL DAY WHEN YOU ARE DRINKING: PATIENT DECLINED
HOW OFTEN DO YOU ATTEND CHURCH OR RELIGIOUS SERVICES?: PATIENT DECLINED
HOW OFTEN DO YOU HAVE SIX OR MORE DRINKS ON ONE OCCASION: PATIENT DECLINED
HOW OFTEN DO YOU ATTEND CHURCH OR RELIGIOUS SERVICES?: PATIENT DECLINED
HOW OFTEN DO YOU ATTENT MEETINGS OF THE CLUB OR ORGANIZATION YOU BELONG TO?: NEVER
HOW OFTEN DO YOU GET TOGETHER WITH FRIENDS OR RELATIVES?: PATIENT DECLINED
DO YOU BELONG TO ANY CLUBS OR ORGANIZATIONS SUCH AS CHURCH GROUPS UNIONS, FRATERNAL OR ATHLETIC GROUPS, OR SCHOOL GROUPS?: NO
HOW OFTEN DO YOU ATTENT MEETINGS OF THE CLUB OR ORGANIZATION YOU BELONG TO?: NEVER
IN A TYPICAL WEEK, HOW MANY TIMES DO YOU TALK ON THE PHONE WITH FAMILY, FRIENDS, OR NEIGHBORS?: MORE THAN THREE TIMES A WEEK
IN A TYPICAL WEEK, HOW MANY TIMES DO YOU TALK ON THE PHONE WITH FAMILY, FRIENDS, OR NEIGHBORS?: MORE THAN THREE TIMES A WEEK

## 2023-05-23 ASSESSMENT — LIFESTYLE VARIABLES
HOW MANY STANDARD DRINKS CONTAINING ALCOHOL DO YOU HAVE ON A TYPICAL DAY: PATIENT DECLINED
HOW OFTEN DO YOU HAVE SIX OR MORE DRINKS ON ONE OCCASION: PATIENT DECLINED
HOW OFTEN DO YOU HAVE A DRINK CONTAINING ALCOHOL: PATIENT DECLINED
SKIP TO QUESTIONS 9-10: 0
AUDIT-C TOTAL SCORE: -1

## 2023-05-23 NOTE — ANESTHESIA PROCEDURE NOTES
Airway  Date/Time: 10/15/2019 4:35 PM  Performed by: Low Smith M.D.  Authorized by: Low Smith M.D.     Location:  OR  Urgency:  Elective  Indications for Airway Management:  Anesthesia  Spontaneous Ventilation: absent    Sedation Level:  Deep  Preoxygenated: Yes    Patient Position:  Sniffing  Final Airway Type:  Endotracheal airway  Final Endotracheal Airway:  ETT  Cuffed: Yes    Technique Used for Successful ETT Placement:  Direct laryngoscopy  Insertion Site:  Oral  Blade Type:  Raghav  Laryngoscope Blade/Videolaryngoscope Blade Size:  3  ETT Size (mm):  7.0  Measured from:  Teeth  ETT to Teeth (cm):  22  Placement Verified by: auscultation and capnometry    Cormack-Lehane Classification:  Grade I - full view of glottis  Number of Attempts at Approach:  1         Thoracic Surgery Discharge Instructions    We will be contacting you to discuss your progress post discharge.    Wash incisions daily with soap and water.  Call for redness, drainage, or temperature greater than 101.      WALK, WALK, WALK.  Please walk every 2 hours.  Use your breathing machine on the hours you're are not walking.     No driving while on pain medication.  No heavy lifting x 6 weeks.    Please avoid any NSAIDS.  These include: Motrin, Advil, Aleve and Ibuprofen.     Apply ice to your incisions to help with swelling and discomfort.     You may use Tylenol for pain, do not exceed 3000 mg of tylenol in 24 hours.    Take an over the counter stool softener while on pain medication.     5-6 small meals per day     No straws or carbonated beverages    Questions or concerns:   [place] office: Buffy DOMINGUEZ [phone]    Full Liquid Diet    A full liquid diet is a middle step between a clear liquid diet and eating solid foods.  The full liquid diet may be used before or after surgery.  It is easy to digest and leaves little food in the stomach and intestines.    Choose these foods    Choose fruit juices without pulp, such as apple juice, grape juice, cranberry juice, and nectars.    Choose drinks such as tea (hot or cold), fruit flavored drinks, water, milk (whole, skim, 1%, and 2%), cream, instant breakfast drinks, and liquid meal replacements.  Choose desserts and snacks such as fruit ices (without chunks of fruit), plain or vanilla yogurt, plain gelatin, hard candy, popsicles made from juices, custards, frozen yogurt, smoothies without chunks, ice cream (without nuts or candy), and pudding.  Choose broth, bouillon, fat-free consomme, or strained cream soups.  Choose thin, refined hot cereals, such as porridge, and grits.    Don't eat these foods    Don't eat canned, fresh, or frozen fruit.  Don't eat soup with vegetables, noodles, rice, meat, or other chunks of food in it.  Don't eat vegetables, bread, whole  cereal and grain products, meat, chicken, fish, meat substitutes (nuts, tofu, etc.), oils, butter, or margarine.    NO CARBONATED BEVERAGES  NO STRAWS    Soft Diet    Your health care provider has prescribed a soft diet.  This means eating foods that are soft in texture, low in fiber, and easily digested.  This diet is for people with digestive problems.  A soft diet provides foods that are easy to chew and swallow.  Foods should be bite-size and very soft or moist, so they are easy to swallow.    Beverages    OK: Milk, tea, coffee, fruit juices, nutrition shakes and drinks    Avoid: Carbonated beverages and straws.    Breads and crackers    OK: Refined white, wheat, or seedless rye bread, uche or soda crackers that have been moistened, plain rolls, very soft tortillas    Avoid: Whole-grain breads, rolls, or bagels with nuts, raisins, or seeds, crackers, croutons, taco shells    Cereals and grains    OK: Cooked cereals, plain dry cereals that have been moistened, plain macaroni, spaghetti, noodles, rice    Avoid: Whole-grain cereals and granola, or cereals containing bran, raisins, seeds or nuts, coconut; brown or wild rice    Fruits    OK: Avocado, banana, baked peeled apple, applesauce, peeled ripe peaches or pears, canned fruit (apricots, cherries, peaches, pears), melons    Avoid: raw apple, dried fruits, coconut, pineapple, grapes, fruit vida, fruit snacks    Meat and Fish    OK: All fresh meat, poultry, or fish that is cooked until tender - CHEW THOROUGHLY    Avoid: Meat, fish, or poultry that is fried; tough or stringy meat including daly, sausage, bratwurst, jerky, corned beef    Eggs and cheese    OK: Poached or scrambled eggs, cottage cheese, ricotta cheese, cream cheese, cheese sauces, or cheese melted in other dishes    Avoid: Crisp fried eggs, cheese slices and cubes    Other Protein Foods    OK: Tofu, baked beans, smooth peanut butter    Avoid: Soups made with stringy meat pieces or chunky  vegetables    Vegetables    OK: Peeled and well-cooked potatoes or sweet potatoes: fresh, cooked, canned, or frozen vegetables without seeds, skin, or coarse fiber    Avoid: Raw vegetables, deep-fired vegetables (such as tempura); corn    Desserts and Sweets    OK: Moist cake, soft fruit pie with bottom crust only, soft cookies moistened in milk or other liquid, gelatin, custard, pudding, plain ice cream, plain sherbet, sugar, honey, clear jelly    Avoid: Pastries, desserts, and ice cream that have nuts, coconut, seeds, or dried fruit; popcorn, chips of any kind including potato and taco chips; jam, marmalade      Home Care:  Home care nursing, physical therapy, and occupational therapy services have been arranged for you through [facility] (formerly [facility]). Your [facility] RN will contact you prior to your first home visit. For reference, the main [facility] number is [phone].

## 2023-05-26 ENCOUNTER — OFFICE VISIT (OUTPATIENT)
Dept: MEDICAL GROUP | Facility: MEDICAL CENTER | Age: 78
End: 2023-05-26
Payer: MEDICARE

## 2023-05-26 VITALS
RESPIRATION RATE: 16 BRPM | HEART RATE: 82 BPM | WEIGHT: 141.09 LBS | HEIGHT: 62 IN | OXYGEN SATURATION: 91 % | BODY MASS INDEX: 25.96 KG/M2 | TEMPERATURE: 98 F

## 2023-05-26 DIAGNOSIS — I10 ESSENTIAL HYPERTENSION, BENIGN: ICD-10-CM

## 2023-05-26 DIAGNOSIS — E55.9 VITAMIN D DEFICIENCY: ICD-10-CM

## 2023-05-26 DIAGNOSIS — I10 PRIMARY HYPERTENSION: ICD-10-CM

## 2023-05-26 DIAGNOSIS — E11.69 TYPE 2 DIABETES MELLITUS WITH OTHER SPECIFIED COMPLICATION, WITH LONG-TERM CURRENT USE OF INSULIN (HCC): ICD-10-CM

## 2023-05-26 DIAGNOSIS — Z11.59 NEED FOR HEPATITIS C SCREENING TEST: ICD-10-CM

## 2023-05-26 DIAGNOSIS — E78.2 MIXED HYPERLIPIDEMIA: Chronic | ICD-10-CM

## 2023-05-26 DIAGNOSIS — R92.1 BREAST CALCIFICATION, RIGHT: ICD-10-CM

## 2023-05-26 DIAGNOSIS — J45.40 MODERATE PERSISTENT ASTHMA WITHOUT COMPLICATION: ICD-10-CM

## 2023-05-26 DIAGNOSIS — Z79.4 TYPE 2 DIABETES MELLITUS WITH OTHER SPECIFIED COMPLICATION, WITH LONG-TERM CURRENT USE OF INSULIN (HCC): ICD-10-CM

## 2023-05-26 PROCEDURE — 99204 OFFICE O/P NEW MOD 45 MIN: CPT | Performed by: FAMILY MEDICINE

## 2023-05-26 ASSESSMENT — ENCOUNTER SYMPTOMS
DIZZINESS: 0
SENSORY CHANGE: 0
DIARRHEA: 0
DEPRESSION: 0
VOMITING: 0
NAUSEA: 0
ABDOMINAL PAIN: 0
WHEEZING: 0
FEVER: 0
HEADACHES: 0
SHORTNESS OF BREATH: 0
CHILLS: 0
BLOOD IN STOOL: 0
NERVOUS/ANXIOUS: 0
CONSTIPATION: 0
COUGH: 0
PALPITATIONS: 0
HEMOPTYSIS: 0
MYALGIAS: 0
FOCAL WEAKNESS: 0

## 2023-05-26 ASSESSMENT — FIBROSIS 4 INDEX: FIB4 SCORE: 0.97

## 2023-05-26 NOTE — PROGRESS NOTES
FAMILY MEDICINE VISIT                                                               Chief complaint::Diagnoses of Type 2 diabetes mellitus with other specified complication, with long-term current use of insulin (HCC), Essential hypertension, benign, Mixed hyperlipidemia, Primary hypertension, Vitamin D deficiency, Moderate persistent asthma without complication, Need for hepatitis C screening test, and Breast calcification, right were pertinent to this visit.    History of present illness: Rossy Lopez is a 78 y.o. female who presented to establish care.    Problem   Moderate Persistent Asthma    She has history of moderate persistent asthma, currently on Asmanex 2 times daily and albuterol inhaler as needed.  No recent asthma exacerbation.       Vitamin D Deficiency    She is taking vitamin D supplementation.  Previous history of vitamin D insufficiency, we will recheck her labs.       Hypertension    Blood pressure is stable today.  She is on losartan 25 mg daily, hydrochlorothiazide 25 mg daily.  No side effects with medication.  No symptoms.       Type 2 Diabetes Mellitus With Other Specified Complication (Hcc)    She is currently following with endocrinology Maxx Kingskinocencio at [place].  She is on Tresiba 24 units at bedtime and short acting insulin LYUMJEV.  She is on Jardiance 10 mg daily.  Reports that after starting insulin blood sugars are much better.  She is going to do her A1c next month with endocrinology.  Up-to-date for foot exam and eye exam       Hyperlipidemia    Currently on Lipitor 40 mg daily.  Doing well with this medication.    Lab Results   Component Value Date/Time    CHOLSTRLTOT 152 01/10/2022 0941    TRIGLYCERIDE 81 01/10/2022 0941    HDL 61 01/10/2022 0941    LDL 75 01/10/2022 0941               Review of systems:     Review of Systems   Constitutional:  Negative for chills, fever and malaise/fatigue.   Respiratory:  Negative for cough, hemoptysis, shortness of breath and wheezing.     Cardiovascular:  Negative for chest pain, palpitations and leg swelling.   Gastrointestinal:  Negative for abdominal pain, blood in stool, constipation, diarrhea, nausea and vomiting.   Musculoskeletal:  Negative for myalgias.   Neurological:  Negative for dizziness, sensory change, focal weakness and headaches.   Psychiatric/Behavioral:  Negative for depression. The patient is not nervous/anxious.         Medications and Allergies:     Current Outpatient Medications   Medication Sig Dispense Refill    LYUMJEV KWIKPEN 100 UNIT/ML Solution Pen-injector 5 units SC tid      TRESIBA FLEXTOUCH 100 UNIT/ML Solution Pen-injector ADMINISTER 24 TO 60 UNITS UNDER THE SKIN DAILY AS DIRECTED      atorvastatin (LIPITOR) 40 MG Tab Take 1 Tablet by mouth at bedtime. 90 Tablet 1    cyclobenzaprine (FLEXERIL) 10 mg Tab Take 1 Tablet by mouth at bedtime as needed for Moderate Pain. 30 Tablet 1    nystatin (MYCOSTATIN) powder APPLY TO GROIN EVERY DAY      JARDIANCE 10 MG Tab Take 1 Tablet by mouth every day.      losartan (COZAAR) 25 MG Tab Take 1 Tablet by mouth every day. 90 Tablet 1    omeprazole (PRILOSEC) 20 MG delayed-release capsule Take 1 Capsule by mouth every day. 90 Capsule 1    hydroCHLOROthiazide (HYDRODIURIL) 25 MG Tab Take 1 Tablet by mouth every day. 90 Tablet 1    ASMANEX  MCG/ACT Aerosol Inhale 2 Puffs 2 (two) times a day.      triamcinolone acetonide (KENALOG) 0.1 % Cream Apply  topically 2 times a day.      glucose blood strip Use as prn for checking BS levels 3 times a day. Dx: E11.9 300 Strip 3    PREVIDENT 5000 DRY MOUTH 1.1 % Gel BRUSH UTD      albuterol 108 (90 Base) MCG/ACT Aero Soln inhalation aerosol Inhale 2 Puffs by mouth every four hours as needed for Shortness of Breath.      Ca Phosphate-Cholecalciferol 250-500 MG-UNIT Chew Tab Take 1 Tab by mouth 2 Times a Day.      Multiple Vitamins-Minerals (MULTIVITAMIN ADULT PO) Take 1 Tab by mouth every day.       No current facility-administered  "medications for this visit.          Vitals:    Pulse 82   Temp 36.7 °C (98 °F)   Resp 16   Ht 1.575 m (5' 2\")   Wt 64 kg (141 lb 1.5 oz)   SpO2 91%  Body mass index is 25.81 kg/m².    Physical Exam:     Physical Exam  Constitutional:       Appearance: Normal appearance. She is well-developed and well-groomed.   HENT:      Head: Normocephalic and atraumatic.      Right Ear: Tympanic membrane, ear canal and external ear normal.      Left Ear: Tympanic membrane, ear canal and external ear normal.      Nose: Nose normal.      Mouth/Throat:      Mouth: Mucous membranes are moist.      Pharynx: No oropharyngeal exudate or posterior oropharyngeal erythema.   Eyes:      General:         Right eye: No discharge.         Left eye: No discharge.      Conjunctiva/sclera: Conjunctivae normal.   Neck:      Thyroid: No thyromegaly.   Cardiovascular:      Rate and Rhythm: Normal rate and regular rhythm.      Heart sounds: Normal heart sounds. No murmur heard.  Pulmonary:      Effort: Pulmonary effort is normal. No respiratory distress.      Breath sounds: Normal breath sounds. No wheezing or rales.   Abdominal:      General: Bowel sounds are normal. There is no distension.      Palpations: Abdomen is soft.      Tenderness: There is no abdominal tenderness. There is no guarding.   Musculoskeletal:         General: Normal range of motion.      Cervical back: Neck supple.      Right lower leg: No edema.      Left lower leg: No edema.   Skin:     General: Skin is warm.      Findings: No rash.   Neurological:      General: No focal deficit present.      Mental Status: She is alert. Mental status is at baseline.      Gait: Gait is intact.   Psychiatric:         Mood and Affect: Mood and affect normal.         Behavior: Behavior normal.              Assessment/Plan:         Problem List Items Addressed This Visit       Hyperlipidemia (Chronic)     Chronic problem, stable, continue Lipitor 40 mg daily.  Check labs.  Goal LDL is less " than 70 due to history of diabetes.           Relevant Orders    Comp Metabolic Panel    Lipid Profile    Vitamin D deficiency     Chronic problem, stable, continue vitamin D supplementation.  Recheck labs to assess control.           Relevant Orders    VITAMIN D,25 HYDROXY (DEFICIENCY)    Type 2 diabetes mellitus with other specified complication (HCC)     Chronic problem, stable, continue to follow-up with endocrinology for diabetes management.             Relevant Orders    VITAMIN B12    MICROALBUMIN CREAT RATIO URINE    Moderate persistent asthma     Chronic problem, stable, continue Asmanex 2 times daily and albuterol inhaler as needed.           Hypertension     Chronic problem, stable, continue same meds as listed in HPI.           Essential hypertension, benign     Other Visit Diagnoses       Need for hepatitis C screening test        Relevant Orders    HEP C VIRUS ANTIBODY    Breast calcification, right      Had breast biopsy in January 2023 which came back negative for any abnormality.  Repeat recommended in 6-month for follow-up.    Relevant Orders    MA-DIAGNOSTIC MAMMO RIGHT W/TOMOSYNTHESIS W/CAD             Please note that this dictation was created using voice recognition software. I have made every reasonable attempt to correct obvious errors, but I expect that there are errors of grammar and possibly content that I did not discover before finalizing the note.    Follow up in 3 months for lab follow-up.

## 2023-05-26 NOTE — ASSESSMENT & PLAN NOTE
Chronic problem, stable, continue Lipitor 40 mg daily.  Check labs.  Goal LDL is less than 70 due to history of diabetes.

## 2023-06-09 ENCOUNTER — OUTPATIENT INFUSION SERVICES (OUTPATIENT)
Dept: ONCOLOGY | Facility: MEDICAL CENTER | Age: 78
End: 2023-06-09
Attending: INTERNAL MEDICINE
Payer: MEDICARE

## 2023-06-09 VITALS
BODY MASS INDEX: 25.6 KG/M2 | HEIGHT: 62 IN | OXYGEN SATURATION: 92 % | DIASTOLIC BLOOD PRESSURE: 57 MMHG | HEART RATE: 87 BPM | SYSTOLIC BLOOD PRESSURE: 113 MMHG | TEMPERATURE: 97.6 F | RESPIRATION RATE: 18 BRPM | WEIGHT: 139.11 LBS

## 2023-06-09 DIAGNOSIS — J45.40 MODERATE PERSISTENT ASTHMA WITHOUT COMPLICATION: ICD-10-CM

## 2023-06-09 PROCEDURE — 96372 THER/PROPH/DIAG INJ SC/IM: CPT

## 2023-06-09 PROCEDURE — 700111 HCHG RX REV CODE 636 W/ 250 OVERRIDE (IP): Mod: JG | Performed by: INTERNAL MEDICINE

## 2023-06-09 RX ADMIN — MEPOLIZUMAB 100 MG: 100 INJECTION, POWDER, FOR SOLUTION SUBCUTANEOUS at 11:50

## 2023-06-09 ASSESSMENT — FIBROSIS 4 INDEX: FIB4 SCORE: 0.97

## 2023-06-09 NOTE — PROGRESS NOTES
Pt arrives to [facility] for Nucala injection.  Pt denies s/sx of infection or other complaints today.  Pt reports her asthma symptoms are managed by Nucala. Nucala given SC to LUE.  Pt declines Band-Aid to site d/t tape allergy.  Confirmed next appt time on 7/07/23 with pt.  Pt dc home to self care.

## 2023-06-19 ENCOUNTER — HOSPITAL ENCOUNTER (OUTPATIENT)
Dept: RADIOLOGY | Facility: MEDICAL CENTER | Age: 78
End: 2023-06-19
Attending: FAMILY MEDICINE
Payer: MEDICARE

## 2023-06-19 DIAGNOSIS — R92.1 BREAST CALCIFICATION, RIGHT: ICD-10-CM

## 2023-06-19 PROCEDURE — G0279 TOMOSYNTHESIS, MAMMO: HCPCS

## 2023-07-07 ENCOUNTER — OUTPATIENT INFUSION SERVICES (OUTPATIENT)
Dept: ONCOLOGY | Facility: MEDICAL CENTER | Age: 78
End: 2023-07-07
Attending: INTERNAL MEDICINE
Payer: MEDICARE

## 2023-07-07 VITALS
SYSTOLIC BLOOD PRESSURE: 129 MMHG | OXYGEN SATURATION: 91 % | WEIGHT: 140.21 LBS | HEIGHT: 62 IN | DIASTOLIC BLOOD PRESSURE: 65 MMHG | HEART RATE: 78 BPM | TEMPERATURE: 97.8 F | RESPIRATION RATE: 18 BRPM | BODY MASS INDEX: 25.8 KG/M2

## 2023-07-07 DIAGNOSIS — J45.40 MODERATE PERSISTENT ASTHMA WITHOUT COMPLICATION: ICD-10-CM

## 2023-07-07 PROCEDURE — 96372 THER/PROPH/DIAG INJ SC/IM: CPT

## 2023-07-07 PROCEDURE — 700111 HCHG RX REV CODE 636 W/ 250 OVERRIDE (IP): Mod: JG | Performed by: INTERNAL MEDICINE

## 2023-07-07 RX ADMIN — MEPOLIZUMAB 100 MG: 100 INJECTION, POWDER, FOR SOLUTION SUBCUTANEOUS at 11:51

## 2023-07-07 ASSESSMENT — FIBROSIS 4 INDEX: FIB4 SCORE: 0.97

## 2023-07-07 NOTE — PROGRESS NOTES
Pt arrives to [facility] for Nucala injection.  Pt denies s/sx of infection or other complaints today.  Pt reports her asthma symptoms are managed by Nucala. Nucala given SC to RUE.  Pt declines Band-Aid to site d/t tape allergy.  Confirmed next appt time on 8/04/23 with pt.  Pt dc home to self care.

## 2023-07-09 ENCOUNTER — PATIENT MESSAGE (OUTPATIENT)
Dept: MEDICAL GROUP | Facility: MEDICAL CENTER | Age: 78
End: 2023-07-09
Payer: MEDICARE

## 2023-07-09 DIAGNOSIS — I10 ESSENTIAL HYPERTENSION: ICD-10-CM

## 2023-07-10 RX ORDER — HYDROCHLOROTHIAZIDE 25 MG/1
25 TABLET ORAL
Qty: 90 TABLET | Refills: 3 | Status: SHIPPED | OUTPATIENT
Start: 2023-07-10

## 2023-07-13 ENCOUNTER — PATIENT MESSAGE (OUTPATIENT)
Dept: MEDICAL GROUP | Facility: MEDICAL CENTER | Age: 78
End: 2023-07-13
Payer: MEDICARE

## 2023-07-13 DIAGNOSIS — K21.9 GERD WITHOUT ESOPHAGITIS: ICD-10-CM

## 2023-07-14 RX ORDER — OMEPRAZOLE 20 MG/1
20 CAPSULE, DELAYED RELEASE ORAL
Qty: 90 CAPSULE | Refills: 3 | Status: SHIPPED | OUTPATIENT
Start: 2023-07-14

## 2023-07-14 RX ORDER — OMEPRAZOLE 20 MG/1
20 CAPSULE, DELAYED RELEASE ORAL
Qty: 90 CAPSULE | Refills: 1 | Status: CANCELLED | OUTPATIENT
Start: 2023-07-14

## 2023-07-29 ENCOUNTER — HOSPITAL ENCOUNTER (OUTPATIENT)
Dept: LAB | Facility: MEDICAL CENTER | Age: 78
End: 2023-07-29
Attending: FAMILY MEDICINE
Payer: MEDICARE

## 2023-07-29 DIAGNOSIS — Z11.59 NEED FOR HEPATITIS C SCREENING TEST: ICD-10-CM

## 2023-07-29 DIAGNOSIS — Z79.4 TYPE 2 DIABETES MELLITUS WITH OTHER SPECIFIED COMPLICATION, WITH LONG-TERM CURRENT USE OF INSULIN (HCC): ICD-10-CM

## 2023-07-29 DIAGNOSIS — E11.69 TYPE 2 DIABETES MELLITUS WITH OTHER SPECIFIED COMPLICATION, WITH LONG-TERM CURRENT USE OF INSULIN (HCC): ICD-10-CM

## 2023-07-29 DIAGNOSIS — E55.9 VITAMIN D DEFICIENCY: ICD-10-CM

## 2023-07-29 DIAGNOSIS — E78.2 MIXED HYPERLIPIDEMIA: Chronic | ICD-10-CM

## 2023-07-29 LAB
25(OH)D3 SERPL-MCNC: 71 NG/ML (ref 30–100)
ALBUMIN SERPL BCP-MCNC: 4.3 G/DL (ref 3.2–4.9)
ALBUMIN/GLOB SERPL: 1.2 G/DL
ALP SERPL-CCNC: 157 U/L (ref 30–99)
ALT SERPL-CCNC: 28 U/L (ref 2–50)
ANION GAP SERPL CALC-SCNC: 12 MMOL/L (ref 7–16)
AST SERPL-CCNC: 22 U/L (ref 12–45)
BILIRUB SERPL-MCNC: 0.4 MG/DL (ref 0.1–1.5)
BUN SERPL-MCNC: 18 MG/DL (ref 8–22)
CALCIUM ALBUM COR SERPL-MCNC: 10 MG/DL (ref 8.5–10.5)
CALCIUM SERPL-MCNC: 10.2 MG/DL (ref 8.5–10.5)
CHLORIDE SERPL-SCNC: 98 MMOL/L (ref 96–112)
CHOLEST SERPL-MCNC: 136 MG/DL (ref 100–199)
CO2 SERPL-SCNC: 30 MMOL/L (ref 20–33)
CREAT SERPL-MCNC: 0.69 MG/DL (ref 0.5–1.4)
GFR SERPLBLD CREATININE-BSD FMLA CKD-EPI: 90 ML/MIN/1.73 M 2
GLOBULIN SER CALC-MCNC: 3.6 G/DL (ref 1.9–3.5)
GLUCOSE SERPL-MCNC: 91 MG/DL (ref 65–99)
HCV AB SER QL: NORMAL
HDLC SERPL-MCNC: 59 MG/DL
LDLC SERPL CALC-MCNC: 67 MG/DL
POTASSIUM SERPL-SCNC: 3.4 MMOL/L (ref 3.6–5.5)
PROT SERPL-MCNC: 7.9 G/DL (ref 6–8.2)
SODIUM SERPL-SCNC: 140 MMOL/L (ref 135–145)
TRIGL SERPL-MCNC: 52 MG/DL (ref 0–149)
VIT B12 SERPL-MCNC: 659 PG/ML (ref 211–911)

## 2023-07-29 PROCEDURE — 82306 VITAMIN D 25 HYDROXY: CPT

## 2023-07-29 PROCEDURE — 86803 HEPATITIS C AB TEST: CPT

## 2023-07-29 PROCEDURE — 36415 COLL VENOUS BLD VENIPUNCTURE: CPT

## 2023-07-29 PROCEDURE — 80053 COMPREHEN METABOLIC PANEL: CPT

## 2023-07-29 PROCEDURE — 80061 LIPID PANEL: CPT

## 2023-07-29 PROCEDURE — 82607 VITAMIN B-12: CPT

## 2023-08-01 ENCOUNTER — HOSPITAL ENCOUNTER (OUTPATIENT)
Facility: MEDICAL CENTER | Age: 78
End: 2023-08-01
Attending: FAMILY MEDICINE
Payer: MEDICARE

## 2023-08-01 LAB
CREAT UR-MCNC: 106.51 MG/DL
MICROALBUMIN UR-MCNC: <1.2 MG/DL
MICROALBUMIN/CREAT UR: NORMAL MG/G (ref 0–30)

## 2023-08-01 PROCEDURE — 82570 ASSAY OF URINE CREATININE: CPT

## 2023-08-01 PROCEDURE — 82043 UR ALBUMIN QUANTITATIVE: CPT

## 2023-08-04 ENCOUNTER — OUTPATIENT INFUSION SERVICES (OUTPATIENT)
Dept: ONCOLOGY | Facility: MEDICAL CENTER | Age: 78
End: 2023-08-04
Attending: INTERNAL MEDICINE
Payer: MEDICARE

## 2023-08-04 VITALS
RESPIRATION RATE: 16 BRPM | SYSTOLIC BLOOD PRESSURE: 144 MMHG | TEMPERATURE: 97.6 F | HEIGHT: 63 IN | OXYGEN SATURATION: 99 % | HEART RATE: 70 BPM | BODY MASS INDEX: 25.08 KG/M2 | DIASTOLIC BLOOD PRESSURE: 62 MMHG | WEIGHT: 141.54 LBS

## 2023-08-04 DIAGNOSIS — J45.40 MODERATE PERSISTENT ASTHMA WITHOUT COMPLICATION: ICD-10-CM

## 2023-08-04 PROCEDURE — 700111 HCHG RX REV CODE 636 W/ 250 OVERRIDE (IP): Mod: JG | Performed by: INTERNAL MEDICINE

## 2023-08-04 PROCEDURE — 96372 THER/PROPH/DIAG INJ SC/IM: CPT

## 2023-08-04 RX ADMIN — MEPOLIZUMAB 100 MG: 100 INJECTION, POWDER, FOR SOLUTION SUBCUTANEOUS at 11:53

## 2023-08-04 ASSESSMENT — FIBROSIS 4 INDEX: FIB4 SCORE: 0.81

## 2023-08-04 NOTE — PROGRESS NOTES
[facility] for [facility]uc[facility] injection ambulating with a steady gait. Pt denies s/sx of infection, wound or illness. POC reviewed. Nucala administered per MD order, L BA, Pt tolerated well. Future appointment confirmed and Rossy was discharged home in no acute distress.

## 2023-08-25 ENCOUNTER — OFFICE VISIT (OUTPATIENT)
Dept: MEDICAL GROUP | Facility: MEDICAL CENTER | Age: 78
End: 2023-08-25
Payer: MEDICARE

## 2023-08-25 VITALS
BODY MASS INDEX: 25.56 KG/M2 | WEIGHT: 138.89 LBS | SYSTOLIC BLOOD PRESSURE: 112 MMHG | TEMPERATURE: 98.1 F | RESPIRATION RATE: 16 BRPM | DIASTOLIC BLOOD PRESSURE: 54 MMHG | HEART RATE: 56 BPM | HEIGHT: 62 IN | OXYGEN SATURATION: 92 %

## 2023-08-25 DIAGNOSIS — R74.8 ALKALINE PHOSPHATASE ELEVATION: ICD-10-CM

## 2023-08-25 DIAGNOSIS — L11.1 GROVER'S DISEASE: ICD-10-CM

## 2023-08-25 DIAGNOSIS — A09 DIARRHEA OF INFECTIOUS ORIGIN: ICD-10-CM

## 2023-08-25 DIAGNOSIS — G89.29 CHRONIC BILATERAL LOW BACK PAIN WITHOUT SCIATICA: ICD-10-CM

## 2023-08-25 DIAGNOSIS — E11.69 TYPE 2 DIABETES MELLITUS WITH OTHER SPECIFIED COMPLICATION, WITH LONG-TERM CURRENT USE OF INSULIN (HCC): ICD-10-CM

## 2023-08-25 DIAGNOSIS — Z79.4 TYPE 2 DIABETES MELLITUS WITH OTHER SPECIFIED COMPLICATION, WITH LONG-TERM CURRENT USE OF INSULIN (HCC): ICD-10-CM

## 2023-08-25 DIAGNOSIS — M54.50 CHRONIC BILATERAL LOW BACK PAIN WITHOUT SCIATICA: ICD-10-CM

## 2023-08-25 PROCEDURE — 99214 OFFICE O/P EST MOD 30 MIN: CPT | Performed by: FAMILY MEDICINE

## 2023-08-25 PROCEDURE — 3074F SYST BP LT 130 MM HG: CPT | Performed by: FAMILY MEDICINE

## 2023-08-25 PROCEDURE — 3078F DIAST BP <80 MM HG: CPT | Performed by: FAMILY MEDICINE

## 2023-08-25 ASSESSMENT — ENCOUNTER SYMPTOMS
BACK PAIN: 1
CHILLS: 0
PALPITATIONS: 0
DIARRHEA: 1
FEVER: 0

## 2023-08-25 ASSESSMENT — FIBROSIS 4 INDEX: FIB4 SCORE: 0.81

## 2023-08-25 NOTE — ASSESSMENT & PLAN NOTE
Chronic problem, unstable, check alkaline phosphatase last exam to check her liver function 1 fraction

## 2023-08-25 NOTE — ASSESSMENT & PLAN NOTE
Chronic problem, stable, recommend patient to use Voltaren as needed.  Continue strength training exercises

## 2023-08-25 NOTE — PROGRESS NOTES
FAMILY MEDICINE VISIT                                                               Chief complaint::Diagnoses of Vinod's disease, Type 2 diabetes mellitus with other specified complication, with long-term current use of insulin (HCC), Alkaline phosphatase elevation, Diarrhea of infectious origin, and Chronic bilateral low back pain without sciatica were pertinent to this visit.    History of present illness: Rossy Lopez is a 78 y.o. female who presented for lab follow-up.    Problem   Vinod's Disease    She is following with dermatology and was diagnosed with papular dermatitis and Grovers disease.  She has been using Zyrtec 2 times a day.  She gets a lot of itching at her whole body     Alkaline Phosphatase Elevation        Alkaline phosphatase came back elevated at 157.    Component      Latest Ref Rng 1/10/2022 10/26/2022 7/29/2023   Alkaline Phosphatase      30 - 99 U/L 118 (H)  116 (H)  157 (H)       Legend:  (H) High       Diarrhea of Infectious Origin    She reports that she has been having watery stools since last few days.  She ate at a restaurant and after that she started having the symptoms.  No blood in stools but she gets symptoms lower abdominal cramping.  Sometimes her bowel movements are normal also.  No other symptoms.     Chronic Bilateral Low Back Pain Without Sciatica    Last lumbar x-ray in 2019 showed 6 fluoroscopic spot films of the lumbar spine taken intraoperatively demonstrates localization of L4-5 with posterior decompression and posterior spinal fusion from L4 through S1 using pedicle screws and rods. Intervertebral body spacers identified at  L4-5 and L5-S1.  She gets back pain sometimes and uses ibuprofen as needed.     Type 2 Diabetes Mellitus With Other Specified Complication (Hcc)    She is currently following with endocrinology Maxx Hogue at [facility].  She is on Tresiba 24 units at bedtime and short acting insulin LYUMJEV.  She is on Jardiance 10 mg daily.  Reports that after  starting insulin blood sugars are much better.  She had A1c done at endocrinology office and it came back at 6.3.    Monofilament testing with a 10 gram force: sensation intact: intact bilaterally  Visual Inspection: Feet without maceration, ulcers, fissures.  Pedal pulses: intact bilaterally            Review of systems:     Review of Systems   Constitutional:  Negative for chills and fever.   Cardiovascular:  Negative for chest pain, palpitations and leg swelling.   Gastrointestinal:  Positive for diarrhea.   Musculoskeletal:  Positive for back pain.   Skin:  Positive for itching and rash.        Medications and Allergies:     Current Outpatient Medications   Medication Sig Dispense Refill    omeprazole (PRILOSEC) 20 MG delayed-release capsule Take 1 Capsule by mouth every day. 90 Capsule 3    hydroCHLOROthiazide (HYDRODIURIL) 25 MG Tab Take 1 Tablet by mouth every day. 90 Tablet 3    LYUMJEV KWIKPEN 100 UNIT/ML Solution Pen-injector 5 units SC tid      TRESIBA FLEXTOUCH 100 UNIT/ML Solution Pen-injector ADMINISTER 24 TO 60 UNITS UNDER THE SKIN DAILY AS DIRECTED      atorvastatin (LIPITOR) 40 MG Tab Take 1 Tablet by mouth at bedtime. 90 Tablet 1    cyclobenzaprine (FLEXERIL) 10 mg Tab Take 1 Tablet by mouth at bedtime as needed for Moderate Pain. 30 Tablet 1    nystatin (MYCOSTATIN) powder APPLY TO GROIN EVERY DAY      JARDIANCE 10 MG Tab Take 1 Tablet by mouth every day.      losartan (COZAAR) 25 MG Tab Take 1 Tablet by mouth every day. 90 Tablet 1    ASMANEX  MCG/ACT Aerosol Inhale 2 Puffs 2 (two) times a day.      triamcinolone acetonide (KENALOG) 0.1 % Cream Apply  topically 2 times a day.      glucose blood strip Use as prn for checking BS levels 3 times a day. Dx: E11.9 300 Strip 3    PREVIDENT 5000 DRY MOUTH 1.1 % Gel BRUSH UTD      albuterol 108 (90 Base) MCG/ACT Aero Soln inhalation aerosol Inhale 2 Puffs by mouth every four hours as needed for Shortness of Breath.      Ca Phosphate-Cholecalciferol  "250-500 MG-UNIT Chew Tab Take 1 Tab by mouth 2 Times a Day.      Multiple Vitamins-Minerals (MULTIVITAMIN ADULT PO) Take 1 Tab by mouth every day.       No current facility-administered medications for this visit.          Vitals:    /54   Pulse (!) 56   Temp 36.7 °C (98.1 °F)   Resp 16   Ht 1.575 m (5' 2\")   Wt 63 kg (138 lb 14.2 oz)   SpO2 92%  Body mass index is 25.4 kg/m².    Physical Exam:     Physical Exam  Constitutional:       Appearance: Normal appearance. She is well-developed and well-groomed.   HENT:      Head: Normocephalic and atraumatic.      Right Ear: External ear normal.      Left Ear: External ear normal.   Eyes:      General:         Right eye: No discharge.         Left eye: No discharge.      Conjunctiva/sclera: Conjunctivae normal.   Cardiovascular:      Rate and Rhythm: Normal rate.   Pulmonary:      Effort: Pulmonary effort is normal. No respiratory distress.   Musculoskeletal:      Cervical back: Neck supple.   Skin:     Findings: No rash.   Neurological:      Mental Status: She is alert.   Psychiatric:         Mood and Affect: Mood and affect normal.         Behavior: Behavior normal.          Labs:  I reviewed with patient recent labs resulted on 7/29/2023    Assessment/Plan:         Problem List Items Addressed This Visit       Type 2 diabetes mellitus with other specified complication (HCC)     Chronic problem, stable, guarded to follow-up with endocrinology         Relevant Orders    Diabetic Monofilament LE Exam (Completed)    Chronic bilateral low back pain without sciatica     Chronic problem, stable, recommend patient to use Voltaren as needed.  Continue strength training exercises         [place]'s disease     Chronic problem, unstable, recommend patient to try Allegra.  Follow-up with dermatology for further evaluation.         Alkaline phosphatase elevation     Chronic problem, unstable, check alkaline phosphatase last exam to check her liver function 1 fraction      "    Relevant Orders    ALKALINE PHOSPHATASE ISOENZYMES    Diarrhea of infectious origin     New problem, unstable, likely viral gastroenteritis.  Recommended supportive care.  If not getting better follow-up in office and will order stool test             Please note that this dictation was created using voice recognition software. I have made every reasonable attempt to correct obvious errors, but I expect that there are errors of grammar and possibly content that I did not discover before finalizing the note.    Follow up in 3 months for lab follow-up.

## 2023-08-25 NOTE — ASSESSMENT & PLAN NOTE
Chronic problem, unstable, recommend patient to try Allegra.  Follow-up with dermatology for further evaluation.

## 2023-08-25 NOTE — ASSESSMENT & PLAN NOTE
New problem, unstable, likely viral gastroenteritis.  Recommended supportive care.  If not getting better follow-up in office and will order stool test

## 2023-08-30 ENCOUNTER — HOSPITAL ENCOUNTER (OUTPATIENT)
Facility: MEDICAL CENTER | Age: 78
End: 2023-08-30
Attending: FAMILY MEDICINE
Payer: MEDICARE

## 2023-08-30 DIAGNOSIS — R19.7 DIARRHEA, UNSPECIFIED TYPE: ICD-10-CM

## 2023-08-30 DIAGNOSIS — R10.2 PELVIC PAIN: ICD-10-CM

## 2023-08-30 LAB
APPEARANCE UR: CLEAR
BILIRUB UR QL STRIP.AUTO: NEGATIVE
COLOR UR: YELLOW
G LAMBLIA+C PARVUM AG STL QL RAPID: NORMAL
GLUCOSE UR STRIP.AUTO-MCNC: >=1000 MG/DL
HEMOCCULT STL QL: NEGATIVE
KETONES UR STRIP.AUTO-MCNC: ABNORMAL MG/DL
LEUKOCYTE ESTERASE UR QL STRIP.AUTO: NEGATIVE
MICRO URNS: ABNORMAL
NITRITE UR QL STRIP.AUTO: NEGATIVE
PH UR STRIP.AUTO: 6.5 [PH] (ref 5–8)
PROT UR QL STRIP: NEGATIVE MG/DL
RBC UR QL AUTO: NEGATIVE
SIGNIFICANT IND 70042: NORMAL
SITE SITE: NORMAL
SOURCE SOURCE: NORMAL
SP GR UR STRIP.AUTO: 1.04
UROBILINOGEN UR STRIP.AUTO-MCNC: 0.2 MG/DL

## 2023-08-30 PROCEDURE — 87086 URINE CULTURE/COLONY COUNT: CPT

## 2023-08-30 PROCEDURE — 83993 ASSAY FOR CALPROTECTIN FECAL: CPT

## 2023-08-30 PROCEDURE — 82272 OCCULT BLD FECES 1-3 TESTS: CPT

## 2023-08-30 PROCEDURE — 87329 GIARDIA AG IA: CPT

## 2023-08-30 PROCEDURE — 87899 AGENT NOS ASSAY W/OPTIC: CPT

## 2023-08-30 PROCEDURE — 87328 CRYPTOSPORIDIUM AG IA: CPT

## 2023-08-30 PROCEDURE — 87046 STOOL CULTR AEROBIC BACT EA: CPT

## 2023-08-30 PROCEDURE — 87045 FECES CULTURE AEROBIC BACT: CPT

## 2023-08-30 PROCEDURE — 81003 URINALYSIS AUTO W/O SCOPE: CPT

## 2023-08-31 ENCOUNTER — OFFICE VISIT (OUTPATIENT)
Dept: MEDICAL GROUP | Facility: MEDICAL CENTER | Age: 78
End: 2023-08-31
Payer: MEDICARE

## 2023-08-31 VITALS
OXYGEN SATURATION: 91 % | HEIGHT: 62 IN | SYSTOLIC BLOOD PRESSURE: 100 MMHG | BODY MASS INDEX: 25.56 KG/M2 | WEIGHT: 138.89 LBS | DIASTOLIC BLOOD PRESSURE: 52 MMHG | HEART RATE: 95 BPM | TEMPERATURE: 98.6 F | RESPIRATION RATE: 17 BRPM

## 2023-08-31 DIAGNOSIS — R10.84 GENERALIZED ABDOMINAL PAIN: ICD-10-CM

## 2023-08-31 DIAGNOSIS — R19.7 DIARRHEA, UNSPECIFIED TYPE: ICD-10-CM

## 2023-08-31 LAB
E COLI SXT1+2 STL IA: NORMAL
SIGNIFICANT IND 70042: NORMAL
SITE SITE: NORMAL
SOURCE SOURCE: NORMAL

## 2023-08-31 PROCEDURE — 3078F DIAST BP <80 MM HG: CPT | Performed by: FAMILY MEDICINE

## 2023-08-31 PROCEDURE — 3074F SYST BP LT 130 MM HG: CPT | Performed by: FAMILY MEDICINE

## 2023-08-31 PROCEDURE — 99214 OFFICE O/P EST MOD 30 MIN: CPT | Performed by: FAMILY MEDICINE

## 2023-08-31 RX ORDER — DICYCLOMINE HYDROCHLORIDE 10 MG/1
10 CAPSULE ORAL EVERY 6 HOURS PRN
Qty: 60 CAPSULE | Refills: 1 | Status: SHIPPED | OUTPATIENT
Start: 2023-08-31 | End: 2023-12-07

## 2023-08-31 ASSESSMENT — ENCOUNTER SYMPTOMS
ABDOMINAL PAIN: 1
CHILLS: 0
FEVER: 0
PALPITATIONS: 0
DIARRHEA: 1

## 2023-08-31 ASSESSMENT — FIBROSIS 4 INDEX: FIB4 SCORE: 0.81

## 2023-08-31 NOTE — PROGRESS NOTES
FAMILY MEDICINE VISIT                                                               Chief complaint::The encounter diagnosis was Diarrhea, unspecified type.    History of present illness: Rossy Lopez is a 78 y.o. female who presented for persistent diarrhea symptoms    She is having persistent diarrhea and abdominal pain symptoms.  We checked her stool test.  Blood in stools, crypto and Giardia came back negative.  Stool culture and fecal calprotectin results are pending.  Urinalysis also came back negative.  Urine culture results are pending.  She is taking Imodium but still having a lot of diarrhea symptoms.  No blood in stools.  Reports that she feels sometimes epigastric pain after eating and feels that she is full.    Last colonoscopy was in 2017 and was normal and was recommended to repeat in 10 years.    Review of systems:     Review of Systems   Constitutional:  Negative for chills, fever and malaise/fatigue.   Cardiovascular:  Negative for chest pain, palpitations and leg swelling.   Gastrointestinal:  Positive for abdominal pain and diarrhea.        Medications and Allergies:     Current Outpatient Medications   Medication Sig Dispense Refill    dicyclomine (BENTYL) 10 MG Cap Take 1 Capsule by mouth every 6 hours as needed (diarrhea). 60 Capsule 1    omeprazole (PRILOSEC) 20 MG delayed-release capsule Take 1 Capsule by mouth every day. 90 Capsule 3    hydroCHLOROthiazide (HYDRODIURIL) 25 MG Tab Take 1 Tablet by mouth every day. 90 Tablet 3    LYUMJEV KWIKPEN 100 UNIT/ML Solution Pen-injector 5 units SC tid      TRESIBA FLEXTOUCH 100 UNIT/ML Solution Pen-injector ADMINISTER 24 TO 60 UNITS UNDER THE SKIN DAILY AS DIRECTED      atorvastatin (LIPITOR) 40 MG Tab Take 1 Tablet by mouth at bedtime. 90 Tablet 1    cyclobenzaprine (FLEXERIL) 10 mg Tab Take 1 Tablet by mouth at bedtime as needed for Moderate Pain. 30 Tablet 1    nystatin (MYCOSTATIN) powder APPLY TO GROIN EVERY DAY      JARDIANCE 10 MG Tab Take 1  "Tablet by mouth every day.      losartan (COZAAR) 25 MG Tab Take 1 Tablet by mouth every day. 90 Tablet 1    ASMANEX  MCG/ACT Aerosol Inhale 2 Puffs 2 (two) times a day.      triamcinolone acetonide (KENALOG) 0.1 % Cream Apply  topically 2 times a day.      glucose blood strip Use as prn for checking BS levels 3 times a day. Dx: E11.9 300 Strip 3    PREVIDENT 5000 DRY MOUTH 1.1 % Gel BRUSH UTD      albuterol 108 (90 Base) MCG/ACT Aero Soln inhalation aerosol Inhale 2 Puffs by mouth every four hours as needed for Shortness of Breath.      Ca Phosphate-Cholecalciferol 250-500 MG-UNIT Chew Tab Take 1 Tab by mouth 2 Times a Day.      Multiple Vitamins-Minerals (MULTIVITAMIN ADULT PO) Take 1 Tab by mouth every day.       No current facility-administered medications for this visit.          Vitals:    /52   Pulse 95   Temp 37 °C (98.6 °F)   Resp 17   Ht 1.575 m (5' 2\")   Wt 63 kg (138 lb 14.2 oz)   SpO2 91%  Body mass index is 25.4 kg/m².    Physical Exam:     Physical Exam  Constitutional:       Appearance: Normal appearance. She is well-developed and well-groomed.   HENT:      Head: Normocephalic and atraumatic.      Right Ear: External ear normal.      Left Ear: External ear normal.   Eyes:      General:         Right eye: No discharge.         Left eye: No discharge.      Conjunctiva/sclera: Conjunctivae normal.   Cardiovascular:      Rate and Rhythm: Normal rate.   Pulmonary:      Effort: Pulmonary effort is normal. No respiratory distress.   Musculoskeletal:      Cervical back: Neck supple.   Skin:     Findings: No rash.   Neurological:      Mental Status: She is alert.   Psychiatric:         Mood and Affect: Mood and affect normal.         Behavior: Behavior normal.        I reviewed recent stool results resulted on 8/30/2023      Assessment/Plan:         1. Diarrhea, unspecified type  - dicyclomine (BENTYL) 10 MG Cap; Take 1 Capsule by mouth every 6 hours as needed (diarrhea).  Dispense: 60 " Capsule; Refill: 1  - Referral to Gastroenterology    2. Generalized abdominal pain    Chronic ongoing problem, unstable, likely infectious source.  Stool culture and fecal calprotectin results are pending.  Prescribed Bentyl to be used as needed for diarrhea symptoms and pain.  Recommended to use Pepcid over-the-counter for abdominal pain.  Referral placed to gastroenterology if symptoms are not getting better.  Continue supportive measures which include eating light diet, drink plenty of fluids to maintain hydration    Please note that this dictation was created using voice recognition software. I have made every reasonable attempt to correct obvious errors, but I expect that there are errors of grammar and possibly content that I did not discover before finalizing the note.    Follow up as needed

## 2023-09-01 ENCOUNTER — OUTPATIENT INFUSION SERVICES (OUTPATIENT)
Dept: ONCOLOGY | Facility: MEDICAL CENTER | Age: 78
End: 2023-09-01
Attending: INTERNAL MEDICINE
Payer: MEDICARE

## 2023-09-01 VITALS
RESPIRATION RATE: 18 BRPM | WEIGHT: 137.35 LBS | SYSTOLIC BLOOD PRESSURE: 130 MMHG | TEMPERATURE: 97.3 F | HEIGHT: 62 IN | HEART RATE: 69 BPM | DIASTOLIC BLOOD PRESSURE: 56 MMHG | OXYGEN SATURATION: 94 % | BODY MASS INDEX: 25.27 KG/M2

## 2023-09-01 DIAGNOSIS — J45.40 MODERATE PERSISTENT ASTHMA WITHOUT COMPLICATION: ICD-10-CM

## 2023-09-01 LAB
BACTERIA UR CULT: NORMAL
CALPROTECTIN STL-MCNT: 337 UG/G
SIGNIFICANT IND 70042: NORMAL
SITE SITE: NORMAL
SOURCE SOURCE: NORMAL

## 2023-09-01 PROCEDURE — 700111 HCHG RX REV CODE 636 W/ 250 OVERRIDE (IP): Mod: JZ,JG | Performed by: INTERNAL MEDICINE

## 2023-09-01 PROCEDURE — 96372 THER/PROPH/DIAG INJ SC/IM: CPT

## 2023-09-01 RX ADMIN — MEPOLIZUMAB 100 MG: 100 INJECTION, POWDER, FOR SOLUTION SUBCUTANEOUS at 12:21

## 2023-09-01 ASSESSMENT — FIBROSIS 4 INDEX: FIB4 SCORE: 0.81

## 2023-09-01 NOTE — PROGRESS NOTES
Patient to [facility] for Nucala injection. Patient states that she has had diarrhea for two weeks and MD is aware. Nucala injected into left back of arm. Patient has future appointments. Patient to home in care of self.

## 2023-09-02 LAB
BACTERIA STL CULT: NORMAL
E COLI SXT1+2 STL IA: NORMAL
SIGNIFICANT IND 70042: NORMAL
SITE SITE: NORMAL
SOURCE SOURCE: NORMAL

## 2023-09-28 ENCOUNTER — PATIENT MESSAGE (OUTPATIENT)
Dept: MEDICAL GROUP | Facility: MEDICAL CENTER | Age: 78
End: 2023-09-28
Payer: MEDICARE

## 2023-09-28 DIAGNOSIS — I10 ESSENTIAL HYPERTENSION: ICD-10-CM

## 2023-09-28 RX ORDER — LOSARTAN POTASSIUM 25 MG/1
25 TABLET ORAL
Qty: 90 TABLET | Refills: 3 | Status: SHIPPED | OUTPATIENT
Start: 2023-09-28

## 2023-10-06 ENCOUNTER — OUTPATIENT INFUSION SERVICES (OUTPATIENT)
Dept: ONCOLOGY | Facility: MEDICAL CENTER | Age: 78
End: 2023-10-06
Attending: INTERNAL MEDICINE
Payer: MEDICARE

## 2023-10-06 VITALS
SYSTOLIC BLOOD PRESSURE: 119 MMHG | OXYGEN SATURATION: 98 % | HEIGHT: 62 IN | WEIGHT: 139.99 LBS | RESPIRATION RATE: 18 BRPM | BODY MASS INDEX: 25.76 KG/M2 | DIASTOLIC BLOOD PRESSURE: 62 MMHG | HEART RATE: 47 BPM | TEMPERATURE: 97 F

## 2023-10-06 DIAGNOSIS — J45.40 MODERATE PERSISTENT ASTHMA WITHOUT COMPLICATION: ICD-10-CM

## 2023-10-06 PROCEDURE — 96372 THER/PROPH/DIAG INJ SC/IM: CPT

## 2023-10-06 PROCEDURE — 700111 HCHG RX REV CODE 636 W/ 250 OVERRIDE (IP): Mod: JZ,JG | Performed by: INTERNAL MEDICINE

## 2023-10-06 RX ADMIN — MEPOLIZUMAB 100 MG: 100 INJECTION, POWDER, FOR SOLUTION SUBCUTANEOUS at 11:30

## 2023-10-06 ASSESSMENT — FIBROSIS 4 INDEX: FIB4 SCORE: 0.81

## 2023-10-06 NOTE — PROGRESS NOTES
Rossy arrived ambulatory to the infusion center for Nucala injection. Nucala injected into right back of arm.  Next appointment confirmed. She was then discharged home in stable condition.

## 2023-11-03 ENCOUNTER — OUTPATIENT INFUSION SERVICES (OUTPATIENT)
Dept: ONCOLOGY | Facility: MEDICAL CENTER | Age: 78
End: 2023-11-03
Attending: INTERNAL MEDICINE
Payer: MEDICARE

## 2023-11-03 VITALS
HEART RATE: 79 BPM | TEMPERATURE: 97 F | DIASTOLIC BLOOD PRESSURE: 73 MMHG | WEIGHT: 142.2 LBS | RESPIRATION RATE: 18 BRPM | SYSTOLIC BLOOD PRESSURE: 134 MMHG | HEIGHT: 61 IN | OXYGEN SATURATION: 91 % | BODY MASS INDEX: 26.85 KG/M2

## 2023-11-03 DIAGNOSIS — J45.40 MODERATE PERSISTENT ASTHMA WITHOUT COMPLICATION: ICD-10-CM

## 2023-11-03 PROCEDURE — 96372 THER/PROPH/DIAG INJ SC/IM: CPT

## 2023-11-03 PROCEDURE — 700111 HCHG RX REV CODE 636 W/ 250 OVERRIDE (IP): Mod: JZ,JG | Performed by: INTERNAL MEDICINE

## 2023-11-03 RX ADMIN — MEPOLIZUMAB 100 MG: 100 INJECTION, POWDER, FOR SOLUTION SUBCUTANEOUS at 12:04

## 2023-11-03 ASSESSMENT — FIBROSIS 4 INDEX: FIB4 SCORE: 0.81

## 2023-11-03 NOTE — PROGRESS NOTES
Pt arrived to [facility], ambulatory, for Nucala injection. Pt voices no complaints. Nucala injected into the R-back arm with no complications. Pt left [facility] with no s/sx of distress. Follow up appointment confirmed.

## 2023-11-27 ENCOUNTER — HOSPITAL ENCOUNTER (OUTPATIENT)
Dept: LAB | Facility: MEDICAL CENTER | Age: 78
End: 2023-11-27
Attending: FAMILY MEDICINE
Payer: MEDICARE

## 2023-11-27 DIAGNOSIS — R74.8 ALKALINE PHOSPHATASE ELEVATION: ICD-10-CM

## 2023-11-27 PROCEDURE — 36415 COLL VENOUS BLD VENIPUNCTURE: CPT

## 2023-11-27 PROCEDURE — 84080 ASSAY ALKALINE PHOSPHATASES: CPT

## 2023-11-27 PROCEDURE — 84075 ASSAY ALKALINE PHOSPHATASE: CPT

## 2023-11-29 ENCOUNTER — PATIENT MESSAGE (OUTPATIENT)
Dept: HEALTH INFORMATION MANAGEMENT | Facility: OTHER | Age: 78
End: 2023-11-29

## 2023-11-30 LAB
ALP BONE SERPL-CCNC: 44 U/L (ref 0–55)
ALP ISOS SERPL HS-CCNC: 0 U/L
ALP LIVER SERPL-CCNC: 76 U/L (ref 0–94)
ALP SERPL-CCNC: 120 U/L (ref 40–120)

## 2023-12-01 ENCOUNTER — OUTPATIENT INFUSION SERVICES (OUTPATIENT)
Dept: ONCOLOGY | Facility: MEDICAL CENTER | Age: 78
End: 2023-12-01
Attending: INTERNAL MEDICINE
Payer: MEDICARE

## 2023-12-01 VITALS
WEIGHT: 143.08 LBS | OXYGEN SATURATION: 93 % | DIASTOLIC BLOOD PRESSURE: 60 MMHG | SYSTOLIC BLOOD PRESSURE: 151 MMHG | BODY MASS INDEX: 27.01 KG/M2 | HEIGHT: 61 IN | TEMPERATURE: 97.5 F | HEART RATE: 73 BPM | RESPIRATION RATE: 18 BRPM

## 2023-12-01 DIAGNOSIS — J45.40 MODERATE PERSISTENT ASTHMA WITHOUT COMPLICATION: ICD-10-CM

## 2023-12-01 PROCEDURE — 96372 THER/PROPH/DIAG INJ SC/IM: CPT

## 2023-12-01 PROCEDURE — 700111 HCHG RX REV CODE 636 W/ 250 OVERRIDE (IP): Mod: JZ,JG | Performed by: INTERNAL MEDICINE

## 2023-12-01 RX ADMIN — MEPOLIZUMAB 100 MG: 100 INJECTION, POWDER, FOR SOLUTION SUBCUTANEOUS at 11:44

## 2023-12-01 ASSESSMENT — FIBROSIS 4 INDEX: FIB4 SCORE: 0.81

## 2023-12-01 NOTE — PROGRESS NOTES
Patient to [facility] for Nucala injection. Nucala injected into right back of arm. Emailed scheduling for future appointments. Patient to home.

## 2023-12-07 ENCOUNTER — OFFICE VISIT (OUTPATIENT)
Dept: MEDICAL GROUP | Facility: MEDICAL CENTER | Age: 78
End: 2023-12-07
Payer: MEDICARE

## 2023-12-07 VITALS
DIASTOLIC BLOOD PRESSURE: 60 MMHG | TEMPERATURE: 97.2 F | HEIGHT: 61 IN | WEIGHT: 141 LBS | HEART RATE: 68 BPM | SYSTOLIC BLOOD PRESSURE: 110 MMHG | OXYGEN SATURATION: 94 % | BODY MASS INDEX: 26.62 KG/M2

## 2023-12-07 DIAGNOSIS — E11.69 TYPE 2 DIABETES MELLITUS WITH OTHER SPECIFIED COMPLICATION, WITHOUT LONG-TERM CURRENT USE OF INSULIN (HCC): ICD-10-CM

## 2023-12-07 DIAGNOSIS — E55.9 VITAMIN D DEFICIENCY: ICD-10-CM

## 2023-12-07 DIAGNOSIS — Z12.31 ENCOUNTER FOR SCREENING MAMMOGRAM FOR MALIGNANT NEOPLASM OF BREAST: ICD-10-CM

## 2023-12-07 DIAGNOSIS — J45.40 MODERATE PERSISTENT ASTHMA WITHOUT COMPLICATION: ICD-10-CM

## 2023-12-07 PROCEDURE — 99214 OFFICE O/P EST MOD 30 MIN: CPT | Performed by: FAMILY MEDICINE

## 2023-12-07 PROCEDURE — 3074F SYST BP LT 130 MM HG: CPT | Performed by: FAMILY MEDICINE

## 2023-12-07 PROCEDURE — 3078F DIAST BP <80 MM HG: CPT | Performed by: FAMILY MEDICINE

## 2023-12-07 ASSESSMENT — ENCOUNTER SYMPTOMS
FEVER: 0
PALPITATIONS: 0
CHILLS: 0

## 2023-12-07 ASSESSMENT — FIBROSIS 4 INDEX: FIB4 SCORE: 0.81

## 2023-12-07 NOTE — ASSESSMENT & PLAN NOTE
Chronic problem, stable, continue Tresiba and short acting insulin as recommended by endocrinology.  Continue Jardiance 10 mg daily.  Recheck labs in 6-month

## 2023-12-07 NOTE — PROGRESS NOTES
FAMILY MEDICINE VISIT                                                               Chief complaint::Diagnoses of Moderate persistent asthma without complication, Type 2 diabetes mellitus with other specified complication, with long-term current use of insulin (HCC), Vitamin D deficiency, and Encounter for screening mammogram for malignant neoplasm of breast were pertinent to this visit.    History of present illness: Rossy Lopez is a 78 y.o. female who presented for lab follow up.    Problem   Moderate Persistent Asthma    She has history of moderate persistent asthma, currently on Asmanex 2 times daily and albuterol inhaler as needed.  No recent asthma exacerbation.     Vitamin D Deficiency    She is taking vitamin D supplementation.  Recent vitamin D level came back normal at 71.     Type 2 Diabetes Mellitus With Other Specified Complication (Hcc)    She is currently following with endocrinology [facility] at [place].  She is on Tresiba 24 units at bedtime and short acting insulin LYUMJEV.  She is on Jardiance 10 mg daily.  Reports that after starting insulin blood sugars are much better.  She had A1c done at endocrinology office and it came back at 6.3.                Review of systems:     Review of Systems   Constitutional:  Negative for chills and fever.   Cardiovascular:  Negative for chest pain, palpitations and leg swelling.        Medications and Allergies:     Current Outpatient Medications   Medication Sig Dispense Refill    losartan (COZAAR) 25 MG Tab Take 1 Tablet by mouth every day. 90 Tablet 3    omeprazole (PRILOSEC) 20 MG delayed-release capsule Take 1 Capsule by mouth every day. 90 Capsule 3    hydroCHLOROthiazide (HYDRODIURIL) 25 MG Tab Take 1 Tablet by mouth every day. 90 Tablet 3    LYUMJEV KWIKPEN 100 UNIT/ML Solution Pen-injector 5 units SC tid      TRESIBA FLEXTOUCH 100 UNIT/ML Solution Pen-injector ADMINISTER 24 TO 60 UNITS UNDER THE SKIN DAILY AS DIRECTED      atorvastatin (LIPITOR) 40  "MG Tab Take 1 Tablet by mouth at bedtime. 90 Tablet 1    cyclobenzaprine (FLEXERIL) 10 mg Tab Take 1 Tablet by mouth at bedtime as needed for Moderate Pain. 30 Tablet 1    nystatin (MYCOSTATIN) powder APPLY TO GROIN EVERY DAY      JARDIANCE 10 MG Tab Take 1 Tablet by mouth every day.      ASMANEX  MCG/ACT Aerosol Inhale 2 Puffs 2 (two) times a day.      triamcinolone acetonide (KENALOG) 0.1 % Cream Apply  topically 2 times a day.      glucose blood strip Use as prn for checking BS levels 3 times a day. Dx: E11.9 300 Strip 3    PREVIDENT 5000 DRY MOUTH 1.1 % Gel BRUSH UTD      albuterol 108 (90 Base) MCG/ACT Aero Soln inhalation aerosol Inhale 2 Puffs by mouth every four hours as needed for Shortness of Breath.      Ca Phosphate-Cholecalciferol 250-500 MG-UNIT Chew Tab Take 1 Tab by mouth 2 Times a Day.      Multiple Vitamins-Minerals (MULTIVITAMIN ADULT PO) Take 1 Tab by mouth every day.       No current facility-administered medications for this visit.          Vitals:    /60 (BP Location: Right arm, Patient Position: Sitting, BP Cuff Size: Adult)   Pulse 68   Temp 36.2 °C (97.2 °F) (Temporal)   Ht 1.56 m (5' 1.42\")   Wt 64 kg (141 lb)   SpO2 94%  Body mass index is 26.28 kg/m².    Physical Exam:     Physical Exam  Constitutional:       Appearance: Normal appearance. She is well-developed and well-groomed.   HENT:      Head: Normocephalic and atraumatic.      Right Ear: External ear normal.      Left Ear: External ear normal.   Eyes:      General:         Right eye: No discharge.         Left eye: No discharge.      Conjunctiva/sclera: Conjunctivae normal.   Cardiovascular:      Rate and Rhythm: Normal rate.   Pulmonary:      Effort: Pulmonary effort is normal. No respiratory distress.   Musculoskeletal:      Cervical back: Neck supple.   Skin:     Findings: No rash.   Neurological:      Mental Status: She is alert.   Psychiatric:         Mood and Affect: Mood and affect normal.         Behavior: " Behavior normal.          Labs:  I reviewed with patient recent labs resulted on 11/27/2023.    Assessment/Plan:    HCC Gap Form    Diagnosis to address: J45.40 - Moderate persistent asthma without complication  Assessment and plan: Chronic, stable. Continue with current defined treatment plan: Asmanex inhaler 2 puffs 2 times daily and albuterol inhaler as needed.. Follow-up at least annually.  Last edited 12/07/23 12:15 PST by Shan Alvarado M.D.          Problem List Items Addressed This Visit       Type 2 diabetes mellitus with other specified complication (HCC)     Chronic problem, stable, continue Tresiba and short acting insulin as recommended by endocrinology.  Continue Jardiance 10 mg daily.  Recheck labs in 6-month         Relevant Orders    Comp Metabolic Panel    Lipid Profile    MICROALBUMIN CREAT RATIO URINE    Vitamin D deficiency     Chronic problem, stable, continue vitamin D supplementation.  Recheck labs with next blood test.         Relevant Orders    VITAMIN D,25 HYDROXY (DEFICIENCY)    Moderate persistent asthma     Chronic problem, stable, continue same inhalers: Asmanex and albuterol          Other Visit Diagnoses       Encounter for screening mammogram for malignant neoplasm of breast        Relevant Orders    MA-SCREENING MAMMO BILAT W/TOMOSYNTHESIS W/CAD             Please note that this dictation was created using voice recognition software. I have made every reasonable attempt to correct obvious errors, but I expect that there are errors of grammar and possibly content that I did not discover before finalizing the note.    Follow up in 6 months for annual physical and lab follow-up.     DISPLAY PLAN FREE TEXT

## 2023-12-08 ENCOUNTER — PATIENT MESSAGE (OUTPATIENT)
Dept: MEDICAL GROUP | Facility: MEDICAL CENTER | Age: 78
End: 2023-12-08
Payer: MEDICARE

## 2023-12-08 DIAGNOSIS — E78.2 MIXED HYPERLIPIDEMIA: ICD-10-CM

## 2023-12-08 RX ORDER — ATORVASTATIN CALCIUM 40 MG/1
40 TABLET, FILM COATED ORAL
Qty: 90 TABLET | Refills: 3 | Status: SHIPPED | OUTPATIENT
Start: 2023-12-08

## 2023-12-29 ENCOUNTER — OUTPATIENT INFUSION SERVICES (OUTPATIENT)
Dept: ONCOLOGY | Facility: MEDICAL CENTER | Age: 78
End: 2023-12-29
Attending: INTERNAL MEDICINE
Payer: MEDICARE

## 2023-12-29 VITALS
RESPIRATION RATE: 18 BRPM | BODY MASS INDEX: 27.14 KG/M2 | DIASTOLIC BLOOD PRESSURE: 66 MMHG | HEIGHT: 61 IN | TEMPERATURE: 97.4 F | OXYGEN SATURATION: 98 % | HEART RATE: 72 BPM | SYSTOLIC BLOOD PRESSURE: 149 MMHG | WEIGHT: 143.74 LBS

## 2023-12-29 DIAGNOSIS — J45.40 MODERATE PERSISTENT ASTHMA WITHOUT COMPLICATION: ICD-10-CM

## 2023-12-29 PROCEDURE — 700111 HCHG RX REV CODE 636 W/ 250 OVERRIDE (IP): Mod: JZ,JG | Performed by: INTERNAL MEDICINE

## 2023-12-29 PROCEDURE — 96372 THER/PROPH/DIAG INJ SC/IM: CPT

## 2023-12-29 RX ADMIN — MEPOLIZUMAB 100 MG: 100 INJECTION, POWDER, FOR SOLUTION SUBCUTANEOUS at 12:09

## 2023-12-29 ASSESSMENT — FIBROSIS 4 INDEX: FIB4 SCORE: 0.81

## 2023-12-29 NOTE — PROGRESS NOTES
Pt presented to Infusion for monthly Nucala injection. POC discussed and pt verbalized understanding. Pt denies pain, asthma exacerbation or s/s of acute illness today. Nucala injection administered per MAR, band-aid applied to site and pt tolerated well. No s/s of reactions or complications noted. Future appts confirmed and  emailed for additional appts;  pt confirmed [employer] access to view appts. Pt discharged to self care in [facility].

## 2024-01-09 ENCOUNTER — HOSPITAL ENCOUNTER (OUTPATIENT)
Dept: RADIOLOGY | Facility: MEDICAL CENTER | Age: 79
End: 2024-01-09
Attending: FAMILY MEDICINE
Payer: MEDICARE

## 2024-01-09 DIAGNOSIS — Z12.31 ENCOUNTER FOR SCREENING MAMMOGRAM FOR BREAST CANCER: ICD-10-CM

## 2024-01-09 DIAGNOSIS — R92.8 ABNORMAL MAMMOGRAM OF LEFT BREAST: ICD-10-CM

## 2024-01-09 PROCEDURE — 77067 SCR MAMMO BI INCL CAD: CPT

## 2024-01-18 ENCOUNTER — HOSPITAL ENCOUNTER (OUTPATIENT)
Dept: RADIOLOGY | Facility: MEDICAL CENTER | Age: 79
End: 2024-01-18
Attending: FAMILY MEDICINE
Payer: MEDICARE

## 2024-01-18 DIAGNOSIS — R92.8 ABNORMAL MAMMOGRAM OF LEFT BREAST: ICD-10-CM

## 2024-01-18 PROCEDURE — G0279 TOMOSYNTHESIS, MAMMO: HCPCS

## 2024-01-18 PROCEDURE — 77065 DX MAMMO INCL CAD UNI: CPT | Mod: LT

## 2024-01-26 ENCOUNTER — OUTPATIENT INFUSION SERVICES (OUTPATIENT)
Dept: ONCOLOGY | Facility: MEDICAL CENTER | Age: 79
End: 2024-01-26
Attending: INTERNAL MEDICINE
Payer: MEDICARE

## 2024-01-26 VITALS
HEART RATE: 72 BPM | SYSTOLIC BLOOD PRESSURE: 132 MMHG | HEIGHT: 62 IN | TEMPERATURE: 97.8 F | BODY MASS INDEX: 26.33 KG/M2 | RESPIRATION RATE: 18 BRPM | DIASTOLIC BLOOD PRESSURE: 64 MMHG | WEIGHT: 143.08 LBS | OXYGEN SATURATION: 97 %

## 2024-01-26 DIAGNOSIS — J45.40 MODERATE PERSISTENT ASTHMA WITHOUT COMPLICATION: ICD-10-CM

## 2024-01-26 PROCEDURE — 700111 HCHG RX REV CODE 636 W/ 250 OVERRIDE (IP): Mod: JZ,JG | Performed by: INTERNAL MEDICINE

## 2024-01-26 PROCEDURE — 96372 THER/PROPH/DIAG INJ SC/IM: CPT

## 2024-01-26 RX ADMIN — MEPOLIZUMAB 100 MG: 100 INJECTION, POWDER, FOR SOLUTION SUBCUTANEOUS at 11:43

## 2024-01-26 ASSESSMENT — FIBROSIS 4 INDEX: FIB4 SCORE: 0.81

## 2024-02-23 ENCOUNTER — OUTPATIENT INFUSION SERVICES (OUTPATIENT)
Dept: ONCOLOGY | Facility: MEDICAL CENTER | Age: 79
End: 2024-02-23
Attending: INTERNAL MEDICINE
Payer: MEDICARE

## 2024-02-23 VITALS
TEMPERATURE: 96.8 F | SYSTOLIC BLOOD PRESSURE: 122 MMHG | RESPIRATION RATE: 18 BRPM | WEIGHT: 143.08 LBS | HEIGHT: 62 IN | OXYGEN SATURATION: 91 % | DIASTOLIC BLOOD PRESSURE: 66 MMHG | HEART RATE: 70 BPM | BODY MASS INDEX: 26.33 KG/M2

## 2024-02-23 DIAGNOSIS — J45.40 MODERATE PERSISTENT ASTHMA WITHOUT COMPLICATION: ICD-10-CM

## 2024-02-23 PROCEDURE — 96372 THER/PROPH/DIAG INJ SC/IM: CPT

## 2024-02-23 PROCEDURE — 700111 HCHG RX REV CODE 636 W/ 250 OVERRIDE (IP): Mod: JZ,JG | Performed by: INTERNAL MEDICINE

## 2024-02-23 RX ADMIN — MEPOLIZUMAB 100 MG: 100 INJECTION, POWDER, FOR SOLUTION SUBCUTANEOUS at 12:21

## 2024-02-23 ASSESSMENT — FIBROSIS 4 INDEX: FIB4 SCORE: 0.83

## 2024-02-23 NOTE — PROGRESS NOTES
Ms Lopez is here today for Nucala injection.   She reports asthma has been well controlled.     Nucala given SQ to the back of her right arm and was tolerated well.     Discharged in stable condition.

## 2024-03-22 ENCOUNTER — OUTPATIENT INFUSION SERVICES (OUTPATIENT)
Dept: ONCOLOGY | Facility: MEDICAL CENTER | Age: 79
End: 2024-03-22
Attending: INTERNAL MEDICINE
Payer: MEDICARE

## 2024-03-22 VITALS
SYSTOLIC BLOOD PRESSURE: 94 MMHG | OXYGEN SATURATION: 98 % | WEIGHT: 141.09 LBS | BODY MASS INDEX: 25.96 KG/M2 | TEMPERATURE: 98 F | DIASTOLIC BLOOD PRESSURE: 71 MMHG | HEIGHT: 62 IN | HEART RATE: 70 BPM | RESPIRATION RATE: 18 BRPM

## 2024-03-22 DIAGNOSIS — J45.40 MODERATE PERSISTENT ASTHMA WITHOUT COMPLICATION: ICD-10-CM

## 2024-03-22 PROCEDURE — 700111 HCHG RX REV CODE 636 W/ 250 OVERRIDE (IP): Mod: JZ,JG | Performed by: INTERNAL MEDICINE

## 2024-03-22 PROCEDURE — 96372 THER/PROPH/DIAG INJ SC/IM: CPT

## 2024-03-22 RX ADMIN — MEPOLIZUMAB 100 MG: 100 INJECTION, POWDER, FOR SOLUTION SUBCUTANEOUS at 11:55

## 2024-03-22 ASSESSMENT — FIBROSIS 4 INDEX: FIB4 SCORE: 0.83

## 2024-04-19 ENCOUNTER — OUTPATIENT INFUSION SERVICES (OUTPATIENT)
Dept: ONCOLOGY | Facility: MEDICAL CENTER | Age: 79
End: 2024-04-19
Attending: INTERNAL MEDICINE
Payer: MEDICARE

## 2024-04-19 VITALS
DIASTOLIC BLOOD PRESSURE: 66 MMHG | HEART RATE: 82 BPM | TEMPERATURE: 97.1 F | SYSTOLIC BLOOD PRESSURE: 146 MMHG | OXYGEN SATURATION: 93 % | RESPIRATION RATE: 18 BRPM

## 2024-04-19 DIAGNOSIS — J45.40 MODERATE PERSISTENT ASTHMA WITHOUT COMPLICATION: ICD-10-CM

## 2024-04-19 PROCEDURE — 96372 THER/PROPH/DIAG INJ SC/IM: CPT

## 2024-04-19 PROCEDURE — 700111 HCHG RX REV CODE 636 W/ 250 OVERRIDE (IP): Mod: JZ,JG | Performed by: INTERNAL MEDICINE

## 2024-04-19 RX ORDER — DIPHENHYDRAMINE HCL 25 MG
25 TABLET ORAL PRN
COMMUNITY

## 2024-04-19 RX ADMIN — MEPOLIZUMAB 100 MG: 100 INJECTION, POWDER, FOR SOLUTION SUBCUTANEOUS at 08:15

## 2024-04-19 NOTE — PROGRESS NOTES
Pt arrives to [facility] for Nucala injection.  Pt denies s/sx of infection or other complaints today.  Pt reports her asthma symptoms are managed by Nucala. Nucala given SC to RUE.  Pt declines Band-Aid to site d/t tape allergy.  Confirmed next appt time on 5/17/24 with pt.  Pt dc home to self care.

## 2024-05-10 NOTE — PROGRESS NOTES
Pt returns to [facility] for Nucala injection.  Pt denies s/sx of infection or worsening asthma symptoms.  Nucala given SC to back of RUE.  Confirmed next appt with pt.  Pt dc home to self care.  Email sent to scheduling dept to set up future appts at pt's preferred time.     Rtc to switch f/u appt with Dr.Charles Bustamante. On Friday 5/10/24 at 1:30. Due to patient has a severe migraine.

## 2024-05-17 ENCOUNTER — OUTPATIENT INFUSION SERVICES (OUTPATIENT)
Dept: ONCOLOGY | Facility: MEDICAL CENTER | Age: 79
End: 2024-05-17
Attending: INTERNAL MEDICINE
Payer: MEDICARE

## 2024-05-17 VITALS
BODY MASS INDEX: 25.96 KG/M2 | TEMPERATURE: 97.4 F | HEIGHT: 62 IN | SYSTOLIC BLOOD PRESSURE: 122 MMHG | RESPIRATION RATE: 18 BRPM | OXYGEN SATURATION: 92 % | HEART RATE: 88 BPM | DIASTOLIC BLOOD PRESSURE: 83 MMHG | WEIGHT: 141.09 LBS

## 2024-05-17 DIAGNOSIS — J45.40 MODERATE PERSISTENT ASTHMA WITHOUT COMPLICATION: ICD-10-CM

## 2024-05-17 RX ADMIN — MEPOLIZUMAB 100 MG: 100 INJECTION, POWDER, FOR SOLUTION SUBCUTANEOUS at 11:16

## 2024-05-17 ASSESSMENT — FIBROSIS 4 INDEX: FIB4 SCORE: 0.83

## 2024-05-17 NOTE — PROGRESS NOTES
Pt arrives to [facility] for Nucala injection.  Pt denies s/sx of infection or other complaints today.  Pt reports her asthma symptoms are managed by Nucala. Nucala given SC to RUE.  Pt declines Band-Aid to site d/t tape allergy.  Confirmed next appt time on 6/14/24 with pt.  Pt dc home to self care.

## 2024-06-04 ENCOUNTER — HOSPITAL ENCOUNTER (OUTPATIENT)
Dept: LAB | Facility: MEDICAL CENTER | Age: 79
End: 2024-06-04
Attending: FAMILY MEDICINE
Payer: MEDICARE

## 2024-06-04 DIAGNOSIS — E55.9 VITAMIN D DEFICIENCY: ICD-10-CM

## 2024-06-04 DIAGNOSIS — E11.69 TYPE 2 DIABETES MELLITUS WITH OTHER SPECIFIED COMPLICATION, WITHOUT LONG-TERM CURRENT USE OF INSULIN (HCC): ICD-10-CM

## 2024-06-04 LAB
25(OH)D3 SERPL-MCNC: 78 NG/ML (ref 30–100)
ALBUMIN SERPL BCP-MCNC: 4.2 G/DL (ref 3.2–4.9)
ALBUMIN/GLOB SERPL: 1.4 G/DL
ALP SERPL-CCNC: 128 U/L (ref 30–99)
ALT SERPL-CCNC: 50 U/L (ref 2–50)
ANION GAP SERPL CALC-SCNC: 13 MMOL/L (ref 7–16)
AST SERPL-CCNC: 41 U/L (ref 12–45)
BILIRUB SERPL-MCNC: 0.5 MG/DL (ref 0.1–1.5)
BUN SERPL-MCNC: 17 MG/DL (ref 8–22)
CALCIUM ALBUM COR SERPL-MCNC: 9.2 MG/DL (ref 8.5–10.5)
CALCIUM SERPL-MCNC: 9.4 MG/DL (ref 8.5–10.5)
CHLORIDE SERPL-SCNC: 97 MMOL/L (ref 96–112)
CHOLEST SERPL-MCNC: 123 MG/DL (ref 100–199)
CO2 SERPL-SCNC: 28 MMOL/L (ref 20–33)
CREAT SERPL-MCNC: 0.53 MG/DL (ref 0.5–1.4)
CREAT UR-MCNC: 59.75 MG/DL
GFR SERPLBLD CREATININE-BSD FMLA CKD-EPI: 95 ML/MIN/1.73 M 2
GLOBULIN SER CALC-MCNC: 3.1 G/DL (ref 1.9–3.5)
GLUCOSE SERPL-MCNC: 110 MG/DL (ref 65–99)
HDLC SERPL-MCNC: 60 MG/DL
LDLC SERPL CALC-MCNC: 51 MG/DL
MICROALBUMIN UR-MCNC: <1.2 MG/DL
MICROALBUMIN/CREAT UR: NORMAL MG/G (ref 0–30)
POTASSIUM SERPL-SCNC: 3.7 MMOL/L (ref 3.6–5.5)
PROT SERPL-MCNC: 7.3 G/DL (ref 6–8.2)
SODIUM SERPL-SCNC: 138 MMOL/L (ref 135–145)
TRIGL SERPL-MCNC: 59 MG/DL (ref 0–149)

## 2024-06-04 PROCEDURE — 80061 LIPID PANEL: CPT

## 2024-06-04 PROCEDURE — 80053 COMPREHEN METABOLIC PANEL: CPT

## 2024-06-04 PROCEDURE — 82306 VITAMIN D 25 HYDROXY: CPT

## 2024-06-04 PROCEDURE — 82043 UR ALBUMIN QUANTITATIVE: CPT

## 2024-06-04 PROCEDURE — 36415 COLL VENOUS BLD VENIPUNCTURE: CPT

## 2024-06-04 PROCEDURE — 82570 ASSAY OF URINE CREATININE: CPT

## 2024-06-09 SDOH — ECONOMIC STABILITY: INCOME INSECURITY: HOW HARD IS IT FOR YOU TO PAY FOR THE VERY BASICS LIKE FOOD, HOUSING, MEDICAL CARE, AND HEATING?: NOT VERY HARD

## 2024-06-09 SDOH — ECONOMIC STABILITY: FOOD INSECURITY: WITHIN THE PAST 12 MONTHS, THE FOOD YOU BOUGHT JUST DIDN'T LAST AND YOU DIDN'T HAVE MONEY TO GET MORE.: NEVER TRUE

## 2024-06-09 SDOH — ECONOMIC STABILITY: TRANSPORTATION INSECURITY
IN THE PAST 12 MONTHS, HAS THE LACK OF TRANSPORTATION KEPT YOU FROM MEDICAL APPOINTMENTS OR FROM GETTING MEDICATIONS?: NO

## 2024-06-09 SDOH — ECONOMIC STABILITY: HOUSING INSECURITY: IN THE LAST 12 MONTHS, HOW MANY PLACES HAVE YOU LIVED?: 1

## 2024-06-09 SDOH — ECONOMIC STABILITY: INCOME INSECURITY: IN THE LAST 12 MONTHS, WAS THERE A TIME WHEN YOU WERE NOT ABLE TO PAY THE MORTGAGE OR RENT ON TIME?: NO

## 2024-06-09 SDOH — HEALTH STABILITY: PHYSICAL HEALTH: ON AVERAGE, HOW MANY MINUTES DO YOU ENGAGE IN EXERCISE AT THIS LEVEL?: 20 MIN

## 2024-06-09 SDOH — ECONOMIC STABILITY: FOOD INSECURITY: WITHIN THE PAST 12 MONTHS, YOU WORRIED THAT YOUR FOOD WOULD RUN OUT BEFORE YOU GOT MONEY TO BUY MORE.: NEVER TRUE

## 2024-06-09 SDOH — ECONOMIC STABILITY: TRANSPORTATION INSECURITY
IN THE PAST 12 MONTHS, HAS LACK OF RELIABLE TRANSPORTATION KEPT YOU FROM MEDICAL APPOINTMENTS, MEETINGS, WORK OR FROM GETTING THINGS NEEDED FOR DAILY LIVING?: NO

## 2024-06-09 SDOH — ECONOMIC STABILITY: TRANSPORTATION INSECURITY
IN THE PAST 12 MONTHS, HAS LACK OF TRANSPORTATION KEPT YOU FROM MEETINGS, WORK, OR FROM GETTING THINGS NEEDED FOR DAILY LIVING?: NO

## 2024-06-09 SDOH — HEALTH STABILITY: PHYSICAL HEALTH: ON AVERAGE, HOW MANY DAYS PER WEEK DO YOU ENGAGE IN MODERATE TO STRENUOUS EXERCISE (LIKE A BRISK WALK)?: 6 DAYS

## 2024-06-09 ASSESSMENT — LIFESTYLE VARIABLES
HOW MANY STANDARD DRINKS CONTAINING ALCOHOL DO YOU HAVE ON A TYPICAL DAY: 1 OR 2
SKIP TO QUESTIONS 9-10: 1
AUDIT-C TOTAL SCORE: 1
HOW OFTEN DO YOU HAVE SIX OR MORE DRINKS ON ONE OCCASION: NEVER
HOW OFTEN DO YOU HAVE A DRINK CONTAINING ALCOHOL: MONTHLY OR LESS

## 2024-06-09 ASSESSMENT — SOCIAL DETERMINANTS OF HEALTH (SDOH)
HOW OFTEN DO YOU HAVE A DRINK CONTAINING ALCOHOL: MONTHLY OR LESS
HOW MANY DRINKS CONTAINING ALCOHOL DO YOU HAVE ON A TYPICAL DAY WHEN YOU ARE DRINKING: 1 OR 2
DO YOU BELONG TO ANY CLUBS OR ORGANIZATIONS SUCH AS CHURCH GROUPS UNIONS, FRATERNAL OR ATHLETIC GROUPS, OR SCHOOL GROUPS?: NO
HOW OFTEN DO YOU ATTEND CHURCH OR RELIGIOUS SERVICES?: NEVER
HOW OFTEN DO YOU ATTENT MEETINGS OF THE CLUB OR ORGANIZATION YOU BELONG TO?: NEVER
WITHIN THE PAST 12 MONTHS, YOU WORRIED THAT YOUR FOOD WOULD RUN OUT BEFORE YOU GOT THE MONEY TO BUY MORE: NEVER TRUE
HOW HARD IS IT FOR YOU TO PAY FOR THE VERY BASICS LIKE FOOD, HOUSING, MEDICAL CARE, AND HEATING?: NOT VERY HARD
HOW OFTEN DO YOU HAVE SIX OR MORE DRINKS ON ONE OCCASION: NEVER
HOW OFTEN DO YOU ATTEND CHURCH OR RELIGIOUS SERVICES?: NEVER
IN A TYPICAL WEEK, HOW MANY TIMES DO YOU TALK ON THE PHONE WITH FAMILY, FRIENDS, OR NEIGHBORS?: MORE THAN THREE TIMES A WEEK
DO YOU BELONG TO ANY CLUBS OR ORGANIZATIONS SUCH AS CHURCH GROUPS UNIONS, FRATERNAL OR ATHLETIC GROUPS, OR SCHOOL GROUPS?: NO
IN A TYPICAL WEEK, HOW MANY TIMES DO YOU TALK ON THE PHONE WITH FAMILY, FRIENDS, OR NEIGHBORS?: MORE THAN THREE TIMES A WEEK
HOW OFTEN DO YOU GET TOGETHER WITH FRIENDS OR RELATIVES?: PATIENT DECLINED
HOW OFTEN DO YOU GET TOGETHER WITH FRIENDS OR RELATIVES?: PATIENT DECLINED
HOW OFTEN DO YOU ATTENT MEETINGS OF THE CLUB OR ORGANIZATION YOU BELONG TO?: NEVER

## 2024-06-11 ENCOUNTER — OFFICE VISIT (OUTPATIENT)
Dept: MEDICAL GROUP | Facility: MEDICAL CENTER | Age: 79
End: 2024-06-11
Payer: MEDICARE

## 2024-06-11 VITALS
OXYGEN SATURATION: 98 % | WEIGHT: 140.2 LBS | TEMPERATURE: 98.9 F | DIASTOLIC BLOOD PRESSURE: 62 MMHG | HEART RATE: 63 BPM | RESPIRATION RATE: 12 BRPM | SYSTOLIC BLOOD PRESSURE: 126 MMHG | BODY MASS INDEX: 26.47 KG/M2 | HEIGHT: 61 IN

## 2024-06-11 DIAGNOSIS — I10 PRIMARY HYPERTENSION: ICD-10-CM

## 2024-06-11 DIAGNOSIS — M21.612 BUNION OF LEFT FOOT: ICD-10-CM

## 2024-06-11 DIAGNOSIS — E78.2 MIXED HYPERLIPIDEMIA: Chronic | ICD-10-CM

## 2024-06-11 DIAGNOSIS — M54.50 CHRONIC BILATERAL LOW BACK PAIN WITHOUT SCIATICA: ICD-10-CM

## 2024-06-11 DIAGNOSIS — Z00.00 MEDICARE ANNUAL WELLNESS VISIT, INITIAL: ICD-10-CM

## 2024-06-11 DIAGNOSIS — L11.1 GROVER'S DISEASE: ICD-10-CM

## 2024-06-11 DIAGNOSIS — Z79.4 TYPE 2 DIABETES MELLITUS WITH OTHER SPECIFIED COMPLICATION, WITH LONG-TERM CURRENT USE OF INSULIN (HCC): ICD-10-CM

## 2024-06-11 DIAGNOSIS — K21.9 GASTROESOPHAGEAL REFLUX DISEASE WITHOUT ESOPHAGITIS: ICD-10-CM

## 2024-06-11 DIAGNOSIS — L20.89 PAPULAR ATOPIC DERMATITIS: ICD-10-CM

## 2024-06-11 DIAGNOSIS — E55.9 VITAMIN D DEFICIENCY: ICD-10-CM

## 2024-06-11 DIAGNOSIS — G89.29 CHRONIC BILATERAL LOW BACK PAIN WITHOUT SCIATICA: ICD-10-CM

## 2024-06-11 DIAGNOSIS — I10 ESSENTIAL HYPERTENSION, BENIGN: ICD-10-CM

## 2024-06-11 DIAGNOSIS — R74.8 ALKALINE PHOSPHATASE ELEVATION: ICD-10-CM

## 2024-06-11 DIAGNOSIS — E11.69 TYPE 2 DIABETES MELLITUS WITH OTHER SPECIFIED COMPLICATION, WITH LONG-TERM CURRENT USE OF INSULIN (HCC): ICD-10-CM

## 2024-06-11 DIAGNOSIS — Z80.0 FAMILY HISTORY OF COLON CANCER: ICD-10-CM

## 2024-06-11 DIAGNOSIS — J45.40 MODERATE PERSISTENT ASTHMA WITHOUT COMPLICATION: ICD-10-CM

## 2024-06-11 DIAGNOSIS — L50.0 ALLERGIC URTICARIA: ICD-10-CM

## 2024-06-11 PROBLEM — A09 DIARRHEA OF INFECTIOUS ORIGIN: Status: RESOLVED | Noted: 2023-08-25 | Resolved: 2024-06-11

## 2024-06-11 PROBLEM — D17.20 LIPOMA OF SHOULDER: Status: RESOLVED | Noted: 2021-08-31 | Resolved: 2024-06-11

## 2024-06-11 PROBLEM — E28.310 SYMPTOMATIC PREMATURE MENOPAUSE: Status: RESOLVED | Noted: 2022-01-18 | Resolved: 2024-06-11

## 2024-06-11 PROCEDURE — 3078F DIAST BP <80 MM HG: CPT | Performed by: FAMILY MEDICINE

## 2024-06-11 PROCEDURE — 3074F SYST BP LT 130 MM HG: CPT | Performed by: FAMILY MEDICINE

## 2024-06-11 PROCEDURE — G0438 PPPS, INITIAL VISIT: HCPCS | Performed by: FAMILY MEDICINE

## 2024-06-11 PROCEDURE — 99214 OFFICE O/P EST MOD 30 MIN: CPT | Mod: 25 | Performed by: FAMILY MEDICINE

## 2024-06-11 ASSESSMENT — ENCOUNTER SYMPTOMS
GENERAL WELL-BEING: GOOD
CHILLS: 0
PALPITATIONS: 0
SHORTNESS OF BREATH: 0
COUGH: 0
FEVER: 0
WHEEZING: 0

## 2024-06-11 ASSESSMENT — FIBROSIS 4 INDEX: FIB4 SCORE: 1.15

## 2024-06-11 ASSESSMENT — ACTIVITIES OF DAILY LIVING (ADL): BATHING_REQUIRES_ASSISTANCE: 0

## 2024-06-11 ASSESSMENT — PATIENT HEALTH QUESTIONNAIRE - PHQ9: CLINICAL INTERPRETATION OF PHQ2 SCORE: 0

## 2024-06-11 NOTE — ASSESSMENT & PLAN NOTE
Chronic condition, stable, continue to follow-up with endocrinology.  Continue Tresiba and short acting insulin at same dose.  Continue Jardiance 10 mg daily.

## 2024-06-11 NOTE — PROGRESS NOTES
Chief Complaint   Patient presents with    Annual Exam       HPI:  Rossy Lopez is a 79 y.o. here for Medicare Annual Wellness Visit.    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    Alkaline phosphatase elevation 08/25/2023    Papular atopic dermatitis 10/03/2022    Moderate persistent asthma 08/26/2022    Allergic urticaria 08/10/2022    Bunion of left foot 01/18/2022    Chronic bilateral low back pain without sciatica 09/16/2020    Vitamin D deficiency 08/25/2017    Gastroesophageal reflux disease without esophagitis 08/25/2017    Hypertension 06/19/2012    Type 2 diabetes mellitus with other specified complication (HCC) 06/19/2012    Hyperlipidemia 06/19/2012    Family history of colon cancer 06/19/2012    Essential hypertension, benign 06/19/2012       Current Outpatient Medications   Medication Sig Dispense Refill    diphenhydrAMINE (BENADRYL) 25 MG Tab Take 25 mg by mouth as needed for Itching.      atorvastatin (LIPITOR) 40 MG Tab Take 1 Tablet by mouth at bedtime. 90 Tablet 3    losartan (COZAAR) 25 MG Tab Take 1 Tablet by mouth every day. 90 Tablet 3    omeprazole (PRILOSEC) 20 MG delayed-release capsule Take 1 Capsule by mouth every day. 90 Capsule 3    hydroCHLOROthiazide (HYDRODIURIL) 25 MG Tab Take 1 Tablet by mouth every day. 90 Tablet 3    LYUMJEV KWIKPEN 100 UNIT/ML Solution Pen-injector 5 units SC tid      TRESIBA FLEXTOUCH 100 UNIT/ML Solution Pen-injector ADMINISTER 24 TO 60 UNITS UNDER THE SKIN DAILY AS DIRECTED      cyclobenzaprine (FLEXERIL) 10 mg Tab Take 1 Tablet by mouth at bedtime as needed for Moderate Pain. 30 Tablet 1    nystatin (MYCOSTATIN) powder APPLY TO GROIN EVERY DAY      JARDIANCE 10 MG Tab Take 1 Tablet by mouth every day.      ASMANEX  MCG/ACT Aerosol Inhale 2 Puffs 2 (two) times a day.      triamcinolone acetonide (KENALOG) 0.1 % Cream Apply  topically 2 times a day.      glucose blood strip Use as prn for checking BS levels 3  times a day. Dx: E11.9 300 Strip 3    PREVIDENT 5000 DRY MOUTH 1.1 % Gel BRUSH UTD      albuterol 108 (90 Base) MCG/ACT Aero Soln inhalation aerosol Inhale 2 Puffs by mouth every four hours as needed for Shortness of Breath.      Ca Phosphate-Cholecalciferol 250-500 MG-UNIT Chew Tab Take 1 Tab by mouth 2 Times a Day.      Multiple Vitamins-Minerals (MULTIVITAMIN ADULT PO) Take 1 Tab by mouth every day.       No current facility-administered medications for this visit.          Current supplements as per medication list.     Allergies: Other misc, Phisohex [hexachlorophene], Sulfa drugs, Cobalt, Tape, and Trulicity [dulaglutide]    Current social contact/activities: spends time with      She  reports that she quit smoking about 54 years ago. Her smoking use included cigarettes. She started smoking about 59 years ago. She has a 5 pack-year smoking history. She has never used smokeless tobacco. She reports that she does not currently use alcohol. She reports that she does not use drugs.  Counseling given: Not Answered  Tobacco comments: quit 1972      ROS:    Gait: Uses no assistive device  Ostomy: No  Other tubes: No  Amputations: No  Chronic oxygen use: No  Last eye exam: due next month  Wears hearing aids: No   : Denies any urinary leakage during the last 6 months    Screening:    Depression Screening  Little interest or pleasure in doing things?  0 - not at all  Feeling down, depressed , or hopeless? 0 - not at all  Patient Health Questionnaire Score: 0     If depressive symptoms identified deferred to follow up visit unless specifically addressed in assessment and plan.    Interpretation of PHQ-9 Total Score   Score Severity   1-4 No Depression   5-9 Mild Depression   10-14 Moderate Depression   15-19 Moderately Severe Depression   20-27 Severe Depression    Screening for Cognitive Impairment  Do you or any of your friends or family members have any concern about your memory? No  Three Minute Recall  (Leader, Season, Table) 3/3    Natalio clock face with all 12 numbers and set the hands to show 10 minutes after 11.  Yes    Cognitive concerns identified deferred for follow up unless specifically addressed in assessment and plan.    Fall Risk Assessment  Has the patient had two or more falls in the last year or any fall with injury in the last year?  No    Safety Assessment  Do you always wear your seatbelt?  Yes  Any changes to home needed to function safely? No  Difficulty hearing.  Yes  Patient counseled about all safety risks that were identified.    Functional Assessment ADLs  Are there any barriers preventing you from cooking for yourself or meeting nutritional needs?  No.    Are there any barriers preventing you from driving safely or obtaining transportation?  No.    Are there any barriers preventing you from using a telephone or calling for help?  No    Are there any barriers preventing you from shopping?  No.    Are there any barriers preventing you from taking care of your own finances?  No    Are there any barriers preventing you from managing your medications?  No    Are there any barriers preventing you from showering, bathing or dressing yourself? No    Are there any barriers preventing you from doing housework or laundry? No  Are there any barriers preventing you from using the toilet?No  Are you currently engaging in any exercise or physical activity?  Yes. Walk everyday    Self-Assessment of Health  What is your perception of your health? Good  Do you sleep more than six hours a night? Yes  In the past 7 days, how much did pain keep you from doing your normal work? None  Do you spend quality time with family or friends (virtually or in person)? Yes  Do you usually eat a heart healthy diet that constists of a variety of fruits, vegetables, whole grains and fiber? Yes  Do you eat foods high in fat and/or Fast Food more than three times per week? No    Advance Care Planning  Do you have an Advance  Directive, Living Will, Durable Power of , or POLST? Yes    Living Will Durable Power of           Health Maintenance Summary            A1c Screening (Every 6 Months) Tentatively due on 7/12/2024 01/12/2024  HM Hemoglobin a1c    07/11/2023  HM Hemoglobin a1c    07/10/2023  HM Hemoglobin a1c    09/15/2022  HEMOGLOBIN A1C    05/02/2022  POCT  A1C    Only the first 5 history entries have been loaded, but more history exists.              Diabetes: Retinopathy Screening (Yearly) Next due on 8/21/2024 08/21/2023  AMB EXTERNAL RETINAL SCREENING RESULTS    08/17/2023  AMB EXTERNAL RETINAL SCREENING RESULTS    08/15/2022  Done    08/05/2021  Reason not specified    09/24/2018  REFERRAL FOR RETINAL SCREENING EXAM    Only the first 5 history entries have been loaded, but more history exists.              Diabetes: Monofilament / LE Exam (Yearly) Next due on 8/25/2024 08/25/2023  Diabetic Monofilament LE Exam    08/25/2023  Smart[employer]: WORKFLOW - DIABETES - DIABETIC FOOT EXAM PERFORMED    07/06/2022  Smart[employer]: FINDINGS - TESTS - NEUROLOGY - MONOFILAMENT TEST - LEFT FOOT - NUMBER OF SITES TESTED    07/06/2022  Smart[employer]: FINDINGS - TESTS - NEUROLOGY - MONOFILAMENT TEST - RIGHT FOOT - NUMBER OF SITES TESTED    07/06/2022  Diabetic Monofilament LE Exam    Only the first 5 history entries have been loaded, but more history exists.              Ordered - Fasting Lipid Profile (Yearly) Ordered on 6/11/2024 06/04/2024  Lipid Profile    07/29/2023  Lipid Profile    01/10/2022  Lipid Profile    09/21/2021  Lipid Profile    09/08/2020  Lipid Profile    Only the first 5 history entries have been loaded, but more history exists.              Ordered - SERUM CREATININE (Yearly) Ordered on 6/11/2024 06/04/2024  Comp Metabolic Panel    07/29/2023  Comp Metabolic Panel    10/26/2022  Comp Metabolic Panel    01/10/2022  Comp Metabolic Panel    09/21/2021  Comp Metabolic Panel    Only the first 5  history entries have been loaded, but more history exists.              Annual Wellness Visit (Yearly) Next due on 6/11/2025 06/11/2024  Visit Dx: Medicare annual wellness visit, initial    06/11/2024  Level of Service: INITIAL ANNUAL WELLNESS VISIT-INCLUDES PPPS    01/03/2023  Visit Dx: Medicare annual wellness visit, subsequent    07/05/2022  Visit Dx: Medicare annual wellness visit, subsequent    01/03/2022  Visit Dx: Medicare annual wellness visit, subsequent    Only the first 5 history entries have been loaded, but more history exists.              Bone Density Scan (Every 5 Years) Next due on 2/10/2027      02/10/2022  DS-BONE DENSITY STUDY (DEXA)    03/02/2016  DS-BONE DENSITY STUDY (DEXA)    07/11/2012  DS-BONE DENSITY STUDY (DEXA)              IMM DTaP/Tdap/Td Vaccine (3 - Td or Tdap) Next due on 10/3/2032      10/03/2022  Imm Admin: Tdap Vaccine    06/19/2012  Imm Admin: Tdap Vaccine              Pneumococcal Vaccine: 65+ Years (Series Information) Completed      08/19/2015  Imm Admin: Pneumococcal Conjugate Vaccine (Prevnar/PCV-13)    06/19/2012  Imm Admin: Pneumococcal polysaccharide vaccine (PPSV-23)              Zoster (Shingles) Vaccines (Series Information) Completed      10/21/2020  Imm Admin: Zoster Vaccine Recombinant (RZV) (SHINGRIX)    01/26/2020  Imm Admin: Zoster Vaccine Recombinant (RZV) (SHINGRIX)    06/19/2012  Imm Admin: Zoster Vaccine Live (ZVL) (Zostavax) - HISTORICAL DATA    06/19/2012  Imm Admin: Varicella Vaccine Live              Hepatitis C Screening  Tentatively Complete      07/29/2023  Hepatitis C Antibody component of HEP C VIRUS ANTIBODY              Influenza Vaccine (Series Information) Completed      10/09/2023  Imm Admin: Influenza Vaccine Adult HD    10/20/2022  Imm Admin: Influenza Vaccine Adult HD    10/15/2021  Imm Admin: Influenza Vaccine Adult HD    10/21/2020  Imm Admin: Influenza Vaccine Adult HD    10/18/2019  Imm Admin: Influenza Vaccine Adult HD    Only the  first 5 history entries have been loaded, but more history exists.              COVID-19 Vaccine (Series Information) Completed      03/13/2024  Imm Admin: Comirnaty (Covid-19 Vaccine, Mrna, 8812-5115 Formula)    10/09/2023  Imm Admin: Comirnaty (Covid-19 Vaccine, Mrna, 9013-9279 Formula)    09/27/2022  Imm Admin: PFIZER BIVALENT SARS-COV-2 VACCINE (12+)    04/13/2022  Imm Admin: PFIZER WHITESIDE CAP SARS-COV-2 VACCINATION (12+)    10/13/2021  Imm Admin: PFIZER PURPLE CAP SARS-COV-2 VACCINATION (12+)    Only the first 5 history entries have been loaded, but more history exists.              Hepatitis A Vaccine (Hep A) (Series Information) Aged Out      No completion history exists for this topic.              Hepatitis B Vaccine (Hep B) (Series Information) Aged Out      No completion history exists for this topic.              HPV Vaccines (Series Information) Aged Out      No completion history exists for this topic.              Polio Vaccine (Inactivated Polio) (Series Information) Aged Out      No completion history exists for this topic.              Meningococcal Immunization (Series Information) Aged Out      No completion history exists for this topic.              Discontinued - Diabetes: Urine Protein Screening  Ordered on 6/11/2024 06/04/2024  MICROALBUMIN CREAT RATIO URINE    08/01/2023  MICROALBUMIN CREAT RATIO URINE    02/04/2022  MICROALBUMIN CREAT RATIO URINE    01/08/2020  MICROALBUMIN CREAT RATIO URINE    02/13/2019  MICROALBUMIN CREAT RATIO URINE    Only the first 5 history entries have been loaded, but more history exists.              Discontinued - Mammogram  Scheduled for 7/16/2024        Frequency changed to Never automatically (Topic No Longer Applies)    01/18/2024  MA-DIAGNOSTIC MAMMO LEFT W/O CAD    01/09/2024  MA-SCREENING MAMMO BILAT W/TOMOSYNTHESIS W/CAD    06/19/2023  MA-DIAGNOSTIC MAMMO RIGHT W/TOMOSYNTHESIS W/CAD    01/12/2023  MA-DIAGNOSTIC MAMMO RIGHT W/TOMOSYNTHESIS W/O CAD     Only the first 5 history entries have been loaded, but more history exists.              Discontinued - Colorectal Cancer Screening  Discontinued        Frequency changed to Never automatically (Topic No Longer Applies)    2023  Occult Blood Feces component of OCCULT BLOOD STOOL    2017  REFERRAL TO GI FOR COLONOSCOPY    2017  REFERRAL TO GI FOR COLONOSCOPY    2012  REFERRAL TO GI FOR COLONOSCOPY    Only the first 5 history entries have been loaded, but more history exists.                    Patient Care Team:  Shan Alvarado M.D. as PCP - General (Family Medicine)  Emiliano Daily M.D. as Consulting Physician (Ophthalmology)  Cale Watkins M.D. as Consulting Physician (Dermatology)  Jovani Alvares M.D. as Consulting Physician (Neurosurgery)  [facility] as Home Health Provider  Shayne Pena, PT as Physical Therapist (Physical Therapy)        Social History     Tobacco Use    Smoking status: Former     Current packs/day: 0.00     Average packs/day: 1 pack/day for 5.0 years (5.0 ttl pk-yrs)     Types: Cigarettes     Start date: 1965     Quit date: 1970     Years since quittin.4    Smokeless tobacco: Never    Tobacco comments:     quit    Vaping Use    Vaping status: Never Used   Substance Use Topics    Alcohol use: Not Currently     Comment: Once in a great while    Drug use: Never     Family History   Problem Relation Age of Onset    Heart Disease Father 60        MI    Cancer Father         colon cancer    Diabetes Father     Cancer Maternal Aunt         breast cancer    Breast Cancer Maternal Aunt     Lung Disease Mother     Diabetes Sister     Heart Disease Sister      She  has a past medical history of Allergy, Anesthesia, Asthma, Cataract (2016), Delayed emergence from general anesthesia, Diabetes, Diabetes mellitus type 2, controlled (HCC) (2012), Family history of colon cancer (2012), Heart burn, High cholesterol, Hyperlipidemia,  "Hypertension, and Pain.   Past Surgical History:   Procedure Laterality Date    FUSION, SPINE, LUMBAR, PLIF  10/15/2019    Procedure: POSTERIOR LUMBAR 4 - 5 DECOMPRESSION AND TRANSFORAMINAL INTERBODY FUSION;  Surgeon: Jovani Alvares M.D.;  Location: SURGERY [facility];  Service: Neurosurgery    LUMBAR DECOMPRESSION  10/15/2019    Procedure: POSTERIOR LUMBAR 4 - 5 DECOMPRESSION;  Surgeon: Jovani Alvares M.D.;  Location: SURGERY [facility];  Service: Neurosurgery    CARPAL TUNNEL RELEASE Right 09/12/2016    Procedure: CARPAL TUNNEL RELEASE;  Surgeon: Orlando Hunter M.D.;  Location: SURGERY [facility];  Service:     INJ,EPIDURAL/LUMB/SAC SINGLE  09/05/2014    Performed by Cale Alejandro M.D. at [facility]    ABDOMINAL HYSTERECTOMY TOTAL      1995    EYE SURGERY      TONSILLECTOMY      TUBAL COAGULATION LAPAROSCOPIC BILATERAL         Exam:   /62 (BP Location: Right arm, Patient Position: Sitting, BP Cuff Size: Adult long)   Pulse 63   Temp 37.2 °C (98.9 °F)   Resp 12   Ht 1.549 m (5' 1\")   Wt 63.6 kg (140 lb 3.2 oz)   SpO2 98%  Body mass index is 26.49 kg/m².    Hearing good.    Dentition good  Alert, oriented in no acute distress.  Eye contact is good, speech goal directed, affect calm    Assessment and Plan. The following treatment and monitoring plan is recommended:      1. Medicare annual wellness visit, initial    2. Alkaline phosphatase elevation    3. Allergic urticaria    4. Bunion of left foot    5. Chronic bilateral low back pain without sciatica    6. Essential hypertension, benign    7. Family history of colon cancer    8. Gastroesophageal reflux disease without esophagitis    9. Vinod's disease    10. Mixed hyperlipidemia  - Lipid Profile; Future    11. Primary hypertension  - Comp Metabolic Panel; Future    12. Type 2 diabetes mellitus with other specified complication, with long-term current use of insulin (HCC)  - MICROALBUMIN CREAT RATIO URINE; Future    13. " Vitamin D deficiency  - VITAMIN D,25 HYDROXY (DEFICIENCY); Future    14. Moderate persistent asthma without complication    15. Papular atopic dermatitis      Services suggested: No services needed at this time  Health Care Screening: Age-appropriate preventive services recommended by USPTF and ACIP covered by Medicare were discussed today. Services ordered if indicated and agreed upon by the patient.  Referrals offered: Community-based lifestyle interventions to reduce health risks and promote self-management and wellness, fall prevention, nutrition, physical activity, tobacco-use cessation, weight loss, and mental health services as per orders if indicated.    Discussion today about general wellness and lifestyle habits:    Prevent falls and reduce trip hazards; Cautioned about securing or removing rugs.  Have a working fire alarm and carbon monoxide detector;   Engage in regular physical activity and social activities         Problem   [place]'s Disease    She is following with dermatology and was diagnosed with papular dermatitis and Grovers disease.  Uses Kenalog cream as needed     Alkaline Phosphatase Elevation    Alkaline phosphatase numbers are getting better.  Recheck labs in 1 year       Papular Atopic Dermatitis    Following with dermatology     Allergic Urticaria    Previous history of allergic urticaria, currently doing well.  Following with dermatology     Bunion of Left Foot    Currently doing well without any medications.     Vitamin D Deficiency    She is taking vitamin D supplementation.  Recent vitamin D level came back normal at 78.     Gastroesophageal Reflux Disease Without Esophagitis    Taking omeprazole 20 mg daily, doing well with this medication     Type 2 Diabetes Mellitus With Other Specified Complication (Hcc)    She is currently following with endocrinology [facility] at [place].  She is on Tresiba 24 units at bedtime and short acting insulin LYUMJEV.  She is on Jardiance 10 mg daily.   Reports that after starting insulin blood sugars are much better.  She had A1c done at endocrinology office and it came back at 6.3.    She is having blood sugars monitored with freestyle glucose monitor and average is coming about 139 with A1c of 6.6         Hyperlipidemia    Currently on Lipitor 40 mg daily.  Doing well with this medication.    Lab Results   Component Value Date/Time    CHOLSTRLTOT 123 06/04/2024 0941    TRIGLYCERIDE 59 06/04/2024 0941    HDL 60 06/04/2024 0941    LDL 51 06/04/2024 0941           Family History of Colon Cancer    Previous colonoscopy negative, repeat in 10 years per GI     Essential Hypertension, Benign    Blood pressure today 126/62, continue losartan 25 mg and hydrochlorothiazide 25 mg at same dose           Review of Systems   Constitutional:  Negative for chills and fever.   Respiratory:  Negative for cough, shortness of breath and wheezing.    Cardiovascular:  Negative for chest pain, palpitations and leg swelling.        Physical Exam  Constitutional:       Appearance: Normal appearance. She is well-developed and well-groomed.   HENT:      Head: Normocephalic and atraumatic.   Eyes:      General:         Right eye: No discharge.         Left eye: No discharge.      Conjunctiva/sclera: Conjunctivae normal.   Cardiovascular:      Rate and Rhythm: Normal rate and regular rhythm.      Heart sounds: Normal heart sounds. No murmur heard.     No friction rub. No gallop.   Pulmonary:      Effort: Pulmonary effort is normal. No respiratory distress.      Breath sounds: Normal breath sounds. No wheezing or rales.   Neurological:      General: No focal deficit present.      Mental Status: She is alert. Mental status is at baseline.      Gait: Gait is intact.   Psychiatric:         Mood and Affect: Mood and affect normal.         Behavior: Behavior normal.     I reviewed recent labs resulted on 6/4/2024 with patient      Problem List Items Addressed This Visit       Hyperlipidemia  (Chronic)     Chronic condition, stable, continue Lipitor 40 mg daily.  Recheck labs in 1 year         Relevant Orders    Lipid Profile    Hypertension    Relevant Orders    Comp Metabolic Panel    Type 2 diabetes mellitus with other specified complication (HCC)     Chronic condition, stable, continue to follow-up with endocrinology.  Continue Tresiba and short acting insulin at same dose.  Continue Jardiance 10 mg daily.         Relevant Orders    MICROALBUMIN CREAT RATIO URINE        Vitamin D deficiency     Chronic condition, stable, continue to take vitamin D supplementation.  Recheck labs in 1 year.         Relevant Orders    VITAMIN D,25 HYDROXY (DEFICIENCY)     She had a injury at right big toe which seems to be getting better.  There is small open wound which is healing, I placed a Band-Aid on that.    She gets dryness at her tongue and feels burning sensation.  Recommended to wash mouth after using inhaler.  Drink more water.      Follow-up in 1 year for annual physical, sooner as needed.

## 2024-06-14 ENCOUNTER — OUTPATIENT INFUSION SERVICES (OUTPATIENT)
Dept: ONCOLOGY | Facility: MEDICAL CENTER | Age: 79
End: 2024-06-14
Attending: INTERNAL MEDICINE
Payer: MEDICARE

## 2024-06-14 VITALS
DIASTOLIC BLOOD PRESSURE: 66 MMHG | BODY MASS INDEX: 25.56 KG/M2 | SYSTOLIC BLOOD PRESSURE: 92 MMHG | HEIGHT: 62 IN | WEIGHT: 138.89 LBS | OXYGEN SATURATION: 98 % | TEMPERATURE: 97 F | HEART RATE: 82 BPM | RESPIRATION RATE: 18 BRPM

## 2024-06-14 DIAGNOSIS — J45.40 MODERATE PERSISTENT ASTHMA WITHOUT COMPLICATION: ICD-10-CM

## 2024-06-14 PROCEDURE — 96372 THER/PROPH/DIAG INJ SC/IM: CPT

## 2024-06-14 PROCEDURE — 700111 HCHG RX REV CODE 636 W/ 250 OVERRIDE (IP): Mod: JZ,JG | Performed by: INTERNAL MEDICINE

## 2024-06-14 RX ADMIN — MEPOLIZUMAB 100 MG: 100 INJECTION, POWDER, FOR SOLUTION SUBCUTANEOUS at 11:27

## 2024-06-14 ASSESSMENT — FIBROSIS 4 INDEX: FIB4 SCORE: 1.15

## 2024-06-14 NOTE — PROGRESS NOTES
Pt presented to [facility] for mepolizumab injection. Mepolizumab injected into R back arm with no s/s of adverse reactions and pt tolerated well. Next appointment confirmed. Pt discharged to self care with all personal belongings and in NAD.

## 2024-06-19 DIAGNOSIS — K21.9 GERD WITHOUT ESOPHAGITIS: ICD-10-CM

## 2024-06-19 DIAGNOSIS — I10 ESSENTIAL HYPERTENSION: ICD-10-CM

## 2024-06-19 RX ORDER — OMEPRAZOLE 20 MG/1
20 CAPSULE, DELAYED RELEASE ORAL
Qty: 90 CAPSULE | Refills: 3 | Status: SHIPPED | OUTPATIENT
Start: 2024-06-19

## 2024-06-19 RX ORDER — HYDROCHLOROTHIAZIDE 25 MG/1
25 TABLET ORAL
Qty: 90 TABLET | Refills: 3 | Status: SHIPPED | OUTPATIENT
Start: 2024-06-19

## 2024-06-19 NOTE — TELEPHONE ENCOUNTER
Received request via: Pharmacy    Was the patient seen in the last year in this department? Yes    Does the patient have an active prescription (recently filled or refills available) for medication(s) requested? No    [facility]     Does the patient have USP Plus and need 100 day supply (blood pressure, diabetes and cholesterol meds only)? Patient does not have SCP

## 2024-07-02 ENCOUNTER — HOSPITAL ENCOUNTER (OUTPATIENT)
Dept: LAB | Facility: MEDICAL CENTER | Age: 79
End: 2024-07-02
Attending: PHYSICIAN ASSISTANT
Payer: MEDICARE

## 2024-07-02 LAB
BASOPHILS # BLD AUTO: 0.5 % (ref 0–1.8)
BASOPHILS # BLD: 0.04 K/UL (ref 0–0.12)
EOSINOPHIL # BLD AUTO: 0.07 K/UL (ref 0–0.51)
EOSINOPHIL NFR BLD: 0.8 % (ref 0–6.9)
ERYTHROCYTE [DISTWIDTH] IN BLOOD BY AUTOMATED COUNT: 47.4 FL (ref 35.9–50)
FOLATE SERPL-MCNC: 39.6 NG/ML
HCT VFR BLD AUTO: 42.5 % (ref 37–47)
HGB BLD-MCNC: 13.5 G/DL (ref 12–16)
IMM GRANULOCYTES # BLD AUTO: 0.04 K/UL (ref 0–0.11)
IMM GRANULOCYTES NFR BLD AUTO: 0.5 % (ref 0–0.9)
IRON SATN MFR SERPL: 11 % (ref 15–55)
IRON SERPL-MCNC: 41 UG/DL (ref 40–170)
LYMPHOCYTES # BLD AUTO: 1.68 K/UL (ref 1–4.8)
LYMPHOCYTES NFR BLD: 20 % (ref 22–41)
MCH RBC QN AUTO: 25 PG (ref 27–33)
MCHC RBC AUTO-ENTMCNC: 31.8 G/DL (ref 32.2–35.5)
MCV RBC AUTO: 78.7 FL (ref 81.4–97.8)
MONOCYTES # BLD AUTO: 0.8 K/UL (ref 0–0.85)
MONOCYTES NFR BLD AUTO: 9.5 % (ref 0–13.4)
NEUTROPHILS # BLD AUTO: 5.75 K/UL (ref 1.82–7.42)
NEUTROPHILS NFR BLD: 68.7 % (ref 44–72)
NRBC # BLD AUTO: 0 K/UL
NRBC BLD-RTO: 0 /100 WBC (ref 0–0.2)
PLATELET # BLD AUTO: 398 K/UL (ref 164–446)
PMV BLD AUTO: 9.7 FL (ref 9–12.9)
RBC # BLD AUTO: 5.4 M/UL (ref 4.2–5.4)
TIBC SERPL-MCNC: 378 UG/DL (ref 250–450)
UIBC SERPL-MCNC: 337 UG/DL (ref 110–370)
VIT B12 SERPL-MCNC: 552 PG/ML (ref 211–911)
WBC # BLD AUTO: 8.4 K/UL (ref 4.8–10.8)

## 2024-07-02 PROCEDURE — 36415 COLL VENOUS BLD VENIPUNCTURE: CPT

## 2024-07-02 PROCEDURE — 82746 ASSAY OF FOLIC ACID SERUM: CPT

## 2024-07-02 PROCEDURE — 85025 COMPLETE CBC W/AUTO DIFF WBC: CPT

## 2024-07-02 PROCEDURE — 83540 ASSAY OF IRON: CPT | Mod: GA

## 2024-07-02 PROCEDURE — 82607 VITAMIN B-12: CPT

## 2024-07-02 PROCEDURE — 83550 IRON BINDING TEST: CPT | Mod: GA

## 2024-07-12 ENCOUNTER — OUTPATIENT INFUSION SERVICES (OUTPATIENT)
Dept: ONCOLOGY | Facility: MEDICAL CENTER | Age: 79
End: 2024-07-12
Attending: INTERNAL MEDICINE
Payer: MEDICARE

## 2024-07-12 VITALS
BODY MASS INDEX: 25.76 KG/M2 | RESPIRATION RATE: 18 BRPM | OXYGEN SATURATION: 92 % | WEIGHT: 139.99 LBS | TEMPERATURE: 97.5 F | HEART RATE: 52 BPM | SYSTOLIC BLOOD PRESSURE: 132 MMHG | HEIGHT: 62 IN | DIASTOLIC BLOOD PRESSURE: 57 MMHG

## 2024-07-12 DIAGNOSIS — J45.40 MODERATE PERSISTENT ASTHMA WITHOUT COMPLICATION: ICD-10-CM

## 2024-07-12 PROCEDURE — 96372 THER/PROPH/DIAG INJ SC/IM: CPT

## 2024-07-12 PROCEDURE — 700111 HCHG RX REV CODE 636 W/ 250 OVERRIDE (IP): Mod: JZ,JG | Performed by: INTERNAL MEDICINE

## 2024-07-12 RX ADMIN — MEPOLIZUMAB 100 MG: 100 INJECTION, POWDER, FOR SOLUTION SUBCUTANEOUS at 11:07

## 2024-07-12 ASSESSMENT — FIBROSIS 4 INDEX: FIB4 SCORE: 1.12

## 2024-07-16 ENCOUNTER — HOSPITAL ENCOUNTER (OUTPATIENT)
Dept: RADIOLOGY | Facility: MEDICAL CENTER | Age: 79
End: 2024-07-16
Attending: FAMILY MEDICINE
Payer: MEDICARE

## 2024-07-16 DIAGNOSIS — R92.8 ABNORMAL MAMMOGRAM OF LEFT BREAST: ICD-10-CM

## 2024-07-16 PROCEDURE — 76642 ULTRASOUND BREAST LIMITED: CPT | Mod: LT

## 2024-07-16 PROCEDURE — G0279 TOMOSYNTHESIS, MAMMO: HCPCS

## 2024-07-25 ENCOUNTER — HOSPITAL ENCOUNTER (OUTPATIENT)
Dept: RADIOLOGY | Facility: MEDICAL CENTER | Age: 79
End: 2024-07-25
Attending: FAMILY MEDICINE
Payer: MEDICARE

## 2024-07-25 DIAGNOSIS — R92.8 ABNORMAL MAMMOGRAM OF LEFT BREAST: ICD-10-CM

## 2024-07-25 LAB — PATHOLOGY CONSULT NOTE: NORMAL

## 2024-07-25 PROCEDURE — 19083 BX BREAST 1ST LESION US IMAG: CPT | Mod: LT

## 2024-07-25 PROCEDURE — 88305 TISSUE EXAM BY PATHOLOGIST: CPT

## 2024-07-26 ENCOUNTER — TELEPHONE (OUTPATIENT)
Dept: RADIOLOGY | Facility: MEDICAL CENTER | Age: 79
End: 2024-07-26
Payer: MEDICARE

## 2024-07-26 DIAGNOSIS — R92.8 ABNORMAL MAMMOGRAM OF LEFT BREAST: ICD-10-CM

## 2024-08-03 ENCOUNTER — APPOINTMENT (OUTPATIENT)
Dept: RADIOLOGY | Facility: MEDICAL CENTER | Age: 79
End: 2024-08-03
Attending: EMERGENCY MEDICINE
Payer: MEDICARE

## 2024-08-03 ENCOUNTER — HOSPITAL ENCOUNTER (EMERGENCY)
Facility: MEDICAL CENTER | Age: 79
End: 2024-08-03
Attending: EMERGENCY MEDICINE
Payer: MEDICARE

## 2024-08-03 ENCOUNTER — OFFICE VISIT (OUTPATIENT)
Dept: URGENT CARE | Facility: CLINIC | Age: 79
End: 2024-08-03
Payer: MEDICARE

## 2024-08-03 VITALS
HEIGHT: 62 IN | TEMPERATURE: 97.6 F | OXYGEN SATURATION: 93 % | SYSTOLIC BLOOD PRESSURE: 126 MMHG | BODY MASS INDEX: 25.4 KG/M2 | RESPIRATION RATE: 16 BRPM | DIASTOLIC BLOOD PRESSURE: 66 MMHG | HEART RATE: 60 BPM | WEIGHT: 138 LBS

## 2024-08-03 VITALS
RESPIRATION RATE: 18 BRPM | BODY MASS INDEX: 25.4 KG/M2 | SYSTOLIC BLOOD PRESSURE: 147 MMHG | DIASTOLIC BLOOD PRESSURE: 64 MMHG | WEIGHT: 138 LBS | HEIGHT: 62 IN | TEMPERATURE: 97 F | OXYGEN SATURATION: 95 % | HEART RATE: 71 BPM

## 2024-08-03 DIAGNOSIS — E87.6 HYPOKALEMIA: ICD-10-CM

## 2024-08-03 DIAGNOSIS — K57.92 DIVERTICULITIS: ICD-10-CM

## 2024-08-03 DIAGNOSIS — S00.03XA HEMATOMA OF SCALP, INITIAL ENCOUNTER: ICD-10-CM

## 2024-08-03 DIAGNOSIS — S09.90XA INJURY OF HEAD, INITIAL ENCOUNTER: ICD-10-CM

## 2024-08-03 DIAGNOSIS — R55 SYNCOPE, UNSPECIFIED SYNCOPE TYPE: ICD-10-CM

## 2024-08-03 DIAGNOSIS — R55 VASOVAGAL SYNCOPE: ICD-10-CM

## 2024-08-03 LAB
ALBUMIN SERPL BCP-MCNC: 3.9 G/DL (ref 3.2–4.9)
ALBUMIN/GLOB SERPL: 1.2 G/DL
ALP SERPL-CCNC: 127 U/L (ref 30–99)
ALT SERPL-CCNC: 42 U/L (ref 2–50)
ANION GAP SERPL CALC-SCNC: 13 MMOL/L (ref 7–16)
APPEARANCE UR: CLEAR
AST SERPL-CCNC: 30 U/L (ref 12–45)
BASOPHILS # BLD AUTO: 0.2 % (ref 0–1.8)
BASOPHILS # BLD: 0.03 K/UL (ref 0–0.12)
BILIRUB SERPL-MCNC: 0.5 MG/DL (ref 0.1–1.5)
BILIRUB UR QL STRIP.AUTO: NEGATIVE
BUN SERPL-MCNC: 20 MG/DL (ref 8–22)
CALCIUM ALBUM COR SERPL-MCNC: 9.4 MG/DL (ref 8.5–10.5)
CALCIUM SERPL-MCNC: 9.3 MG/DL (ref 8.5–10.5)
CHLORIDE SERPL-SCNC: 96 MMOL/L (ref 96–112)
CO2 SERPL-SCNC: 27 MMOL/L (ref 20–33)
COLOR UR: YELLOW
CREAT SERPL-MCNC: 0.64 MG/DL (ref 0.5–1.4)
EKG IMPRESSION: NORMAL
EOSINOPHIL # BLD AUTO: 0.02 K/UL (ref 0–0.51)
EOSINOPHIL NFR BLD: 0.1 % (ref 0–6.9)
ERYTHROCYTE [DISTWIDTH] IN BLOOD BY AUTOMATED COUNT: 48.1 FL (ref 35.9–50)
GFR SERPLBLD CREATININE-BSD FMLA CKD-EPI: 91 ML/MIN/1.73 M 2
GLOBULIN SER CALC-MCNC: 3.3 G/DL (ref 1.9–3.5)
GLUCOSE SERPL-MCNC: 161 MG/DL (ref 65–99)
GLUCOSE UR STRIP.AUTO-MCNC: >=1000 MG/DL
HCT VFR BLD AUTO: 38.8 % (ref 37–47)
HGB BLD-MCNC: 12.6 G/DL (ref 12–16)
IMM GRANULOCYTES # BLD AUTO: 0.07 K/UL (ref 0–0.11)
IMM GRANULOCYTES NFR BLD AUTO: 0.5 % (ref 0–0.9)
KETONES UR STRIP.AUTO-MCNC: NEGATIVE MG/DL
LEUKOCYTE ESTERASE UR QL STRIP.AUTO: NEGATIVE
LIPASE SERPL-CCNC: 24 U/L (ref 11–82)
LYMPHOCYTES # BLD AUTO: 1.44 K/UL (ref 1–4.8)
LYMPHOCYTES NFR BLD: 10.3 % (ref 22–41)
MCH RBC QN AUTO: 24.7 PG (ref 27–33)
MCHC RBC AUTO-ENTMCNC: 32.5 G/DL (ref 32.2–35.5)
MCV RBC AUTO: 76.1 FL (ref 81.4–97.8)
MICRO URNS: ABNORMAL
MONOCYTES # BLD AUTO: 1.4 K/UL (ref 0–0.85)
MONOCYTES NFR BLD AUTO: 10 % (ref 0–13.4)
NEUTROPHILS # BLD AUTO: 11 K/UL (ref 1.82–7.42)
NEUTROPHILS NFR BLD: 78.9 % (ref 44–72)
NITRITE UR QL STRIP.AUTO: NEGATIVE
NRBC # BLD AUTO: 0 K/UL
NRBC BLD-RTO: 0 /100 WBC (ref 0–0.2)
PH UR STRIP.AUTO: 7 [PH] (ref 5–8)
PLATELET # BLD AUTO: 341 K/UL (ref 164–446)
PMV BLD AUTO: 9.3 FL (ref 9–12.9)
POTASSIUM SERPL-SCNC: 3.3 MMOL/L (ref 3.6–5.5)
PROT SERPL-MCNC: 7.2 G/DL (ref 6–8.2)
PROT UR QL STRIP: NEGATIVE MG/DL
RBC # BLD AUTO: 5.1 M/UL (ref 4.2–5.4)
RBC UR QL AUTO: NEGATIVE
SODIUM SERPL-SCNC: 136 MMOL/L (ref 135–145)
SP GR UR STRIP.AUTO: 1.03
TROPONIN T SERPL-MCNC: 17 NG/L (ref 6–19)
UROBILINOGEN UR STRIP.AUTO-MCNC: 0.2 MG/DL
WBC # BLD AUTO: 14 K/UL (ref 4.8–10.8)

## 2024-08-03 PROCEDURE — 83690 ASSAY OF LIPASE: CPT

## 2024-08-03 PROCEDURE — 3078F DIAST BP <80 MM HG: CPT | Performed by: FAMILY MEDICINE

## 2024-08-03 PROCEDURE — 85025 COMPLETE CBC W/AUTO DIFF WBC: CPT

## 2024-08-03 PROCEDURE — 99284 EMERGENCY DEPT VISIT MOD MDM: CPT

## 2024-08-03 PROCEDURE — 70450 CT HEAD/BRAIN W/O DYE: CPT

## 2024-08-03 PROCEDURE — 99214 OFFICE O/P EST MOD 30 MIN: CPT | Performed by: FAMILY MEDICINE

## 2024-08-03 PROCEDURE — 3074F SYST BP LT 130 MM HG: CPT | Performed by: FAMILY MEDICINE

## 2024-08-03 PROCEDURE — 74177 CT ABD & PELVIS W/CONTRAST: CPT

## 2024-08-03 PROCEDURE — 84484 ASSAY OF TROPONIN QUANT: CPT

## 2024-08-03 PROCEDURE — 36415 COLL VENOUS BLD VENIPUNCTURE: CPT

## 2024-08-03 PROCEDURE — 81003 URINALYSIS AUTO W/O SCOPE: CPT

## 2024-08-03 PROCEDURE — 80053 COMPREHEN METABOLIC PANEL: CPT

## 2024-08-03 PROCEDURE — 700117 HCHG RX CONTRAST REV CODE 255: Performed by: EMERGENCY MEDICINE

## 2024-08-03 PROCEDURE — 93005 ELECTROCARDIOGRAM TRACING: CPT | Performed by: EMERGENCY MEDICINE

## 2024-08-03 RX ORDER — DUPILUMAB 300 MG/2ML
INJECTION, SOLUTION SUBCUTANEOUS
COMMUNITY
Start: 2024-07-20

## 2024-08-03 RX ADMIN — IOHEXOL 100 ML: 350 INJECTION, SOLUTION INTRAVENOUS at 14:13

## 2024-08-03 ASSESSMENT — ENCOUNTER SYMPTOMS
DISORIENTATION: 0
LOSS OF CONSCIOUSNESS: 1
SORE THROAT: 0
VOMITING: 0
CHILLS: 0
NAUSEA: 0
DIZZINESS: 0
BLURRED VISION: 0
MYALGIAS: 0
WEAKNESS: 0
SHORTNESS OF BREATH: 0
EYE REDNESS: 0
FEVER: 0

## 2024-08-03 ASSESSMENT — FIBROSIS 4 INDEX
FIB4 SCORE: 1.12
FIB4 SCORE: 1.12

## 2024-08-03 ASSESSMENT — LIFESTYLE VARIABLES
CONSUMPTION TOTAL: INCOMPLETE
TOTAL SCORE: 0
HAVE YOU EVER FELT YOU SHOULD CUT DOWN ON YOUR DRINKING: NO
DO YOU DRINK ALCOHOL: NO
EVER FELT BAD OR GUILTY ABOUT YOUR DRINKING: NO
EVER HAD A DRINK FIRST THING IN THE MORNING TO STEADY YOUR NERVES TO GET RID OF A HANGOVER: NO
HAVE PEOPLE ANNOYED YOU BY CRITICIZING YOUR DRINKING: NO
TOTAL SCORE: 0
TOTAL SCORE: 0

## 2024-08-03 NOTE — PROGRESS NOTES
Subjective:   Rossy Lopez is a 79 y.o. female who presents for Head Injury (Xtoday, fall, hit head and bit tongue)        79-year-old with history of hypertension, diabetes mellitus presents urgent care with chief complaint of syncopal episode and head injury.  Patient reports at approximately 845 this morning standing in the kitchen and experiencing sharp suprapubic abdominal pain and bending over while turning the neck to the side and reports suddenly losing consciousness.   was in the house and reported approximate 30 seconds of unresponsiveness in the patient spontaneously recovered with initial difficulty speaking as the patient recognize the tongue had been bitten.  Patient denies recurrence of the suprapubic abdominal pain initially experienced prior to his syncopal episode.  Denies chest pain, denies palpitations denies dyspnea.  Denies fever or other recent illness.  Patient reports striking the posterior aspect of the head and reports swelling posterior right scalp.    Head Injury   The incident occurred 1 to 3 hours ago. The injury mechanism was a fall. She lost consciousness for a period of less than one minute. There was no blood loss. The quality of the pain is described as aching. Pertinent negatives include no blurred vision, disorientation, vomiting or weakness.     PMH:  has a past medical history of Allergy, Anesthesia, Asthma, Cataract (08/2016), Delayed emergence from general anesthesia, Diabetes, Diabetes mellitus type 2, controlled (HCC) (06/19/2012), Family history of colon cancer (06/19/2012), Heart burn, High cholesterol, Hyperlipidemia, Hypertension, and Pain.  MEDS:   Current Outpatient Medications:     dupilumab (DUPIXENT) 300 MG/2ML injection, , Disp: , Rfl:     hydroCHLOROthiazide 25 MG Tab, TAKE 1 TABLET BY MOUTH EVERY DAY, Disp: 90 Tablet, Rfl: 3    omeprazole (PRILOSEC) 20 MG delayed-release capsule, TAKE 1 CAPSULE BY MOUTH EVERY DAY, Disp: 90 Capsule, Rfl: 3     diphenhydrAMINE (BENADRYL) 25 MG Tab, Take 25 mg by mouth as needed for Itching., Disp: , Rfl:     atorvastatin (LIPITOR) 40 MG Tab, Take 1 Tablet by mouth at bedtime., Disp: 90 Tablet, Rfl: 3    losartan (COZAAR) 25 MG Tab, Take 1 Tablet by mouth every day., Disp: 90 Tablet, Rfl: 3    LYUMJEV KWIKPEN 100 UNIT/ML Solution Pen-injector, 5 units SC tid, Disp: , Rfl:     TRESIBA FLEXTOUCH 100 UNIT/ML Solution Pen-injector, ADMINISTER 24 TO 60 UNITS UNDER THE SKIN DAILY AS DIRECTED, Disp: , Rfl:     cyclobenzaprine (FLEXERIL) 10 mg Tab, Take 1 Tablet by mouth at bedtime as needed for Moderate Pain., Disp: 30 Tablet, Rfl: 1    nystatin (MYCOSTATIN) powder, APPLY TO GROIN EVERY DAY, Disp: , Rfl:     JARDIANCE 10 MG Tab, Take 1 Tablet by mouth every day., Disp: , Rfl:     ASMANEX  MCG/ACT Aerosol, Inhale 2 Puffs 2 (two) times a day., Disp: , Rfl:     triamcinolone acetonide (KENALOG) 0.1 % Cream, Apply  topically 2 times a day., Disp: , Rfl:     glucose blood strip, Use as prn for checking BS levels 3 times a day. Dx: E11.9, Disp: 300 Strip, Rfl: 3    PREVIDENT 5000 DRY MOUTH 1.1 % Gel, BRUSH UTD, Disp: , Rfl:     albuterol 108 (90 Base) MCG/ACT Aero Soln inhalation aerosol, Inhale 2 Puffs by mouth every four hours as needed for Shortness of Breath., Disp: , Rfl:     Ca Phosphate-Cholecalciferol 250-500 MG-UNIT Chew Tab, Take 1 Tab by mouth 2 Times a Day., Disp: , Rfl:     Multiple Vitamins-Minerals (MULTIVITAMIN ADULT PO), Take 1 Tab by mouth every day., Disp: , Rfl:   ALLERGIES:   Allergies   Allergen Reactions    Other Misc Hives     Patient is allergic to bandaides  RXN=ongoing    Phisohex [Hexachlorophene] Rash     Rash at contact site  RXN=48 years ago    Sulfa Drugs Rash and Itching     RXN=44-48  Years ago     Rea     Tape Hives and Itching     Paper tape okay    Trulicity [Dulaglutide]      moribiliform rash     SURGHX:   Past Surgical History:   Procedure Laterality Date    FUSION, SPINE, LUMBAR, PLIF   10/15/2019    Procedure: POSTERIOR LUMBAR 4 - 5 DECOMPRESSION AND TRANSFORAMINAL INTERBODY FUSION;  Surgeon: Jovani Alvares M.D.;  Location: SURGERY [facility];  Service: Neurosurgery    LUMBAR DECOMPRESSION  10/15/2019    Procedure: POSTERIOR LUMBAR 4 - 5 DECOMPRESSION;  Surgeon: Jovani Alvares M.D.;  Location: SURGERY [facility];  Service: Neurosurgery    CARPAL TUNNEL RELEASE Right 09/12/2016    Procedure: CARPAL TUNNEL RELEASE;  Surgeon: Orlando Hunter M.D.;  Location: SURGERY [facility];  Service:     INJ,EPIDURAL/LUMB/SAC SINGLE  09/05/2014    Performed by Cale Alejandro M.D. at [facility]    ABDOMINAL HYSTERECTOMY TOTAL      1995    EYE SURGERY      TONSILLECTOMY      TUBAL COAGULATION LAPAROSCOPIC BILATERAL       SOCHX:  reports that she quit smoking about 54 years ago. Her smoking use included cigarettes. She started smoking about 59 years ago. She has a 5 pack-year smoking history. She has never used smokeless tobacco. She reports that she does not currently use alcohol. She reports that she does not use drugs.  FH:   Family History   Problem Relation Age of Onset    Heart Disease Father 60        MI    Cancer Father         colon cancer    Diabetes Father     Cancer Maternal Aunt         breast cancer    Breast Cancer Maternal Aunt     Lung Disease Mother     Diabetes Sister     Heart Disease Sister      Review of Systems   Constitutional:  Negative for chills and fever.   HENT:  Negative for sore throat.    Eyes:  Negative for blurred vision and redness.   Respiratory:  Negative for shortness of breath.    Cardiovascular:  Negative for chest pain.   Gastrointestinal:  Negative for nausea and vomiting.   Musculoskeletal:  Negative for myalgias.   Skin:  Negative for rash.   Neurological:  Positive for loss of consciousness. Negative for dizziness and weakness.        Objective:   /66 (BP Location: Left arm, Patient Position: Sitting, BP Cuff Size: Adult)   Pulse 60    "Temp 36.4 °C (97.6 °F) (Temporal)   Resp 16   Ht 1.575 m (5' 2\")   Wt 62.6 kg (138 lb)   SpO2 93%   BMI 25.24 kg/m²   Physical Exam  Vitals and nursing note reviewed.   Constitutional:       General: She is not in acute distress.     Appearance: She is well-developed.   HENT:      Head: Normocephalic and atraumatic.        Right Ear: External ear normal.      Left Ear: External ear normal.      Nose: Nose normal.      Mouth/Throat:      Mouth: Mucous membranes are moist.     Eyes:      Extraocular Movements: Extraocular movements intact.      Conjunctiva/sclera: Conjunctivae normal.      Pupils: Pupils are equal, round, and reactive to light.   Cardiovascular:      Rate and Rhythm: Normal rate and regular rhythm.   Pulmonary:      Effort: Pulmonary effort is normal. No respiratory distress.      Breath sounds: Normal breath sounds. No wheezing or rhonchi.   Abdominal:      General: There is no distension.   Musculoskeletal:         General: Normal range of motion.   Skin:     General: Skin is warm and dry.   Neurological:      General: No focal deficit present.      Mental Status: She is alert and oriented to person, place, and time. Mental status is at baseline.      Motor: Motor function is intact.      Coordination: Coordination is intact. Romberg sign negative. Coordination normal.      Gait: Gait (gait at baseline) normal.   Psychiatric:         Judgment: Judgment normal.           Assessment/Plan:   1. Injury of head, initial encounter    2. Hematoma of scalp, initial encounter    3. Syncope, unspecified syncope type    Other orders  - dupilumab (DUPIXENT) 300 MG/2ML injection        Medical Decision Making/Course:  In the context of the clinical presentation of syncopal episode with head injury and loss of consciousness with significant physical exam findings, emergency department evaluation management is warranted.  The patient and  deferred EMS ambulance transfer requested transport by private " vehicle.  The [facility] emergency department was advised and transfer center notified.  In the course of preparing for this visit with review of the pertinent past medical history, recent and past clinic visits, current medications, and performing chart, immunization, medical history and medication reconciliation, and in the further course of obtaining the current history pertinent to the clinic visit today, performing an exam and evaluation, ordering and independently evaluating labs, tests  , and/or procedures, prescribing any recommended new medications as noted above, providing any pertinent counseling and education and recommending further coordination of care including contacting coordination with transfer center, at least  38 minutes of total time were spent during this encounter.          Please note that this dictation was created using voice recognition software. I have worked with consultants from the vendor as well as technical experts from [facility] to optimize the interface. I have made every reasonable attempt to correct obvious errors, but I expect that there are errors of grammar and possibly content that I did not discover before finalizing the note.

## 2024-08-03 NOTE — DISCHARGE INSTRUCTIONS
You were seen in the ED for syncope (loss of consciousness). Your medical evaluation, physical exam and EKG were reassuring. At this time, the cause of your syncope does not appear to be dangerous. Please drink plenty of fluids and eat regular meals. Please take care when getting up from sitting or standing.         You were found to have a low potassium level.  At this time, it does not appear dangerous, however you should eat fruits and vegetables that are high in potassium.  This includes bananas, oranges, avocados, cooked beans, baked potatoes among others.  You can do an Internet search to find foods that are high in potassium.    You are also found to have inflammation of your large intestine called diverticulitis.  This will generally improve on its own but you are being sent home with a prescription for watch and wait antibiotics.  If you develop worsening symptoms including worsening pain, fevers, or other mild worsening, then you may start the antibiotics.  Please return to the ED or seek medical attention if you develop:  - Chest pain, severe abdominal pain, recurrent fevers  - Shortness of breath  - Loss of consciousness  - Other concerning findings  Please follow up with your regular doctor.

## 2024-08-03 NOTE — ED PROVIDER NOTES
ED Provider Note    Scribed for Dr. Redman by Geovanny Trejo. 8/3/2024,  12:15 PM.      CHIEF COMPLAINT  Chief Complaint   Patient presents with    Head Injury     Patient reports this morning at 0915 she had a pain in her abd and turned to speak to her  when she had a + LOC and hit the back of her head on the ground. Per  patient was unresponsive for 20 seconds. - Thinners    Abdominal Pain     Lower abd pain x 2 days.        EXTERNAL RECORDS REVIEWED  Outpatient Notes The patient was sent here from Urgent Care earlier today where she was seen for a head injury secondary to a syncopal episode.     HPI  LIMITATION TO HISTORY   Select: : None  OUTSIDE HISTORIAN(S):  Significant other  provided history regarding the patient's episode of syncope.    Rossy Lopez is a 79 y.o. female who presents to the Emergency Department as a code TBI. The patient reports she was taking her medications at approximately 8:45 AM this morning, and when she turned her head to the left, she states she lost consciousness. This caused her to fall, hitting the back of her head and biting her tongue. The patient's  reports the patient was unresponsive for 20 seconds, and she currently has a hematoma to the occiput that is causing her pain. She notes she does not remember falling. She has never had an episode like this. The patient notes she has been having some abdominal pain for the last few days, and she though her abdominal pain was due to a possible UTI, but she denies any dysuria. She also notes a lump to the back left side of her neck that she was told is a lipoma. The patient has been taking Advil at home for her abdominal pain and headache. She denies any nausea, vomiting, diarrhea, fever, cough, chest pain, or shortness of breath. Patient denies taking any blood thinners.    REVIEW OF SYSTEMS  See HPI for further details. All other systems are negative.     PAST MEDICAL HISTORY     Past Medical History:    Diagnosis Date    Allergy     Anesthesia     difficult to wake and extended sleep (couple of days)    Asthma     prn inhalers    Cataract 08/2016    bilat iol    Delayed emergence from general anesthesia     Diabetes     Diabetes mellitus type 2, controlled (HCC) 06/19/2012    oral medication    Family history of colon cancer 06/19/2012    Heart burn     High cholesterol     Hyperlipidemia     Hypertension     Pain     hips       SURGICAL HISTORY  Past Surgical History:   Procedure Laterality Date    FUSION, SPINE, LUMBAR, PLIF  10/15/2019    Procedure: POSTERIOR LUMBAR 4 - 5 DECOMPRESSION AND TRANSFORAMINAL INTERBODY FUSION;  Surgeon: Jovani Alvares M.D.;  Location: SURGERY [facility];  Service: Neurosurgery    LUMBAR DECOMPRESSION  10/15/2019    Procedure: POSTERIOR LUMBAR 4 - 5 DECOMPRESSION;  Surgeon: Jovani Alvares M.D.;  Location: SURGERY [facility];  Service: Neurosurgery    CARPAL TUNNEL RELEASE Right 09/12/2016    Procedure: CARPAL TUNNEL RELEASE;  Surgeon: Orlando Hunter M.D.;  Location: SURGERY [facility];  Service:     INJ,EPIDURAL/LUMB/SAC SINGLE  09/05/2014    Performed by Cale Alejandro M.D. at [facility]    ABDOMINAL HYSTERECTOMY TOTAL      1995    EYE SURGERY      TONSILLECTOMY      TUBAL COAGULATION LAPAROSCOPIC BILATERAL         FAMILY HISTORY  Family History   Problem Relation Age of Onset    Heart Disease Father 60        MI    Cancer Father         colon cancer    Diabetes Father     Cancer Maternal Aunt         breast cancer    Breast Cancer Maternal Aunt     Lung Disease Mother     Diabetes Sister     Heart Disease Sister        SOCIAL HISTORY    reports that she quit smoking about 54 years ago. Her smoking use included cigarettes. She started smoking about 59 years ago. She has a 5 pack-year smoking history. She has never used smokeless tobacco. She reports that she does not currently use alcohol. She reports that she does not use drugs.    CURRENT  "MEDICATIONS  Home Medications       Reviewed by Chelsi Bansal R.N. (Registered Nurse) on 08/03/24 at 1230  Med List Status: Partial     Medication Last Dose Status   albuterol 108 (90 Base) MCG/ACT Aero Soln inhalation aerosol  Active   ASMANEX  MCG/ACT Aerosol  Active   atorvastatin (LIPITOR) 40 MG Tab  Active   Ca Phosphate-Cholecalciferol 250-500 MG-UNIT Chew Tab  Active   cyclobenzaprine (FLEXERIL) 10 mg Tab  Active   diphenhydrAMINE (BENADRYL) 25 MG Tab  Active   dupilumab (DUPIXENT) 300 MG/2ML injection  Active   glucose blood strip  Active   hydroCHLOROthiazide 25 MG Tab  Active   JARDIANCE 10 MG Tab  Active   losartan (COZAAR) 25 MG Tab  Active   LYUMJEV KWIKPEN 100 UNIT/ML Solution Pen-injector  Active   Multiple Vitamins-Minerals (MULTIVITAMIN ADULT PO)  Active   nystatin (MYCOSTATIN) powder  Active   omeprazole (PRILOSEC) 20 MG delayed-release capsule  Active   PREVIDENT 5000 DRY MOUTH 1.1 % Gel  Active   TRESIBA FLEXTOUCH 100 UNIT/ML Solution Pen-injector  Active   triamcinolone acetonide (KENALOG) 0.1 % Cream  Active                    ALLERGIES  Allergies   Allergen Reactions    Other Misc Hives     Patient is allergic to bandaides  RXN=ongoing    Phisohex [Hexachlorophene] Rash     Rash at contact site  RXN=48 years ago    Sulfa Drugs Rash and Itching     RXN=44-48  Years ago     Cook     Tape Hives and Itching     Paper tape okay    Trulicity [Dulaglutide]      moribiliform rash       PHYSICAL EXAM  VITAL SIGNS: BP (!) 152/60   Pulse 63   Temp 36.6 °C (97.9 °F) (Temporal)   Resp 16   Ht 1.575 m (5' 2\")   Wt 62.6 kg (138 lb)   SpO2 94%   BMI 25.24 kg/m²   Gen: Alert, no acute distress  HEENT: NC, hematoma to the occiput  Eyes: PERRL, EOMI, normal conjunctiva  Neck: trachea midline  Resp: no respiratory distress  CV: No JVD, regular rate and rhythm  Abd: non-distended  Ext: No deformities  Neuro: speech fluent    DIAGNOSTIC STUDIES / PROCEDURES  EKG  I independently interpreted this " EKG.  Results for orders placed or performed during the hospital encounter of 24   EKG (NOW)   Result Value Ref Range    Report       [facility] Emergency Dept.    Test Date:  2024  Pt Name:    XI LICONA                   Department: ER  MRN:        [id number]                      Room:        18  Gender:     Female                       Technician: [id number]  :        [id number]                   Requested By:SUZANNE REDMAN  Order #:    [id number]                    Reading MD: Suzanne Redman    Measurements  Intervals                                Axis  Rate:       79                           P:          83  ID:         150                          QRS:        132  QRSD:       88                           T:          59  QT:         390  QTc:        448    Interpretive Statements  Sinus rhythm  Ventricular trigeminy  Consider right ventricular hypertrophy  Compared to ECG 10/02/2019 10:12:26  Ventricular premature complex(es) now present  Atrial abnormality no longer present  Myocardial infarct finding no longer present  Electronically Signed On 2024 12:59:09 PDT by Suzanne Redman         LABS  Labs Reviewed   URINALYSIS,CULTURE IF INDICATED - Abnormal; Notable for the following components:       Result Value    Glucose >=1000 (*)     All other components within normal limits   CBC WITH DIFFERENTIAL - Abnormal; Notable for the following components:    WBC 14.0 (*)     MCV 76.1 (*)     MCH 24.7 (*)     Neutrophils-Polys 78.90 (*)     Lymphocytes 10.30 (*)     Neutrophils (Absolute) 11.00 (*)     Monos (Absolute) 1.40 (*)     All other components within normal limits   COMP METABOLIC PANEL - Abnormal; Notable for the following components:    Potassium 3.3 (*)     Glucose 161 (*)     Alkaline Phosphatase 127 (*)     All other components within normal limits   LIPASE   TROPONIN   ESTIMATED GFR         RADIOLOGY  I have independently interpreted the diagnostic imaging associated with this  visit.  My preliminary interpretation is as follows: CT of pelvis: Stranding in lower abdomen consistent with diverticulitis  Radiologist interpretation:    CT-ABDOMEN-PELVIS WITH   Final Result      1.  Findings are consistent with diverticulitis of the sigmoid colon in the left side of the pelvis.      2.  No abscess or free air identified.      CT-HEAD W/O   Final Result      Negative noncontrast CT scan of the head / brain.             COURSE & MEDICAL DECISION MAKING  Pertinent Labs & Imaging studies were reviewed. (See chart for details)    12:15 PM Patient seen and examined at bedside.     INITIAL ASSESSMENT AND PLAN  Medical Decision Making: Patient presents with episode of syncope after an episode of abdominal pain.  She is activated as a code TBI.  CT scan of the head is reassuring, however the patient does have an elevated white blood cell count.  Given the abdominal pain, will perform a CT of the patient is not currently tender.    Troponin negative for ACS, myocarditis.    EKG demonstrates no high risk features for cardiac syncope, including ischemia, high-grade heart block, Brugada syndrome, Michael-Parkinson-White syndrome, arrhythmogenic right ventricular dysplasia, long QT, short QT, hypertrophic obstructive cardiomyopathy.    Patient's CAT scan demonstrates diverticulitis.  The patient is well-appearing with no active pain, no evidence of sepsis.  Patient will be provided with watch and wait antibiotics given her increased risk for complications given her history of diabetes.  She is counseled to use these only if her symptoms are worsening and given return precautions for markedly worsening symptoms.  I suspect she likely had reflex syncope in the setting of her abdominal discomfort.    2:40 PM - I reevaluated the patient at bedside. I discussed the patient's diagnostic study results which show diverticulitis, which likely caused her abdominal pain and reflex syncope. I informed her she will be  prescribed antibiotics, but she does not need to take them unless her symptoms worsen. I discussed plan for discharge and follow up as outlined below. The patient is stable for discharge at this time and will return for any new or worsening symptoms. She was given an opportunity to ask questions. Patient verbalizes understanding and support with my plan for discharge.      ADDITIONAL PROBLEM LIST AND DISPOSITION        Decision tools and prescription drugs considered including, but not limited to: Antibiotics Augmentin .      The patient will return for new or worsening symptoms and is stable at the time of discharge.    The patient is referred to a primary physician for blood pressure management, diabetic screening, and for all other preventative health concerns.    DISPOSITION:  Patient will be discharged home in stable condition.    FOLLOW UP:  Shan Alvarado M.D.  [address]  [address]  [facility] [id number] [phone]    Schedule an appointment as soon as possible for a visit       [facility], Emergency Dept  [address] [facility] [address] [phone]    If symptoms worsen      OUTPATIENT MEDICATIONS:  Discharge Medication List as of 8/3/2024  2:42 PM        START taking these medications    Details   amoxicillin-clavulanate (AUGMENTIN) 875-125 MG Tab Take 1 Tablet by mouth 2 times a day for 5 days., Disp-10 Tablet, R-0, Print Rx Paper                FINAL IMPRESSION  1. Vasovagal syncope    2. Diverticulitis    3. Hypokalemia             Geovanny MARION ([place]), am scribing for, and in the presence of, Isael Redman M.D..    Electronically signed by: Geovanny Cox), 8/3/2024    Isael MARION M.D. personally performed the services described in this documentation, as scribed by Geovanny Trejo in my presence, and it is both accurate and complete.    The note accurately reflects work and decisions made by me.  Isael Redman M.D.  8/3/2024  9:36  PM      This dictation was created using voice recognition software. The accuracy of the dictation is limited to the abilities of the software. I expect there may be some errors of grammar and possibly content. The nursing notes were reviewed and certain aspects of this information were incorporated into this note.

## 2024-08-03 NOTE — ED TRIAGE NOTES
Chief Complaint   Patient presents with    Head Injury     Patient reports this morning at 0915 she had a pain in her abd and turned to speak to her  when she had a + LOC and hit the back of her head on the ground. Per  patient was unresponsive for 20 seconds. - Thinners    Abdominal Pain     Lower abd pain x 2 days.

## 2024-08-09 ENCOUNTER — OUTPATIENT INFUSION SERVICES (OUTPATIENT)
Dept: ONCOLOGY | Facility: MEDICAL CENTER | Age: 79
End: 2024-08-09
Attending: INTERNAL MEDICINE
Payer: MEDICARE

## 2024-08-09 VITALS
SYSTOLIC BLOOD PRESSURE: 122 MMHG | BODY MASS INDEX: 25.56 KG/M2 | HEART RATE: 62 BPM | RESPIRATION RATE: 18 BRPM | TEMPERATURE: 97.9 F | HEIGHT: 62 IN | OXYGEN SATURATION: 92 % | WEIGHT: 138.89 LBS | DIASTOLIC BLOOD PRESSURE: 55 MMHG

## 2024-08-09 DIAGNOSIS — J45.40 MODERATE PERSISTENT ASTHMA WITHOUT COMPLICATION: ICD-10-CM

## 2024-08-09 PROCEDURE — 700111 HCHG RX REV CODE 636 W/ 250 OVERRIDE (IP): Mod: JZ,JG | Performed by: INTERNAL MEDICINE

## 2024-08-09 PROCEDURE — 96372 THER/PROPH/DIAG INJ SC/IM: CPT

## 2024-08-09 RX ADMIN — MEPOLIZUMAB 100 MG: 100 INJECTION, POWDER, FOR SOLUTION SUBCUTANEOUS at 11:44

## 2024-08-09 ASSESSMENT — FIBROSIS 4 INDEX: FIB4 SCORE: 1.05

## 2024-08-09 NOTE — PROGRESS NOTES
Pt arrived ambulatory to [facility] for Q 4 week Nucala injection for asthma. POC discussed with pt and she agrees with plan.     Pt medicated per MAR. Nucala given right back arm. Pt tolerated treatment without s/s adverse reaction.     Pt discharged to [facility] care, [facility]. Pt's next appointment confirmed 9/6/2024.

## 2024-08-23 ENCOUNTER — HOSPITAL ENCOUNTER (OUTPATIENT)
Dept: RADIOLOGY | Facility: MEDICAL CENTER | Age: 79
End: 2024-08-23
Attending: NURSE PRACTITIONER
Payer: MEDICARE

## 2024-08-23 DIAGNOSIS — R22.1 NECK MASS: ICD-10-CM

## 2024-08-23 PROCEDURE — 700117 HCHG RX CONTRAST REV CODE 255: Performed by: NURSE PRACTITIONER

## 2024-08-23 PROCEDURE — 70491 CT SOFT TISSUE NECK W/DYE: CPT

## 2024-08-23 RX ADMIN — IOHEXOL 80 ML: 350 INJECTION, SOLUTION INTRAVENOUS at 13:38

## 2024-09-04 ENCOUNTER — OFFICE VISIT (OUTPATIENT)
Dept: MEDICAL GROUP | Facility: MEDICAL CENTER | Age: 79
End: 2024-09-04
Payer: MEDICARE

## 2024-09-04 VITALS
HEART RATE: 63 BPM | DIASTOLIC BLOOD PRESSURE: 72 MMHG | SYSTOLIC BLOOD PRESSURE: 126 MMHG | TEMPERATURE: 97.7 F | OXYGEN SATURATION: 93 % | BODY MASS INDEX: 26.43 KG/M2 | HEIGHT: 61 IN | WEIGHT: 140 LBS

## 2024-09-04 DIAGNOSIS — R53.83 OTHER FATIGUE: ICD-10-CM

## 2024-09-04 DIAGNOSIS — R91.8 GROUND GLASS OPACITY PRESENT ON IMAGING OF LUNG: ICD-10-CM

## 2024-09-04 DIAGNOSIS — I10 PRIMARY HYPERTENSION: ICD-10-CM

## 2024-09-04 DIAGNOSIS — K57.92 DIVERTICULITIS: ICD-10-CM

## 2024-09-04 DIAGNOSIS — D17.0 LIPOMA OF NECK: ICD-10-CM

## 2024-09-04 DIAGNOSIS — E11.69 TYPE 2 DIABETES MELLITUS WITH OTHER SPECIFIED COMPLICATION, WITH LONG-TERM CURRENT USE OF INSULIN (HCC): ICD-10-CM

## 2024-09-04 DIAGNOSIS — Z79.4 TYPE 2 DIABETES MELLITUS WITH OTHER SPECIFIED COMPLICATION, WITH LONG-TERM CURRENT USE OF INSULIN (HCC): ICD-10-CM

## 2024-09-04 PROCEDURE — 3074F SYST BP LT 130 MM HG: CPT | Performed by: FAMILY MEDICINE

## 2024-09-04 PROCEDURE — 3078F DIAST BP <80 MM HG: CPT | Performed by: FAMILY MEDICINE

## 2024-09-04 PROCEDURE — 99214 OFFICE O/P EST MOD 30 MIN: CPT | Performed by: FAMILY MEDICINE

## 2024-09-04 ASSESSMENT — ENCOUNTER SYMPTOMS
PALPITATIONS: 0
FEVER: 0
CHILLS: 0

## 2024-09-04 ASSESSMENT — FIBROSIS 4 INDEX: FIB4 SCORE: 1.05
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
## 2024-09-06 ENCOUNTER — OUTPATIENT INFUSION SERVICES (OUTPATIENT)
Dept: ONCOLOGY | Facility: MEDICAL CENTER | Age: 79
End: 2024-09-06
Attending: INTERNAL MEDICINE
Payer: MEDICARE

## 2024-09-06 VITALS
SYSTOLIC BLOOD PRESSURE: 116 MMHG | DIASTOLIC BLOOD PRESSURE: 62 MMHG | RESPIRATION RATE: 18 BRPM | HEART RATE: 57 BPM | WEIGHT: 139.11 LBS | TEMPERATURE: 97.3 F | OXYGEN SATURATION: 93 % | HEIGHT: 62 IN | BODY MASS INDEX: 25.6 KG/M2

## 2024-09-06 DIAGNOSIS — J45.40 MODERATE PERSISTENT ASTHMA WITHOUT COMPLICATION: ICD-10-CM

## 2024-09-06 PROCEDURE — 96372 THER/PROPH/DIAG INJ SC/IM: CPT

## 2024-09-06 PROCEDURE — 700111 HCHG RX REV CODE 636 W/ 250 OVERRIDE (IP): Mod: JZ,JG | Performed by: INTERNAL MEDICINE

## 2024-09-06 RX ADMIN — MEPOLIZUMAB 100 MG: 100 INJECTION, POWDER, FOR SOLUTION SUBCUTANEOUS at 11:12

## 2024-09-06 ASSESSMENT — FIBROSIS 4 INDEX: FIB4 SCORE: 1.05

## 2024-09-06 NOTE — PROGRESS NOTES
Pt arrived ambulatory for Q4 week Nucala, denies c/o, no new medical problems since last visit.  Nucala given in left arm, no bandaid applied per pt request.  Pt Dc'd home without incident and will f/u as scheduled.

## 2024-09-17 DIAGNOSIS — I10 ESSENTIAL HYPERTENSION: ICD-10-CM

## 2024-09-17 RX ORDER — LOSARTAN POTASSIUM 25 MG/1
25 TABLET ORAL
Qty: 90 TABLET | Refills: 3 | Status: SHIPPED | OUTPATIENT
Start: 2024-09-17

## 2024-09-17 NOTE — TELEPHONE ENCOUNTER
Received request via: Pharmacy    Was the patient seen in the last year in this department? Yes    Does the patient have an active prescription (recently filled or refills available) for medication(s) requested? No    [facility]    Does the patient have [facility] and need 100-day supply? (This applies to ALL medications) Patient does not have SCP

## 2024-09-24 ENCOUNTER — APPOINTMENT (OUTPATIENT)
Dept: ADMISSIONS | Facility: MEDICAL CENTER | Age: 79
End: 2024-09-24
Attending: SURGERY
Payer: MEDICARE

## 2024-10-07 ENCOUNTER — APPOINTMENT (OUTPATIENT)
Dept: ADMISSIONS | Facility: MEDICAL CENTER | Age: 79
End: 2024-10-07
Attending: SURGERY
Payer: MEDICARE

## 2024-10-07 RX ORDER — B-COMPLEX WITH VITAMIN C
1 TABLET ORAL DAILY
COMMUNITY

## 2024-10-07 RX ORDER — MEPOLIZUMAB 100 MG/ML
100 INJECTION, SOLUTION SUBCUTANEOUS
COMMUNITY

## 2024-10-09 ENCOUNTER — HOSPITAL ENCOUNTER (OUTPATIENT)
Dept: LAB | Facility: MEDICAL CENTER | Age: 79
End: 2024-10-09
Attending: FAMILY MEDICINE
Payer: MEDICARE

## 2024-10-09 DIAGNOSIS — E55.9 VITAMIN D DEFICIENCY: ICD-10-CM

## 2024-10-09 DIAGNOSIS — E78.2 MIXED HYPERLIPIDEMIA: Chronic | ICD-10-CM

## 2024-10-09 DIAGNOSIS — Z79.4 TYPE 2 DIABETES MELLITUS WITH OTHER SPECIFIED COMPLICATION, WITH LONG-TERM CURRENT USE OF INSULIN (HCC): ICD-10-CM

## 2024-10-09 DIAGNOSIS — R53.83 OTHER FATIGUE: ICD-10-CM

## 2024-10-09 DIAGNOSIS — E11.69 TYPE 2 DIABETES MELLITUS WITH OTHER SPECIFIED COMPLICATION, WITH LONG-TERM CURRENT USE OF INSULIN (HCC): ICD-10-CM

## 2024-10-09 DIAGNOSIS — I10 PRIMARY HYPERTENSION: ICD-10-CM

## 2024-10-09 LAB
CREAT UR-MCNC: 119 MG/DL
MICROALBUMIN UR-MCNC: 1.4 MG/DL
MICROALBUMIN/CREAT UR: 12 MG/G (ref 0–30)

## 2024-10-09 PROCEDURE — 36415 COLL VENOUS BLD VENIPUNCTURE: CPT

## 2024-10-09 PROCEDURE — 80053 COMPREHEN METABOLIC PANEL: CPT

## 2024-10-09 PROCEDURE — 82306 VITAMIN D 25 HYDROXY: CPT

## 2024-10-09 PROCEDURE — 82570 ASSAY OF URINE CREATININE: CPT

## 2024-10-09 PROCEDURE — 82607 VITAMIN B-12: CPT

## 2024-10-09 PROCEDURE — 84443 ASSAY THYROID STIM HORMONE: CPT

## 2024-10-09 PROCEDURE — 84439 ASSAY OF FREE THYROXINE: CPT

## 2024-10-09 PROCEDURE — 82043 UR ALBUMIN QUANTITATIVE: CPT

## 2024-10-09 PROCEDURE — 80061 LIPID PANEL: CPT

## 2024-10-10 LAB
25(OH)D3 SERPL-MCNC: 75 NG/ML (ref 30–100)
ALBUMIN SERPL BCP-MCNC: 4.1 G/DL (ref 3.2–4.9)
ALBUMIN/GLOB SERPL: 1.2 G/DL
ALP SERPL-CCNC: 107 U/L (ref 30–99)
ALT SERPL-CCNC: 62 U/L (ref 2–50)
ANION GAP SERPL CALC-SCNC: 11 MMOL/L (ref 7–16)
AST SERPL-CCNC: 57 U/L (ref 12–45)
BILIRUB SERPL-MCNC: 0.5 MG/DL (ref 0.1–1.5)
BUN SERPL-MCNC: 17 MG/DL (ref 8–22)
CALCIUM ALBUM COR SERPL-MCNC: 9.4 MG/DL (ref 8.5–10.5)
CALCIUM SERPL-MCNC: 9.5 MG/DL (ref 8.5–10.5)
CHLORIDE SERPL-SCNC: 96 MMOL/L (ref 96–112)
CHOLEST SERPL-MCNC: 124 MG/DL (ref 100–199)
CO2 SERPL-SCNC: 30 MMOL/L (ref 20–33)
CREAT SERPL-MCNC: 0.69 MG/DL (ref 0.5–1.4)
GFR SERPLBLD CREATININE-BSD FMLA CKD-EPI: 89 ML/MIN/1.73 M 2
GLOBULIN SER CALC-MCNC: 3.3 G/DL (ref 1.9–3.5)
GLUCOSE SERPL-MCNC: 100 MG/DL (ref 65–99)
HDLC SERPL-MCNC: 63 MG/DL
LDLC SERPL CALC-MCNC: 50 MG/DL
POTASSIUM SERPL-SCNC: 3.4 MMOL/L (ref 3.6–5.5)
PROT SERPL-MCNC: 7.4 G/DL (ref 6–8.2)
SODIUM SERPL-SCNC: 137 MMOL/L (ref 135–145)
T4 FREE SERPL-MCNC: 1.26 NG/DL (ref 0.93–1.7)
TRIGL SERPL-MCNC: 54 MG/DL (ref 0–149)
TSH SERPL-ACNC: 1.34 UIU/ML (ref 0.35–5.5)
VIT B12 SERPL-MCNC: 699 PG/ML (ref 211–911)

## 2024-10-11 ENCOUNTER — APPOINTMENT (OUTPATIENT)
Dept: ADMISSIONS | Facility: MEDICAL CENTER | Age: 79
End: 2024-10-11
Attending: SURGERY
Payer: MEDICARE

## 2024-10-11 ENCOUNTER — OUTPATIENT INFUSION SERVICES (OUTPATIENT)
Dept: ONCOLOGY | Facility: MEDICAL CENTER | Age: 79
End: 2024-10-11
Attending: INTERNAL MEDICINE
Payer: MEDICARE

## 2024-10-11 VITALS
OXYGEN SATURATION: 93 % | TEMPERATURE: 98 F | BODY MASS INDEX: 25.56 KG/M2 | WEIGHT: 138.89 LBS | SYSTOLIC BLOOD PRESSURE: 123 MMHG | DIASTOLIC BLOOD PRESSURE: 80 MMHG | HEART RATE: 79 BPM | HEIGHT: 62 IN | RESPIRATION RATE: 18 BRPM

## 2024-10-11 DIAGNOSIS — J45.40 MODERATE PERSISTENT ASTHMA WITHOUT COMPLICATION: ICD-10-CM

## 2024-10-11 DIAGNOSIS — Z01.812 PRE-OPERATIVE LABORATORY EXAMINATION: ICD-10-CM

## 2024-10-11 LAB
EST. AVERAGE GLUCOSE BLD GHB EST-MCNC: 140 MG/DL
HBA1C MFR BLD: 6.5 % (ref 4–5.6)

## 2024-10-11 PROCEDURE — 36415 COLL VENOUS BLD VENIPUNCTURE: CPT

## 2024-10-11 PROCEDURE — 700111 HCHG RX REV CODE 636 W/ 250 OVERRIDE (IP): Mod: JZ,JG | Performed by: INTERNAL MEDICINE

## 2024-10-11 PROCEDURE — 96372 THER/PROPH/DIAG INJ SC/IM: CPT

## 2024-10-11 PROCEDURE — 83036 HEMOGLOBIN GLYCOSYLATED A1C: CPT

## 2024-10-11 RX ADMIN — MEPOLIZUMAB 100 MG: 100 INJECTION, POWDER, FOR SOLUTION SUBCUTANEOUS at 11:24

## 2024-10-11 ASSESSMENT — FIBROSIS 4 INDEX: FIB4 SCORE: 1.63

## 2024-10-14 ENCOUNTER — ANESTHESIA EVENT (OUTPATIENT)
Dept: SURGERY | Facility: MEDICAL CENTER | Age: 79
End: 2024-10-14
Payer: MEDICARE

## 2024-10-15 ENCOUNTER — ANESTHESIA (OUTPATIENT)
Dept: SURGERY | Facility: MEDICAL CENTER | Age: 79
End: 2024-10-15
Payer: MEDICARE

## 2024-10-15 ENCOUNTER — HOSPITAL ENCOUNTER (OUTPATIENT)
Facility: MEDICAL CENTER | Age: 79
End: 2024-10-15
Attending: SURGERY | Admitting: SURGERY
Payer: MEDICARE

## 2024-10-15 VITALS
WEIGHT: 138.23 LBS | HEART RATE: 68 BPM | DIASTOLIC BLOOD PRESSURE: 64 MMHG | HEIGHT: 63 IN | OXYGEN SATURATION: 95 % | RESPIRATION RATE: 17 BRPM | SYSTOLIC BLOOD PRESSURE: 123 MMHG | TEMPERATURE: 96.6 F | BODY MASS INDEX: 24.49 KG/M2

## 2024-10-15 DIAGNOSIS — G89.18 POST-OP PAIN: ICD-10-CM

## 2024-10-15 LAB
GLUCOSE BLD STRIP.AUTO-MCNC: 112 MG/DL (ref 65–99)
PATHOLOGY CONSULT NOTE: NORMAL

## 2024-10-15 PROCEDURE — 160046 HCHG PACU - 1ST 60 MINS PHASE II: Performed by: SURGERY

## 2024-10-15 PROCEDURE — 700111 HCHG RX REV CODE 636 W/ 250 OVERRIDE (IP): Performed by: STUDENT IN AN ORGANIZED HEALTH CARE EDUCATION/TRAINING PROGRAM

## 2024-10-15 PROCEDURE — 88304 TISSUE EXAM BY PATHOLOGIST: CPT

## 2024-10-15 PROCEDURE — 160035 HCHG PACU - 1ST 60 MINS PHASE I: Performed by: SURGERY

## 2024-10-15 PROCEDURE — 82962 GLUCOSE BLOOD TEST: CPT

## 2024-10-15 PROCEDURE — 160002 HCHG RECOVERY MINUTES (STAT): Performed by: SURGERY

## 2024-10-15 PROCEDURE — 700105 HCHG RX REV CODE 258: Performed by: SURGERY

## 2024-10-15 PROCEDURE — 160028 HCHG SURGERY MINUTES - 1ST 30 MINS LEVEL 3: Performed by: SURGERY

## 2024-10-15 PROCEDURE — 160009 HCHG ANES TIME/MIN: Performed by: SURGERY

## 2024-10-15 PROCEDURE — 160039 HCHG SURGERY MINUTES - EA ADDL 1 MIN LEVEL 3: Performed by: SURGERY

## 2024-10-15 PROCEDURE — 160048 HCHG OR STATISTICAL LEVEL 1-5: Performed by: SURGERY

## 2024-10-15 PROCEDURE — 160036 HCHG PACU - EA ADDL 30 MINS PHASE I: Performed by: SURGERY

## 2024-10-15 PROCEDURE — 700101 HCHG RX REV CODE 250: Performed by: SURGERY

## 2024-10-15 PROCEDURE — 700101 HCHG RX REV CODE 250: Performed by: STUDENT IN AN ORGANIZED HEALTH CARE EDUCATION/TRAINING PROGRAM

## 2024-10-15 PROCEDURE — 160025 RECOVERY II MINUTES (STATS): Performed by: SURGERY

## 2024-10-15 RX ORDER — OXYCODONE HCL 5 MG/5 ML
5 SOLUTION, ORAL ORAL
Status: DISCONTINUED | OUTPATIENT
Start: 2024-10-15 | End: 2024-10-15 | Stop reason: HOSPADM

## 2024-10-15 RX ORDER — LIDOCAINE HYDROCHLORIDE 20 MG/ML
INJECTION, SOLUTION EPIDURAL; INFILTRATION; INTRACAUDAL; PERINEURAL PRN
Status: DISCONTINUED | OUTPATIENT
Start: 2024-10-15 | End: 2024-10-15 | Stop reason: SURG

## 2024-10-15 RX ORDER — HYDROMORPHONE HYDROCHLORIDE 1 MG/ML
0.2 INJECTION, SOLUTION INTRAMUSCULAR; INTRAVENOUS; SUBCUTANEOUS
Status: DISCONTINUED | OUTPATIENT
Start: 2024-10-15 | End: 2024-10-15 | Stop reason: HOSPADM

## 2024-10-15 RX ORDER — HYDROMORPHONE HYDROCHLORIDE 1 MG/ML
0.1 INJECTION, SOLUTION INTRAMUSCULAR; INTRAVENOUS; SUBCUTANEOUS
Status: DISCONTINUED | OUTPATIENT
Start: 2024-10-15 | End: 2024-10-15 | Stop reason: HOSPADM

## 2024-10-15 RX ORDER — ROCURONIUM BROMIDE 10 MG/ML
INJECTION, SOLUTION INTRAVENOUS PRN
Status: DISCONTINUED | OUTPATIENT
Start: 2024-10-15 | End: 2024-10-15 | Stop reason: SURG

## 2024-10-15 RX ORDER — EPHEDRINE SULFATE 50 MG/ML
5 INJECTION, SOLUTION INTRAVENOUS
Status: DISCONTINUED | OUTPATIENT
Start: 2024-10-15 | End: 2024-10-15 | Stop reason: HOSPADM

## 2024-10-15 RX ORDER — ONDANSETRON 2 MG/ML
4 INJECTION INTRAMUSCULAR; INTRAVENOUS
Status: COMPLETED | OUTPATIENT
Start: 2024-10-15 | End: 2024-10-15

## 2024-10-15 RX ORDER — ALBUTEROL SULFATE 5 MG/ML
2.5 SOLUTION RESPIRATORY (INHALATION)
Status: DISCONTINUED | OUTPATIENT
Start: 2024-10-15 | End: 2024-10-15 | Stop reason: HOSPADM

## 2024-10-15 RX ORDER — IBUPROFEN 200 MG
400 TABLET ORAL EVERY 8 HOURS PRN
COMMUNITY

## 2024-10-15 RX ORDER — SODIUM CHLORIDE, SODIUM LACTATE, POTASSIUM CHLORIDE, CALCIUM CHLORIDE 600; 310; 30; 20 MG/100ML; MG/100ML; MG/100ML; MG/100ML
INJECTION, SOLUTION INTRAVENOUS CONTINUOUS
Status: ACTIVE | OUTPATIENT
Start: 2024-10-15 | End: 2024-10-15

## 2024-10-15 RX ORDER — DEXAMETHASONE SODIUM PHOSPHATE 4 MG/ML
INJECTION, SOLUTION INTRA-ARTICULAR; INTRALESIONAL; INTRAMUSCULAR; INTRAVENOUS; SOFT TISSUE PRN
Status: DISCONTINUED | OUTPATIENT
Start: 2024-10-15 | End: 2024-10-15 | Stop reason: SURG

## 2024-10-15 RX ORDER — TRAMADOL HYDROCHLORIDE 50 MG/1
50 TABLET ORAL EVERY 6 HOURS PRN
Qty: 20 TABLET | Refills: 0 | Status: SHIPPED | OUTPATIENT
Start: 2024-10-15 | End: 2024-10-22

## 2024-10-15 RX ORDER — BUPIVACAINE HYDROCHLORIDE AND EPINEPHRINE 5; 5 MG/ML; UG/ML
INJECTION, SOLUTION EPIDURAL; INTRACAUDAL; PERINEURAL
Status: DISCONTINUED | OUTPATIENT
Start: 2024-10-15 | End: 2024-10-15 | Stop reason: HOSPADM

## 2024-10-15 RX ORDER — LABETALOL HYDROCHLORIDE 5 MG/ML
5 INJECTION, SOLUTION INTRAVENOUS
Status: DISCONTINUED | OUTPATIENT
Start: 2024-10-15 | End: 2024-10-15 | Stop reason: HOSPADM

## 2024-10-15 RX ORDER — HYDROMORPHONE HYDROCHLORIDE 1 MG/ML
0.4 INJECTION, SOLUTION INTRAMUSCULAR; INTRAVENOUS; SUBCUTANEOUS
Status: DISCONTINUED | OUTPATIENT
Start: 2024-10-15 | End: 2024-10-15 | Stop reason: HOSPADM

## 2024-10-15 RX ORDER — BLOOD-GLUCOSE SENSOR
1 EACH MISCELLANEOUS
COMMUNITY

## 2024-10-15 RX ORDER — OXYCODONE HCL 5 MG/5 ML
10 SOLUTION, ORAL ORAL
Status: DISCONTINUED | OUTPATIENT
Start: 2024-10-15 | End: 2024-10-15 | Stop reason: HOSPADM

## 2024-10-15 RX ORDER — CEFAZOLIN SODIUM 1 G/3ML
INJECTION, POWDER, FOR SOLUTION INTRAMUSCULAR; INTRAVENOUS PRN
Status: DISCONTINUED | OUTPATIENT
Start: 2024-10-15 | End: 2024-10-15 | Stop reason: SURG

## 2024-10-15 RX ORDER — HYDRALAZINE HYDROCHLORIDE 20 MG/ML
5 INJECTION INTRAMUSCULAR; INTRAVENOUS
Status: DISCONTINUED | OUTPATIENT
Start: 2024-10-15 | End: 2024-10-15 | Stop reason: HOSPADM

## 2024-10-15 RX ORDER — PHENYLEPHRINE HYDROCHLORIDE 10 MG/ML
INJECTION, SOLUTION INTRAMUSCULAR; INTRAVENOUS; SUBCUTANEOUS PRN
Status: DISCONTINUED | OUTPATIENT
Start: 2024-10-15 | End: 2024-10-15 | Stop reason: SURG

## 2024-10-15 RX ADMIN — SUGAMMADEX 200 MG: 100 INJECTION, SOLUTION INTRAVENOUS at 11:41

## 2024-10-15 RX ADMIN — ROCURONIUM BROMIDE 50 MG: 50 INJECTION, SOLUTION INTRAVENOUS at 11:07

## 2024-10-15 RX ADMIN — SUGAMMADEX 200 MG: 100 INJECTION, SOLUTION INTRAVENOUS at 11:45

## 2024-10-15 RX ADMIN — CEFAZOLIN 2 G: 1 INJECTION, POWDER, FOR SOLUTION INTRAMUSCULAR; INTRAVENOUS at 11:09

## 2024-10-15 RX ADMIN — SODIUM CHLORIDE, POTASSIUM CHLORIDE, SODIUM LACTATE AND CALCIUM CHLORIDE: 600; 310; 30; 20 INJECTION, SOLUTION INTRAVENOUS at 11:02

## 2024-10-15 RX ADMIN — PROPOFOL 125 MG: 10 INJECTION, EMULSION INTRAVENOUS at 11:07

## 2024-10-15 RX ADMIN — ONDANSETRON 4 MG: 2 INJECTION INTRAMUSCULAR; INTRAVENOUS at 12:35

## 2024-10-15 RX ADMIN — FENTANYL CITRATE 100 MCG: 50 INJECTION, SOLUTION INTRAMUSCULAR; INTRAVENOUS at 11:05

## 2024-10-15 RX ADMIN — PHENYLEPHRINE HYDROCHLORIDE 100 MCG: 10 INJECTION INTRAVENOUS at 11:16

## 2024-10-15 RX ADMIN — DEXAMETHASONE SODIUM PHOSPHATE 4 MG: 4 INJECTION INTRA-ARTICULAR; INTRALESIONAL; INTRAMUSCULAR; INTRAVENOUS; SOFT TISSUE at 11:05

## 2024-10-15 RX ADMIN — LIDOCAINE HYDROCHLORIDE 80 MG: 20 INJECTION, SOLUTION EPIDURAL; INFILTRATION; INTRACAUDAL at 11:07

## 2024-10-15 RX ADMIN — PHENYLEPHRINE HYDROCHLORIDE 100 MCG: 10 INJECTION INTRAVENOUS at 11:19

## 2024-10-15 ASSESSMENT — FIBROSIS 4 INDEX: FIB4 SCORE: 1.63

## 2024-10-15 ASSESSMENT — PAIN SCALES - GENERAL: PAIN_LEVEL: 4

## 2024-10-16 ENCOUNTER — OFFICE VISIT (OUTPATIENT)
Dept: MEDICAL GROUP | Facility: MEDICAL CENTER | Age: 79
End: 2024-10-16
Payer: MEDICARE

## 2024-10-16 VITALS
OXYGEN SATURATION: 94 % | SYSTOLIC BLOOD PRESSURE: 104 MMHG | HEART RATE: 71 BPM | BODY MASS INDEX: 25 KG/M2 | DIASTOLIC BLOOD PRESSURE: 54 MMHG | TEMPERATURE: 97.8 F | WEIGHT: 141.09 LBS | HEIGHT: 63 IN

## 2024-10-16 DIAGNOSIS — I10 ESSENTIAL HYPERTENSION, BENIGN: ICD-10-CM

## 2024-10-16 DIAGNOSIS — R74.8 ELEVATED LIVER ENZYMES: ICD-10-CM

## 2024-10-16 PROCEDURE — 3078F DIAST BP <80 MM HG: CPT | Performed by: FAMILY MEDICINE

## 2024-10-16 PROCEDURE — 3074F SYST BP LT 130 MM HG: CPT | Performed by: FAMILY MEDICINE

## 2024-10-16 PROCEDURE — 99214 OFFICE O/P EST MOD 30 MIN: CPT | Performed by: FAMILY MEDICINE

## 2024-10-16 ASSESSMENT — ENCOUNTER SYMPTOMS
PALPITATIONS: 0
FEVER: 0
CHILLS: 0

## 2024-10-16 ASSESSMENT — FIBROSIS 4 INDEX: FIB4 SCORE: 1.63

## 2024-11-08 ENCOUNTER — APPOINTMENT (OUTPATIENT)
Dept: ONCOLOGY | Facility: MEDICAL CENTER | Age: 79
End: 2024-11-08
Attending: INTERNAL MEDICINE
Payer: MEDICARE

## 2024-12-06 ENCOUNTER — OUTPATIENT INFUSION SERVICES (OUTPATIENT)
Dept: ONCOLOGY | Facility: MEDICAL CENTER | Age: 79
End: 2024-12-06
Attending: INTERNAL MEDICINE
Payer: MEDICARE

## 2024-12-06 VITALS
BODY MASS INDEX: 25.56 KG/M2 | HEART RATE: 84 BPM | DIASTOLIC BLOOD PRESSURE: 61 MMHG | WEIGHT: 138.89 LBS | HEIGHT: 62 IN | OXYGEN SATURATION: 98 % | RESPIRATION RATE: 18 BRPM | TEMPERATURE: 98 F | SYSTOLIC BLOOD PRESSURE: 130 MMHG

## 2024-12-06 DIAGNOSIS — J45.40 MODERATE PERSISTENT ASTHMA WITHOUT COMPLICATION: ICD-10-CM

## 2024-12-06 PROCEDURE — 96372 THER/PROPH/DIAG INJ SC/IM: CPT

## 2024-12-06 PROCEDURE — 700111 HCHG RX REV CODE 636 W/ 250 OVERRIDE (IP): Mod: JZ,JG | Performed by: INTERNAL MEDICINE

## 2024-12-06 RX ADMIN — MEPOLIZUMAB 100 MG: 100 INJECTION, POWDER, FOR SOLUTION SUBCUTANEOUS at 11:25

## 2024-12-06 ASSESSMENT — FIBROSIS 4 INDEX: FIB4 SCORE: 1.63

## 2024-12-06 NOTE — PROGRESS NOTES
Patient came into infusion independently. Orders and vitals reviewed, assessment done. Nucala injection given in back of left arm as ordered with no adverse events. Patient left in stable condition with next appointment confirmed.

## 2024-12-12 ENCOUNTER — TELEPHONE (OUTPATIENT)
Dept: HEALTH INFORMATION MANAGEMENT | Facility: OTHER | Age: 79
End: 2024-12-12
Payer: MEDICARE

## 2024-12-13 ENCOUNTER — OFFICE VISIT (OUTPATIENT)
Dept: MEDICAL GROUP | Facility: MEDICAL CENTER | Age: 79
End: 2024-12-13
Payer: MEDICARE

## 2024-12-13 ENCOUNTER — TELEPHONE (OUTPATIENT)
Dept: HEALTH INFORMATION MANAGEMENT | Facility: OTHER | Age: 79
End: 2024-12-13

## 2024-12-13 VITALS
HEIGHT: 62 IN | OXYGEN SATURATION: 90 % | HEART RATE: 79 BPM | BODY MASS INDEX: 25.8 KG/M2 | SYSTOLIC BLOOD PRESSURE: 130 MMHG | WEIGHT: 140.21 LBS | TEMPERATURE: 98.5 F | DIASTOLIC BLOOD PRESSURE: 62 MMHG

## 2024-12-13 DIAGNOSIS — S46.812A STRAIN OF LEFT TRAPEZIUS MUSCLE, INITIAL ENCOUNTER: ICD-10-CM

## 2024-12-13 DIAGNOSIS — Z98.890 S/P EXCISION OF LIPOMA: ICD-10-CM

## 2024-12-13 DIAGNOSIS — Z86.018 S/P EXCISION OF LIPOMA: ICD-10-CM

## 2024-12-13 DIAGNOSIS — R91.8 GROUND GLASS OPACITY PRESENT ON IMAGING OF LUNG: ICD-10-CM

## 2024-12-13 DIAGNOSIS — R74.8 ELEVATED LIVER ENZYMES: ICD-10-CM

## 2024-12-13 PROBLEM — D17.0 LIPOMA OF NECK: Status: RESOLVED | Noted: 2024-09-04 | Resolved: 2024-12-13

## 2024-12-13 PROCEDURE — 3078F DIAST BP <80 MM HG: CPT | Performed by: FAMILY MEDICINE

## 2024-12-13 PROCEDURE — 3075F SYST BP GE 130 - 139MM HG: CPT | Performed by: FAMILY MEDICINE

## 2024-12-13 PROCEDURE — 99214 OFFICE O/P EST MOD 30 MIN: CPT | Performed by: FAMILY MEDICINE

## 2024-12-13 ASSESSMENT — FIBROSIS 4 INDEX: FIB4 SCORE: 1.63

## 2024-12-13 ASSESSMENT — ENCOUNTER SYMPTOMS
CHILLS: 0
FEVER: 0

## 2024-12-13 NOTE — PROGRESS NOTES
Verbal consent was acquired by the patient to use [employer] ambient listening note generation during this visit.      Rossy was seen today for follow-up.    Diagnoses and all orders for this visit:    Strain of left trapezius muscle, initial encounter  -     tizanidine (ZANAFLEX) 4 MG Tab; Take 1 Tablet by mouth at bedtime as needed (muscle strain).    Elevated liver enzymes    S/P excision of lipoma                  Assessment & Plan  1.  trapezius strain, left side:  New condition, unstable  - Pain likely due to muscle strain from lipoma removal surgery affecting the trapezius muscle  - Muscle in different position post-surgery causing strain and pain  - Prescribe Tizanidine to be taken at night for 2 weeks  - Apply Voltaren gel every 6 hours for 2 weeks for pain and inflammation  - If no improvement, arrange consultation with PA for potential Kenalog injection    2. Groundglass opacity  Chronic condition, unstable  - CT scan ordered for February to monitor groundglass opacity  - Referral to pulmonology made; appointment to be scheduled for March  - If CT scan shows increase in size, follow-up with pulmonologist necessary    3.  Elevated liver enzymes  Chronic condition, unstable, recommended to do blood test before next appointment.    Follow-up in April for blood test and imaging follow-up.          Chief complaint::Diagnoses of Strain of left trapezius muscle, initial encounter, Elevated liver enzymes, and S/P excision of lipoma were pertinent to this visit.      History of Present Illness  The patient is a 79-year-old female who presents for evaluation of neck pain on the left side where she had a lipoma removed.    Pain at site of lipoma removal  - Underwent a surgical procedure for the removal of a lipoma, measuring approximately 6.5 cm, from the left side of her neck 2 months prior  - Lipoma was located beneath the muscle layer  - Surgical site was managed with dissolvable stitches and glue  - Experienced  discomfort postoperatively, prompting a follow-up visit with the surgeon  - Reports that one of the stitches protruded and was subsequently trimmed  - Describes the area as becoming hard, which she attributes to the underlying muscle  - Has been applying scar cream to the area as per her dermatologist's advice  - Informed during a recent dermatology visit that Kenalog could potentially alleviate the muscle strain, but it was not administered at that time  - Previously used Flexeril but discontinued it due to its sedative effects  - Has not tried tizanidine before  - Has Voltaren gel at home    Pulmonary Concerns  - Received a call from the pulmonary department last night at about 5:30 PM  - Unsure why they wanted to see her  - Has not had her CT scan yet  - Had groundglass opacities before, which resolved on their own          Review of Systems   Constitutional:  Negative for chills and fever.   Musculoskeletal:         Pain at site of lipoma removal as well as at trapezius muscle of left side          Medications and Allergies:     Current Outpatient Medications   Medication Sig Dispense Refill    tizanidine (ZANAFLEX) 4 MG Tab Take 1 Tablet by mouth at bedtime as needed (muscle strain). 30 Tablet 1    Multiple Vitamins-Minerals (AIRBORNE PO) Take  by mouth.      ibuprofen (MOTRIN) 200 MG Tab Take 400 mg by mouth every 8 hours as needed for Mild Pain.      Continuous Glucose Sensor (FREESTYLE DANIELLA 3 SENSOR) Misc 1 Each every 14 days.      Mepolizumab (NUCALA) 100 MG/ML Solution Prefilled Syringe Inject 100 mg under the skin every 30 (thirty) days.         B Complex-C (VITAMIN B + C COMPLEX) Tab Take 1 Tablet by mouth every day.      losartan (COZAAR) 25 MG Tab TAKE 1 TABLET BY MOUTH EVERY DAY 90 Tablet 3    dupilumab (DUPIXENT) 300 MG/2ML injection Inject 300 mg under the skin every 14 days.         hydroCHLOROthiazide 25 MG Tab TAKE 1 TABLET BY MOUTH EVERY DAY 90 Tablet 3    omeprazole (PRILOSEC) 20 MG  "delayed-release capsule TAKE 1 CAPSULE BY MOUTH EVERY DAY 90 Capsule 3    atorvastatin (LIPITOR) 40 MG Tab Take 1 Tablet by mouth at bedtime. 90 Tablet 3    LYUMJEV KWIKPEN 100 UNIT/ML Solution Pen-injector Inject 5-8 Units under the skin 3 times a day.         TRESIBA FLEXTOUCH 100 UNIT/ML Solution Pen-injector Inject 20 Units under the skin every evening.      nystatin (MYCOSTATIN) powder Apply 1 Application topically 4 times a day as needed (yeast). On vaginia      JARDIANCE 10 MG Tab Take 1 Tablet by mouth every day.      ASMANEX  MCG/ACT Aerosol Inhale 2 Puffs 2 (two) times a day.      albuterol 108 (90 Base) MCG/ACT Aero Soln inhalation aerosol Inhale 2 Puffs every four hours as needed for Shortness of Breath.      Ca Phosphate-Cholecalciferol 250-500 MG-UNIT Chew Tab Chew 1 Tablet 2 times a day.      Multiple Vitamins-Minerals (MULTIVITAMIN ADULT PO) Take 1 Tablet by mouth every day.       No current facility-administered medications for this visit.       /62 (BP Location: Left arm, Patient Position: Sitting, BP Cuff Size: Adult)   Pulse 79   Temp 36.9 °C (98.5 °F) (Temporal)   Ht 1.575 m (5' 2\")   Wt 63.6 kg (140 lb 3.4 oz)   SpO2 90% , Body mass index is 25.65 kg/m².      Physical Exam  Constitutional:       Appearance: Normal appearance. She is well-developed and well-groomed.   HENT:      Head: Normocephalic and atraumatic.      Right Ear: External ear normal.      Left Ear: External ear normal.   Eyes:      General:         Right eye: No discharge.         Left eye: No discharge.      Conjunctiva/sclera: Conjunctivae normal.   Cardiovascular:      Rate and Rhythm: Normal rate.   Pulmonary:      Effort: Pulmonary effort is normal. No respiratory distress.   Musculoskeletal:      Cervical back: Neck supple.      Comments: Linear scar present at left side of neck, tenderness on palpation of trapezius muscle on left side.   Skin:     Findings: No rash.   Neurological:      Mental Status: " She is alert.   Psychiatric:         Mood and Affect: Mood and affect normal.         Behavior: Behavior normal.                  Please note that this dictation was created using voice recognition software. I have made every reasonable attempt to correct obvious errors, but I expect that there are errors of grammar and possibly content that I did not discover before finalizing the note.

## 2025-01-03 ENCOUNTER — OUTPATIENT INFUSION SERVICES (OUTPATIENT)
Dept: ONCOLOGY | Facility: MEDICAL CENTER | Age: 80
End: 2025-01-03
Attending: INTERNAL MEDICINE
Payer: MEDICARE

## 2025-01-03 VITALS
OXYGEN SATURATION: 93 % | RESPIRATION RATE: 17 BRPM | HEART RATE: 104 BPM | TEMPERATURE: 98.7 F | BODY MASS INDEX: 26.1 KG/M2 | DIASTOLIC BLOOD PRESSURE: 67 MMHG | WEIGHT: 138.23 LBS | HEIGHT: 61 IN | SYSTOLIC BLOOD PRESSURE: 128 MMHG

## 2025-01-03 DIAGNOSIS — J45.40 MODERATE PERSISTENT ASTHMA WITHOUT COMPLICATION: ICD-10-CM

## 2025-01-03 PROCEDURE — 96372 THER/PROPH/DIAG INJ SC/IM: CPT

## 2025-01-03 PROCEDURE — 700111 HCHG RX REV CODE 636 W/ 250 OVERRIDE (IP): Mod: JZ,TB | Performed by: INTERNAL MEDICINE

## 2025-01-03 RX ADMIN — MEPOLIZUMAB 100 MG: 100 INJECTION, POWDER, FOR SOLUTION SUBCUTANEOUS at 11:30

## 2025-01-03 ASSESSMENT — FIBROSIS 4 INDEX: FIB4 SCORE: 1.63

## 2025-01-16 ENCOUNTER — APPOINTMENT (OUTPATIENT)
Dept: RADIOLOGY | Facility: MEDICAL CENTER | Age: 80
End: 2025-01-16
Attending: FAMILY MEDICINE
Payer: MEDICARE

## 2025-01-17 ENCOUNTER — HOSPITAL ENCOUNTER (OUTPATIENT)
Dept: RADIOLOGY | Facility: MEDICAL CENTER | Age: 80
End: 2025-01-17
Attending: FAMILY MEDICINE
Payer: MEDICARE

## 2025-01-17 DIAGNOSIS — R92.8 ABNORMAL MAMMOGRAM OF LEFT BREAST: ICD-10-CM

## 2025-01-17 PROCEDURE — G0279 TOMOSYNTHESIS, MAMMO: HCPCS

## 2025-01-17 PROCEDURE — 76642 ULTRASOUND BREAST LIMITED: CPT | Mod: LT

## 2025-01-23 DIAGNOSIS — E78.2 MIXED HYPERLIPIDEMIA: ICD-10-CM

## 2025-01-23 RX ORDER — ATORVASTATIN CALCIUM 40 MG/1
40 TABLET, FILM COATED ORAL
Qty: 90 TABLET | Refills: 3 | Status: SHIPPED | OUTPATIENT
Start: 2025-01-23

## 2025-01-23 NOTE — TELEPHONE ENCOUNTER
Received request via: Patient    Was the patient seen in the last year in this department? Yes    Does the patient have an active prescription (recently filled or refills available) for medication(s) requested? No    Pharmacy Name: [facility]    Does the patient have [facility] and need 100-day supply? (This applies to ALL medications) Patient does not have SCP    Patient comment: I have changed pharmacies to mail order [facility]. Please send refill to them. Thanks

## 2025-01-31 ENCOUNTER — OUTPATIENT INFUSION SERVICES (OUTPATIENT)
Dept: ONCOLOGY | Facility: MEDICAL CENTER | Age: 80
End: 2025-01-31
Attending: INTERNAL MEDICINE
Payer: MEDICARE

## 2025-01-31 VITALS
HEART RATE: 51 BPM | TEMPERATURE: 97 F | SYSTOLIC BLOOD PRESSURE: 129 MMHG | BODY MASS INDEX: 25.81 KG/M2 | WEIGHT: 136.69 LBS | OXYGEN SATURATION: 94 % | DIASTOLIC BLOOD PRESSURE: 66 MMHG | HEIGHT: 61 IN | RESPIRATION RATE: 18 BRPM

## 2025-01-31 DIAGNOSIS — J45.40 MODERATE PERSISTENT ASTHMA WITHOUT COMPLICATION: ICD-10-CM

## 2025-01-31 PROCEDURE — 700111 HCHG RX REV CODE 636 W/ 250 OVERRIDE (IP): Mod: JZ,TB | Performed by: INTERNAL MEDICINE

## 2025-01-31 PROCEDURE — 96372 THER/PROPH/DIAG INJ SC/IM: CPT

## 2025-01-31 RX ADMIN — MEPOLIZUMAB 100 MG: 100 INJECTION, POWDER, FOR SOLUTION SUBCUTANEOUS at 11:48

## 2025-01-31 ASSESSMENT — FIBROSIS 4 INDEX: FIB4 SCORE: 1.63

## 2025-02-19 ENCOUNTER — HOSPITAL ENCOUNTER (OUTPATIENT)
Dept: LAB | Facility: MEDICAL CENTER | Age: 80
End: 2025-02-19
Attending: FAMILY MEDICINE
Payer: MEDICARE

## 2025-02-19 DIAGNOSIS — R74.8 ELEVATED LIVER ENZYMES: ICD-10-CM

## 2025-02-19 LAB
ALBUMIN SERPL BCP-MCNC: 3.9 G/DL (ref 3.2–4.9)
ALBUMIN/GLOB SERPL: 1 G/DL
ALP SERPL-CCNC: 130 U/L (ref 30–99)
ALT SERPL-CCNC: 162 U/L (ref 2–50)
ANION GAP SERPL CALC-SCNC: 11 MMOL/L (ref 7–16)
AST SERPL-CCNC: 106 U/L (ref 12–45)
BILIRUB SERPL-MCNC: 0.5 MG/DL (ref 0.1–1.5)
BUN SERPL-MCNC: 22 MG/DL (ref 8–22)
CALCIUM ALBUM COR SERPL-MCNC: 9.9 MG/DL (ref 8.5–10.5)
CALCIUM SERPL-MCNC: 9.8 MG/DL (ref 8.5–10.5)
CHLORIDE SERPL-SCNC: 99 MMOL/L (ref 96–112)
CO2 SERPL-SCNC: 28 MMOL/L (ref 20–33)
CREAT SERPL-MCNC: 0.66 MG/DL (ref 0.5–1.4)
GFR SERPLBLD CREATININE-BSD FMLA CKD-EPI: 90 ML/MIN/1.73 M 2
GLOBULIN SER CALC-MCNC: 3.8 G/DL (ref 1.9–3.5)
GLUCOSE SERPL-MCNC: 96 MG/DL (ref 65–99)
POTASSIUM SERPL-SCNC: 3.5 MMOL/L (ref 3.6–5.5)
PROT SERPL-MCNC: 7.7 G/DL (ref 6–8.2)
SODIUM SERPL-SCNC: 138 MMOL/L (ref 135–145)

## 2025-02-19 PROCEDURE — 80053 COMPREHEN METABOLIC PANEL: CPT

## 2025-02-19 PROCEDURE — 36415 COLL VENOUS BLD VENIPUNCTURE: CPT

## 2025-02-20 ENCOUNTER — RESULTS FOLLOW-UP (OUTPATIENT)
Dept: MEDICAL GROUP | Facility: MEDICAL CENTER | Age: 80
End: 2025-02-20
Payer: MEDICARE

## 2025-02-20 DIAGNOSIS — R79.89 ELEVATED LFTS: ICD-10-CM

## 2025-02-26 ENCOUNTER — HOSPITAL ENCOUNTER (OUTPATIENT)
Dept: RADIOLOGY | Facility: MEDICAL CENTER | Age: 80
End: 2025-02-26
Attending: FAMILY MEDICINE
Payer: MEDICARE

## 2025-02-26 DIAGNOSIS — R91.8 GROUND GLASS OPACITY PRESENT ON IMAGING OF LUNG: ICD-10-CM

## 2025-02-26 PROCEDURE — 71260 CT THORAX DX C+: CPT

## 2025-02-26 PROCEDURE — 700117 HCHG RX CONTRAST REV CODE 255: Performed by: FAMILY MEDICINE

## 2025-02-26 RX ADMIN — IOHEXOL 80 ML: 350 INJECTION, SOLUTION INTRAVENOUS at 11:32

## 2025-02-27 ENCOUNTER — RESULTS FOLLOW-UP (OUTPATIENT)
Dept: MEDICAL GROUP | Facility: MEDICAL CENTER | Age: 80
End: 2025-02-27

## 2025-02-28 ENCOUNTER — OUTPATIENT INFUSION SERVICES (OUTPATIENT)
Dept: ONCOLOGY | Facility: MEDICAL CENTER | Age: 80
End: 2025-02-28
Attending: INTERNAL MEDICINE
Payer: MEDICARE

## 2025-02-28 VITALS
TEMPERATURE: 97.3 F | SYSTOLIC BLOOD PRESSURE: 112 MMHG | WEIGHT: 139.55 LBS | DIASTOLIC BLOOD PRESSURE: 68 MMHG | BODY MASS INDEX: 26.35 KG/M2 | HEART RATE: 63 BPM | RESPIRATION RATE: 18 BRPM | OXYGEN SATURATION: 94 % | HEIGHT: 61 IN

## 2025-02-28 DIAGNOSIS — J45.40 MODERATE PERSISTENT ASTHMA WITHOUT COMPLICATION: ICD-10-CM

## 2025-02-28 PROCEDURE — 96372 THER/PROPH/DIAG INJ SC/IM: CPT

## 2025-02-28 PROCEDURE — 700111 HCHG RX REV CODE 636 W/ 250 OVERRIDE (IP): Mod: JZ,TB | Performed by: INTERNAL MEDICINE

## 2025-02-28 RX ADMIN — MEPOLIZUMAB 100 MG: 100 INJECTION, POWDER, FOR SOLUTION SUBCUTANEOUS at 11:00

## 2025-02-28 ASSESSMENT — FIBROSIS 4 INDEX: FIB4 SCORE: 1.9

## 2025-02-28 NOTE — PROGRESS NOTES
Pt arrives to [facility] for Nucala injection.  Pt denies s/sx of infection or other complaints today.  Pt reports her asthma symptoms are managed by Nucala. Nucala given SC to RUE.  Pt declines Band-Aid to site d/t tape allergy.  Confirmed next appt time on 3/28/25 with pt.  Pt dc home to self care.  Email sent to scheduling dept to set up more appts.

## 2025-03-04 ENCOUNTER — APPOINTMENT (OUTPATIENT)
Dept: SLEEP MEDICINE | Facility: MEDICAL CENTER | Age: 80
End: 2025-03-04
Attending: FAMILY MEDICINE
Payer: MEDICARE

## 2025-03-17 ENCOUNTER — PATIENT MESSAGE (OUTPATIENT)
Dept: MEDICAL GROUP | Facility: MEDICAL CENTER | Age: 80
End: 2025-03-17
Payer: MEDICARE

## 2025-03-17 DIAGNOSIS — I10 ESSENTIAL HYPERTENSION: ICD-10-CM

## 2025-03-17 RX ORDER — LOSARTAN POTASSIUM 25 MG/1
25 TABLET ORAL
Qty: 90 TABLET | Refills: 3 | Status: SHIPPED | OUTPATIENT
Start: 2025-03-17

## 2025-03-20 DIAGNOSIS — I10 ESSENTIAL HYPERTENSION: ICD-10-CM

## 2025-03-20 RX ORDER — LOSARTAN POTASSIUM 25 MG/1
25 TABLET ORAL
Qty: 90 TABLET | Refills: 3 | Status: CANCELLED | OUTPATIENT
Start: 2025-03-20

## 2025-03-26 ENCOUNTER — HOSPITAL ENCOUNTER (OUTPATIENT)
Dept: RADIOLOGY | Facility: MEDICAL CENTER | Age: 80
End: 2025-03-26
Attending: FAMILY MEDICINE
Payer: MEDICARE

## 2025-03-26 ENCOUNTER — RESULTS FOLLOW-UP (OUTPATIENT)
Dept: MEDICAL GROUP | Facility: MEDICAL CENTER | Age: 80
End: 2025-03-26

## 2025-03-26 DIAGNOSIS — R79.89 ELEVATED LFTS: ICD-10-CM

## 2025-03-26 PROCEDURE — 76705 ECHO EXAM OF ABDOMEN: CPT

## 2025-03-28 ENCOUNTER — OUTPATIENT INFUSION SERVICES (OUTPATIENT)
Dept: ONCOLOGY | Facility: MEDICAL CENTER | Age: 80
End: 2025-03-28
Attending: INTERNAL MEDICINE
Payer: MEDICARE

## 2025-03-28 VITALS
HEIGHT: 61 IN | HEART RATE: 71 BPM | RESPIRATION RATE: 18 BRPM | BODY MASS INDEX: 26.14 KG/M2 | SYSTOLIC BLOOD PRESSURE: 137 MMHG | OXYGEN SATURATION: 94 % | DIASTOLIC BLOOD PRESSURE: 67 MMHG | WEIGHT: 138.45 LBS | TEMPERATURE: 97.3 F

## 2025-03-28 DIAGNOSIS — J45.40 MODERATE PERSISTENT ASTHMA WITHOUT COMPLICATION: ICD-10-CM

## 2025-03-28 PROCEDURE — 700111 HCHG RX REV CODE 636 W/ 250 OVERRIDE (IP): Mod: JZ,TB | Performed by: INTERNAL MEDICINE

## 2025-03-28 PROCEDURE — 96372 THER/PROPH/DIAG INJ SC/IM: CPT

## 2025-03-28 RX ADMIN — MEPOLIZUMAB 100 MG: 100 INJECTION, POWDER, FOR SOLUTION SUBCUTANEOUS at 10:41

## 2025-03-28 ASSESSMENT — FIBROSIS 4 INDEX: FIB4 SCORE: 1.9

## 2025-03-28 NOTE — PROGRESS NOTES
Pt arrived ambulatory for Q4 week Nucala, denies c/o, no new medical problems since last visit. Nucala given in left back arm, no bandaid applied per pt request. Pt Dc'd home without incident and will f/u as scheduled.

## 2025-03-31 ENCOUNTER — APPOINTMENT (OUTPATIENT)
Dept: RADIOLOGY | Facility: MEDICAL CENTER | Age: 80
End: 2025-03-31
Attending: EMERGENCY MEDICINE
Payer: MEDICARE

## 2025-03-31 ENCOUNTER — HOSPITAL ENCOUNTER (EMERGENCY)
Facility: MEDICAL CENTER | Age: 80
End: 2025-03-31
Attending: EMERGENCY MEDICINE
Payer: MEDICARE

## 2025-03-31 VITALS
HEART RATE: 71 BPM | HEIGHT: 62 IN | RESPIRATION RATE: 17 BRPM | DIASTOLIC BLOOD PRESSURE: 69 MMHG | OXYGEN SATURATION: 97 % | TEMPERATURE: 97.4 F | BODY MASS INDEX: 25.27 KG/M2 | WEIGHT: 137.35 LBS | SYSTOLIC BLOOD PRESSURE: 172 MMHG

## 2025-03-31 DIAGNOSIS — R42 VERTIGO: ICD-10-CM

## 2025-03-31 DIAGNOSIS — K57.92 DIVERTICULITIS: ICD-10-CM

## 2025-03-31 LAB
ALBUMIN SERPL BCP-MCNC: 4.1 G/DL (ref 3.2–4.9)
ALBUMIN/GLOB SERPL: 1.2 G/DL
ALP SERPL-CCNC: 118 U/L (ref 30–99)
ALT SERPL-CCNC: 76 U/L (ref 2–50)
ANION GAP SERPL CALC-SCNC: 15 MMOL/L (ref 7–16)
APPEARANCE UR: CLEAR
AST SERPL-CCNC: 59 U/L (ref 12–45)
BASOPHILS # BLD AUTO: 0.3 % (ref 0–1.8)
BASOPHILS # BLD: 0.03 K/UL (ref 0–0.12)
BILIRUB SERPL-MCNC: 0.6 MG/DL (ref 0.1–1.5)
BILIRUB UR QL STRIP.AUTO: NEGATIVE
BUN SERPL-MCNC: 19 MG/DL (ref 8–22)
CALCIUM ALBUM COR SERPL-MCNC: 9.9 MG/DL (ref 8.5–10.5)
CALCIUM SERPL-MCNC: 10 MG/DL (ref 8.5–10.5)
CHLORIDE SERPL-SCNC: 99 MMOL/L (ref 96–112)
CO2 SERPL-SCNC: 26 MMOL/L (ref 20–33)
COLOR UR: YELLOW
CREAT SERPL-MCNC: 0.72 MG/DL (ref 0.5–1.4)
EKG IMPRESSION: NORMAL
EOSINOPHIL # BLD AUTO: 0.04 K/UL (ref 0–0.51)
EOSINOPHIL NFR BLD: 0.4 % (ref 0–6.9)
ERYTHROCYTE [DISTWIDTH] IN BLOOD BY AUTOMATED COUNT: 48.7 FL (ref 35.9–50)
GFR SERPLBLD CREATININE-BSD FMLA CKD-EPI: 85 ML/MIN/1.73 M 2
GLOBULIN SER CALC-MCNC: 3.5 G/DL (ref 1.9–3.5)
GLUCOSE SERPL-MCNC: 150 MG/DL (ref 65–99)
GLUCOSE UR STRIP.AUTO-MCNC: >=1000 MG/DL
HCT VFR BLD AUTO: 43.6 % (ref 37–47)
HGB BLD-MCNC: 13.4 G/DL (ref 12–16)
IMM GRANULOCYTES # BLD AUTO: 0.04 K/UL (ref 0–0.11)
IMM GRANULOCYTES NFR BLD AUTO: 0.4 % (ref 0–0.9)
KETONES UR STRIP.AUTO-MCNC: ABNORMAL MG/DL
LEUKOCYTE ESTERASE UR QL STRIP.AUTO: NEGATIVE
LIPASE SERPL-CCNC: 27 U/L (ref 11–82)
LYMPHOCYTES # BLD AUTO: 2.44 K/UL (ref 1–4.8)
LYMPHOCYTES NFR BLD: 24.9 % (ref 22–41)
MCH RBC QN AUTO: 24.7 PG (ref 27–33)
MCHC RBC AUTO-ENTMCNC: 30.7 G/DL (ref 32.2–35.5)
MCV RBC AUTO: 80.4 FL (ref 81.4–97.8)
MICRO URNS: ABNORMAL
MONOCYTES # BLD AUTO: 1.09 K/UL (ref 0–0.85)
MONOCYTES NFR BLD AUTO: 11.1 % (ref 0–13.4)
NEUTROPHILS # BLD AUTO: 6.14 K/UL (ref 1.82–7.42)
NEUTROPHILS NFR BLD: 62.9 % (ref 44–72)
NITRITE UR QL STRIP.AUTO: NEGATIVE
NRBC # BLD AUTO: 0 K/UL
NRBC BLD-RTO: 0 /100 WBC (ref 0–0.2)
PH UR STRIP.AUTO: 7.5 [PH] (ref 5–8)
PLATELET # BLD AUTO: 326 K/UL (ref 164–446)
PMV BLD AUTO: 9.5 FL (ref 9–12.9)
POTASSIUM SERPL-SCNC: 3.3 MMOL/L (ref 3.6–5.5)
PROT SERPL-MCNC: 7.6 G/DL (ref 6–8.2)
PROT UR QL STRIP: NEGATIVE MG/DL
RBC # BLD AUTO: 5.42 M/UL (ref 4.2–5.4)
RBC UR QL AUTO: NEGATIVE
SODIUM SERPL-SCNC: 140 MMOL/L (ref 135–145)
SP GR UR STRIP.AUTO: 1.02
UROBILINOGEN UR STRIP.AUTO-MCNC: 1 EU/DL
WBC # BLD AUTO: 9.8 K/UL (ref 4.8–10.8)

## 2025-03-31 PROCEDURE — 81003 URINALYSIS AUTO W/O SCOPE: CPT

## 2025-03-31 PROCEDURE — 700117 HCHG RX CONTRAST REV CODE 255: Performed by: EMERGENCY MEDICINE

## 2025-03-31 PROCEDURE — 93005 ELECTROCARDIOGRAM TRACING: CPT | Mod: TC

## 2025-03-31 PROCEDURE — 80053 COMPREHEN METABOLIC PANEL: CPT

## 2025-03-31 PROCEDURE — 99285 EMERGENCY DEPT VISIT HI MDM: CPT

## 2025-03-31 PROCEDURE — 83690 ASSAY OF LIPASE: CPT

## 2025-03-31 PROCEDURE — 700105 HCHG RX REV CODE 258: Performed by: EMERGENCY MEDICINE

## 2025-03-31 PROCEDURE — 93005 ELECTROCARDIOGRAM TRACING: CPT | Mod: TC | Performed by: EMERGENCY MEDICINE

## 2025-03-31 PROCEDURE — 85025 COMPLETE CBC W/AUTO DIFF WBC: CPT

## 2025-03-31 PROCEDURE — 74177 CT ABD & PELVIS W/CONTRAST: CPT

## 2025-03-31 PROCEDURE — 36415 COLL VENOUS BLD VENIPUNCTURE: CPT

## 2025-03-31 RX ORDER — SODIUM CHLORIDE 9 MG/ML
1000 INJECTION, SOLUTION INTRAVENOUS ONCE
Status: COMPLETED | OUTPATIENT
Start: 2025-03-31 | End: 2025-03-31

## 2025-03-31 RX ADMIN — SODIUM CHLORIDE 1000 ML: 9 INJECTION, SOLUTION INTRAVENOUS at 08:35

## 2025-03-31 RX ADMIN — IOHEXOL 85 ML: 350 INJECTION, SOLUTION INTRAVENOUS at 10:30

## 2025-03-31 ASSESSMENT — FIBROSIS 4 INDEX: FIB4 SCORE: 1.9

## 2025-03-31 NOTE — ED NOTES
Pt has discharge orders. Pt educated on discharge instructions and new prescriptions.  Pt verbalizes understanding and agrees with care plan.  Follow up appointment made with PCP. PIV removed. Pt ambulated to lobby with  with a steady gait. Pt going home with . Pt denies dizziness.

## 2025-03-31 NOTE — ED TRIAGE NOTES
"Chief Complaint   Patient presents with    Abdominal Pain     Thinks having a flare of diverticulitis onset yesterday 3/30    Dizziness     Pt has hx of left ear positional vertigo. Has not had it for a few years but this episode started last night, usually has a home remedy but home remedy did not work.      Placed in wc in triage for above complaints. Pt able to stand to scale with assistance. Denies vision changes pt states feels some nausea. Aox4 GCS 15     Hx DM2 with glucometer     Pt Blood sugar in triage 152    BP (!) 142/104   Pulse 71   Temp (!) 35.4 °C (95.8 °F) (Temporal)   Resp 16   Ht 1.575 m (5' 2\")   Wt 62.3 kg (137 lb 5.6 oz)   SpO2 97%   BMI 25.12 kg/m²     "
Detail Level: Detailed

## 2025-03-31 NOTE — ED PROVIDER NOTES
ED Provider Note    CHIEF COMPLAINT  Chief Complaint   Patient presents with    Abdominal Pain     Thinks having a flare of diverticulitis onset yesterday 3/30    Dizziness     Pt has hx of left ear positional vertigo. Has not had it for a few years but this episode started last night, usually has a home remedy but home remedy did not work.        EXTERNAL RECORDS REVIEWED  Reviewed medication list reviewed ER encounter diagnosis of diverticulitis with associated imaging studies    HPI/ROS  LIMITATION TO HISTORY   None  OUTSIDE HISTORIAN(S):  Partner provided additional history    Rossy Lopez is a 80 y.o. female who presents for a constellation of symptoms including some positional dizziness that started last night as well as some left lower quadrant crampy abdominal pain.  Patient is a very pleasant active 80-year-old female.  She does have a history of mild diverticulitis treated with antibiotics only last year.  She denies chest pain or strokelike symptoms such as focal numbness weakness tingling to the arms legs or face.  She had an episode of dizziness that was positional and related to head positions last night when she got up to urinate.  It has resolved.  She denies any focal numbness weakness tingling to the arms legs or face no ataxia.  No chest pain fevers or chills.  No black or bloody stools    PAST MEDICAL HISTORY   has a past medical history of Allergy, Anesthesia, Asthma, Cataract (08/2016), Delayed emergence from general anesthesia, Diabetes, Diabetes mellitus type 2, controlled (HCC) (06/19/2012), Family history of colon cancer (06/19/2012), Heart burn, High cholesterol, Hyperlipidemia, Hypertension, and Pain.    SURGICAL HISTORY   has a past surgical history that includes abdominal hysterectomy total; tonsillectomy; inj,epidural/lumb/sac single (09/05/2014); carpal tunnel release (Right, 09/12/2016); fusion, spine, lumbar, plif (10/15/2019); lumbar decompression (10/15/2019); eye surgery; tubal  coagulation laparoscopic bilateral; and lipoma excision (Left, 10/15/2024).    FAMILY HISTORY  Family History   Problem Relation Age of Onset    Heart Disease Father 60        MI    Cancer Father         colon cancer    Diabetes Father     Cancer Maternal Aunt         breast cancer    Breast Cancer Maternal Aunt     Lung Disease Mother     Diabetes Sister     Heart Disease Sister        SOCIAL HISTORY  Social History     Tobacco Use    Smoking status: Former     Current packs/day: 0.00     Average packs/day: 1 pack/day for 5.0 years (5.0 ttl pk-yrs)     Types: Cigarettes     Start date: 1965     Quit date: 1970     Years since quittin.2    Smokeless tobacco: Never    Tobacco comments:     quit    Vaping Use    Vaping status: Never Used   Substance and Sexual Activity    Alcohol use: Not Currently     Comment: Once in a great while    Drug use: Never    Sexual activity: Not Currently     Partners: Male     Birth control/protection: Pill     Comment:      no IV drug use    CURRENT MEDICATIONS  Home Medications       Reviewed by Sarahi Nguyễn R.N. (Registered Nurse) on 25 at 0754  Med List Status: Not Addressed     Medication Last Dose Status   albuterol 108 (90 Base) MCG/ACT Aero Soln inhalation aerosol  Active   ASMANEX  MCG/ACT Aerosol  Active   atorvastatin (LIPITOR) 40 MG Tab  Active   B Complex-C (VITAMIN B + C COMPLEX) Tab  Active   Ca Phosphate-Cholecalciferol 250-500 MG-UNIT Chew Tab  Active   Continuous Glucose Sensor (FREESTYLE DANIELLA 3 SENSOR) Misc  Active   dupilumab (DUPIXENT) 300 MG/2ML injection  Active   hydroCHLOROthiazide 25 MG Tab  Active   ibuprofen (MOTRIN) 200 MG Tab  Active   JARDIANCE 10 MG Tab  Active   losartan (COZAAR) 25 MG Tab  Active   LYUMJEV KWIKPEN 100 UNIT/ML Solution Pen-injector  Active   Mepolizumab (NUCALA) 100 MG/ML Solution Prefilled Syringe  Active   Multiple Vitamins-Minerals (AIRBORNE PO)  Active   Multiple Vitamins-Minerals  "(MULTIVITAMIN ADULT PO)  Active   nystatin (MYCOSTATIN) powder  Active   omeprazole (PRILOSEC) 20 MG delayed-release capsule  Active   tizanidine (ZANAFLEX) 4 MG Tab  Active   TRESIBA FLEXTOUCH 100 UNIT/ML Solution Pen-injector  Active                  Audit from Redirected Encounters    **Home medications have not yet been reviewed for this encounter**         ALLERGIES  Allergies   Allergen Reactions    Other Misc Hives     Patient is allergic to bandaides  RXN=ongoing    Phisohex [Hexachlorophene] Rash     Rash at contact site  RXN=48 years ago    Sulfa Drugs Rash and Itching     RXN=44-48  Years ago     Elnora Unspecified     Per allergy testing    Tape Hives and Itching     Paper tape okay    Trulicity [Dulaglutide] Unspecified     moribiliform rash       PHYSICAL EXAM  VITAL SIGNS: BP (!) 139/92   Pulse 75   Temp (!) 35.4 °C (95.8 °F) (Temporal)   Resp 17   Ht 1.575 m (5' 2\")   Wt 62.3 kg (137 lb 5.6 oz)   SpO2 93%   BMI 25.12 kg/m²    Pulse ox interpretation: I interpret this pulse ox as normal.  Constitutional: Alert and oriented x 3, no acute distress  HEENT: Atraumatic normocephalic, pupils are equal round reactive to light extraocular movements are intact. The nares is clear, external ears are normal, mouth shows moist mucous membranes normal dentition for age  Neck: Supple, no JVD no tracheal deviation  Cardiovascular: Regular rate and rhythm no murmur rub or gallop 2+ pulses peripherally x4  Thorax & Lungs: No respiratory distress, no wheezes rales or rhonchi, No chest tenderness.   GI: Soft mild reproducible left lower quadrant discomfort without palpable hernia or mass no rebound guarding or rigidity  Skin: Warm dry no acute rash or lesion  Musculoskeletal: Moving all extremities with full range and 5 of 5 strength no acute  deformity  Neurologic: Cranial nerves III through XII are grossly intact no sensory deficit no cerebellar dysfunction no ataxia no facial droop.  Finger-nose testing is " normal no pronator drift of the arms or legs.  NIH stroke scale score is 0  Psychiatric: Appropriate affect for situation at this time          EKG/LABS  Results for orders placed or performed during the hospital encounter of 25   EKG    Collection Time: 25  7:44 AM   Result Value Ref Range    Report       [facility]    Test Date:  2025  Pt Name:    XI LICONA                   Department: ER  MRN:        [id number]                      Room:  Gender:     Female                       Technician: [id number]  :        1945                   Requested By:ER TRIAGE PROTOCOL  Order #:    [id number]                    Reading MD:    Measurements  Intervals                                Axis  Rate:       71                           P:          78  AL:         146                          QRS:        113  QRSD:       99                           T:          84  QT:         427  QTc:        465    Interpretive Statements  Sinus rhythm  Low voltage, precordial leads  Right ventricular hypertrophy  Baseline wander in lead(s) V6  Compared to ECG 2024 12:48:36  Low QRS voltage now present  Ventricular premature complex(es) no longer present     CBC with Differential    Collection Time: 25  8:00 AM   Result Value Ref Range    WBC 9.8 4.8 - 10.8 K/uL    RBC 5.42 (H) 4.20 - 5.40 M/uL    Hemoglobin 13.4 12.0 - 16.0 g/dL    Hematocrit 43.6 37.0 - 47.0 %    MCV 80.4 (L) 81.4 - 97.8 fL    MCH 24.7 (L) 27.0 - 33.0 pg    MCHC 30.7 (L) 32.2 - 35.5 g/dL    RDW 48.7 35.9 - 50.0 fL    Platelet Count 326 164 - 446 K/uL    MPV 9.5 9.0 - 12.9 fL    Neutrophils-Polys 62.90 44.00 - 72.00 %    Lymphocytes 24.90 22.00 - 41.00 %    Monocytes 11.10 0.00 - 13.40 %    Eosinophils 0.40 0.00 - 6.90 %    Basophils 0.30 0.00 - 1.80 %    Immature Granulocytes 0.40 0.00 - 0.90 %    Nucleated RBC 0.00 0.00 - 0.20 /100 WBC    Neutrophils (Absolute) 6.14 1.82 - 7.42 K/uL    Lymphs (Absolute)  2.44 1.00 - 4.80 K/uL    Monos (Absolute) 1.09 (H) 0.00 - 0.85 K/uL    Eos (Absolute) 0.04 0.00 - 0.51 K/uL    Baso (Absolute) 0.03 0.00 - 0.12 K/uL    Immature Granulocytes (abs) 0.04 0.00 - 0.11 K/uL    NRBC (Absolute) 0.00 K/uL   Complete Metabolic Panel    Collection Time: 03/31/25  8:00 AM   Result Value Ref Range    Sodium 140 135 - 145 mmol/L    Potassium 3.3 (L) 3.6 - 5.5 mmol/L    Chloride 99 96 - 112 mmol/L    Co2 26 20 - 33 mmol/L    Anion Gap 15.0 7.0 - 16.0    Glucose 150 (H) 65 - 99 mg/dL    Bun 19 8 - 22 mg/dL    Creatinine 0.72 0.50 - 1.40 mg/dL    Calcium 10.0 8.5 - 10.5 mg/dL    Correct Calcium 9.9 8.5 - 10.5 mg/dL    AST(SGOT) 59 (H) 12 - 45 U/L    ALT(SGPT) 76 (H) 2 - 50 U/L    Alkaline Phosphatase 118 (H) 30 - 99 U/L    Total Bilirubin 0.6 0.1 - 1.5 mg/dL    Albumin 4.1 3.2 - 4.9 g/dL    Total Protein 7.6 6.0 - 8.2 g/dL    Globulin 3.5 1.9 - 3.5 g/dL    A-G Ratio 1.2 g/dL   Lipase    Collection Time: 03/31/25  8:00 AM   Result Value Ref Range    Lipase 27 11 - 82 U/L   ESTIMATED GFR    Collection Time: 03/31/25  8:00 AM   Result Value Ref Range    GFR (CKD-EPI) 85 >60 mL/min/1.73 m 2   Urinalysis    Collection Time: 03/31/25 10:21 AM    Specimen: Urine   Result Value Ref Range    Color Yellow     Character Clear     Specific Gravity 1.023 <1.035    Ph 7.5 5.0 - 8.0    Glucose >=1000 (A) Negative mg/dL    Ketones Trace (A) Negative mg/dL    Protein Negative Negative mg/dL    Bilirubin Negative Negative    Urobilinogen, Urine 1.0 <=1.0 EU/dL    Nitrite Negative Negative    Leukocyte Esterase Negative Negative    Occult Blood Negative Negative    Micro Urine Req see below      *Note: Due to a large number of results and/or encounters for the requested time period, some results have not been displayed. A complete set of results can be found in Results Review.        RADIOLOGY/PROCEDURES   I have independently interpreted the diagnostic imaging associated with this visit and am waiting the final  reading from the radiologist.   My preliminary interpretation is as follows: Minimal diverticulitis without perforation or abscess    Radiologist interpretation:  CT-ABDOMEN-PELVIS WITH   Final Result      1.  Colonic diverticulosis with thickening of the proximal sigmoid colon better seen on the coronal view which could indicate diverticular colitis. No evidence of extraluminal fluid or gas to suggest perforation.   2.  Atherosclerosis with coronary artery disease.          COURSE & MEDICAL DECISION MAKING    ASSESSMENT, COURSE AND PLAN  Care Narrative:     This is a very pleasant 80-year-old female who presented here with some transient positional vertigo that resolved prior to arrival but also some crampy lower abdominal discomfort.  I reviewed her records.  She has a history of mild diverticulitis in the past.  Here her vital signs are reassuring.  She did not have any high fever hypotension tachycardia or hypoxia.  On physical exam she had a nonfocal neurological exam and had some mild reproducible dull pain in the left lower quadrant but no peritoneal signs.  Extensive workup was performed today.  She has no leukocytosis anemia or bandemia.  Metabolic panel is notable for mild hyperglycemia and some mild transaminitis.  Urine microscopy does not reveal any evidence of infection or blood.  CT scan with contrast was performed and there is some very subtle thickening around the distal colon where there is known diverticulosis but no evidence of severe diverticulitis such as abscess perforation or signs of toxicity on clinical exam or workup.  I do long discussion with the patient.  She apparently did well without antibiotics previously.  I will cautiously pursue prescribe some Augmentin and counseled her to return as needed for new or worsening symptoms.  I did indicate that she can hold off on the antibiotics if she has no ongoing symptoms and I will provide her a handout on dietary modifications for  diverticulitis    Hydration: Based on the patient's presentation of GI Bleed / Upset the patient was given IV fluids. IV Hydration was used because oral hydration was not adequate alone. Upon recheck following hydration, the patient was improved.          ADDITIONAL PROBLEMS MANAGED      DISPOSITION AND DISCUSSIONS  I have discussed management of the patient with the following physicians and GUS's: None    Discussion of management with other QHP or appropriate source(s): None    Escalation of care considered, and ultimately not performed: Considered neuroimaging    Barriers to care at this time, including but not limited to: None.     Decision tools and prescription drugs considered including, but not limited to: Patient will be prescribed Augmentin.    FINAL DIAGNOSIS  Vertigo  Mild diverticulitis     Electronically signed by: Sidney Horn M.D., 3/31/2025 8:30 AM

## 2025-04-03 ENCOUNTER — PATIENT MESSAGE (OUTPATIENT)
Dept: MEDICAL GROUP | Facility: MEDICAL CENTER | Age: 80
End: 2025-04-03
Payer: MEDICARE

## 2025-04-03 DIAGNOSIS — K21.9 GERD WITHOUT ESOPHAGITIS: ICD-10-CM

## 2025-04-04 RX ORDER — OMEPRAZOLE 20 MG/1
20 CAPSULE, DELAYED RELEASE ORAL
Qty: 90 CAPSULE | Refills: 3 | Status: SHIPPED | OUTPATIENT
Start: 2025-04-04

## 2025-04-16 ENCOUNTER — OFFICE VISIT (OUTPATIENT)
Dept: MEDICAL GROUP | Facility: MEDICAL CENTER | Age: 80
End: 2025-04-16
Payer: MEDICARE

## 2025-04-16 ENCOUNTER — RESULTS FOLLOW-UP (OUTPATIENT)
Dept: MEDICAL GROUP | Facility: MEDICAL CENTER | Age: 80
End: 2025-04-16

## 2025-04-16 VITALS
HEART RATE: 77 BPM | SYSTOLIC BLOOD PRESSURE: 130 MMHG | WEIGHT: 138.23 LBS | BODY MASS INDEX: 24.49 KG/M2 | TEMPERATURE: 96.6 F | HEIGHT: 63 IN | OXYGEN SATURATION: 92 % | DIASTOLIC BLOOD PRESSURE: 60 MMHG

## 2025-04-16 DIAGNOSIS — E55.9 VITAMIN D DEFICIENCY: ICD-10-CM

## 2025-04-16 DIAGNOSIS — E11.69 TYPE 2 DIABETES MELLITUS WITH OTHER SPECIFIED COMPLICATION, WITH LONG-TERM CURRENT USE OF INSULIN (HCC): ICD-10-CM

## 2025-04-16 DIAGNOSIS — I10 PRIMARY HYPERTENSION: ICD-10-CM

## 2025-04-16 DIAGNOSIS — R71.8 MICROCYTOSIS: ICD-10-CM

## 2025-04-16 DIAGNOSIS — R79.89 ELEVATED LFTS: ICD-10-CM

## 2025-04-16 DIAGNOSIS — Z79.4 TYPE 2 DIABETES MELLITUS WITH OTHER SPECIFIED COMPLICATION, WITH LONG-TERM CURRENT USE OF INSULIN (HCC): ICD-10-CM

## 2025-04-16 DIAGNOSIS — M54.2 NECK PAIN: ICD-10-CM

## 2025-04-16 DIAGNOSIS — E78.2 MIXED HYPERLIPIDEMIA: Chronic | ICD-10-CM

## 2025-04-16 DIAGNOSIS — I10 ESSENTIAL HYPERTENSION, BENIGN: ICD-10-CM

## 2025-04-16 DIAGNOSIS — H81.12 BENIGN PAROXYSMAL POSITIONAL VERTIGO OF LEFT EAR: ICD-10-CM

## 2025-04-16 LAB
HBA1C MFR BLD: 6.3 % (ref ?–5.8)
POCT INT CON NEG: NEGATIVE
POCT INT CON POS: POSITIVE

## 2025-04-16 PROCEDURE — 99214 OFFICE O/P EST MOD 30 MIN: CPT | Performed by: FAMILY MEDICINE

## 2025-04-16 PROCEDURE — 3078F DIAST BP <80 MM HG: CPT | Performed by: FAMILY MEDICINE

## 2025-04-16 PROCEDURE — 83036 HEMOGLOBIN GLYCOSYLATED A1C: CPT | Performed by: FAMILY MEDICINE

## 2025-04-16 PROCEDURE — 3075F SYST BP GE 130 - 139MM HG: CPT | Performed by: FAMILY MEDICINE

## 2025-04-16 RX ORDER — LEBRIKIZUMAB-LBKZ 250 MG/2ML
INJECTION, SOLUTION SUBCUTANEOUS
COMMUNITY

## 2025-04-16 ASSESSMENT — FIBROSIS 4 INDEX: FIB4 SCORE: 1.62

## 2025-04-16 ASSESSMENT — ENCOUNTER SYMPTOMS
FEVER: 0
CHILLS: 0
NECK PAIN: 1
DIZZINESS: 1

## 2025-04-16 ASSESSMENT — PATIENT HEALTH QUESTIONNAIRE - PHQ9: CLINICAL INTERPRETATION OF PHQ2 SCORE: 0

## 2025-04-16 NOTE — PROGRESS NOTES
Verbal consent was acquired by the patient to use [employer] [facility] ambient listening note generation during this visit.      Rossy was seen today for follow-up.    Diagnoses and all orders for this visit:    Type 2 diabetes mellitus with other specified complication, with long-term current use of insulin (HCC)  -     POCT A1C  -     MICROALBUMIN CREAT RATIO URINE; Future    Benign paroxysmal positional vertigo of left ear  -     Referral to Physical Therapy    Primary hypertension  -     CBC WITH DIFFERENTIAL; Future  -     Comp Metabolic Panel; Future    Neck pain  -     DX-CERVICAL SPINE-2 OR 3 VIEWS; Future  -     Referral to Physical Therapy    Vitamin D deficiency  -     VITAMIN D,25 HYDROXY (DEFICIENCY); Future    Essential hypertension, benign    Mixed hyperlipidemia  -     Lipid Profile; Future                  Assessment & Plan  1. BPPV:  Chronic condition, unstable  - Experiences vertigo primarily at night, sometimes severe enough to prevent walking  - Uses the Epley's maneuver at home with some success  - Referral for vestibular therapy with Magy Garcia on the third floor has been initiated  - Counseling provided on the benefits of vestibular therapy to manage symptoms more effectively    2. Diverticulitis  - Recently diagnosed with diverticulitis in the ER; given a prescription for antibiotics to use if symptoms worsen  - Reports that the condition is currently resolved but experiences intermittent episodes  - Advised to call if symptoms worsen  - Reviewed the protocol for managing diverticulitis and the importance of follow-up    3. Trapezius muscle strain, neck pain  Chronic condition, unstable, continue losartan 25 mg daily and hydrochlorothiazide 25 mg daily.  - Reports pain in the trapezius muscle area, worsens with walking and poor posture  - Physical exam reveals pain upon palpation of the trapezius muscle  - Physical therapy recommended to alleviate muscle strain  - Ordered an x-ray of the neck to  rule out other potential causes    4.  Primary hypertension  Chronic condition, stable, continue  5.  Hyperlipidemia  Chronic condition, stable  - Currently on Lipitor 40 mg  - Cholesterol levels will be reassessed to monitor the effectiveness of the current dosage  - Reviewed the potential adverse effects of statins and the importance of regular monitoring  - Ordered liver function tests to ensure no adverse effects from the medication    6.  Microcytosis seen on blood test  Chronic condition, unstable  - Previous blood tests indicated low blood counts, suggesting possible iron deficiency  - Repeat blood count will be ordered to monitor  levels and overall blood health  - Discussed the potential causes of iron deficiency and the importance of follow-up testing  - Ordered additional tests including kidney function and urine analysis    7. Vitamin D supplementation  - Currently taking a vitamin D supplement  - Vitamin D levels will be checked to ensure adequate supplementation  - Reviewed the importance of vitamin D in overall health and the need for regular monitoring  - Ordered vitamin D level testing as part of the next blood test    8.  Type 2 diabetes mellitus  Chronic condition, stable, continue to follow with endocrinology and continue medications as recommended by endocrinology including Tresiba and Lyumjev.    9. Elevated LFTs,  Chronic condition, improving, ultrasound abdomen normal for liver recheck labs.  If remains elevated will lower dose of statin      Medication management  - Currently using Dupixent provided as a free sample by the dermatologist  - Qualifies for free Ebglyss for the remainder of the year but has not yet started this treatment  - Awaiting further instructions on starting Ebglyss  - Discussed the benefits and potential side effects of Ebglyss compared to Dupixent      Follow-up in 2-month for lab follow-up    Chief complaint::Diagnoses of Type 2 diabetes mellitus with other specified  complication, with long-term current use of insulin (HCC), Benign paroxysmal positional vertigo of left ear, Primary hypertension, Neck pain, Vitamin D deficiency, Essential hypertension, benign, and Mixed hyperlipidemia were pertinent to this visit.      History of Present Illness  The patient is a 80-year-old female who presents for an ER follow-up and blood test.    Dizziness and Abdominal Pain  - Recently evaluated in the emergency room for dizziness and abdominal pain, which led to a diagnosis of diverticulitis.  - Antibiotics were prescribed as a precautionary measure should her condition worsen.  - Symptoms have since resolved.    Vertigo  - Intermittent episodes of vertigo are reported, predominantly at night upon awakening.  - She has not previously undergone vestibular therapy with Magy.  - Symptoms have been managed at home using the Epley's maneuver, which has been effective.  - A severe episode of vertigo at 6:30 AM rendered her unable to walk, and no treatment was provided for her vertigo at an urgent care facility.    Lipoma and Pain Management  - A lipoma removal procedure was performed in 10/2024, during which tizanidine was prescribed.  - Pain recurs when the medication is discontinued.  - The lipoma had grown under the muscle, and there is concern about potential nerve damage as pain continues.  - Pain is described as severe, particularly when walking, necessitating rest.  - Headaches at the base of the skull are also reported.  - No radiographic imaging of the neck has been performed.  - MRIs for the lower back have been done.  - Tizanidine effectively alleviates symptoms, but pain persists upon waking.    The patient is currently on a regimen of Lipitor 40 mg for cholesterol management, previously on a lower dose of 20 mg. Vitamin D supplements are currently being taken. Dupixent is currently used, provided as a free sample by her dermatologist. Additional free samples were not qualified for.  Paperwork has been submitted for Ebglyss, which is purported to be superior to Dupixent due to fewer restrictions and no required blood tests. She qualifies for free Ebglyss for the remainder of the year. Treatment has not yet started, awaiting further instructions.      Review of Systems   Constitutional:  Negative for chills and fever.   Musculoskeletal:  Positive for neck pain. Negative for joint pain.   Neurological:  Positive for dizziness.          Medications and Allergies:     Current Outpatient Medications   Medication Sig Dispense Refill    Probiotic Product (PROBIOTIC PO) Take  by mouth.      omeprazole (PRILOSEC) 20 MG delayed-release capsule Take 1 Capsule by mouth every day. 90 Capsule 3    losartan (COZAAR) 25 MG Tab Take 1 Tablet by mouth every day. 90 Tablet 3    atorvastatin (LIPITOR) 40 MG Tab Take 1 Tablet by mouth at bedtime. 90 Tablet 3    tizanidine (ZANAFLEX) 4 MG Tab Take 1 Tablet by mouth at bedtime as needed (muscle strain). 30 Tablet 1    Multiple Vitamins-Minerals (AIRBORNE PO) Take  by mouth.      ibuprofen (MOTRIN) 200 MG Tab Take 400 mg by mouth every 8 hours as needed for Mild Pain.      Continuous Glucose Sensor (FREESTYLE DANIELLA 3 SENSOR) Misc 1 Each every 14 days.      Mepolizumab (NUCALA) 100 MG/ML Solution Prefilled Syringe Inject 100 mg under the skin every 30 (thirty) days.         B Complex-C (VITAMIN B + C COMPLEX) Tab Take 1 Tablet by mouth every day.      dupilumab (DUPIXENT) 300 MG/2ML injection Inject 300 mg under the skin every 14 days.         hydroCHLOROthiazide 25 MG Tab TAKE 1 TABLET BY MOUTH EVERY DAY 90 Tablet 3    LYUMJEV KWIKPEN 100 UNIT/ML Solution Pen-injector Inject 5-8 Units under the skin 3 times a day.         TRESIBA FLEXTOUCH 100 UNIT/ML Solution Pen-injector Inject 20 Units under the skin every evening.      nystatin (MYCOSTATIN) powder Apply 1 Application topically 4 times a day as needed (yeast). On vaginia      JARDIANCE 10 MG Tab Take 1 Tablet by  "mouth every day.      ASMANEX  MCG/ACT Aerosol Inhale 2 Puffs 2 (two) times a day.      albuterol 108 (90 Base) MCG/ACT Aero Soln inhalation aerosol Inhale 2 Puffs every four hours as needed for Shortness of Breath.      Ca Phosphate-Cholecalciferol 250-500 MG-UNIT Chew Tab Chew 1 Tablet 2 times a day.      Multiple Vitamins-Minerals (MULTIVITAMIN ADULT PO) Take 1 Tablet by mouth every day.      Lebrikizumab-lbkz (EBGLYSS) 250 MG/2ML Solution Auto-injector Inject  under the skin. PT will take after dupixent sample is complete       No current facility-administered medications for this visit.       /60   Pulse 77   Temp 35.9 °C (96.6 °F) (Temporal)   Ht 1.588 m (5' 2.5\")   Wt 62.7 kg (138 lb 3.7 oz)   SpO2 92% , Body mass index is 24.88 kg/m².      Physical Exam  Constitutional:       Appearance: Normal appearance. She is well-developed and well-groomed.   HENT:      Head: Normocephalic and atraumatic.      Right Ear: External ear normal.      Left Ear: External ear normal.   Eyes:      General:         Right eye: No discharge.         Left eye: No discharge.      Conjunctiva/sclera: Conjunctivae normal.   Cardiovascular:      Rate and Rhythm: Normal rate.   Pulmonary:      Effort: Pulmonary effort is normal. No respiratory distress.   Musculoskeletal:      Cervical back: Neck supple.      Comments: Tenderness on palpation at both trapezius muscle, full range of motion at neck   Skin:     Findings: No rash.   Neurological:      Mental Status: She is alert.   Psychiatric:         Mood and Affect: Mood and affect normal.         Behavior: Behavior normal.            I reviewed with patient lab results resulted on 3/31/2025.        Please note that this dictation was created using voice recognition software. I have made every reasonable attempt to correct obvious errors, but I expect that there are errors of grammar and possibly content that I did not discover before finalizing the note.      "

## 2025-04-22 NOTE — Clinical Note
REFERRAL APPROVAL NOTICE         Sent on April 22, 2025                   Rossy Lopez  [address] [place] [address]                   Dear Ms. Lopez,    After a careful review of the medical information and benefit coverage, [facility] has processed your referral. See below for additional details.    If applicable, you must be actively enrolled with your insurance for coverage of the authorized service. If you have any questions regarding your coverage, please contact your insurance directly.    REFERRAL INFORMATION   Referral #:  [id number]  Referred-To Department    Referred-By Provider:  Physical Therapy    Shan Alvarado M.D.   Phys Therapy [facility]      [address]  [address]  [place] [address]  [phone] [address]  [place] [id number] [phone]    Referral Start Date:  04/16/2025  Referral End Date:   04/16/2026             SCHEDULING  If you do not already have an appointment, please call [phone] to make an appointment.     MORE INFORMATION  If you do not already have a [employer] account, sign up at: [email or web address]  You can access your medical information, make appointments, see lab results, billing information, and more.  If you have questions regarding this referral, please contact  the [facility] Referrals department at:             [phone]. Monday - Friday 8:00AM - 5:00PM.     Sincerely,    [facility]

## 2025-04-22 NOTE — Clinical Note
REFERRAL APPROVAL NOTICE         Sent on April 22, 2025                   Rossy Lopez  [address] [place] [address]                   Dear Ms. Lopez,    After a careful review of the medical information and benefit coverage, [facility] has processed your referral. See below for additional details.    If applicable, you must be actively enrolled with your insurance for coverage of the authorized service. If you have any questions regarding your coverage, please contact your insurance directly.    REFERRAL INFORMATION   Referral #:  [id number]  Referred-To Provider    Referred-By Provider:  Physical Therapy    Shan Alvarado M.D.   [facility]      [address]  [address]  [place] [address]  [phone] [address]  [facility] [address]  [phone]    Referral Start Date:  04/16/2025  Referral End Date:   04/16/2026             SCHEDULING  If you do not already have an appointment, please call [phone] to make an appointment.     MORE INFORMATION  If you do not already have a [employer] account, sign up at: [email or web address]  You can access your medical information, make appointments, see lab results, billing information, and more.  If you have questions regarding this referral, please contact  the [facility] Referrals department at:             [phone]. Monday - Friday 8:00AM - 5:00PM.     Sincerely,    [facility]

## 2025-04-23 ENCOUNTER — HOSPITAL ENCOUNTER (OUTPATIENT)
Dept: RADIOLOGY | Facility: MEDICAL CENTER | Age: 80
End: 2025-04-23
Attending: FAMILY MEDICINE
Payer: MEDICARE

## 2025-04-23 ENCOUNTER — RESULTS FOLLOW-UP (OUTPATIENT)
Dept: MEDICAL GROUP | Facility: MEDICAL CENTER | Age: 80
End: 2025-04-23

## 2025-04-23 DIAGNOSIS — M54.2 NECK PAIN: ICD-10-CM

## 2025-04-23 PROCEDURE — 72040 X-RAY EXAM NECK SPINE 2-3 VW: CPT

## 2025-04-25 ENCOUNTER — OUTPATIENT INFUSION SERVICES (OUTPATIENT)
Dept: ONCOLOGY | Facility: MEDICAL CENTER | Age: 80
End: 2025-04-25
Attending: INTERNAL MEDICINE
Payer: MEDICARE

## 2025-04-25 VITALS
HEART RATE: 83 BPM | HEIGHT: 61 IN | SYSTOLIC BLOOD PRESSURE: 132 MMHG | DIASTOLIC BLOOD PRESSURE: 56 MMHG | BODY MASS INDEX: 26.1 KG/M2 | TEMPERATURE: 97.4 F | WEIGHT: 138.23 LBS | RESPIRATION RATE: 18 BRPM | OXYGEN SATURATION: 93 %

## 2025-04-25 DIAGNOSIS — J45.40 MODERATE PERSISTENT ASTHMA WITHOUT COMPLICATION: ICD-10-CM

## 2025-04-25 PROCEDURE — 700111 HCHG RX REV CODE 636 W/ 250 OVERRIDE (IP): Mod: JZ,TB | Performed by: INTERNAL MEDICINE

## 2025-04-25 PROCEDURE — 96372 THER/PROPH/DIAG INJ SC/IM: CPT

## 2025-04-25 RX ADMIN — MEPOLIZUMAB 100 MG: 100 INJECTION, POWDER, FOR SOLUTION SUBCUTANEOUS at 11:13

## 2025-04-25 ASSESSMENT — FIBROSIS 4 INDEX: FIB4 SCORE: 1.62

## 2025-04-25 NOTE — PROGRESS NOTES
Pt arrived ambulatory to [facility] for Nucala injection. Plan of care reviewed, assessment completed. Patient denies any new or acute complaints, denies any recent infection/illness or fevers.      Nucala administered SQ per MAR to left back of arm. Patient tolerated well, site dressed with band-aid. Scheduling emailed for next appointment, pt confirmed use of MyChart. Patient discharged home in [facility].

## 2025-05-01 ENCOUNTER — PHYSICAL THERAPY (OUTPATIENT)
Dept: PHYSICAL THERAPY | Facility: MEDICAL CENTER | Age: 80
End: 2025-05-01
Attending: FAMILY MEDICINE
Payer: MEDICARE

## 2025-05-01 DIAGNOSIS — H81.12 BENIGN PAROXYSMAL POSITIONAL VERTIGO OF LEFT EAR: ICD-10-CM

## 2025-05-01 PROCEDURE — 97162 PT EVAL MOD COMPLEX 30 MIN: CPT

## 2025-05-01 PROCEDURE — 95992 CANALITH REPOSITIONING PROC: CPT

## 2025-05-01 ASSESSMENT — ENCOUNTER SYMPTOMS
PAIN SCALE: 0
PAIN SCALE AT LOWEST: 0
PAIN SCALE AT HIGHEST: 5

## 2025-05-01 NOTE — OP THERAPY EVALUATION
Outpatient Physical Therapy  VESTIBULAR EVALUATION    [facility] Outpatient Physical Therapy  [address]  [place] [address]  Phone:  [phone]  Fax:  [phone]    Date of Evaluation: 2025    Patient: Rossy Lopez  YOB: 1945  MRN: [id number]     Referring Provider: Shan Alvarado M.D.  [address]  [address]  [place] [address]   Referring Diagnosis: Benign paroxysmal positional vertigo of left ear [H81.12]     Time Calculation    Start time: 1415  Stop time: 1500 Time Calculation (min): 45 minutes           Chief Complaint: Vertigo    Visit Diagnoses     ICD-10-CM   1. Benign paroxysmal positional vertigo of left ear  H81.12         History of Present Illness:     Mechanism of injury:    Rossy presents to PT for support in managing her dizziness. She reports having a hx of BPPV for at least 10 years. At the onset of her original vertigo attack, she was provided information to perform the L Epley at home (with spouse support). She notes this has always been effective and she has not had a flare up in the last 5 years until just recently. She reports awaking with the vertigo on 3/31/25. She recalls the onset around the time she was sitting on the toilet. States the vertigo had never been this intense, so she decided to go the ED. Assessment was suggestive of vestibular dysfunction. Reports she has not done the home treatments, but the symptoms are largely resolved unless she is completely flat like in the dentist's chair. Does have hx of Afib per her report in addition to what is listed below.     Prior level of function:  Retired. Active: walking, gardening.    Headaches: no headaches      Ear problems:  Hearing loss (Chronic)  Symptoms:     Current symptom ratin    At best symptom ratin    At worst symptom ratin  Treatments:     No prior treatments received    Patient goals:     Other patient goals:  Ensure the vertigo is  resolve and understand the home treatment.      Past Medical History:   Diagnosis Date    Allergy     Anesthesia     difficult to wake and extended sleep (couple of days)    Asthma     prn inhalers    Cataract 2016    bilat iol    Delayed emergence from general anesthesia     Diabetes     Diabetes mellitus type 2, controlled (HCC) 2012    oral medication    Family history of colon cancer 2012    Heart burn     High cholesterol     Hyperlipidemia     Hypertension     Pain     hips     Past Surgical History:   Procedure Laterality Date    LIPOMA EXCISION Left 10/15/2024    Procedure: EXCISION OF LARGE LIPOMA OF THE LEFT LATERAL NECK;  Surgeon: Jeremy Brice M.D.;  Location: SURGERY [facility];  Service: General    FUSION, SPINE, LUMBAR, PLIF  10/15/2019    Procedure: POSTERIOR LUMBAR 4 - 5 DECOMPRESSION AND TRANSFORAMINAL INTERBODY FUSION;  Surgeon: Jovani Alvares M.D.;  Location: SURGERY [facility];  Service: Neurosurgery    LUMBAR DECOMPRESSION  10/15/2019    Procedure: POSTERIOR LUMBAR 4 - 5 DECOMPRESSION;  Surgeon: Jovani Alvares M.D.;  Location: SURGERY [facility];  Service: Neurosurgery    CARPAL TUNNEL RELEASE Right 2016    Procedure: CARPAL TUNNEL RELEASE;  Surgeon: Orlando Hunter M.D.;  Location: SURGERY [facility];  Service:     INJ,EPIDURAL/LUMB/SAC SINGLE  2014    Performed by Cale Alejandro M.D. at SURGERY SURGICAL [facility]    ABDOMINAL HYSTERECTOMY TOTAL          EYE SURGERY      TONSILLECTOMY      TUBAL COAGULATION LAPAROSCOPIC BILATERAL       Social History     Tobacco Use    Smoking status: Former     Current packs/day: 0.00     Average packs/day: 1 pack/day for 5.0 years (5.0 ttl pk-yrs)     Types: Cigarettes     Start date: 1965     Quit date: 1970     Years since quittin.3    Smokeless tobacco: Never    Tobacco comments:     quit    Substance Use Topics    Alcohol use: Not Currently     Comment: Once in a great while     Family and  Occupational History     Socioeconomic History    Marital status:      Spouse name: Not on file    Number of children: Not on file    Years of education: Not on file    Highest education level: Some college, no degree   Occupational History    Not on file       Objective:    Oculomotor Exam:         Details: No spontaneous nystagmus central gaze          Details: No spontaneous nystagmus eccentric gaze    No saccadic eye movements    Smooth pursuit present (Coordination was challenging, but able with pt providing light support to the chin)    Convergence:        Normal convergence  Active Range of Motion:     Within functional limits    Active range of motion comments: Cervical pain with end range rotation and extension, but function for BPPV assessment  Limb Ataxia Exam:     Finger-to-Nose:         Intention tremor: none    Dysdiadochokinesia: none.  Reflex Exam:       Comments:   B C5= 3/4  Sensation Tests:     Left Light Touch Sensation:         All left lumbar dermatomes intact      Right Light Touch Sensation:         All right lumbar dermatomes intact    Vestibulo-Ocular Exam:   Normal head thrust test  BPPV Exam:     Abnormal [place]-Hallpike        Latency (sec): 2        Duration (sec): 8        Findings: positive right, fatigable and torsional nystagmus        Therapeutic Treatments and Modalities:     1. Canalith Repositioning (CPT 73156), R Epley completed x 2. Nystagmus resolved on the 2nd attempt. BPPV education provided to pt and spouse, HO provided.    Time-based treatments/modalities:             Assessment:     Assessment details:    Rossy is a 80 y.o female who presents to PT with complaint of intermittent vertigo related to position change that began 3/31/25. PT evaluation was positive for nystagmus in the R noreen hallpike indicating R posterior canalithasis type BPPV. The problem interferes with her tolerance for bed mobility and quick position change, as well as places her at increased risk of  falls. Skilled PT services are indicated to ensure the BPPV fully resolves, as well as address residual imbalance and motion sensitivity via VRT techniques in order to enhance QOL.         Prognosis: good    Goals:     Short term goals:    - Resolve nystagmus with positional exam  - Complete balance screening      Short term goal time span:  1-2 weeks    Long term goals:    - Improve DHI to 0%  - Balance measures indicate low risk of falls  - Pt demonstrates independence with a HEP for continued management of this problem beyond discharge from therapy.       Long term goal time span:  6-8 weeks  Plan:     Therapy options:  Physical therapy treatment to continue    Planned therapy interventions:  Canalith Repositioning (CPT 91133), Therapeutic Exercise (CPT 14287), Therapeutic Activities (CPT 68091), Functional Training, Self Care (CPT 39172) and Neuromuscular Re-education (CPT 58291)    Frequency:  1x week    Duration in weeks:  8    Discussed with:  Patient and family      Functional Assessment Used    DHI= 8%      Referring provider co-signature:  I have reviewed this plan of care and my co-signature certifies the need for services.    Certification Period: 05/01/2025 to  06/26/25    Physician Signature: ________________________________ Date: ______________

## 2025-05-05 NOTE — OP THERAPY DAILY TREATMENT
Outpatient Physical Therapy  DAILY TREATMENT     [facility] Outpatient Physical Therapy  [address]  [place] [address]  Phone:  [phone]  Fax:  [phone]    Date: 05/06/2025    Patient: Rossy Lopez  YOB: 1945  MRN: [id number]     Time Calculation    Start time: 1331  Stop time: 1412 Time Calculation (min): 41 minutes         Chief Complaint: Vertigo    Visit #: 2    SUBJECTIVE:  No vertigo since last time. No lightheadedness and feels stable on her feet.     OBJECTIVE:      Therapeutic Treatments and Modalities:     1. Neuromuscular Re-education (CPT 04821)    Therapeutic Treatment and Modalities Summary:   Positional exam: NEG for B noreen-hallpike and roll    Review of self assessment and treatment to improve management of future episodes.     MiniBest:   STS= 2  Rise to toes= 2  Stand on 1 leg= 1 (L= 3-4 seconds, R= 9 seconds)  Fwd stepping correction= 2  Backward stepping correction= 2  Lateral stepping correction= 2  EC, feet together, firm= 2  Foam EC, feet together= 2  Incline EC= 2  Change in gait speed= 2  Walk with HT= 2  Walk with pivot turn= 2  Step over obstacle= 2  TUG= 2 (Standard= 7 seconds, CogTUG= 9 seconds)    Total= 27/28      Time-based treatments/modalities:    Physical Therapy Timed Treatment Charges  Neuromusc re-ed, balance, coor, post minutes (CPT 84071): 41 minutes    ASSESSMENT:   Response to treatment: BPPV resolved. Shows good understanding of self assessment and treatment. Balance testing shows low risk of falls. No further skilled need at present time, but will hold chart open x 30 day in case of relapse in the vertigo. Otherwise will be ready to discharge.     PLAN/RECOMMENDATIONS:   Plan for treatment: therapy treatment to continue next visit.  Planned interventions for next visit: continue with current treatment.

## 2025-05-06 ENCOUNTER — PHYSICAL THERAPY (OUTPATIENT)
Dept: PHYSICAL THERAPY | Facility: MEDICAL CENTER | Age: 80
End: 2025-05-06
Attending: FAMILY MEDICINE
Payer: MEDICARE

## 2025-05-06 DIAGNOSIS — H81.12 BENIGN PAROXYSMAL POSITIONAL VERTIGO OF LEFT EAR: ICD-10-CM

## 2025-05-06 PROCEDURE — 97112 NEUROMUSCULAR REEDUCATION: CPT

## 2025-05-08 ENCOUNTER — APPOINTMENT (OUTPATIENT)
Dept: PHYSICAL THERAPY | Facility: MEDICAL CENTER | Age: 80
End: 2025-05-08
Attending: FAMILY MEDICINE
Payer: MEDICARE

## 2025-05-12 NOTE — TELEPHONE ENCOUNTER
Atorvastatin Refill    Last seen: 1/18/22 by Dr. Rosenthal  Next appt: 5/2/22 with Dr. Rosenthal    Was the patient seen in the last year in this department? Yes   Does patient have an active prescription for medications requested? No   Received Request Via: Pharmacy   Physical Therapy Discharge       Visit Type: Discharge Summary  Visit: 12  Referring Provider: Anabella Weiss MD  Medical Diagnosis (from order): M16.11 - Primary osteoarthritis of right hip  M25.552, G89.29 - Chronic left hip pain  M25.552 - Lateral pain of left hip     SUBJECTIVE                                                                                                               Patient reports that her hips have good and bad days. Thursday was a really bad day, \"almost like I hadn't had any therapy.\" She did all her exercises yesterday, and she felt worse afterwards. She does the ones with the bands all at the same time, and doesn't think it was one of those; thinks it was something else she did after that.   Indicates pain along the L iliotibial band.   \"I want to get out and walk around the block without having to stop.\" Takes about 20 min. She was able to walk about 15 min in her house yesterday with 4 brief pauses.   She does go to the [employer] to walk as well; takes 5 min to walk the store and does it 6 times.   She doesn't have a drier at home, so she has to hang her clothes outside, which is challenging.  Stairs are going well.   Can bring her groceries in better with being able to carry and lift things without having to set them down to rest.  No pain when she gets up in the morning and walking around the house. The more active she has been   She was standing to sing in [employer], and she didn't have her R knee pressed against the chair behind her for support. Can tell her balance is better.   Is getting her shoes checked at [employer], getting fixed in a few weeks - using some different pairs.   Sits in a rocking chair; can't sit in her [employer]boy chair due to pain.   The new piano stool at [employer] is doing much better.   No questions on her HEP.     Pain / Symptoms  - Pain rating (out of 10): Current: 0 ; Worst: 3      OBJECTIVE                                                                                                                      Range of Motion (ROM)   (degrees unless noted; active unless noted; norms in ( ); negative=lacking to 0, positive=beyond 0)  Hip:   - Internal Rotation (45-50):     - in sitting:         Left:  33          Right:  31    - External Rotation (45-50):     - in sitting:         Left:  35           Right:  34      Strength  (out of 5 unless noted, standard test position unless noted)   Hip:    - Flexion:         Left: 4+         Right: 4+    - Abduction:         Left: 4+         Right: 5    - Internal Rotation:         Left: 5    - External Rotation:         Left: 4+         Right: 5  Comments / Details: Hip ABD measured in sitting         Palpation  Left  - Piriformis: hypertonic and tenderness  - Iliotibial Band: tenderness           Standing Balance  (MURRAY = base of support)  Firm Surface: Single Leg     - Left, Eyes Open (sec): 3     - Left, Eyes Open details: with loss of balance and without UE support     - Right, Eyes Open (sec): 2     - Right, Eyes Open details: with loss of balance and without UE support    Balance Tests   6 Minute Walk Test      - Assistive device: no assistive device     - Distance: 1533 feet     - Rated perceived exertion scale (RPE): 2     Norms: 80-89 years- male 890-1845 feet, female 730-1840 feet  10 Meter Walk Test - Comfortable Pace      - Trial 1: 4.38 sec, Trial 2: 4.31 sec, Trial 3: 4.31 sec     - 1 Trial: 1.37 M/sec     - Average of 3 Trials: 1.38 M/sec     Norms: Female 80-89 comfortable gait speed 1.2 M/s, fast gait speed 1.6 M/s, Minimally Clinically Important Difference: small meaningful     change: 0.05m/s, substantial meaningful change: 0.13m/s   The 2 above test information was taken from progress note on 4/23/25.      Outcome/Assessments  Outcome Measures:   Lower Extremity Functional Scale: LEFS Calculated Total: 54 (0=extreme difficulty; 80=no difficulty) see flowsheet for additional documentation    Treatment     Neuromuscular  Re-Education  single leg balance training: starting with 2 upper extremity support, then 1 upper extremity support, then finger tips of 1 hand, then no upper extremity support - cues for core and gluteal activation - bilateral   Measurements taken and goals assessed. Educated patient on previous measurements and those taken this session. Discussed plan to discharge patient from physical therapy at this time based on their progress in therapy, and patient is agreeable.  Educated patient on a return to walking program, starting with 5 min total (2.5 min out and 2.5 min back), staying at that time for a week. May add 5 min each week, as long as no increased pain/symptoms with the time from the week before. If any issues arise, they should either stay at the same amount of time as the week before, or decrease to the last-known comfortable range. Handout provided.   Also discussed using a rolling pin or paint/lint roller to roll over the iliotibial band region to address patient's reports of continued pain here.  Discussed indications for when to return to ortho for their assessment if pain continues.   Addressed all other presented questions and concerns.       Skilled input: verbal instruction/cues, tactile instruction/cues, as detailed above and posture correction    Writer verbally educated and received verbal consent for hand placement, positioning of patient, and techniques to be performed today from patient for hand placement and palpation for techniques and therapist position for techniques as described above and how they are pertinent to the patient's plan of care.  Home Exercise Program  Access Code: [email or web address]  URL: [email or web address]  Date: 05/06/2025  Prepared by: Divya    Program Notes  PAIN FREE!    Exercises  - Seated Hamstring Stretch  - 2 x daily - 7 x weekly - 2 reps - 30 seconds hold  - Standing Hip Flexor Stretch on Chair  - 2 x daily - 7 x weekly - 2 sets - 30-60 hold  -  Seated Hip External Rotation with Resistance  - 3 x weekly - 2 sets - 10 reps  - Seated Hip External Rotation with Resistance  - 3 x weekly - 2 sets - 10 reps    Also Standing Strong booklet    05/12/25: issued handout for developing a walking program. Instructed patient in using of roller to massage her L iliotibial band region       ASSESSMENT                                                                                                            Gains in skilled therapy to date as expected in ROM, strength, balance.  Patient attends therapy as recommended.  Patient reports performing HEP as prescribed.  Progress toward discharge/long term goals (see below for specific status updates): good progress    Patient has met or improved on her therapy goals, and patient's HEP and the additional education provided is designed to address remaining deficits. I would recommend that she follow up with ortho at this point, as she has not had substantial long-term changes in her pain, particularly in the afternoons. She is discharged from skilled physical therapy at this time. She may, of course, contact me with any future questions or concerns telephonically or via the [employer] messaging.    Pain/symptoms after session (out of 10): 0  Education:   - Results of above outlined education: Verbalizes understanding and Demonstrates understanding    Goals  Long Term Goals: to be met by end of plan of care  1. Patient will report tolerating walking for 30 minutes with reported manageable pain/difficulty for participation in household and community tasks. Status: partially met  Intermittent standing/walking is about 30 min  4 brief rest breaks in about 20 min   2. Patient will ascend and descend 5 steps with reciprocal pattern and minimal use of railing for home and community access. Status: met Reciprocal pattern, using the railing now mainly for safety/balance  3. Patient will improve single leg balance time to >10 seconds  without pain to indicate decreased risk of falling while performing functional daily activities and ability to resume health and wellness activities.  Status: partially met  Improved to 3 sec L and 2 sec R - patient reports this being better yesterday.   4. Patient will report tolerating sitting for 30 minutes with reported manageable pain/difficulty for safety with driving, participating in [employer] activities, and watching a program/movie with friends/family (interpersonal relationships) .  Status: partially met  Still depends on the surface. 15-30 min.   5. Patient will be independent with progressed and modified home exercise program. Status: met       Therapy procedure time and total treatment time can be found documented on the Time Entry flowsheet

## 2025-05-13 ENCOUNTER — APPOINTMENT (OUTPATIENT)
Dept: PHYSICAL THERAPY | Facility: MEDICAL CENTER | Age: 80
End: 2025-05-13
Attending: FAMILY MEDICINE
Payer: MEDICARE

## 2025-05-15 ENCOUNTER — APPOINTMENT (OUTPATIENT)
Dept: PHYSICAL THERAPY | Facility: MEDICAL CENTER | Age: 80
End: 2025-05-15
Attending: FAMILY MEDICINE
Payer: MEDICARE

## 2025-05-20 ENCOUNTER — APPOINTMENT (OUTPATIENT)
Dept: PHYSICAL THERAPY | Facility: MEDICAL CENTER | Age: 80
End: 2025-05-20
Attending: FAMILY MEDICINE
Payer: MEDICARE

## 2025-05-22 ENCOUNTER — APPOINTMENT (OUTPATIENT)
Dept: PHYSICAL THERAPY | Facility: MEDICAL CENTER | Age: 80
End: 2025-05-22
Attending: FAMILY MEDICINE
Payer: MEDICARE

## 2025-05-23 ENCOUNTER — OUTPATIENT INFUSION SERVICES (OUTPATIENT)
Dept: ONCOLOGY | Facility: MEDICAL CENTER | Age: 80
End: 2025-05-23
Attending: INTERNAL MEDICINE
Payer: MEDICARE

## 2025-05-23 VITALS
DIASTOLIC BLOOD PRESSURE: 62 MMHG | OXYGEN SATURATION: 93 % | WEIGHT: 136.69 LBS | TEMPERATURE: 97.4 F | HEIGHT: 62 IN | BODY MASS INDEX: 25.15 KG/M2 | RESPIRATION RATE: 17 BRPM | SYSTOLIC BLOOD PRESSURE: 130 MMHG | HEART RATE: 80 BPM

## 2025-05-23 DIAGNOSIS — J45.40 MODERATE PERSISTENT ASTHMA WITHOUT COMPLICATION: Primary | ICD-10-CM

## 2025-05-23 PROCEDURE — 96372 THER/PROPH/DIAG INJ SC/IM: CPT

## 2025-05-23 PROCEDURE — 700111 HCHG RX REV CODE 636 W/ 250 OVERRIDE (IP): Mod: JZ,TB | Performed by: INTERNAL MEDICINE

## 2025-05-23 RX ADMIN — MEPOLIZUMAB 100 MG: 100 INJECTION, POWDER, FOR SOLUTION SUBCUTANEOUS at 10:57

## 2025-05-23 ASSESSMENT — FIBROSIS 4 INDEX: FIB4 SCORE: 1.62

## 2025-05-23 NOTE — PROGRESS NOTES
Pt arrives to [facility] for Nucala injection.  Pt denies s/sx of infection or other complaints today.  Nucala given to left back arm. Confirmed next appt time with pt.  Pt dc home to self care.

## 2025-05-27 ENCOUNTER — APPOINTMENT (OUTPATIENT)
Dept: PHYSICAL THERAPY | Facility: MEDICAL CENTER | Age: 80
End: 2025-05-27
Attending: FAMILY MEDICINE
Payer: MEDICARE

## 2025-06-04 DIAGNOSIS — I10 ESSENTIAL HYPERTENSION: ICD-10-CM

## 2025-06-04 RX ORDER — HYDROCHLOROTHIAZIDE 25 MG/1
25 TABLET ORAL
Qty: 90 TABLET | Refills: 3 | Status: SHIPPED | OUTPATIENT
Start: 2025-06-04

## 2025-06-04 NOTE — TELEPHONE ENCOUNTER
Received request via: Pharmacy    Was the patient seen in the last year in this department? Yes    Does the patient have an active prescription (recently filled or refills available) for medication(s) requested? No    Pharmacy Name: [facility]    Does the patient have FDC Plus and need 100-day supply? (This applies to ALL medications) Patient does not have SCP

## 2025-06-10 ENCOUNTER — HOSPITAL ENCOUNTER (OUTPATIENT)
Dept: LAB | Facility: MEDICAL CENTER | Age: 80
End: 2025-06-10
Attending: FAMILY MEDICINE
Payer: MEDICARE

## 2025-06-10 DIAGNOSIS — I10 PRIMARY HYPERTENSION: ICD-10-CM

## 2025-06-10 DIAGNOSIS — E78.2 MIXED HYPERLIPIDEMIA: Chronic | ICD-10-CM

## 2025-06-10 DIAGNOSIS — E11.69 TYPE 2 DIABETES MELLITUS WITH OTHER SPECIFIED COMPLICATION, WITH LONG-TERM CURRENT USE OF INSULIN (HCC): ICD-10-CM

## 2025-06-10 DIAGNOSIS — E55.9 VITAMIN D DEFICIENCY: ICD-10-CM

## 2025-06-10 DIAGNOSIS — Z79.4 TYPE 2 DIABETES MELLITUS WITH OTHER SPECIFIED COMPLICATION, WITH LONG-TERM CURRENT USE OF INSULIN (HCC): ICD-10-CM

## 2025-06-10 LAB
25(OH)D3 SERPL-MCNC: 76 NG/ML (ref 30–100)
ALBUMIN SERPL BCP-MCNC: 4.1 G/DL (ref 3.2–4.9)
ALBUMIN/GLOB SERPL: 1.2 G/DL
ALP SERPL-CCNC: 104 U/L (ref 30–99)
ALT SERPL-CCNC: 54 U/L (ref 2–50)
ANION GAP SERPL CALC-SCNC: 11 MMOL/L (ref 7–16)
AST SERPL-CCNC: 59 U/L (ref 12–45)
BASOPHILS # BLD AUTO: 0.5 % (ref 0–1.8)
BASOPHILS # BLD: 0.03 K/UL (ref 0–0.12)
BILIRUB SERPL-MCNC: 0.6 MG/DL (ref 0.1–1.5)
BUN SERPL-MCNC: 11 MG/DL (ref 8–22)
CALCIUM ALBUM COR SERPL-MCNC: 9.8 MG/DL (ref 8.5–10.5)
CALCIUM SERPL-MCNC: 9.9 MG/DL (ref 8.5–10.5)
CHLORIDE SERPL-SCNC: 103 MMOL/L (ref 96–112)
CHOLEST SERPL-MCNC: 137 MG/DL (ref 100–199)
CO2 SERPL-SCNC: 25 MMOL/L (ref 20–33)
CREAT SERPL-MCNC: 0.69 MG/DL (ref 0.5–1.4)
CREAT UR-MCNC: 61 MG/DL
EOSINOPHIL # BLD AUTO: 0.06 K/UL (ref 0–0.51)
EOSINOPHIL NFR BLD: 0.9 % (ref 0–6.9)
ERYTHROCYTE [DISTWIDTH] IN BLOOD BY AUTOMATED COUNT: 49.3 FL (ref 35.9–50)
FASTING STATUS PATIENT QL REPORTED: NORMAL
GFR SERPLBLD CREATININE-BSD FMLA CKD-EPI: 89 ML/MIN/1.73 M 2
GLOBULIN SER CALC-MCNC: 3.5 G/DL (ref 1.9–3.5)
GLUCOSE SERPL-MCNC: 95 MG/DL (ref 65–99)
HCT VFR BLD AUTO: 41.5 % (ref 37–47)
HDLC SERPL-MCNC: 67 MG/DL
HGB BLD-MCNC: 13.2 G/DL (ref 12–16)
IMM GRANULOCYTES # BLD AUTO: 0.02 K/UL (ref 0–0.11)
IMM GRANULOCYTES NFR BLD AUTO: 0.3 % (ref 0–0.9)
LDLC SERPL CALC-MCNC: 60 MG/DL
LYMPHOCYTES # BLD AUTO: 1.8 K/UL (ref 1–4.8)
LYMPHOCYTES NFR BLD: 27.9 % (ref 22–41)
MCH RBC QN AUTO: 26.2 PG (ref 27–33)
MCHC RBC AUTO-ENTMCNC: 31.8 G/DL (ref 32.2–35.5)
MCV RBC AUTO: 82.5 FL (ref 81.4–97.8)
MICROALBUMIN UR-MCNC: 3.9 MG/DL
MICROALBUMIN/CREAT UR: 64 MG/G (ref 0–30)
MONOCYTES # BLD AUTO: 0.69 K/UL (ref 0–0.85)
MONOCYTES NFR BLD AUTO: 10.7 % (ref 0–13.4)
NEUTROPHILS # BLD AUTO: 3.86 K/UL (ref 1.82–7.42)
NEUTROPHILS NFR BLD: 59.7 % (ref 44–72)
NRBC # BLD AUTO: 0 K/UL
NRBC BLD-RTO: 0 /100 WBC (ref 0–0.2)
PLATELET # BLD AUTO: 330 K/UL (ref 164–446)
PMV BLD AUTO: 9.4 FL (ref 9–12.9)
POTASSIUM SERPL-SCNC: 4.9 MMOL/L (ref 3.6–5.5)
PROT SERPL-MCNC: 7.6 G/DL (ref 6–8.2)
RBC # BLD AUTO: 5.03 M/UL (ref 4.2–5.4)
SODIUM SERPL-SCNC: 139 MMOL/L (ref 135–145)
TRIGL SERPL-MCNC: 49 MG/DL (ref 0–149)
WBC # BLD AUTO: 6.5 K/UL (ref 4.8–10.8)

## 2025-06-10 PROCEDURE — 82306 VITAMIN D 25 HYDROXY: CPT

## 2025-06-10 PROCEDURE — 80053 COMPREHEN METABOLIC PANEL: CPT

## 2025-06-10 PROCEDURE — 82043 UR ALBUMIN QUANTITATIVE: CPT

## 2025-06-10 PROCEDURE — 36415 COLL VENOUS BLD VENIPUNCTURE: CPT

## 2025-06-10 PROCEDURE — 85025 COMPLETE CBC W/AUTO DIFF WBC: CPT

## 2025-06-10 PROCEDURE — 82570 ASSAY OF URINE CREATININE: CPT

## 2025-06-10 PROCEDURE — 80061 LIPID PANEL: CPT

## 2025-06-12 ENCOUNTER — APPOINTMENT (OUTPATIENT)
Dept: PHYSICAL THERAPY | Facility: MEDICAL CENTER | Age: 80
End: 2025-06-12
Attending: FAMILY MEDICINE
Payer: MEDICARE

## 2025-06-17 ENCOUNTER — APPOINTMENT (OUTPATIENT)
Dept: PHYSICAL THERAPY | Facility: MEDICAL CENTER | Age: 80
End: 2025-06-17
Attending: FAMILY MEDICINE
Payer: MEDICARE

## 2025-06-18 ENCOUNTER — OFFICE VISIT (OUTPATIENT)
Dept: MEDICAL GROUP | Facility: MEDICAL CENTER | Age: 80
End: 2025-06-18
Payer: MEDICARE

## 2025-06-18 VITALS
DIASTOLIC BLOOD PRESSURE: 64 MMHG | HEIGHT: 62 IN | HEART RATE: 82 BPM | TEMPERATURE: 97.6 F | OXYGEN SATURATION: 94 % | SYSTOLIC BLOOD PRESSURE: 112 MMHG | BODY MASS INDEX: 25.11 KG/M2 | WEIGHT: 136.47 LBS

## 2025-06-18 DIAGNOSIS — I49.9 IRREGULAR HEART RHYTHM: ICD-10-CM

## 2025-06-18 DIAGNOSIS — E78.2 MIXED HYPERLIPIDEMIA: Primary | ICD-10-CM

## 2025-06-18 DIAGNOSIS — E11.69 TYPE 2 DIABETES MELLITUS WITH OTHER SPECIFIED COMPLICATION, WITH LONG-TERM CURRENT USE OF INSULIN (HCC): ICD-10-CM

## 2025-06-18 DIAGNOSIS — Z79.4 TYPE 2 DIABETES MELLITUS WITH OTHER SPECIFIED COMPLICATION, WITH LONG-TERM CURRENT USE OF INSULIN (HCC): ICD-10-CM

## 2025-06-18 DIAGNOSIS — R79.89 ELEVATED LFTS: ICD-10-CM

## 2025-06-18 PROCEDURE — 99214 OFFICE O/P EST MOD 30 MIN: CPT | Performed by: FAMILY MEDICINE

## 2025-06-18 PROCEDURE — 3074F SYST BP LT 130 MM HG: CPT | Performed by: FAMILY MEDICINE

## 2025-06-18 PROCEDURE — 3078F DIAST BP <80 MM HG: CPT | Performed by: FAMILY MEDICINE

## 2025-06-18 RX ORDER — ATORVASTATIN CALCIUM 20 MG/1
20 TABLET, FILM COATED ORAL
Qty: 90 TABLET | Refills: 3 | Status: SHIPPED | OUTPATIENT
Start: 2025-06-18

## 2025-06-18 ASSESSMENT — ENCOUNTER SYMPTOMS
PALPITATIONS: 0
FEVER: 0
CHILLS: 0

## 2025-06-18 ASSESSMENT — FIBROSIS 4 INDEX: FIB4 SCORE: 1.9

## 2025-06-18 NOTE — PROGRESS NOTES
Verbal consent was acquired by the patient to use [employer] ambient listening note generation during this visit.      Rossy was seen today for follow-up.    Diagnoses and all orders for this visit:    Elevated LFTs  -     Comp Metabolic Panel; Future  -     HEPATITIS PANEL ACUTE(4 COMPONENTS); Future  -     ALPHA-1-ANTITRYPSIN DEFICIENCY  -     ANTI-SMOOTH MUSCLE ABS; Future  -     SERUM PROTEIN ELECTROPHORESIS; Future  -     CERULOPLASMIN; Future  -     FERRITIN; Future  -     IRON/TOTAL IRON BIND; Future    Mixed hyperlipidemia  -     atorvastatin (LIPITOR) 20 MG Tab; Take 1 Tablet by mouth at bedtime.  -     Comp Metabolic Panel; Future  -     Lipid Profile; Future    Type 2 diabetes mellitus with other specified complication, with long-term current use of insulin (HCC)  -     MICROALBUMIN CREAT RATIO URINE; Future    Irregular heart rhythm  -     RIH ZIO PATCH MONITOR; Future           Assessment & Plan  1.  Type 2 diabetes mellitus  Chronic condition, improving  - A1c levels have shown improvement, and fasting blood glucose levels are better controlled  - Presence of proteinuria likely due to diabetic nephropathy  - Advised to continue current medication regimen and dietary practices  - Follow-up blood test to monitor A1c levels scheduled for next month  - Continue to follow with endocrinology    2. Elevated Liver Enzymes  Chronic condition, unstable  - Liver function tests are elevated.  Ultrasound right upper quadrant is normal  - Dosage of atorvastatin reduced to 20 mg; advised to take half of current medication until new dosage is delivered  - Comprehensive liver panel ordered to rule out hepatitis, alpha-1 antitrypsin deficiency, autoimmune liver conditions, serum protein abnormalities, copper deposition, and hemochromatosis  - Repeat liver function test scheduled in 3 months; referral to hepatologist considered if no improvement    3.  Irregular heart rhythm  New condition, unstable  - No documentation  of atrial fibrillation in medical records; patient reports occasional heart fluttering  - EKG shows sinus rhythm  - 14-day heart monitor prescribed to assess the burden of atrial fibrillation  - Referral to cardiologist considered if significant atrial fibrillation is detected    4. Hyperlipidemia  Chronic condition, stable but liver function elevated  - Cholesterol levels within normal limits  - Dosage of atorvastatin reduced to 20 mg to improve liver function  - Repeat cholesterol test scheduled in 3 months to ensure cholesterol levels remain controlled    Follow-up in 3 months for annual and lab follow-up      Chief complaint::The primary encounter diagnosis was Elevated LFTs. Diagnoses of Mixed hyperlipidemia, Type 2 diabetes mellitus with other specified complication, with long-term current use of insulin (HCC), and Irregular heart rhythm were also pertinent to this visit.      History of Present Illness  The patient is a 80-year-old female who presents for a blood test follow-up.    Diabetes Management  - She has been managing her diabetes effectively, with her A1c levels showing improvement.  - Her blood glucose levels have been consistently within the normal range for the past 30 days.  - She discontinued self-administration of insulin injections in one area due to the development of scar tissue and has noticed a decrease in her blood glucose levels since then.  - She is scheduled for a blood test next month.    Medications and Supplements  - She does not consume alcohol and is currently on atorvastatin.  - She has undergone an ultrasound of the liver, which yielded normal results.  - She is also taking Airborne supplements and has recently started a probiotic regimen.  - She prefers gummy supplements over pills.    Atrial Fibrillation  - She has a history of atrial fibrillation, which was detected during a hospital stay for diverticulitis and vertigo.  - She experiences occasional heart palpitations, but  "these episodes are infrequent.    Autoimmune Diseases  - She has autoimmune diseases with asthma and diabetes.  - She is not taking Dupixent.            Review of Systems   Constitutional:  Negative for chills and fever.   Cardiovascular:  Negative for chest pain, palpitations and leg swelling.          Medications and Allergies:       Current Outpatient Medications:     atorvastatin, 20 mg, Oral, QHS, Taking    hydroCHLOROthiazide, 25 mg, Oral, QDAY, Taking    Ebglyss, Inject  under the skin. PT will take after dupixent sample is complete, Taking    Probiotic Product (PROBIOTIC PO), Take  by mouth., Taking    omeprazole, 20 mg, Oral, QDAY, Taking    losartan, 25 mg, Oral, QDAY, Taking    tizanidine, 4 mg, Oral, HS PRN, Taking As Needed    Multiple Vitamins-Minerals (AIRBORNE PO), Take  by mouth., Taking    ibuprofen, 400 mg, Oral, Q8HRS PRN, Taking As Needed    FreeStyle James 3 Sensor, 1 Each, Does not apply, Q14 DAYS, Taking    Nucala, 100 mg, Subcutaneous, q30 days, Taking    vitamin B + C complex, 1 Tablet, Oral, DAILY, Taking    Lyumjev KwikPen, 5-8 Units, Subcutaneous, TID, Taking    Tresiba FlexTouch, 20 Units, Subcutaneous, Nightly, Taking    nystatin, 1 Application, Topical, 4X/DAY PRN, Taking As Needed    Jardiance, 1 Tablet, Oral, QDAY, Taking    Asmanex HFA, 2 Puff, Inhalation, BID, Taking    albuterol, 2 Puff, Inhalation, Q4HRS PRN, Taking As Needed    Ca Phosphate-Cholecalciferol, 1 Tablet, Oral, BID, Taking    Multiple Vitamins-Minerals (MULTIVITAMIN ADULT PO), 1 Tablet, Oral, DAILY, Taking     /64 (BP Location: Left arm, Patient Position: Sitting, BP Cuff Size: Adult)   Pulse 82   Temp 36.4 °C (97.6 °F) (Temporal)   Ht 1.575 m (5' 2\")   Wt 61.9 kg (136 lb 7.4 oz)   SpO2 94% , Body mass index is 24.96 kg/m².      Physical Exam  Constitutional:       Appearance: Normal appearance. She is well-developed and well-groomed.   HENT:      Head: Normocephalic and atraumatic.      Right Ear: " External ear normal.      Left Ear: External ear normal.   Eyes:      General:         Right eye: No discharge.         Left eye: No discharge.      Conjunctiva/sclera: Conjunctivae normal.   Cardiovascular:      Rate and Rhythm: Normal rate.   Pulmonary:      Effort: Pulmonary effort is normal. No respiratory distress.   Musculoskeletal:      Cervical back: Neck supple.   Skin:     Findings: No rash.   Neurological:      Mental Status: She is alert.   Psychiatric:         Mood and Affect: Mood and affect normal.         Behavior: Behavior normal.            I reviewed with patient lab results resulted on 6/10/2025.        Please note that this dictation was created using voice recognition software. I have made every reasonable attempt to correct obvious errors, but I expect that there are errors of grammar and possibly content that I did not discover before finalizing the note.

## 2025-06-20 ENCOUNTER — OUTPATIENT INFUSION SERVICES (OUTPATIENT)
Dept: ONCOLOGY | Facility: MEDICAL CENTER | Age: 80
End: 2025-06-20
Attending: INTERNAL MEDICINE
Payer: MEDICARE

## 2025-06-20 VITALS
HEIGHT: 62 IN | TEMPERATURE: 97.9 F | OXYGEN SATURATION: 90 % | BODY MASS INDEX: 25.15 KG/M2 | DIASTOLIC BLOOD PRESSURE: 79 MMHG | SYSTOLIC BLOOD PRESSURE: 128 MMHG | HEART RATE: 85 BPM | WEIGHT: 136.69 LBS | RESPIRATION RATE: 16 BRPM

## 2025-06-20 DIAGNOSIS — J45.40 MODERATE PERSISTENT ASTHMA WITHOUT COMPLICATION: Primary | ICD-10-CM

## 2025-06-20 PROCEDURE — 700111 HCHG RX REV CODE 636 W/ 250 OVERRIDE (IP): Mod: JZ,TB | Performed by: INTERNAL MEDICINE

## 2025-06-20 PROCEDURE — 96372 THER/PROPH/DIAG INJ SC/IM: CPT

## 2025-06-20 RX ADMIN — MEPOLIZUMAB 100 MG: 100 INJECTION, POWDER, FOR SOLUTION SUBCUTANEOUS at 11:05

## 2025-06-20 ASSESSMENT — FIBROSIS 4 INDEX: FIB4 SCORE: 1.9

## 2025-06-20 NOTE — PROGRESS NOTES
Rossy arrives to [facility] for Nucala injection.  Pt denies s/sx of infection or other complaints today.  Nucala given to right back arm. Confirmed next appt time with pt and more apt's requested through .  Pt dc home to self care.

## 2025-06-30 ENCOUNTER — TELEPHONE (OUTPATIENT)
Dept: HEALTH INFORMATION MANAGEMENT | Facility: OTHER | Age: 80
End: 2025-06-30
Payer: MEDICARE

## 2025-07-18 ENCOUNTER — OUTPATIENT INFUSION SERVICES (OUTPATIENT)
Dept: ONCOLOGY | Facility: MEDICAL CENTER | Age: 80
End: 2025-07-18
Attending: INTERNAL MEDICINE
Payer: MEDICARE

## 2025-07-18 VITALS
BODY MASS INDEX: 23.41 KG/M2 | RESPIRATION RATE: 17 BRPM | TEMPERATURE: 97.1 F | SYSTOLIC BLOOD PRESSURE: 124 MMHG | HEIGHT: 64 IN | OXYGEN SATURATION: 95 % | HEART RATE: 75 BPM | WEIGHT: 137.13 LBS | DIASTOLIC BLOOD PRESSURE: 77 MMHG

## 2025-07-18 DIAGNOSIS — J45.40 MODERATE PERSISTENT ASTHMA WITHOUT COMPLICATION: Primary | ICD-10-CM

## 2025-07-18 PROCEDURE — 700111 HCHG RX REV CODE 636 W/ 250 OVERRIDE (IP): Mod: JZ,TB | Performed by: INTERNAL MEDICINE

## 2025-07-18 PROCEDURE — 96372 THER/PROPH/DIAG INJ SC/IM: CPT

## 2025-07-18 RX ADMIN — MEPOLIZUMAB 100 MG: 100 INJECTION, POWDER, FOR SOLUTION SUBCUTANEOUS at 10:55

## 2025-07-18 ASSESSMENT — FIBROSIS 4 INDEX: FIB4 SCORE: 1.9

## 2025-07-18 NOTE — PROGRESS NOTES
Rossy presented to the infusion center for Nucala injection. Nucala  injected subcutaneously into the back of her left arm with no signs and symptoms of adverse reactions.. Next appointment confirmed. She was then discharged home in stable condition.

## 2025-07-25 ENCOUNTER — HOSPITAL ENCOUNTER (OUTPATIENT)
Facility: MEDICAL CENTER | Age: 80
End: 2025-07-25
Attending: FAMILY MEDICINE
Payer: MEDICARE

## 2025-07-25 DIAGNOSIS — R92.8 ABNORMAL MAMMOGRAM OF LEFT BREAST: ICD-10-CM

## 2025-07-25 PROCEDURE — G0279 TOMOSYNTHESIS, MAMMO: HCPCS

## 2025-07-25 PROCEDURE — 76642 ULTRASOUND BREAST LIMITED: CPT | Mod: LT

## 2025-07-29 ENCOUNTER — NON-PROVIDER VISIT (OUTPATIENT)
Dept: CARDIOLOGY | Facility: MEDICAL CENTER | Age: 80
End: 2025-07-29
Attending: FAMILY MEDICINE
Payer: MEDICARE

## 2025-07-29 DIAGNOSIS — I49.9 IRREGULAR HEART RHYTHM: ICD-10-CM

## 2025-07-29 NOTE — PROGRESS NOTES
Home enrollment completed in 14 day Zio Holter Monitor per Shan Alvarado M.D.  Monitor will be shipped to patient via [employer]  Pending EOS

## 2025-08-11 ENCOUNTER — PATIENT MESSAGE (OUTPATIENT)
Dept: MEDICAL GROUP | Facility: MEDICAL CENTER | Age: 80
End: 2025-08-11
Payer: MEDICARE

## 2025-08-11 DIAGNOSIS — R42 VERTIGO: Primary | ICD-10-CM

## 2025-08-15 ENCOUNTER — OUTPATIENT INFUSION SERVICES (OUTPATIENT)
Dept: ONCOLOGY | Facility: MEDICAL CENTER | Age: 80
End: 2025-08-15
Attending: INTERNAL MEDICINE
Payer: MEDICARE

## 2025-08-15 VITALS
HEART RATE: 61 BPM | OXYGEN SATURATION: 97 % | HEIGHT: 64 IN | RESPIRATION RATE: 18 BRPM | BODY MASS INDEX: 23.41 KG/M2 | WEIGHT: 137.13 LBS | SYSTOLIC BLOOD PRESSURE: 121 MMHG | TEMPERATURE: 98.2 F | DIASTOLIC BLOOD PRESSURE: 67 MMHG

## 2025-08-15 DIAGNOSIS — J45.40 MODERATE PERSISTENT ASTHMA WITHOUT COMPLICATION: Primary | ICD-10-CM

## 2025-08-15 PROCEDURE — 96372 THER/PROPH/DIAG INJ SC/IM: CPT

## 2025-08-15 PROCEDURE — 700111 HCHG RX REV CODE 636 W/ 250 OVERRIDE (IP): Mod: JZ,TB | Performed by: INTERNAL MEDICINE

## 2025-08-15 RX ORDER — DUPILUMAB 300 MG/2ML
300 INJECTION, SOLUTION SUBCUTANEOUS
COMMUNITY

## 2025-08-15 RX ADMIN — MEPOLIZUMAB 100 MG: 100 INJECTION, POWDER, FOR SOLUTION SUBCUTANEOUS at 11:16

## 2025-08-15 ASSESSMENT — FIBROSIS 4 INDEX: FIB4 SCORE: 1.9

## 2025-08-19 ENCOUNTER — TELEPHONE (OUTPATIENT)
Dept: PHYSICAL THERAPY | Facility: MEDICAL CENTER | Age: 80
End: 2025-08-19
Payer: MEDICARE

## 2025-08-19 ENCOUNTER — PHYSICAL THERAPY (OUTPATIENT)
Dept: PHYSICAL THERAPY | Facility: MEDICAL CENTER | Age: 80
End: 2025-08-19
Attending: FAMILY MEDICINE
Payer: MEDICARE

## 2025-08-19 DIAGNOSIS — R42 VERTIGO: Primary | ICD-10-CM

## 2025-08-19 PROCEDURE — 97162 PT EVAL MOD COMPLEX 30 MIN: CPT

## 2025-08-19 PROCEDURE — 95992 CANALITH REPOSITIONING PROC: CPT

## 2025-08-19 ASSESSMENT — ENCOUNTER SYMPTOMS
PAIN SCALE AT HIGHEST: 4
PAIN SCALE AT LOWEST: 0
PAIN SCALE: 0

## 2025-08-20 ENCOUNTER — TELEPHONE (OUTPATIENT)
Dept: CARDIOLOGY | Facility: MEDICAL CENTER | Age: 80
End: 2025-08-20
Payer: MEDICARE

## (undated) DEVICE — STAPLER SKIN DISP - (6/BX 10BX/CA) VISISTAT

## (undated) DEVICE — SET LEADWIRE 5 LEAD BEDSIDE DISPOSABLE ECG (1SET OF 5/EA)

## (undated) DEVICE — GLOVE BIOGEL PI INDICATOR SZ 8.5 SURGICAL PF LF - (50PR/BX 4BX/CA)

## (undated) DEVICE — ARMREST CRADLE FOAM - (2PR/PK 12PR/CA)

## (undated) DEVICE — ELECTRODE DUAL RETURN W/ CORD - (50/PK)

## (undated) DEVICE — PEDIGUARD CURVED (1EA)

## (undated) DEVICE — GOWN WARMING STANDARD FLEX - (30/CA)

## (undated) DEVICE — TUBE CONNECT SUCTION CLEAR 120 X 1/4" (50EA/CA)"

## (undated) DEVICE — PACK MINOR BASIN - (2EA/CA)

## (undated) DEVICE — CHLORAPREP 26 ML APPLICATOR - ORANGE TINT(25/CA)

## (undated) DEVICE — DRAPE LARGE 3 QUARTER - (20/CA)

## (undated) DEVICE — DRAPE SURGICAL U 77X120 - (10/CA)

## (undated) DEVICE — DRAPE STRLE REG TOWEL 18X24 - (10/BX 4BX/CA)"

## (undated) DEVICE — GLOVE BIOGEL PI INDICATOR SZ 6.5 SURGICAL PF LF - (50/BX 4BX/CA)

## (undated) DEVICE — KIT  I.V. START (100EA/CA)

## (undated) DEVICE — HEADREST PRONEVIEW LARGE - (10/CA)

## (undated) DEVICE — GLOVE BIOGEL PI INDICATOR SZ 8.0 SURGICAL PF LF -(50/BX 4BX/CA)

## (undated) DEVICE — BONE MILL BM210

## (undated) DEVICE — LACTATED RINGERS INJ. 500 ML - (24EA/CA)

## (undated) DEVICE — CANISTER SUCTION 3000ML MECHANICAL FILTER AUTO SHUTOFF MEDI-VAC NONSTERILE LF DISP (40EA/CA)

## (undated) DEVICE — DRAPE LAPAROTOMY T SHEET - (12EA/CA)

## (undated) DEVICE — SUTURE 2-0 VICRYL PLUS CT-1 - 8 X 18 INCH(12/BX)

## (undated) DEVICE — TUBING CLEARLINK DUO-VENT - C-FLO (48EA/CA)

## (undated) DEVICE — SOD. CHL. INJ. 0.9% 1000 ML - (14EA/CA 60CA/PF)

## (undated) DEVICE — DERMABOND ADVANCED - (12EA/BX)

## (undated) DEVICE — MASK ANESTHESIA ADULT  - (100/CA)

## (undated) DEVICE — SENSOR OXIMETER ADULT SPO2 RD SET (20EA/BX)

## (undated) DEVICE — SET EXTENSION WITH 2 PORTS (48EA/CA) ***PART #2C8610 IS A SUBSTITUTE*****

## (undated) DEVICE — KIT EVACUATER 3 SPRING PVC LF 1/8 DRAIN SIZE (10EA/CA)"

## (undated) DEVICE — SENSOR SPO2 NEO LNCS ADHESIVE (20/BX) SEE USER NOTES

## (undated) DEVICE — SUTURE 3-0 VICRYL PLUS SH - 8X 18 INCH (12/BX)

## (undated) DEVICE — SUCTION INSTRUMENT YANKAUER BULBOUS TIP W/O VENT (50EA/CA)

## (undated) DEVICE — GLOVE BIOGEL INDICATOR SZ 7SURGICAL PF LTX - (50/BX 4BX/CA)

## (undated) DEVICE — TOWELS CLOTH SURGICAL - (4/PK 20PK/CA)

## (undated) DEVICE — GLOVE BIOGEL SZ 7 SURGICAL PF LTX - (50PR/BX 4BX/CA)

## (undated) DEVICE — GOWN SURGEONS LARGE - (32/CA)

## (undated) DEVICE — KIT ANESTHESIA W/CIRCUIT & 3/LT BAG W/FILTER (20EA/CA)

## (undated) DEVICE — NEPTUNE 4 PORT MANIFOLD - (20/PK)

## (undated) DEVICE — SUTURE 4-0 MONOCRYL PLUS PS-1 - 27 INCH (36/BX)

## (undated) DEVICE — PROTECTOR ULNA NERVE - (36PR/CA)

## (undated) DEVICE — KIT SURGIFLO W/OUT THROMBIN - (6EA/CA)

## (undated) DEVICE — SUTURE 1 VICRYL PLUS CTX - 8 X 18 INCH (12/BX)

## (undated) DEVICE — DRESSING POST OP BORDER 4 X 10 (5EA/BX)

## (undated) DEVICE — NEEDLE SPINAL NON-SAFETY 18 GA X 3 IN (25EA/BX)

## (undated) DEVICE — STERI STRIP COMPOUND BENZOIN - TINCTURE 0.6ML WITH APPLICATOR (40EA/BX)

## (undated) DEVICE — GLOVE SZ 7.5 BIOGEL PI MICRO - PF LF (50PR/BX)

## (undated) DEVICE — BONE PRESS SPINAL EDITION HENSLER (10EA/CA)

## (undated) DEVICE — PAD PREP 24 X 48 CUFFED - (100/CA)

## (undated) DEVICE — LACTATED RINGERS INJ 1000 ML - (14EA/CA 60CA/PF)

## (undated) DEVICE — CELLSAVER STAT

## (undated) DEVICE — GLOVE SZ 6.5 BIOGEL PI MICRO - PF LF (50PR/BX)

## (undated) DEVICE — PACK JACKSON TABLE KIT W/OUT - HR (6EA/CA)

## (undated) DEVICE — GLOVE BIOGEL PI INDICATOR SZ 7.0 SURGICAL PF LF - (50/BX 4BX/CA)

## (undated) DEVICE — BLADE SURGICAL CLIPPER - (50EA/CA)

## (undated) DEVICE — SYRINGE SAFETY 10 ML 18 GA X 1 1/2 BLUNT LL (100/BX 4BX/CA)

## (undated) DEVICE — SLEEVE, VASO, THIGH, MED

## (undated) DEVICE — TOOL DISSECT MATCH HEAD

## (undated) DEVICE — CLOSURE WOUND 1/4 X 4 (STERI - STRIP) (50/BX 4BX/CA)

## (undated) DEVICE — SPONGE GAUZESTER 4 X 4 4PLY - (128PK/CA)

## (undated) DEVICE — ELECTRODE 850 FOAM ADHESIVE - HYDROGEL RADIOTRNSPRNT (50/PK)

## (undated) DEVICE — SODIUM CHL IRRIGATION 0.9% 1000ML (12EA/CA)

## (undated) DEVICE — CANISTER SUCTION 3000ML MECHANICAL FILTER AUTO SHUTOFF MEDI-VAC NONSTERILE LF DISP  (40EA/CA)

## (undated) DEVICE — SUTURE GENERAL

## (undated) DEVICE — PACK NEURO - (2EA/CA)

## (undated) DEVICE — NEEDLE NON SAFETY HYPO 22 GA X 1 1/2 IN (100/BX)

## (undated) DEVICE — GLOVE BIOGEL SZ 8.5 SURGICAL PF LTX - (50PR/BX 4BX/CA)

## (undated) DEVICE — KIT ROOM DECONTAMINATION

## (undated) DEVICE — LIGHT SOURCE MIS 12FT

## (undated) DEVICE — SEALER BIPOLAR 2.3 AQUAMANTYS

## (undated) DEVICE — COVER LIGHT HANDLE ALC PLUS DISP (18EA/BX)

## (undated) DEVICE — MIDAS LUBRICATOR DIFFUSER PACK (4EA/CA)

## (undated) DEVICE — INTRAOP NEURO IN OR 1:1 PER 15 MIN

## (undated) DEVICE — PAD SANITARY 11IN MAXI IND WRAPPED (12EA/PK 24PK/CA)

## (undated) DEVICE — GLOVE BIOGEL ECLIPSE  PF LATEX SIZE 6.5 (50PR/BX)

## (undated) DEVICE — PENCIL ELECTSURG 10FT BTN SWH - (50/CA)

## (undated) DEVICE — CELLSAVER PACK

## (undated) DEVICE — TUBING C&T SET FLYING LEADS DRAIN TUBING (10EA/BX)

## (undated) DEVICE — DRESSING,POST OP BORDER 4INX6IN AG

## (undated) DEVICE — TRAY CATHETER FOLEY URINE METER W/STATLOCK 350ML (10EA/CA)